# Patient Record
Sex: MALE | Race: WHITE | NOT HISPANIC OR LATINO | Employment: OTHER | URBAN - METROPOLITAN AREA
[De-identification: names, ages, dates, MRNs, and addresses within clinical notes are randomized per-mention and may not be internally consistent; named-entity substitution may affect disease eponyms.]

---

## 2017-01-06 ENCOUNTER — ALLIED HEALTH/NURSE VISIT (OUTPATIENT)
Dept: NURSING | Facility: CLINIC | Age: 27
End: 2017-01-06
Payer: COMMERCIAL

## 2017-01-06 DIAGNOSIS — E34.9 TESTOSTERONE DEFICIENCY: Primary | ICD-10-CM

## 2017-01-06 PROCEDURE — 96372 THER/PROPH/DIAG INJ SC/IM: CPT

## 2017-01-06 PROCEDURE — 99207 ZZC NO CHARGE NURSE ONLY: CPT

## 2017-01-26 ENCOUNTER — ALLIED HEALTH/NURSE VISIT (OUTPATIENT)
Dept: NURSING | Facility: CLINIC | Age: 27
End: 2017-01-26
Payer: COMMERCIAL

## 2017-01-26 DIAGNOSIS — E34.9 TESTOSTERONE DEFICIENCY: Primary | ICD-10-CM

## 2017-01-26 PROCEDURE — 99207 ZZC NO CHARGE NURSE ONLY: CPT

## 2017-01-26 PROCEDURE — 96372 THER/PROPH/DIAG INJ SC/IM: CPT

## 2017-02-10 ENCOUNTER — ALLIED HEALTH/NURSE VISIT (OUTPATIENT)
Dept: NURSING | Facility: CLINIC | Age: 27
End: 2017-02-10
Payer: COMMERCIAL

## 2017-02-10 DIAGNOSIS — E34.9 TESTOSTERONE DEFICIENCY: Primary | ICD-10-CM

## 2017-02-10 PROCEDURE — 96372 THER/PROPH/DIAG INJ SC/IM: CPT

## 2017-02-10 PROCEDURE — 99207 ZZC NO CHARGE NURSE ONLY: CPT

## 2017-02-28 ENCOUNTER — ALLIED HEALTH/NURSE VISIT (OUTPATIENT)
Dept: NURSING | Facility: CLINIC | Age: 27
End: 2017-02-28
Payer: COMMERCIAL

## 2017-02-28 DIAGNOSIS — E34.9 TESTOSTERONE DEFICIENCY: Primary | ICD-10-CM

## 2017-02-28 PROCEDURE — 96372 THER/PROPH/DIAG INJ SC/IM: CPT

## 2017-02-28 PROCEDURE — 99207 ZZC NO CHARGE NURSE ONLY: CPT

## 2017-03-21 ENCOUNTER — ALLIED HEALTH/NURSE VISIT (OUTPATIENT)
Dept: NURSING | Facility: CLINIC | Age: 27
End: 2017-03-21
Payer: COMMERCIAL

## 2017-03-21 DIAGNOSIS — E34.9 TESTOSTERONE DEFICIENCY: Primary | ICD-10-CM

## 2017-03-21 PROCEDURE — 96372 THER/PROPH/DIAG INJ SC/IM: CPT

## 2017-03-21 PROCEDURE — 99207 ZZC NO CHARGE NURSE ONLY: CPT

## 2017-03-21 NOTE — MR AVS SNAPSHOT
"              After Visit Summary   3/21/2017    Mamadou Reece    MRN: [id number]           Patient Information     Date Of Birth          1990        Visit Information        Provider Department      3/21/2017 8:30 AM  NURSE [facility]        Today's Diagnoses     Testosterone deficiency    -  1       Follow-ups after your visit        Who to contact     If you have questions or need follow up information about today's clinic visit or your schedule please contact [facility] directly at [phone].  Normal or non-critical lab and imaging results will be communicated to you by MyChart, letter or phone within 4 business days after the clinic has received the results. If you do not hear from us within 7 days, please contact the clinic through [employer] or phone. If you have a critical or abnormal lab result, we will notify you by phone as soon as possible.  Submit refill requests through [employer] or call your pharmacy and they will forward the refill request to us. Please allow 3 business days for your refill to be completed.          Additional Information About Your Visit        MyChart Information     [employer] lets you send messages to your doctor, view your test results, renew your prescriptions, schedule appointments and more. To sign up, go to [email or web address] . Click on \"Log in\" on the left side of the screen, which will take you to the Welcome page. Then click on \"Sign up Now\" on the right side of the page.     You will be asked to enter the access code listed below, as well as some personal information. Please follow the directions to create your username and password.     Your access code is: [email or web address]  Expires: 2017  9:55 AM     Your access code will  in 90 days. If you need help or a new code, please call your [facility] or [phone].        Care EveryWhere ID     This is your Care EveryWhere ID. This could be used by other organizations to " access your [place] medical records  [id number]         Blood Pressure from Last 3 Encounters:   08/03/16 102/70   09/23/15 104/72   02/25/14 110/76    Weight from Last 3 Encounters:   08/03/16 184 lb 9.6 oz (83.7 kg)   09/23/15 195 lb 14.4 oz (88.9 kg)   02/25/14 194 lb (88 kg)              We Performed the Following     C TESTOSTERONE CYPIONATE INJECTION, 1 MG     INJECTION INTRAMUSCULAR OR SUB-Q        Primary Care Provider Office Phone # Fax #    Mariluz Mae [phone]       [facility] [address] [address]  [facility] [address]        Thank you!     Thank you for choosing [facility]  for your care. Our goal is always to provide you with excellent care. Hearing back from our patients is one way we can continue to improve our services. Please take a few minutes to complete the written survey that you may receive in the mail after your visit with us. Thank you!             Your Updated Medication List - Protect others around you: Learn how to safely use, store and throw away your medicines at www.disposemymeds.org.          This list is accurate as of: 3/21/17  9:11 AM.  Always use your most recent med list.                   Brand Name Dispense Instructions for use    ADDERALL XR 30 MG per 24 hr capsule   Generic drug:  amphetamine-dextroamphetamine      Take 30 mg by mouth daily.       B-12 1000 MCG Tbcr     100 tablet    Take 1,000 mcg by mouth daily       CALCIUM + D 315-200 MG-UNIT Tabs per tablet   Generic drug:  calcium citrate-vitamin D      Take  by mouth.       desmopressin acetate spray 0.01 % Soln      Spray  in nostril. 3 sprays once daily       levothyroxine 137 MCG tablet    SYNTHROID/LEVOTHROID     Take 137 mcg by mouth daily.       NORDITROPIN NORDIFLEX PEN SC      Inject 2 mLs Subcutaneous       predniSONE 2.5 MG tablet    DELTASONE     Take 2.5 mg by mouth daily. 3 tabs in am, and 2 in pm-unsure of dose       testosterone cypionate 100 MG/ML Soln  inj      Inject 1 mL into the muscle every 14 days       vitamin D 1000 UNITS capsule      Take 1 capsule by mouth daily.

## 2017-04-21 ENCOUNTER — TELEPHONE (OUTPATIENT)
Dept: FAMILY MEDICINE | Facility: CLINIC | Age: 27
End: 2017-04-21

## 2017-04-21 DIAGNOSIS — E23.0 PANHYPOPITUITARISM (H): ICD-10-CM

## 2017-04-21 DIAGNOSIS — E34.9 TESTOSTERONE DEFICIENCY: Primary | ICD-10-CM

## 2017-04-21 RX ORDER — TESTOSTERONE CYPIONATE 1000 MG/10ML
100 INJECTION, SOLUTION INTRAMUSCULAR
OUTPATIENT
Start: 2017-04-21 | End: 2024-08-08

## 2017-04-21 NOTE — TELEPHONE ENCOUNTER
Pt arrived for scheduled testosterone injection  testosterone cypionate 100 MG/ML SOLN inj   9/20/2016  --   Sig: Inject 1 mL into the muscle every 14 days   Class: Historical   Route: Intramuscular     Med is historical, no current order for medication  Was last seen August 2016 by PCP, saw endocrine 9-20-16, notes indicate testosterone per PCP  Above note states 100 mg/ml, but our stock comes 200 mg/ml,  Previous injections state being given 200 mg    Contacted PCP office, but PCP out of office    NEED NEW ORDER FOR MEDICATION WITH CORRECT DOSAGE AND FREQUENCY  NO TESTOSTERONE LEVEL ON FILE    Explained to pts father that we were unable to obtain current rx order, states he will call on Monday to make sure we have current order and will then schedule appt for injection  DIiscused with nurse manager    Prisca Melendrez RN, BS  Clinical Nurse Triage.

## 2017-04-21 NOTE — TELEPHONE ENCOUNTER
This looks like it is routed to me as FYI, but there are instructions to someone to call Endocrinology office and family.     Is this for when Dr. Clark's office calls back?     We certainly need to let the family know what is going on.  I will be there on Monday; I can talk with them if needed.      Thanks!

## 2017-04-21 NOTE — TELEPHONE ENCOUNTER
Dr. Tristan office has not called back yet.    Spoke with supervisor concerning shot protocol. [facility] no longer does testosterone shots. Patient's may go to [facility] or [facility] for RN only appt.    If [facility] provider must send prescription to [facility] pharmacy with refills enough for 6 months. If  provider must put prescription in chart with refills for 6 months. Prescription must read to be given by RN in clinic.    Outside provider, like endocrinology, may send prescription to [facility] or fax to [facility]. It does not need to come from pcp.    Prescriptions  in 6 months, so either way this patient needs new prescription.    Please discuss option with Dr. Tristan office if they want to send prescription.    Please call family to decide where they would like the prescription sent to.    Routed to pcp as FYI.    Jeni Braswell RN

## 2017-04-21 NOTE — TELEPHONE ENCOUNTER
"Several questions....      Looks like there were phone messages from last October. I am not sure if they have all been answered.  I have not wanted to be dosing. I have transcribed an order and given to him here for family convenience.  Looks like we got an order last Fall, but I had asked for how long the order was good for and do not see that answer (see phone message on 10/20/16).  From that; the dose has been 200 mg q 2 weeks:    S\"sivan with [facility]-Dr. Clark's office left VM message.  Dosing instructions for Depo Testosterone/Testosterone Cypionate-100mg/ml  Inject 200 mg IM every two weeks.  If any questions, call [facility] at [phone].\"    -------  Also, I thought I overheard that we will not be doing testosterone injections any longer? Is that true?   We need clarification on this...     If we cannot clarify by Monday; OK to give the 200 mg IM. It looks like we have been doing this all along; though I don't see the clarification there from last October yet... For how long this is good for and when he should be getting his testosterone checked (or did he)?    "

## 2017-04-24 NOTE — TELEPHONE ENCOUNTER
Left msg at Dr. Clark's office for eduardo to call back.  Per Dr. Mae, he should be writing prescription and they can send it to [facility] and then pt can have RN only visit to get injection there, but she does not wish to do the prescribing of the med.

## 2017-04-24 NOTE — TELEPHONE ENCOUNTER
From my understanding, they can do it from outside providers, is that correct? (that's what Jeni's note says)    If so, the order should come from the specialist.   The father may want to contact the specialty office as well.

## 2017-04-24 NOTE — TELEPHONE ENCOUNTER
Father calling-wanting to schedule Testosterone injection but wants to make sure we have order first this time.  Advised per below looks like RM has call in for Dr. Clark.  He was in [facility] the other day for injection but no current order so had to be sent away.  He is now wanting to come to [facility] for injections as RM no longer giving but if we are giving we do need to have current order to provide this service.  Brittaney Davis RN

## 2017-04-25 RX ORDER — TESTOSTERONE CYPIONATE 200 MG/ML
200 INJECTION, SOLUTION INTRAMUSCULAR
Qty: 1 ML | Refills: 5 | Status: SHIPPED | OUTPATIENT
Start: 2017-04-25 | End: 2017-08-08

## 2017-04-25 NOTE — TELEPHONE ENCOUNTER
"Faxed order dated 4/24/17 from [facility]   Endocrinology Department ph [phone], fax [phone]     Fax appears to be a prescription for testosterone.  We faxed note back to endo:     \"Please clarify.  Is this a prescription for pharmacy dispense? Or order for nursing?  If nurse order, what is end date?  If Rx, our pharmacy fax [phone]. \"    (Once we hear back from endo, let's call and give pt option to use clinic's testosterone. It could save him some time waiting in pharmacy for Rx dispense)   Darrel Gray, MICKIE - [place]      "

## 2017-04-25 NOTE — TELEPHONE ENCOUNTER
Father called in again to schedule testosterone  Waiting to get specifics from endocrinology, father was planning to contact endo to tell them we just need order including an end date, that we carry stock of medication in [place]    Prisca Melendrez RN, BS  Clinical Nurse Triage.

## 2017-04-25 NOTE — TELEPHONE ENCOUNTER
Spoke to father Pat,  Scheduled for labs in 1 1/2 wks after testosterone shot is given at [facility]  Considering changing to local endocrinologist, suggested our [facility] providers, will consider    I will f/u with pt & father Wed once order placed  Prisca Melendrez RN, BS  Clinical Nurse Triage.

## 2017-04-25 NOTE — TELEPHONE ENCOUNTER
I did place the order (I believe correctly).    It does look like it is for 200 mg IM q 2 weeks.  He had prn refills; but I have placed with 5 refills.     He will need to be seen in  by early August.  I believe the Endocrinologist had previously said to be seen in a year, which would be September.    Would be great if he switches to  Endocrinology.  Let me know if I should put in a referral.    Thanks so much for working with this family!

## 2017-04-25 NOTE — TELEPHONE ENCOUNTER
Triage received fax for Testosterone via fax. Rx placed in PCP in basket.    Estefania Catalan RN, BSN, PHN

## 2017-04-25 NOTE — TELEPHONE ENCOUNTER
Fax that was received this morning from endocrinologist showing rx for testosterone was just faxed to Dr Mae for review  Prisca Melendrez RN, BS  Clinical Nurse Triage.

## 2017-04-25 NOTE — TELEPHONE ENCOUNTER
Will route to the person who sent to me as not sure how to do routing in [facility]; and to our triage pool to help in the event that person is not there...    I can do an order based on the Endocrine recommendations...     But now I have not seen the prescription....   In the past, it was 200 mg IM q 2 weeks.   Is this what it is?    Is there an end date?   Can you fax a copy of the prescription or help with the order?   I am not sure I know the way the order needs to be in there; can someone help and I can cosign?

## 2017-04-25 NOTE — TELEPHONE ENCOUNTER
Father got a hold of endocrine office, since they are not part of [facility] they can send prescription but not just an order for the medication    [facility] has testosterone in clinic so pt does not need to  rx at pharmacy to be given here    Can we get order for testosterone based on endocrine recommendations so pt can get back on track getting injections?    Father states they plan to get testosterone level checked once pt is back on medications since would be low now,  Can we get testosterone level order for future date?    Route to PCP  Prisca Melendrez RN, BS  Clinical Nurse Triage.

## 2017-04-25 NOTE — TELEPHONE ENCOUNTER
AV RN will send prescription fax for PCP to review.    Please sign a future testosterone lab order as pt. is not up to date on his levels.    Estefania Catalan, RN, BSN, PHN

## 2017-04-26 NOTE — TELEPHONE ENCOUNTER
LM on VM  Encouraged father, Erendira to schedule appt for son,  Provided call back number  Prisca Melendrez RN, BS  Clinical Nurse Triage.

## 2017-04-28 ENCOUNTER — ALLIED HEALTH/NURSE VISIT (OUTPATIENT)
Dept: NURSING | Facility: CLINIC | Age: 27
End: 2017-04-28
Payer: COMMERCIAL

## 2017-04-28 DIAGNOSIS — E34.9 TESTOSTERONE DEFICIENCY: Primary | ICD-10-CM

## 2017-04-28 PROCEDURE — 96372 THER/PROPH/DIAG INJ SC/IM: CPT

## 2017-04-28 PROCEDURE — 99207 ZZC NO CHARGE NURSE ONLY: CPT

## 2017-04-28 NOTE — NURSING NOTE
The following medication was given:     MEDICATION: Testosterone 200 mg  ROUTE: IM  SITE: RT VG  DOSE: 200 mg (1 CC)  LOT #: [id number]  :  [employer]  EXPIRATION DATE:  06/2019  NDC#: 4811-1061-63    Mariluz Salmon RN, BSN  Message handled by Nurse Triage.

## 2017-04-28 NOTE — MR AVS SNAPSHOT
"              After Visit Summary   2017    Mamadou Reece    MRN: [id number]           Patient Information     Date Of Birth          1990        Visit Information        Provider Department      2017 8:30 AM [facility] RN [facility]        Today's Diagnoses     Testosterone deficiency    -  1       Follow-ups after your visit        Who to contact     If you have questions or need follow up information about today's clinic visit or your schedule please contact [facility] directly at [phone].  Normal or non-critical lab and imaging results will be communicated to you by MyChart, letter or phone within 4 business days after the clinic has received the results. If you do not hear from us within 7 days, please contact the clinic through MyChart or phone. If you have a critical or abnormal lab result, we will notify you by phone as soon as possible.  Submit refill requests through [employer] or call your pharmacy and they will forward the refill request to us. Please allow 3 business days for your refill to be completed.          Additional Information About Your Visit        MyChart Information     [employer] lets you send messages to your doctor, view your test results, renew your prescriptions, schedule appointments and more. To sign up, go to [email or web address] . Click on \"Log in\" on the left side of the screen, which will take you to the Welcome page. Then click on \"Sign up Now\" on the right side of the page.     You will be asked to enter the access code listed below, as well as some personal information. Please follow the directions to create your username and password.     Your access code is: [email or web address]  Expires: 2017  9:55 AM     Your access code will  in 90 days. If you need help or a new code, please call your [facility] or [phone].        Care EveryWhere ID     This is your Care EveryWhere ID. This could be used by other organizations to " access your [place] medical records  [id number]         Blood Pressure from Last 3 Encounters:   08/03/16 102/70   09/23/15 104/72   02/25/14 110/76    Weight from Last 3 Encounters:   08/03/16 184 lb 9.6 oz (83.7 kg)   09/23/15 195 lb 14.4 oz (88.9 kg)   02/25/14 194 lb (88 kg)              We Performed the Following     C TESTOSTERONE CYPIONATE INJECTION, 1 MG     INJECTION INTRAMUSCULAR OR SUB-Q        Primary Care Provider Office Phone # Fax #    Mariluz Mae [phone]       [facility] [address] [address] [facility] [address]        Thank you!     Thank you for choosing [facility]  for your care. Our goal is always to provide you with excellent care. Hearing back from our patients is one way we can continue to improve our services. Please take a few minutes to complete the written survey that you may receive in the mail after your visit with us. Thank you!             Your Updated Medication List - Protect others around you: Learn how to safely use, store and throw away your medicines at www.disposemymeds.org.          This list is accurate as of: 4/28/17 10:02 AM.  Always use your most recent med list.                   Brand Name Dispense Instructions for use    ADDERALL XR 30 MG per 24 hr capsule   Generic drug:  amphetamine-dextroamphetamine      Take 30 mg by mouth daily.       B-12 1000 MCG Tbcr     100 tablet    Take 1,000 mcg by mouth daily       CALCIUM + D 315-200 MG-UNIT Tabs per tablet   Generic drug:  calcium citrate-vitamin D      Take  by mouth.       desmopressin acetate spray 0.01 % Soln      Spray  in nostril. 3 sprays once daily       levothyroxine 137 MCG tablet    SYNTHROID/LEVOTHROID     Take 137 mcg by mouth daily.       NORDITROPIN NORDIFLEX PEN SC      Inject 2 mLs Subcutaneous       predniSONE 2.5 MG tablet    DELTASONE     Take 2.5 mg by mouth daily. 3 tabs in am, and 2 in pm-unsure of dose       * testosterone cypionate 100 MG/ML  Soln inj      Inject 1 mL into the muscle every 14 days       * testosterone cypionate 200 MG/ML injection    DEPOTESTOTERONE    1 mL    Inject 1 mL (200 mg) into the muscle every 14 days       vitamin D 1000 UNITS capsule      Take 1 capsule by mouth daily.       * Notice:  This list has 2 medication(s) that are the same as other medications prescribed for you. Read the directions carefully, and ask your doctor or other care provider to review them with you.

## 2017-05-12 ENCOUNTER — ALLIED HEALTH/NURSE VISIT (OUTPATIENT)
Dept: NURSING | Facility: CLINIC | Age: 27
End: 2017-05-12
Payer: COMMERCIAL

## 2017-05-12 DIAGNOSIS — E34.9 TESTOSTERONE DEFICIENCY: Primary | ICD-10-CM

## 2017-05-12 PROCEDURE — 96372 THER/PROPH/DIAG INJ SC/IM: CPT

## 2017-05-12 PROCEDURE — 99207 ZZC NO CHARGE NURSE ONLY: CPT

## 2017-05-12 NOTE — MR AVS SNAPSHOT
"              After Visit Summary   2017    Mamadou Reece    MRN: [id number]           Patient Information     Date Of Birth          1990        Visit Information        Provider Department      2017 8:15 AM CR RN [facility]        Today's Diagnoses     Testosterone deficiency    -  1       Follow-ups after your visit        Follow-up notes from your care team     Return in about 2 weeks (around 2017) for testosterone .      Who to contact     If you have questions or need follow up information about today's clinic visit or your schedule please contact [facility] directly at [phone].  Normal or non-critical lab and imaging results will be communicated to you by MyChart, letter or phone within 4 business days after the clinic has received the results. If you do not hear from us within 7 days, please contact the clinic through [employer] or phone. If you have a critical or abnormal lab result, we will notify you by phone as soon as possible.  Submit refill requests through [employer] or call your pharmacy and they will forward the refill request to us. Please allow 3 business days for your refill to be completed.          Additional Information About Your Visit        MyChart Information     [employer] lets you send messages to your doctor, view your test results, renew your prescriptions, schedule appointments and more. To sign up, go to [email or web address] . Click on \"Log in\" on the left side of the screen, which will take you to the Welcome page. Then click on \"Sign up Now\" on the right side of the page.     You will be asked to enter the access code listed below, as well as some personal information. Please follow the directions to create your username and password.     Your access code is: [email or web address]  Expires: 2017  9:55 AM     Your access code will  in 90 days. If you need help or a new code, please call your [facility] or " [phone].        Care EveryWhere ID     This is your Care EveryWhere ID. This could be used by other organizations to access your [place] medical records  [id number]         Blood Pressure from Last 3 Encounters:   08/03/16 102/70   09/23/15 104/72   02/25/14 110/76    Weight from Last 3 Encounters:   08/03/16 184 lb 9.6 oz (83.7 kg)   09/23/15 195 lb 14.4 oz (88.9 kg)   02/25/14 194 lb (88 kg)              We Performed the Following     C TESTOSTERONE CYPIONATE INJECTION, 1 MG     INJECTION INTRAMUSCULAR OR SUB-Q        Primary Care Provider Office Phone # Fax #    Mariluz Mae [phone]       [facility] [address] [address]  [facility] [address]        Thank you!     Thank you for choosing [facility]  for your care. Our goal is always to provide you with excellent care. Hearing back from our patients is one way we can continue to improve our services. Please take a few minutes to complete the written survey that you may receive in the mail after your visit with us. Thank you!             Your Updated Medication List - Protect others around you: Learn how to safely use, store and throw away your medicines at www.disposemymeds.org.          This list is accurate as of: 5/12/17  8:40 AM.  Always use your most recent med list.                   Brand Name Dispense Instructions for use    ADDERALL XR 30 MG per 24 hr capsule   Generic drug:  amphetamine-dextroamphetamine      Take 30 mg by mouth daily.       B-12 1000 MCG Tbcr     100 tablet    Take 1,000 mcg by mouth daily       CALCIUM + D 315-200 MG-UNIT Tabs per tablet   Generic drug:  calcium citrate-vitamin D      Take  by mouth.       desmopressin acetate spray 0.01 % Soln      Spray  in nostril. 3 sprays once daily       levothyroxine 137 MCG tablet    SYNTHROID/LEVOTHROID     Take 137 mcg by mouth daily.       NORDITROPIN NORDIFLEX PEN SC      Inject 2 mLs Subcutaneous       predniSONE 2.5 MG tablet     DELTASONE     Take 2.5 mg by mouth daily. 3 tabs in am, and 2 in pm-unsure of dose       * testosterone cypionate 100 MG/ML Soln inj      Inject 1 mL into the muscle every 14 days       * testosterone cypionate 200 MG/ML injection    DEPOTESTOTERONE    1 mL    Inject 1 mL (200 mg) into the muscle every 14 days       vitamin D 1000 UNITS capsule      Take 1 capsule by mouth daily.       * Notice:  This list has 2 medication(s) that are the same as other medications prescribed for you. Read the directions carefully, and ask your doctor or other care provider to review them with you.

## 2017-06-01 ENCOUNTER — ALLIED HEALTH/NURSE VISIT (OUTPATIENT)
Dept: NURSING | Facility: CLINIC | Age: 27
End: 2017-06-01
Payer: COMMERCIAL

## 2017-06-01 DIAGNOSIS — E34.9 TESTOSTERONE DEFICIENCY: Primary | ICD-10-CM

## 2017-06-01 PROCEDURE — 99207 ZZC NO CHARGE NURSE ONLY: CPT

## 2017-06-01 PROCEDURE — 96372 THER/PROPH/DIAG INJ SC/IM: CPT

## 2017-06-01 NOTE — NURSING NOTE
The following medication was given:     MEDICATION: Testosterone 200 mg  ROUTE: IM  SITE: Vastus Lateralis - Left  DOSE: 200 mg  LOT #: [id number]  :  [employer]  EXPIRATION DATE:  06/2019  NDC#: 2659-6811-57    Prisca Melendrez RN, BS  Clinical Nurse Triage.

## 2017-06-01 NOTE — MR AVS SNAPSHOT
"              After Visit Summary   2017    Mamadou Reece    MRN: [id number]           Patient Information     Date Of Birth          1990        Visit Information        Provider Department      2017 8:30 AM CR RN [facility]        Today's Diagnoses     Testosterone deficiency    -  1       Follow-ups after your visit        Follow-up notes from your care team     Return in about 2 weeks (around 6/15/2017).      Who to contact     If you have questions or need follow up information about today's clinic visit or your schedule please contact [facility] directly at [phone].  Normal or non-critical lab and imaging results will be communicated to you by [employer], letter or phone within 4 business days after the clinic has received the results. If you do not hear from us within 7 days, please contact the clinic through [employer] or phone. If you have a critical or abnormal lab result, we will notify you by phone as soon as possible.  Submit refill requests through [employer] or call your pharmacy and they will forward the refill request to us. Please allow 3 business days for your refill to be completed.          Additional Information About Your Visit        MyChart Information     [employer] lets you send messages to your doctor, view your test results, renew your prescriptions, schedule appointments and more. To sign up, go to [email or web address] . Click on \"Log in\" on the left side of the screen, which will take you to the Welcome page. Then click on \"Sign up Now\" on the right side of the page.     You will be asked to enter the access code listed below, as well as some personal information. Please follow the directions to create your username and password.     Your access code is: [email or web address]  Expires: 2017  9:01 AM     Your access code will  in 90 days. If you need help or a new code, please call your [facility] or [phone].        Care " EveryWhere ID     This is your Care EveryWhere ID. This could be used by other organizations to access your [place] medical records  [id number]         Blood Pressure from Last 3 Encounters:   08/03/16 102/70   09/23/15 104/72   02/25/14 110/76    Weight from Last 3 Encounters:   08/03/16 184 lb 9.6 oz (83.7 kg)   09/23/15 195 lb 14.4 oz (88.9 kg)   02/25/14 194 lb (88 kg)              We Performed the Following     C TESTOSTERONE CYPIONATE INJECTION, 1 MG     INJECTION INTRAMUSCULAR OR SUB-Q        Primary Care Provider Office Phone # Fax #    Mariluz Mae [phone]       [facility] [address] [facility] [address]        Thank you!     Thank you for choosing [facility]  for your care. Our goal is always to provide you with excellent care. Hearing back from our patients is one way we can continue to improve our services. Please take a few minutes to complete the written survey that you may receive in the mail after your visit with us. Thank you!             Your Updated Medication List - Protect others around you: Learn how to safely use, store and throw away your medicines at www.disposemymeds.org.          This list is accurate as of: 6/1/17  9:01 AM.  Always use your most recent med list.                   Brand Name Dispense Instructions for use    ADDERALL XR 30 MG per 24 hr capsule   Generic drug:  amphetamine-dextroamphetamine      Take 30 mg by mouth daily.       B-12 1000 MCG Tbcr     100 tablet    Take 1,000 mcg by mouth daily       CALCIUM + D 315-200 MG-UNIT Tabs per tablet   Generic drug:  calcium citrate-vitamin D      Take  by mouth.       desmopressin acetate spray 0.01 % Soln      Spray  in nostril. 3 sprays once daily       levothyroxine 137 MCG tablet    SYNTHROID/LEVOTHROID     Take 137 mcg by mouth daily.       NORDITROPIN NORDIFLEX PEN SC      Inject 2 mLs Subcutaneous       predniSONE 2.5 MG tablet    DELTASONE     Take 2.5 mg by  mouth daily. 3 tabs in am, and 2 in pm-unsure of dose       * testosterone cypionate 100 MG/ML Soln inj      Inject 1 mL into the muscle every 14 days       * testosterone cypionate 200 MG/ML injection    DEPOTESTOTERONE    1 mL    Inject 1 mL (200 mg) into the muscle every 14 days       vitamin D 1000 UNITS capsule      Take 1 capsule by mouth daily.       * Notice:  This list has 2 medication(s) that are the same as other medications prescribed for you. Read the directions carefully, and ask your doctor or other care provider to review them with you.

## 2017-06-19 ENCOUNTER — TELEPHONE (OUTPATIENT)
Dept: FAMILY MEDICINE | Facility: CLINIC | Age: 27
End: 2017-06-19

## 2017-06-20 ENCOUNTER — ALLIED HEALTH/NURSE VISIT (OUTPATIENT)
Dept: NURSING | Facility: CLINIC | Age: 27
End: 2017-06-20
Payer: COMMERCIAL

## 2017-06-20 DIAGNOSIS — E34.9 TESTOSTERONE DEFICIENCY: ICD-10-CM

## 2017-06-20 PROCEDURE — 99207 ZZC NO CHARGE NURSE ONLY: CPT

## 2017-06-20 PROCEDURE — 96372 THER/PROPH/DIAG INJ SC/IM: CPT

## 2017-06-20 NOTE — PROGRESS NOTES
Per orders of Dr. Mariluz Mae, injection of Depo-Testosterone 200mg given. Patient instructed to remain in clinic for 20 minutes afterwards and to report any adverse reaction to me immediately.  Darrel Gray RN

## 2017-06-20 NOTE — MR AVS SNAPSHOT
"              After Visit Summary   2017    Mamadou Reece    MRN: [id number]           Patient Information     Date Of Birth          1990        Visit Information        Provider Department      2017 8:30 AM CR RN [facility]        Today's Diagnoses     Testosterone deficiency           Follow-ups after your visit        Follow-up notes from your care team     Return in about 14 years (around 2031).      Who to contact     If you have questions or need follow up information about today's clinic visit or your schedule please contact [facility] directly at [phone].  Normal or non-critical lab and imaging results will be communicated to you by [employer], letter or phone within 4 business days after the clinic has received the results. If you do not hear from us within 7 days, please contact the clinic through [employer] or phone. If you have a critical or abnormal lab result, we will notify you by phone as soon as possible.  Submit refill requests through [employer] or call your pharmacy and they will forward the refill request to us. Please allow 3 business days for your refill to be completed.          Additional Information About Your Visit        MyChart Information     [employer] lets you send messages to your doctor, view your test results, renew your prescriptions, schedule appointments and more. To sign up, go to [email or web address] . Click on \"Log in\" on the left side of the screen, which will take you to the Welcome page. Then click on \"Sign up Now\" on the right side of the page.     You will be asked to enter the access code listed below, as well as some personal information. Please follow the directions to create your username and password.     Your access code is: [email or web address]  Expires: 2017  9:01 AM     Your access code will  in 90 days. If you need help or a new code, please call your [facility] or [phone].        Care " EveryWhere ID     This is your Care EveryWhere ID. This could be used by other organizations to access your [place] medical records  [id number]         Blood Pressure from Last 3 Encounters:   08/03/16 102/70   09/23/15 104/72   02/25/14 110/76    Weight from Last 3 Encounters:   08/03/16 184 lb 9.6 oz (83.7 kg)   09/23/15 195 lb 14.4 oz (88.9 kg)   02/25/14 194 lb (88 kg)              We Performed the Following     C TESTOSTERONE CYPIONATE INJECTION, 1 MG     INJECTION INTRAMUSCULAR OR SUB-Q        Primary Care Provider Office Phone # Fax #    Mariluz Mae -[phone]       [facility] [address] [facility] [address]        Thank you!     Thank you for choosing [facility]  for your care. Our goal is always to provide you with excellent care. Hearing back from our patients is one way we can continue to improve our services. Please take a few minutes to complete the written survey that you may receive in the mail after your visit with us. Thank you!             Your Updated Medication List - Protect others around you: Learn how to safely use, store and throw away your medicines at www.disposemymeds.org.          This list is accurate as of: 6/20/17  9:02 AM.  Always use your most recent med list.                   Brand Name Dispense Instructions for use    ADDERALL XR 30 MG per 24 hr capsule   Generic drug:  amphetamine-dextroamphetamine      Take 30 mg by mouth daily.       B-12 1000 MCG Tbcr     100 tablet    Take 1,000 mcg by mouth daily       CALCIUM + D 315-200 MG-UNIT Tabs per tablet   Generic drug:  calcium citrate-vitamin D      Take  by mouth.       desmopressin acetate spray 0.01 % Soln      Spray  in nostril. 3 sprays once daily       levothyroxine 137 MCG tablet    SYNTHROID/LEVOTHROID     Take 137 mcg by mouth daily.       NORDITROPIN NORDIFLEX PEN SC      Inject 2 mLs Subcutaneous       predniSONE 2.5 MG tablet    DELTASONE     Take 2.5 mg by  mouth daily. 3 tabs in am, and 2 in pm-unsure of dose       testosterone cypionate 200 MG/ML injection    DEPOTESTOTERONE    1 mL    Inject 1 mL (200 mg) into the muscle every 14 days       vitamin D 1000 UNITS capsule      Take 1 capsule by mouth daily.

## 2017-07-05 ENCOUNTER — ALLIED HEALTH/NURSE VISIT (OUTPATIENT)
Dept: NURSING | Facility: CLINIC | Age: 27
End: 2017-07-05
Payer: COMMERCIAL

## 2017-07-05 DIAGNOSIS — E34.9 TESTOSTERONE DEFICIENCY: Primary | ICD-10-CM

## 2017-07-05 PROCEDURE — 99207 ZZC NO CHARGE NURSE ONLY: CPT

## 2017-07-05 PROCEDURE — 96372 THER/PROPH/DIAG INJ SC/IM: CPT

## 2017-07-05 NOTE — MR AVS SNAPSHOT
"              After Visit Summary   2017    Mamadou Reece    MRN: [id number]           Patient Information     Date Of Birth          1990        Visit Information        Provider Department      2017 8:45 AM CR RN [facility]        Today's Diagnoses     Testosterone deficiency    -  1       Follow-ups after your visit        Follow-up notes from your care team     Return in about 2 weeks (around 2017).      Who to contact     If you have questions or need follow up information about today's clinic visit or your schedule please contact [facility] directly at [phone].  Normal or non-critical lab and imaging results will be communicated to you by [employer], letter or phone within 4 business days after the clinic has received the results. If you do not hear from us within 7 days, please contact the clinic through [employer] or phone. If you have a critical or abnormal lab result, we will notify you by phone as soon as possible.  Submit refill requests through [employer] or call your pharmacy and they will forward the refill request to us. Please allow 3 business days for your refill to be completed.          Additional Information About Your Visit        MyChart Information     [employer] lets you send messages to your doctor, view your test results, renew your prescriptions, schedule appointments and more. To sign up, go to [email or web address] . Click on \"Log in\" on the left side of the screen, which will take you to the Welcome page. Then click on \"Sign up Now\" on the right side of the page.     You will be asked to enter the access code listed below, as well as some personal information. Please follow the directions to create your username and password.     Your access code is: [email or web address]  Expires: 2017  9:01 AM     Your access code will  in 90 days. If you need help or a new code, please call your [facility] or [phone].        Care " EveryWhere ID     This is your Care EveryWhere ID. This could be used by other organizations to access your [place] medical records  [id number]         Blood Pressure from Last 3 Encounters:   08/03/16 102/70   09/23/15 104/72   02/25/14 110/76    Weight from Last 3 Encounters:   08/03/16 184 lb 9.6 oz (83.7 kg)   09/23/15 195 lb 14.4 oz (88.9 kg)   02/25/14 194 lb (88 kg)              We Performed the Following     C TESTOSTERONE CYPIONATE INJECTION, 1 MG     INJECTION INTRAMUSCULAR OR SUB-Q        Primary Care Provider Office Phone # Fax #    Mariluz Mae [phone]       [facility] [address] [facility] [address]        Equal Access to Services     MARLO OZUNA : Hadii ryan ku hadasho Soomaali, waaxda luqadaha, qaybta kaalmada adeegyada, waxbárbara garcia hayharoon osorio . So [facility] [phone].    ATENCIÓN: Si habla español, tiene a henderson disposición servicios gratuitos de asistencia lingüística. Llame al [phone].    We comply with applicable federal civil rights laws and [place] laws. We do not discriminate on the basis of race, color, national origin, age, disability sex, sexual orientation or gender identity.            Thank you!     Thank you for choosing [facility]  for your care. Our goal is always to provide you with excellent care. Hearing back from our patients is one way we can continue to improve our services. Please take a few minutes to complete the written survey that you may receive in the mail after your visit with us. Thank you!             Your Updated Medication List - Protect others around you: Learn how to safely use, store and throw away your medicines at www.disposemymeds.org.          This list is accurate as of: 7/5/17  9:13 AM.  Always use your most recent med list.                   Brand Name Dispense Instructions for use Diagnosis    ADDERALL XR 30 MG per 24 hr capsule   Generic drug:   amphetamine-dextroamphetamine      Take 30 mg by mouth daily.        B-12 1000 MCG Tbcr     100 tablet    Take 1,000 mcg by mouth daily    Routine general medical examination at a health care facility       CALCIUM + D 315-200 MG-UNIT Tabs per tablet   Generic drug:  calcium citrate-vitamin D      Take  by mouth.        desmopressin acetate spray 0.01 % Soln      Spray  in nostril. 3 sprays once daily        levothyroxine 137 MCG tablet    SYNTHROID/LEVOTHROID     Take 137 mcg by mouth daily.        NORDITROPIN NORDIFLEX PEN SC      Inject 2 mLs Subcutaneous        predniSONE 2.5 MG tablet    DELTASONE     Take 2.5 mg by mouth daily. 3 tabs in am, and 2 in pm-unsure of dose        testosterone cypionate 200 MG/ML injection    DEPOTESTOTERONE    1 mL    Inject 1 mL (200 mg) into the muscle every 14 days    Testosterone deficiency, Panhypopituitarism (H)       vitamin D 1000 UNITS capsule      Take 1 capsule by mouth daily.

## 2017-07-05 NOTE — PROGRESS NOTES
Per orders of Dr. Mariluz Mae, injection of testosterone 200mg given.. Patient instructed to remain in clinic for 20 minutes afterwards and to report any adverse reaction to me immediately.  Darrel Gray RN

## 2017-07-21 ENCOUNTER — OFFICE VISIT (OUTPATIENT)
Dept: NURSING | Facility: CLINIC | Age: 27
End: 2017-07-21
Payer: COMMERCIAL

## 2017-07-21 DIAGNOSIS — E34.9 TESTOSTERONE DEFICIENCY: Primary | ICD-10-CM

## 2017-07-21 PROCEDURE — 96372 THER/PROPH/DIAG INJ SC/IM: CPT

## 2017-07-21 PROCEDURE — 99207 ZZC NO CHARGE NURSE ONLY: CPT

## 2017-07-21 NOTE — MR AVS SNAPSHOT
"              After Visit Summary   2017    Mamadou Reece    MRN: [id number]           Patient Information     Date Of Birth          1990        Visit Information        Provider Department      2017 9:00 AM CR RN [facility]        Today's Diagnoses     Testosterone deficiency    -  1       Follow-ups after your visit        Who to contact     If you have questions or need follow up information about today's clinic visit or your schedule please contact [facility] directly at [phone].  Normal or non-critical lab and imaging results will be communicated to you by MyChart, letter or phone within 4 business days after the clinic has received the results. If you do not hear from us within 7 days, please contact the clinic through MyChart or phone. If you have a critical or abnormal lab result, we will notify you by phone as soon as possible.  Submit refill requests through [employer] or call your pharmacy and they will forward the refill request to us. Please allow 3 business days for your refill to be completed.          Additional Information About Your Visit        MyChart Information     [employer] lets you send messages to your doctor, view your test results, renew your prescriptions, schedule appointments and more. To sign up, go to [email or web address] . Click on \"Log in\" on the left side of the screen, which will take you to the Welcome page. Then click on \"Sign up Now\" on the right side of the page.     You will be asked to enter the access code listed below, as well as some personal information. Please follow the directions to create your username and password.     Your access code is: [email or web address]  Expires: 2017  9:01 AM     Your access code will  in 90 days. If you need help or a new code, please call your [facility] or [phone].        Care EveryWhere ID     This is your Care EveryWhere ID. This could be used by other organizations to " access your [place] medical records  [id number]         Blood Pressure from Last 3 Encounters:   08/03/16 102/70   09/23/15 104/72   02/25/14 110/76    Weight from Last 3 Encounters:   08/03/16 184 lb 9.6 oz (83.7 kg)   09/23/15 195 lb 14.4 oz (88.9 kg)   02/25/14 194 lb (88 kg)              We Performed the Following     C TESTOSTERONE CYPIONATE INJECTION, 1 MG     INJECTION INTRAMUSCULAR OR SUB-Q        Primary Care Provider Office Phone # Fax #    Mariluz Mae [phone]       [facility] [address] [facility] [address]        Equal Access to Services     MARLO OZUNA : Hadii aad ku hadasho Sodainaali, waaxda luqadaha, qaybta kaalmada adeegyada, kylah best. So [facility] [phone].    ATENCIÓN: Si habla español, tiene a henderson disposición servicios gratuitos de asistencia lingüística. Llame al [phone].    We comply with applicable federal civil rights laws and [place] laws. We do not discriminate on the basis of race, color, national origin, age, disability sex, sexual orientation or gender identity.            Thank you!     Thank you for choosing [facility]  for your care. Our goal is always to provide you with excellent care. Hearing back from our patients is one way we can continue to improve our services. Please take a few minutes to complete the written survey that you may receive in the mail after your visit with us. Thank you!             Your Updated Medication List - Protect others around you: Learn how to safely use, store and throw away your medicines at www.disposemymeds.org.          This list is accurate as of: 7/21/17  9:20 AM.  Always use your most recent med list.                   Brand Name Dispense Instructions for use Diagnosis    ADDERALL XR 30 MG per 24 hr capsule   Generic drug:  amphetamine-dextroamphetamine      Take 30 mg by mouth daily.        B-12 1000 MCG Tbcr     100 tablet    Take 1,000 mcg  by mouth daily    Routine general medical examination at a health care facility       CALCIUM + D 315-200 MG-UNIT Tabs per tablet   Generic drug:  calcium citrate-vitamin D      Take  by mouth.        desmopressin acetate spray 0.01 % Soln      Spray  in nostril. 3 sprays once daily        levothyroxine 137 MCG tablet    SYNTHROID/LEVOTHROID     Take 137 mcg by mouth daily.        NORDITROPIN NORDIFLEX PEN SC      Inject 2 mLs Subcutaneous        predniSONE 2.5 MG tablet    DELTASONE     Take 2.5 mg by mouth daily. 3 tabs in am, and 2 in pm-unsure of dose        testosterone cypionate 200 MG/ML injection    DEPOTESTOTERONE    1 mL    Inject 1 mL (200 mg) into the muscle every 14 days    Testosterone deficiency, Panhypopituitarism (H)       vitamin D 1000 UNITS capsule      Take 1 capsule by mouth daily.

## 2017-08-08 ENCOUNTER — ALLIED HEALTH/NURSE VISIT (OUTPATIENT)
Dept: NURSING | Facility: CLINIC | Age: 27
End: 2017-08-08
Payer: COMMERCIAL

## 2017-08-08 ENCOUNTER — TELEPHONE (OUTPATIENT)
Dept: FAMILY MEDICINE | Facility: CLINIC | Age: 27
End: 2017-08-08

## 2017-08-08 DIAGNOSIS — E34.9 TESTOSTERONE DEFICIENCY: ICD-10-CM

## 2017-08-08 DIAGNOSIS — E34.9 TESTOSTERONE DEFICIENCY: Primary | ICD-10-CM

## 2017-08-08 DIAGNOSIS — E23.0 PANHYPOPITUITARISM (H): ICD-10-CM

## 2017-08-08 PROCEDURE — 99207 ZZC NO CHARGE NURSE ONLY: CPT

## 2017-08-08 PROCEDURE — 96372 THER/PROPH/DIAG INJ SC/IM: CPT

## 2017-08-08 RX ORDER — TESTOSTERONE CYPIONATE 200 MG/ML
200 INJECTION, SOLUTION INTRAMUSCULAR
Qty: 1 ML | Refills: 0 | OUTPATIENT
Start: 2017-08-08 | End: 2017-08-29

## 2017-08-08 NOTE — MR AVS SNAPSHOT
"              After Visit Summary   2017    Mamadou Reece    MRN: [id number]           Patient Information     Date Of Birth          1990        Visit Information        Provider Department      2017 8:30 AM CR RN [facility]        Today's Diagnoses     Testosterone deficiency    -  1       Follow-ups after your visit        Who to contact     If you have questions or need follow up information about today's clinic visit or your schedule please contact [facility] directly at [phone].  Normal or non-critical lab and imaging results will be communicated to you by MyChart, letter or phone within 4 business days after the clinic has received the results. If you do not hear from us within 7 days, please contact the clinic through [employer] or phone. If you have a critical or abnormal lab result, we will notify you by phone as soon as possible.  Submit refill requests through [employer] or call your pharmacy and they will forward the refill request to us. Please allow 3 business days for your refill to be completed.          Additional Information About Your Visit        MyChart Information     [employer] lets you send messages to your doctor, view your test results, renew your prescriptions, schedule appointments and more. To sign up, go to [email or web address] . Click on \"Log in\" on the left side of the screen, which will take you to the Welcome page. Then click on \"Sign up Now\" on the right side of the page.     You will be asked to enter the access code listed below, as well as some personal information. Please follow the directions to create your username and password.     Your access code is: [email or web address]  Expires: 2017  9:01 AM     Your access code will  in 90 days. If you need help or a new code, please call your [facility] or [phone].        Care EveryWhere ID     This is your Care EveryWhere ID. This could be used by other organizations to " access your [place] medical records  [id number]         Blood Pressure from Last 3 Encounters:   08/03/16 102/70   09/23/15 104/72   02/25/14 110/76    Weight from Last 3 Encounters:   08/03/16 184 lb 9.6 oz (83.7 kg)   09/23/15 195 lb 14.4 oz (88.9 kg)   02/25/14 194 lb (88 kg)              Today, you had the following     No orders found for display       Primary Care Provider Office Phone # Fax #    Mariluz Mae [phone]       [facility] [address] [address] [facility] [address]        Equal Access to Services     MARLO OZUNA : Hadii aad ku hadasho Soomaali, waaxda luqadaha, qaybta kaalmada adeegyada, kylah best. So [facility] [phone].    ATENCIÓN: Si habla español, tiene a hendesron disposición servicios gratuitos de asistencia lingüística. Llame al [phone].    We comply with applicable federal civil rights laws and [place] laws. We do not discriminate on the basis of race, color, national origin, age, disability sex, sexual orientation or gender identity.            Thank you!     Thank you for choosing [facility]  for your care. Our goal is always to provide you with excellent care. Hearing back from our patients is one way we can continue to improve our services. Please take a few minutes to complete the written survey that you may receive in the mail after your visit with us. Thank you!             Your Updated Medication List - Protect others around you: Learn how to safely use, store and throw away your medicines at www.disposemymeds.org.          This list is accurate as of: 8/8/17  8:50 AM.  Always use your most recent med list.                   Brand Name Dispense Instructions for use Diagnosis    ADDERALL XR 30 MG per 24 hr capsule   Generic drug:  amphetamine-dextroamphetamine      Take 30 mg by mouth daily.        B-12 1000 MCG Tbcr     100 tablet    Take 1,000 mcg by mouth daily    Routine general medical  examination at a health care facility       CALCIUM + D 315-200 MG-UNIT Tabs per tablet   Generic drug:  calcium citrate-vitamin D      Take  by mouth.        desmopressin acetate spray 0.01 % Soln      Spray  in nostril. 3 sprays once daily        levothyroxine 137 MCG tablet    SYNTHROID/LEVOTHROID     Take 137 mcg by mouth daily.        NORDITROPIN NORDIFLEX PEN SC      Inject 2 mLs Subcutaneous        predniSONE 2.5 MG tablet    DELTASONE     Take 2.5 mg by mouth daily. 3 tabs in am, and 2 in pm-unsure of dose        testosterone cypionate 200 MG/ML injection    DEPOTESTOTERONE    1 mL    Inject 1 mL (200 mg) into the muscle every 14 days    Testosterone deficiency, Panhypopituitarism (H)       vitamin D 1000 UNITS capsule      Take 1 capsule by mouth daily.

## 2017-08-29 ENCOUNTER — OFFICE VISIT (OUTPATIENT)
Dept: FAMILY MEDICINE | Facility: CLINIC | Age: 27
End: 2017-08-29
Payer: COMMERCIAL

## 2017-08-29 VITALS
SYSTOLIC BLOOD PRESSURE: 112 MMHG | HEIGHT: 73 IN | HEART RATE: 71 BPM | WEIGHT: 187.1 LBS | RESPIRATION RATE: 16 BRPM | DIASTOLIC BLOOD PRESSURE: 72 MMHG | OXYGEN SATURATION: 98 % | BODY MASS INDEX: 24.8 KG/M2 | TEMPERATURE: 97.9 F

## 2017-08-29 DIAGNOSIS — Z00.00 ENCOUNTER FOR ROUTINE ADULT HEALTH EXAMINATION WITHOUT ABNORMAL FINDINGS: Primary | ICD-10-CM

## 2017-08-29 DIAGNOSIS — E34.9 TESTOSTERONE DEFICIENCY: ICD-10-CM

## 2017-08-29 DIAGNOSIS — Z86.19 HISTORY OF CHICKEN POX: ICD-10-CM

## 2017-08-29 DIAGNOSIS — E03.9 HYPOTHYROIDISM, UNSPECIFIED TYPE: ICD-10-CM

## 2017-08-29 DIAGNOSIS — F98.8 ATTENTION DEFICIT DISORDER, UNSPECIFIED HYPERACTIVITY PRESENCE: ICD-10-CM

## 2017-08-29 DIAGNOSIS — E23.0 PANHYPOPITUITARISM (H): ICD-10-CM

## 2017-08-29 PROCEDURE — 99214 OFFICE O/P EST MOD 30 MIN: CPT | Mod: 25 | Performed by: FAMILY MEDICINE

## 2017-08-29 PROCEDURE — 99395 PREV VISIT EST AGE 18-39: CPT | Performed by: FAMILY MEDICINE

## 2017-08-29 RX ORDER — TESTOSTERONE CYPIONATE 200 MG/ML
200 INJECTION, SOLUTION INTRAMUSCULAR
Qty: 1 ML | Refills: 0
Start: 2017-08-29 | End: 2017-10-12

## 2017-08-29 NOTE — PROGRESS NOTES
SUBJECTIVE:   CC: Mamadou Reece is an 26 year old male who presents for preventative health visit.     Physical   Annual:     Getting at least 3 servings of Calcium per day::  Yes    Bi-annual eye exam::  Yes    Dental care twice a year::  Yes    Sleep apnea or symptoms of sleep apnea::  None    Diet::  Regular (no restrictions)    Frequency of exercise::  2-3 days/week    Duration of exercise::  15-30 minutes    Taking medications regularly::  Yes    Medication side effects::  None    Additional concerns today::  YES            Discuss taking over the adderal prescription.  Has an ongoing issue with testosterone.    Today's PHQ-2 Score: PHQ-2 ( 1999 Pfizer) 8/29/2017   Q1: Little interest or pleasure in doing things 0   Q2: Feeling down, depressed or hopeless 0   PHQ-2 Score 0   Q1: Little interest or pleasure in doing things Not at all   Q2: Feeling down, depressed or hopeless Not at all   PHQ-2 Score 0       Abuse: Current or Past(Physical, Sexual or Emotional)- No  Do you feel safe in your environment - Yes    Social History   Substance Use Topics     Smoking status: Never Smoker     Smokeless tobacco: Never Used     Alcohol use No     The patient does not drink >3 drinks per day nor >7 drinks per week.    Last PSA: No results found for: PSA    Reviewed orders with patient. Reviewed health maintenance and updated orders accordingly - Yes      Reviewed and updated as needed this visit by clinical staff         Reviewed and updated as needed this visit by Provider        Patient Active Problem List   Diagnosis     ADD (attention deficit disorder)     Craniopharyngioma (H)     CARDIOVASCULAR SCREENING; LDL GOAL LESS THAN 160     Mood change (H)     Legal blindness     Hypothyroidism     Panhypopituitarism (H)     History of craniopharyngioma     Testosterone deficiency       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, [facility]     Legal blindness     Craniopharyngioma      Mood disorder (H)     Craniopharyngioma     Radiation age 11    Craniopharyngioma       Past Surgical History:   Procedure Laterality Date     CRANIOTOMY, EXCISE TUMOR COMPLEX, COMBINED  age 6    craniopharyngioma       Current Outpatient Prescriptions   Medication Sig Dispense Refill     testosterone cypionate (DEPOTESTOTERONE) 200 MG/ML injection Inject 1 mL (200 mg) into the muscle every 14 days 1 mL 0     Cyanocobalamin (B-12) 1000 MCG TBCR Take 1,000 mcg by mouth daily 100 tablet 1     Somatropin (NORDITROPIN NORDIFLEX PEN SC) Inject 2 mLs Subcutaneous       Desmopressin Acetate Spray 0.01 % SOLN Spray  in nostril. 3 sprays once daily       levothyroxine (SYNTHROID, LEVOTHROID) 137 MCG tablet Take 137 mcg by mouth daily.       amphetamine-dextroamphetamine (ADDERALL XR) 30 MG per capsule Take 30 mg by mouth daily.       predniSONE (DELTASONE) 2.5 MG tablet Take 2.5 mg by mouth daily. 3 tabs in am, and 2 in pm-unsure of dose       Cholecalciferol (VITAMIN D) 1000 UNIT capsule Take 1 capsule by mouth daily.       Calcium Citrate-Vitamin D (CALCIUM + D) 315-200 MG-UNIT TABS Take  by mouth.         Family History   Problem Relation Age of Onset     CANCER Father      Melanoma     CEREBROVASCULAR DISEASE Maternal Grandmother 91     DIABETES Maternal Grandmother      CEREBROVASCULAR DISEASE Paternal Grandmother      CEREBROVASCULAR DISEASE Paternal Grandfather        Social History   Substance Use Topics     Smoking status: Never Smoker     Smokeless tobacco: Never Used     Alcohol use 0.0 oz/week     0 Standard drinks or equivalent per week      Comment: sip at holidays     Lives at home with parents.  Works ~ 3.5 hours per day (5 days per week) at [employer] in [place]; does take [employer].    Immunization History   Administered Date(s) Administered     DTAP (<7y) 1991, 04/03/1992, 07/19/1992, 04/26/1993, 09/05/1996     HIB 1991, 02/07/1992, 04/03/1992, 01/26/1993     HepA-Ped 2 dose 08/13/2009      HepB-Peds 04/06/1995     Influenza (IIV3) 11/24/2009, 11/02/2012, 10/01/2013     Influenza Vaccine IM 3yrs+ 4 Valent IIV4 09/23/2015     MMR 01/26/1993, 01/06/2000     Meningococcal (Menomune ) 12/14/2005     Poliovirus, inactivated (IPV) 1991, 02/07/1992, 04/26/1993, 09/05/1996     TD (ADULT, 7+) 01/29/2003     TDAP Vaccine (Adacel) 08/13/2009     Varicella 09/05/1996         ROS:  Here with mother.     C: NEGATIVE for fever, chills, change in weight  I: NEGATIVE for worrisome rashes, moles or lesions  Dryness. May have fungus on toenail.  E: NEGATIVE for vision changes or irritation; he is considered legally blind, but can see some things.   ENT: NEGATIVE for ear, mouth and throat problems  R: NEGATIVE for significant cough or SOB  CV: NEGATIVE for chest pain, palpitations or peripheral edema  GI: NEGATIVE for nausea, abdominal pain, heartburn, or change in bowel habits   male: negative for dysuria, hematuria, decreased urinary stream, erectile dysfunction, urethral discharge  M: NEGATIVE for significant arthralgias or myalgia x left ankle pain, improving.   N: NEGATIVE for weakness, dizziness or paresthesias  P: NEGATIVE for changes in mood or affect      [employer] in [employer].     [facility] [place].  Has been on consistent adderall dose for several years. He has suggested that PCP could take over. He does go in q 6 months.   Notes he is tolerating this.    Used to see Dr. Escobar for Endocrinology.  Now Dr. Clark since Dr. Escobar retired.  Has not seen much; would like to consider [facility].  Mother notes Dr. Tello felt strongly needs growth hormone for life and to work with Endocrinologist who feels the same.  There have been some issues with ordering his testosterone.    Is now past due for testosterone.    Had chicken pox vaccine and got the disease anyways. Was given an antiviral. Mother asking about implications for shingles.      OBJECTIVE:   BP  "112/72 (BP Location: Right arm, Cuff Size: Adult Regular)  Pulse 71  Temp 97.9  F (36.6  C) (Oral)  Resp 16  Ht 6' 0.5\" (1.842 m)  Wt 187 lb 1.6 oz (84.9 kg)  SpO2 98%  BMI 25.03 kg/m2    EXAM:  GENERAL: healthy, alert and no distress  EYES: RR is positive bilaterally; he was able to get onto the table, using the step without difficulty  HENT: ear canals and TM's normal, nose and mouth without ulcers or lesions  NECK: no adenopathy, no asymmetry, masses, or scars and thyroid normal to palpation  RESP: lungs clear to auscultation - no rales, rhonchi or wheezes  CV: regular rate and rhythm, no peripheral edema  ABDOMEN: soft, nontender, no hepatosplenomegaly, no masses and bowel sounds normal  MS: no gross musculoskeletal defects noted, no edema  SKIN: no suspicious lesions or rashes  NEURO: Normal strength and tone, mentation intact and speech normal  PSYCH: mentation appears normal, affect normal/bright    ASSESSMENT/PLAN:     Encounter for routine adult health examination without abnormal findings      Attention deficit disorder, unspecified hyperactivity presence  Discussed I can take over the stimulant prescription. They believe he does have one more month.  Will use our pharmacy. Discussed process.  At this time, had TREVA signed for recent records from Dr. Michelle Rondon ([facility])  Would like to establish with  Endocrinology.   They will do consult. Prior Endocrinologist felt strongly should be on growth hormone for life and this will be important to them (I do know him and he has been an expert on Growth Hormone).  I am not sure of philosophies around this; they will consult with our Endocrinologist.   - ENDOCRINOLOGY ADULT REFERRAL  - testosterone cypionate (DEPOTESTOTERONE) 200 MG/ML injection; Inject 1 mL (200 mg) into the muscle every 14 days    Testosterone deficiency  at this time, placed order for another couple months of testosterone shots.  Hoping they will be established with " "Endocrinology by that time.  He was due to otherwise see Dr. Clark in October.  - ENDOCRINOLOGY ADULT REFERRAL  - testosterone cypionate (DEPOTESTOTERONE) 200 MG/ML injection; Inject 1 mL (200 mg) into the muscle every 14 days    Hypothyroidism, unspecified type  As above.   - ENDOCRINOLOGY ADULT REFERRAL    History of chicken pox  Uncertain about what might happen regarding shingles; not sure if it is known in his situation.  Discussed availability of shingles vaccine, but he is young for this. Perhaps more will become known over the years; could consider consult with ID if needed.        COUNSELING:   Reviewed preventive health counseling, as reflected in patient instructions       Regular exercise       Healthy diet/nutrition         reports that he has never smoked. He has never used smokeless tobacco.      Estimated body mass index is 24.69 kg/(m^2) as calculated from the following:    Height as of 8/3/16: 6' 0.5\" (1.842 m).    Weight as of 8/3/16: 184 lb 9.6 oz (83.7 kg).       Follow up in 6 months; related to ADD medications.    Counseling Resources:  ATP IV Guidelines  Pooled Cohorts Equation Calculator  FRAX Risk Assessment  ICSI Preventive Guidelines  Dietary Guidelines for Americans, 2010  [employer] MyPlate  ASA Prophylaxis  Lung CA Screening    Mariluz Mae MD, MD  [facility] ROSEMOUNTAnswers for HPI/ROS submitted by the patient on 8/29/2017   PHQ-2 Score: 0    "

## 2017-08-29 NOTE — NURSING NOTE
"Chief Complaint   Patient presents with     Physical       Initial /72 (BP Location: Right arm, Cuff Size: Adult Regular)  Pulse 71  Temp 97.9  F (36.6  C) (Oral)  Resp 16  Ht 6' 0.5\" (1.842 m)  Wt 187 lb 1.6 oz (84.9 kg)  SpO2 98%  BMI 25.03 kg/m2 Estimated body mass index is 25.03 kg/(m^2) as calculated from the following:    Height as of this encounter: 6' 0.5\" (1.842 m).    Weight as of this encounter: 187 lb 1.6 oz (84.9 kg).  Medication Reconciliation: complete   Jazmine Kim, CMA    "

## 2017-08-29 NOTE — MR AVS SNAPSHOT
After Visit Summary   8/29/2017    Mamadou Reece    MRN: [id number]           Patient Information     Date Of Birth          1990        Visit Information        Provider Department      8/29/2017 4:10 PM Mariluz Mae MD [facility]        Today's Diagnoses     Hypothyroidism, unspecified type    -  1    Testosterone deficiency        Panhypopituitarism (H)          Care Instructions      Preventive Health Recommendations  Male Ages 18 - 25     Yearly exam:             See your health care provider every year in order to  o   Review health changes.   o   Discuss preventive care.    o   Review your medicines if your doctor has prescribed any.    You should be tested each year for STDs (sexually transmitted diseases).     Talk to your provider about cholesterol testing.      If you are at risk for diabetes, you should have a diabetes test (fasting glucose).    Shots: Get a flu shot each year. Get a tetanus shot every 10 years.     Nutrition:    Eat at least 5 servings of fruits and vegetables daily.     Eat whole-grain bread, whole-wheat pasta and brown rice instead of white grains and rice.     Talk to your provider about calcium and Vitamin D.     Lifestyle    Exercise for at least 150 minutes a week (30 minutes a day, 5 days a week). This will help you control your weight and prevent disease.     Limit alcohol to one drink per day.     No smoking.     Wear sunscreen to prevent skin cancer.     See your dentist every six months for an exam and cleaning.             Follow-ups after your visit        Additional Services     ENDOCRINOLOGY ADULT REFERRAL       Your provider has referred you to: [facility]: [facility] [phone]   [email or web address]/  [facility]: [facility] [phone]   [email or web address]  [facility]: [facility] [phone]    "[email or web address]      Please be aware that coverage of these services is subject to the terms and limitations of your health insurance plan.  Call member services at your health plan with any benefit or coverage questions.      Please bring the following to your appointment:    >>   Any x-rays, CTs or MRIs which have been performed.  Contact the facility where they were done to arrange for  prior to your scheduled appointment.    >>   List of current medications   >>   This referral request   >>   Any documents/labs given to you for this referral                  Who to contact     If you have questions or need follow up information about today's clinic visit or your schedule please contact [facility] directly at [phone].  Normal or non-critical lab and imaging results will be communicated to you by MyChart, letter or phone within 4 business days after the clinic has received the results. If you do not hear from us within 7 days, please contact the clinic through [employer] Network[employer] or phone. If you have a critical or abnormal lab result, we will notify you by phone as soon as possible.  Submit refill requests through [employer] or call your pharmacy and they will forward the refill request to us. Please allow 3 business days for your refill to be completed.          Additional Information About Your Visit        [employer] Information     [employer] lets you send messages to your doctor, view your test results, renew your prescriptions, schedule appointments and more. To sign up, go to [email or web address] . Click on \"Log in\" on the left side of the screen, which will take you to the Welcome page. Then click on \"Sign up Now\" on the right side of the page.     You will be asked to enter the access code listed below, as well as some personal information. Please follow the directions to create your username and password.     Your access code is: [email or web address]  Expires: 8/30/2017  9:01 " "AM     Your access code will  in 90 days. If you need help or a new code, please call your [facility] clinic or [phone].        Care EveryWhere ID     This is your Care EveryWhere ID. This could be used by other organizations to access your [facility] medical records  [id number]        Your Vitals Were     Pulse Temperature Respirations Height Pulse Oximetry BMI (Body Mass Index)    71 97.9  F (36.6  C) (Oral) 16 6' 0.5\" (1.842 m) 98% 25.03 kg/m2       Blood Pressure from Last 3 Encounters:   17 112/72   16 102/70   09/23/15 104/72    Weight from Last 3 Encounters:   17 187 lb 1.6 oz (84.9 kg)   16 184 lb 9.6 oz (83.7 kg)   09/23/15 195 lb 14.4 oz (88.9 kg)              We Performed the Following     ENDOCRINOLOGY ADULT REFERRAL        Primary Care Provider Office Phone # Fax #    Mariluz Mae [phone] [address] [facility] [address]        Equal Access to Services     [facility]: Hadii aad ku hadasho Soomaali, waaxda luqadaha, qaybta kaalmada adeegyada, kylah osorio . So [facility] [phone].    ATENCIÓN: Si habla español, tiene a henderson disposición servicios gratuitos de asistencia lingüística. Llame al [phone].    We comply with applicable federal civil rights laws and [place] laws. We do not discriminate on the basis of race, color, national origin, age, disability sex, sexual orientation or gender identity.            Thank you!     Thank you for choosing [facility]  for your care. Our goal is always to provide you with excellent care. Hearing back from our patients is one way we can continue to improve our services. Please take a few minutes to complete the written survey that you may receive in the mail after your visit with us. Thank you!             Your Updated Medication List - Protect others around you: Learn how to safely use, store and throw away your medicines at www.disposemymeds.org.    "       This list is accurate as of: 8/29/17  4:52 PM.  Always use your most recent med list.                   Brand Name Dispense Instructions for use Diagnosis    ADDERALL XR 30 MG per 24 hr capsule   Generic drug:  amphetamine-dextroamphetamine      Take 30 mg by mouth daily.        B-12 1000 MCG Tbcr     100 tablet    Take 1,000 mcg by mouth daily    Routine general medical examination at a health care facility       CALCIUM + D 315-200 MG-UNIT Tabs per tablet   Generic drug:  calcium citrate-vitamin D      Take  by mouth.        desmopressin acetate spray 0.01 % Soln      Spray  in nostril. 3 sprays once daily        levothyroxine 137 MCG tablet    SYNTHROID/LEVOTHROID     Take 137 mcg by mouth daily.        NORDITROPIN NORDIFLEX PEN SC      Inject 2 mLs Subcutaneous        predniSONE 2.5 MG tablet    DELTASONE     Take 2.5 mg by mouth daily. 3 tabs in am, and 2 in pm-unsure of dose        testosterone cypionate 200 MG/ML injection    DEPOTESTOTERONE    1 mL    Inject 1 mL (200 mg) into the muscle every 14 days    Testosterone deficiency, Panhypopituitarism (H)       vitamin D 1000 UNITS capsule      Take 1 capsule by mouth daily.

## 2017-08-31 ENCOUNTER — ALLIED HEALTH/NURSE VISIT (OUTPATIENT)
Dept: NURSING | Facility: CLINIC | Age: 27
End: 2017-08-31
Payer: COMMERCIAL

## 2017-08-31 DIAGNOSIS — E34.9 TESTOSTERONE DEFICIENCY: Primary | ICD-10-CM

## 2017-08-31 PROCEDURE — 96372 THER/PROPH/DIAG INJ SC/IM: CPT

## 2017-08-31 NOTE — PROGRESS NOTES
Per orders of Dr. Mae, injection of Testosterone 200 mg given by Prisca Melendrez. Patient instructed to remain in clinic for 15 minutes afterwards, and to report any adverse reaction to me immediately.    Prisca Melendrez RN, BS  Clinical Nurse Triage.

## 2017-08-31 NOTE — MR AVS SNAPSHOT
"              After Visit Summary   2017    Mamadou Reece    MRN: [id number]           Patient Information     Date Of Birth          1990        Visit Information        Provider Department      2017 8:30 AM CR RN [facility]        Today's Diagnoses     Testosterone deficiency    -  1       Follow-ups after your visit        Follow-up notes from your care team     Return in about 14 days (around 2017).      Who to contact     If you have questions or need follow up information about today's clinic visit or your schedule please contact [facility] directly at [phone].  Normal or non-critical lab and imaging results will be communicated to you by BitePalhart, letter or phone within 4 business days after the clinic has received the results. If you do not hear from us within 7 days, please contact the clinic through [employer] or phone. If you have a critical or abnormal lab result, we will notify you by phone as soon as possible.  Submit refill requests through [employer] or call your pharmacy and they will forward the refill request to us. Please allow 3 business days for your refill to be completed.          Additional Information About Your Visit        MyChart Information     [employer] lets you send messages to your doctor, view your test results, renew your prescriptions, schedule appointments and more. To sign up, go to [email or web address] . Click on \"Log in\" on the left side of the screen, which will take you to the Welcome page. Then click on \"Sign up Now\" on the right side of the page.     You will be asked to enter the access code listed below, as well as some personal information. Please follow the directions to create your username and password.     Your access code is: [email or web address]  Expires: 2017  8:57 AM     Your access code will  in 90 days. If you need help or a new code, please call your [facility] or [phone].        Care " EveryWhere ID     This is your Care EveryWhere ID. This could be used by other organizations to access your [facility] medical records  [id number]         Blood Pressure from Last 3 Encounters:   08/29/17 112/72   08/03/16 102/70   09/23/15 104/72    Weight from Last 3 Encounters:   08/29/17 187 lb 1.6 oz (84.9 kg)   08/03/16 184 lb 9.6 oz (83.7 kg)   09/23/15 195 lb 14.4 oz (88.9 kg)              We Performed the Following     ADMIN 1st VACCINE     TESTOSTERONE CYPIONATE INJECTION 1MG        Primary Care Provider Office Phone # Fax #    Mariluz Mae [phone] [address] [facility] [facility] [address]        Equal Access to Services     [facility] : Hadii aad ku hadasho Soomaali, waaxda luqadaha, qaybta kaalmada adeegyada, kylah orellanain hayharoon osorio . So [facility] [phone].    ATENCIÓN: Si habla español, tiene a henderson disposición servicios gratuitos de asistencia lingüística. Llame al [phone].    We comply with applicable federal civil rights laws and [place] laws. We do not discriminate on the basis of race, color, national origin, age, disability sex, sexual orientation or gender identity.            Thank you!     Thank you for choosing [facility]  for your care. Our goal is always to provide you with excellent care. Hearing back from our patients is one way we can continue to improve our services. Please take a few minutes to complete the written survey that you may receive in the mail after your visit with us. Thank you!             Your Updated Medication List - Protect others around you: Learn how to safely use, store and throw away your medicines at www.disposemymeds.org.          This list is accurate as of: 8/31/17  8:57 AM.  Always use your most recent med list.                   Brand Name Dispense Instructions for use Diagnosis    ADDERALL XR 30 MG per 24 hr capsule   Generic drug:  amphetamine-dextroamphetamine      Take 30 mg by mouth daily.         B-12 1000 MCG Tbcr     100 tablet    Take 1,000 mcg by mouth daily    Routine general medical examination at a health care facility       CALCIUM + D 315-200 MG-UNIT Tabs per tablet   Generic drug:  calcium citrate-vitamin D      Take  by mouth.        desmopressin acetate spray 0.01 % Soln      Spray  in nostril. 3 sprays once daily        levothyroxine 137 MCG tablet    SYNTHROID/LEVOTHROID     Take 137 mcg by mouth daily.        NORDITROPIN NORDIFLEX PEN SC      Inject 2 mLs Subcutaneous        predniSONE 2.5 MG tablet    DELTASONE     Take 2.5 mg by mouth daily. 3 tabs in am, and 2 in pm-unsure of dose        testosterone cypionate 200 MG/ML injection    DEPOTESTOTERONE    1 mL    Inject 1 mL (200 mg) into the muscle every 14 days    Testosterone deficiency, Panhypopituitarism (H)       vitamin D 1000 UNITS capsule      Take 1 capsule by mouth daily.

## 2017-09-14 ENCOUNTER — ALLIED HEALTH/NURSE VISIT (OUTPATIENT)
Dept: NURSING | Facility: CLINIC | Age: 27
End: 2017-09-14
Payer: COMMERCIAL

## 2017-09-14 DIAGNOSIS — E34.9 TESTOSTERONE DEFICIENCY: Primary | ICD-10-CM

## 2017-09-14 PROCEDURE — 96372 THER/PROPH/DIAG INJ SC/IM: CPT

## 2017-09-14 PROCEDURE — 99207 ZZC NO CHARGE NURSE ONLY: CPT

## 2017-09-14 NOTE — MR AVS SNAPSHOT
"              After Visit Summary   9/14/2017    Mamadou Reece    MRN: [id number]           Patient Information     Date Of Birth          1990        Visit Information        Provider Department      9/14/2017 8:30 AM CR RN [facility]        Today's Diagnoses     Testosterone deficiency    -  1       Follow-ups after your visit        Follow-up notes from your care team     Return in about 14 days (around 9/28/2017).      Your next 10 appointments already scheduled     Oct 05, 2017  8:30 AM CDT   [facility]    [address] [facility] [address]  [facility] [address] [phone]              Who to contact     If you have questions or need follow up information about today's clinic visit or your schedule please contact [facility] directly at [phone].  Normal or non-critical lab and imaging results will be communicated to you by MyChart, letter or phone within 4 business days after the clinic has received the results. If you do not hear from us within 7 days, please contact the clinic through MyChart or phone. If you have a critical or abnormal lab result, we will notify you by phone as soon as possible.  Submit refill requests through [employer] or call your pharmacy and they will forward the refill request to us. Please allow 3 business days for your refill to be completed.          Additional Information About Your Visit        MyChart Information     [employer] lets you send messages to your doctor, view your test results, renew your prescriptions, schedule appointments and more. To sign up, go to [email or web address] . Click on \"Log in\" on the left side of the screen, which will take you to the Welcome page. Then click on \"Sign up Now\" on the right side of the page.     You will be asked to enter the access code listed below, as well as some personal information. Please follow the " directions to create your username and password.     Your access code is: [email or web address]  Expires: 2017  8:57 AM     Your access code will  in 90 days. If you need help or a new code, please call your [place] clinic or [phone].        Care EveryWhere ID     This is your Care EveryWhere ID. This could be used by other organizations to access your [place] medical records  [id number]         Blood Pressure from Last 3 Encounters:   17 112/72   16 102/70   09/23/15 104/72    Weight from Last 3 Encounters:   17 187 lb 1.6 oz (84.9 kg)   16 184 lb 9.6 oz (83.7 kg)   09/23/15 195 lb 14.4 oz (88.9 kg)              Today, you had the following     No orders found for display       Primary Care Provider Office Phone # Fax #    Marilzu Mae [phone] [address] [address]  [facility] [address]        Equal Access to Services     [facility]: Hadii aad ku hadasho Soomaali, waaxda luqadaha, qaybta kaalmada adeegyahyun, kylah osorio . So [facility] [phone].    ATENCIÓN: Si habla español, tiene a henderson disposición servicios gratuitos de asistencia lingüística. Llame al [phone].    We comply with applicable federal civil rights laws and [place] laws. We do not discriminate on the basis of race, color, national origin, age, disability sex, sexual orientation or gender identity.            Thank you!     Thank you for choosing [facility]  for your care. Our goal is always to provide you with excellent care. Hearing back from our patients is one way we can continue to improve our services. Please take a few minutes to complete the written survey that you may receive in the mail after your visit with us. Thank you!             Your Updated Medication List - Protect others around you: Learn how to safely use, store and throw away your medicines at www.disposemymeds.org.          This list is accurate as of: 17  9:16 AM.   Always use your most recent med list.                   Brand Name Dispense Instructions for use Diagnosis    ADDERALL XR 30 MG per 24 hr capsule   Generic drug:  amphetamine-dextroamphetamine      Take 30 mg by mouth daily.        B-12 1000 MCG Tbcr     100 tablet    Take 1,000 mcg by mouth daily    Routine general medical examination at a health care facility       CALCIUM + D 315-200 MG-UNIT Tabs per tablet   Generic drug:  calcium citrate-vitamin D      Take  by mouth.        desmopressin acetate spray 0.01 % Soln      Spray  in nostril. 3 sprays once daily        levothyroxine 137 MCG tablet    SYNTHROID/LEVOTHROID     Take 137 mcg by mouth daily.        NORDITROPIN NORDIFLEX PEN SC      Inject 2 mLs Subcutaneous        predniSONE 2.5 MG tablet    DELTASONE     Take 2.5 mg by mouth daily. 3 tabs in am, and 2 in pm-unsure of dose        testosterone cypionate 200 MG/ML injection    DEPOTESTOTERONE    1 mL    Inject 1 mL (200 mg) into the muscle every 14 days    Testosterone deficiency, Panhypopituitarism (H)       vitamin D 1000 UNITS capsule      Take 1 capsule by mouth daily.

## 2017-09-14 NOTE — PROGRESS NOTES
Per orders of Dr. Mae, injection of Depo-Testosterone 200 mg given by Prisca Melendrez. Patient instructed to remain in clinic for 15 minutes afterwards, and to report any adverse reaction to me immediately.    Prisca Melendrez RN, BS  Clinical Nurse Triage.

## 2017-09-28 ENCOUNTER — ALLIED HEALTH/NURSE VISIT (OUTPATIENT)
Dept: NURSING | Facility: CLINIC | Age: 27
End: 2017-09-28
Payer: COMMERCIAL

## 2017-09-28 DIAGNOSIS — E34.9 TESTOSTERONE DEFICIENCY: Primary | ICD-10-CM

## 2017-09-28 PROCEDURE — 96372 THER/PROPH/DIAG INJ SC/IM: CPT

## 2017-09-28 PROCEDURE — 99207 ZZC NO CHARGE NURSE ONLY: CPT

## 2017-09-28 NOTE — MR AVS SNAPSHOT
"              After Visit Summary   9/28/2017    Mamadou Reece    MRN: [id number]           Patient Information     Date Of Birth          1990        Visit Information        Provider Department      9/28/2017 8:30 AM CR RN [facility]        Today's Diagnoses     Testosterone deficiency    -  1       Follow-ups after your visit        Follow-up notes from your care team     Return in about 14 days (around 10/12/2017).      Your next 10 appointments already scheduled     Oct 05, 2017  8:30 AM CDT   New Visit with Sumaya Alonso MD   [facility]    [address] [facility] [address]  [facility] [address] [phone]              Who to contact     If you have questions or need follow up information about today's clinic visit or your schedule please contact [facility] directly at [phone].  Normal or non-critical lab and imaging results will be communicated to you by MyChart, letter or phone within 4 business days after the clinic has received the results. If you do not hear from us within 7 days, please contact the clinic through MyChart or phone. If you have a critical or abnormal lab result, we will notify you by phone as soon as possible.  Submit refill requests through [employer] or call your pharmacy and they will forward the refill request to us. Please allow 3 business days for your refill to be completed.          Additional Information About Your Visit        MyChart Information     [employer] lets you send messages to your doctor, view your test results, renew your prescriptions, schedule appointments and more. To sign up, go to [email or web address] . Click on \"Log in\" on the left side of the screen, which will take you to the Welcome page. Then click on \"Sign up Now\" on the right side of the page.     You will be asked to enter the access code listed below, as well as some personal information. Please follow the " directions to create your username and password.     Your access code is: [email or web address]  Expires: 2017  8:57 AM     Your access code will  in 90 days. If you need help or a new code, please call your [place] clinic or [phone].        Care EveryWhere ID     This is your Care EveryWhere ID. This could be used by other organizations to access your [place] medical records  [id number]         Blood Pressure from Last 3 Encounters:   17 112/72   16 102/70   09/23/15 104/72    Weight from Last 3 Encounters:   17 187 lb 1.6 oz (84.9 kg)   16 184 lb 9.6 oz (83.7 kg)   09/23/15 195 lb 14.4 oz (88.9 kg)              We Performed the Following     ADMIN 1st VACCINE     TESTOSTERONE CYPIONATE INJECTION 1MG        Primary Care Provider Office Phone # Fax #    Mariluz Mae [phone] [address] [address] [facility] [address]        Equal Access to Services     [facility]: Hadii aad ku hadasho Soomaali, waaxda luqadaha, qaybta kaalmada adeegyada, waxay radha osorio . So [facility] [phone].    ATENCIÓN: Si habla español, tiene a henderson disposición servicios gratuitos de asistencia lingüística. Ravinderame al [phone].    We comply with applicable federal civil rights laws and [place] laws. We do not discriminate on the basis of race, color, national origin, age, disability sex, sexual orientation or gender identity.            Thank you!     Thank you for choosing [facility]  for your care. Our goal is always to provide you with excellent care. Hearing back from our patients is one way we can continue to improve our services. Please take a few minutes to complete the written survey that you may receive in the mail after your visit with us. Thank you!             Your Updated Medication List - Protect others around you: Learn how to safely use, store and throw away your medicines at www.disposemymeds.org.          This list is  accurate as of: 9/28/17  8:45 AM.  Always use your most recent med list.                   Brand Name Dispense Instructions for use Diagnosis    ADDERALL XR 30 MG per 24 hr capsule   Generic drug:  amphetamine-dextroamphetamine      Take 30 mg by mouth daily.        B-12 1000 MCG Tbcr     100 tablet    Take 1,000 mcg by mouth daily    Routine general medical examination at a health care facility       CALCIUM + D 315-200 MG-UNIT Tabs per tablet   Generic drug:  calcium citrate-vitamin D      Take  by mouth.        desmopressin acetate spray 0.01 % Soln      Spray  in nostril. 3 sprays once daily        levothyroxine 137 MCG tablet    SYNTHROID/LEVOTHROID     Take 137 mcg by mouth daily.        NORDITROPIN NORDIFLEX PEN SC      Inject 2 mLs Subcutaneous        predniSONE 2.5 MG tablet    DELTASONE     Take 2.5 mg by mouth daily. 3 tabs in am, and 2 in pm-unsure of dose        testosterone cypionate 200 MG/ML injection    DEPOTESTOTERONE    1 mL    Inject 1 mL (200 mg) into the muscle every 14 days    Testosterone deficiency, Panhypopituitarism (H)       vitamin D 1000 UNITS capsule      Take 1 capsule by mouth daily.

## 2017-10-05 ENCOUNTER — OFFICE VISIT (OUTPATIENT)
Dept: ENDOCRINOLOGY | Facility: CLINIC | Age: 27
End: 2017-10-05
Payer: COMMERCIAL

## 2017-10-05 VITALS
OXYGEN SATURATION: 100 % | WEIGHT: 191.6 LBS | HEIGHT: 73 IN | HEART RATE: 81 BPM | DIASTOLIC BLOOD PRESSURE: 62 MMHG | BODY MASS INDEX: 25.39 KG/M2 | TEMPERATURE: 98.7 F | SYSTOLIC BLOOD PRESSURE: 104 MMHG

## 2017-10-05 DIAGNOSIS — E34.9 TESTOSTERONE DEFICIENCY: Primary | ICD-10-CM

## 2017-10-05 DIAGNOSIS — E23.0 PANHYPOPITUITARISM (H): ICD-10-CM

## 2017-10-05 DIAGNOSIS — E03.9 HYPOTHYROIDISM, UNSPECIFIED TYPE: ICD-10-CM

## 2017-10-05 LAB
ANION GAP SERPL CALCULATED.3IONS-SCNC: 5 MMOL/L (ref 3–14)
BUN SERPL-MCNC: 11 MG/DL (ref 7–30)
CALCIUM SERPL-MCNC: 9.7 MG/DL (ref 8.5–10.1)
CHLORIDE SERPL-SCNC: 104 MMOL/L (ref 94–109)
CO2 SERPL-SCNC: 32 MMOL/L (ref 20–32)
CREAT SERPL-MCNC: 1.06 MG/DL (ref 0.66–1.25)
GFR SERPL CREATININE-BSD FRML MDRD: 84 ML/MIN/1.7M2
GLUCOSE SERPL-MCNC: 57 MG/DL (ref 70–99)
HGB BLD-MCNC: 14.9 G/DL (ref 13.3–17.7)
POTASSIUM SERPL-SCNC: 3.8 MMOL/L (ref 3.4–5.3)
PROLACTIN SERPL-MCNC: 1 UG/L (ref 2–18)
SODIUM SERPL-SCNC: 141 MMOL/L (ref 133–144)
T4 FREE SERPL-MCNC: 1.18 NG/DL (ref 0.76–1.46)

## 2017-10-05 PROCEDURE — 85018 HEMOGLOBIN: CPT | Performed by: INTERNAL MEDICINE

## 2017-10-05 PROCEDURE — 99244 OFF/OP CNSLTJ NEW/EST MOD 40: CPT | Performed by: INTERNAL MEDICINE

## 2017-10-05 PROCEDURE — 36415 COLL VENOUS BLD VENIPUNCTURE: CPT | Performed by: INTERNAL MEDICINE

## 2017-10-05 PROCEDURE — 80048 BASIC METABOLIC PNL TOTAL CA: CPT | Performed by: INTERNAL MEDICINE

## 2017-10-05 PROCEDURE — 84439 ASSAY OF FREE THYROXINE: CPT | Performed by: INTERNAL MEDICINE

## 2017-10-05 PROCEDURE — 84146 ASSAY OF PROLACTIN: CPT | Performed by: INTERNAL MEDICINE

## 2017-10-05 PROCEDURE — 84153 ASSAY OF PSA TOTAL: CPT | Performed by: INTERNAL MEDICINE

## 2017-10-05 PROCEDURE — 84270 ASSAY OF SEX HORMONE GLOBUL: CPT | Performed by: INTERNAL MEDICINE

## 2017-10-05 PROCEDURE — 84305 ASSAY OF SOMATOMEDIN: CPT | Performed by: INTERNAL MEDICINE

## 2017-10-05 PROCEDURE — 84403 ASSAY OF TOTAL TESTOSTERONE: CPT | Performed by: INTERNAL MEDICINE

## 2017-10-05 RX ORDER — PREDNISONE 1 MG/1
TABLET ORAL
Qty: 150 TABLET | Refills: 11 | Status: SHIPPED | OUTPATIENT
Start: 2017-10-05 | End: 2018-10-29

## 2017-10-05 NOTE — MR AVS SNAPSHOT
After Visit Summary   10/5/2017    Mamadou Reece    MRN: [id number]           Patient Information     Date Of Birth          1990        Visit Information        Provider Department      10/5/2017 8:30 AM Sumaya Alonso MD [facility]        Today's Diagnoses     Testosterone deficiency    -  1    Panhypopituitarism (H)        Hypothyroidism, unspecified type          Care Instructions    [facility] & [place] locations   Dr Alonso, Endocrinology Department      [facility]   [address]  [place] [address]  Appointment [phone]  Fax: [phone]  [place]: Monday and Tuesday         [facility]   [address] [facility]  [place] [address]  Appointment [phone]  Fax: [phone]  [place]: Wednesday and Thursday          Labs today  Follow up based on labs or in 6 months            Follow-ups after your visit        Who to contact     If you have questions or need follow up information about today's clinic visit or your schedule please contact [facility] directly at [phone].  Normal or non-critical lab and imaging results will be communicated to you by MyChart, letter or phone within 4 business days after the clinic has received the results. If you do not hear from us within 7 days, please contact the clinic through MyChart or phone. If you have a critical or abnormal lab result, we will notify you by phone as soon as possible.  Submit refill requests through [employer] or call your pharmacy and they will forward the refill request to us. Please allow 3 business days for your refill to be completed.          Additional Information About Your Visit        MyChart Information     [employer] lets you send messages to your doctor, view your test results, renew your prescriptions, schedule appointments and more. To sign up, go to  [email or web address] . Click on \"Log in\" on the left side of the screen, which will take you to the Welcome page. Then click on \"Sign up Now\" on the right side of the page.     You will be asked to enter the access code listed below, as well as some personal information. Please follow the directions to create your username and password.     Your access code is: [email or web address]  Expires: 2017  8:57 AM     Your access code will  in 90 days. If you need help or a new code, please call your [place] clinic or [phone].        Care EveryWhere ID     This is your Care EveryWhere ID. This could be used by other organizations to access your [place] medical records  [id number]        Your Vitals Were     Pulse Temperature Height Pulse Oximetry BMI (Body Mass Index)       81 98.7  F (37.1  C) (Oral) 1.842 m (6' 0.5\") 100% 25.63 kg/m2        Blood Pressure from Last 3 Encounters:   10/05/17 104/62   17 112/72   16 102/70    Weight from Last 3 Encounters:   10/05/17 86.9 kg (191 lb 9.6 oz)   17 84.9 kg (187 lb 1.6 oz)   16 83.7 kg (184 lb 9.6 oz)              We Performed the Following     Basic metabolic panel     Hemoglobin     Insulin growth factor 1     Prolactin     PSA tumor marker     T4 free     Testosterone Free and Total          Today's Medication Changes          These changes are accurate as of: 10/5/17  9:11 AM.  If you have any questions, ask your nurse or doctor.               These medicines have changed or have updated prescriptions.        Dose/Directions    predniSONE 1 MG tablet   Commonly known as:  DELTASONE   This may have changed:    - medication strength  - how much to take  - how to take this  - when to take this  - additional instructions   Used for:  Panhypopituitarism (H)   Changed by:  Sumaya Alonso MD        Take 3 mg in AM and 2 mg at night   Quantity:  150 tablet   Refills:  11            Where to get your medicines      These medications were " sent to [facility] [place] - [address]  [address] [address] [facility] [address]     Phone:  [phone]     predniSONE 1 MG tablet                Primary Care Provider Office Phone # Fax #    Mariluz Mae [phone] [address] [address] [facility] [address]        Equal Access to Services     [facility]: Hadii aad ku hadasho Soomaali, waaxda luqadaha, qaybta kaalmada adeegyada, waxay idiin hayaan adeeg kharash la'aan ah. So [facility] [phone].    ATENCIÓN: Si habla espricarda, tiene a henderson disposición servicios gratuitos de asistencia lingüística. Llame al [phone].    We comply with applicable federal civil rights laws and [place] laws. We do not discriminate on the basis of race, color, national origin, age, disability, sex, sexual orientation, or gender identity.            Thank you!     Thank you for choosing [facility]  for your care. Our goal is always to provide you with excellent care. Hearing back from our patients is one way we can continue to improve our services. Please take a few minutes to complete the written survey that you may receive in the mail after your visit with us. Thank you!             Your Updated Medication List - Protect others around you: Learn how to safely use, store and throw away your medicines at www.disposemymeds.org.          This list is accurate as of: 10/5/17  9:11 AM.  Always use your most recent med list.                   Brand Name Dispense Instructions for use Diagnosis    ADDERALL XR 30 MG per 24 hr capsule   Generic drug:  amphetamine-dextroamphetamine      Take 30 mg by mouth daily.        B-12 1000 MCG Tbcr     100 tablet    Take 2,000 mcg by mouth daily    Routine general medical examination at a health care facility       CALCIUM + D 315-200 MG-UNIT Tabs per tablet   Generic drug:  calcium citrate-vitamin D           desmopressin 0.01 % Soln spray    DDAVP     Spray  in nostril. 3 sprays  once daily        levothyroxine 137 MCG tablet    SYNTHROID/LEVOTHROID     Take 137 mcg by mouth daily.        NORDITROPIN NORDIFLEX PEN SC      Inject 0.8 mLs Subcutaneous        predniSONE 1 MG tablet    DELTASONE    150 tablet    Take 3 mg in AM and 2 mg at night    Panhypopituitarism (H)       testosterone cypionate 200 MG/ML injection    DEPOTESTOTERONE    1 mL    Inject 1 mL (200 mg) into the muscle every 14 days    Testosterone deficiency, Panhypopituitarism (H)       vitamin D 1000 UNITS capsule      Take 2 capsules by mouth daily

## 2017-10-05 NOTE — PROGRESS NOTES
Name: Mamadou Reece  Seen at the request of Mariluz Mae for panhypopituitarism. Here with mother.  Chief Complaint   Patient presents with     Referral     Dr Mae for Hypothyroidism, testoetersone deficiency and panhypopituitarism       HPI:  Mamadou Reece is a 27 year old male who presents for the evaluation of  Above.  Was seen in [facility] before. Records reviewed. Last visit with Endo was 9/2016  [facility]  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 He had stereotactic  radiation therapy at age 11.   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  Follow[facility] by [facility]; getting MRIs every 2 yrs now. Due for MRI and has upcoming appointment.  Has not had recurrence since his radiation therapy    He is currently on full hormonal treatment with medications listed below.    Feeling OK.  No major concerns today.    1. HYpogonadism:  On testosterone cypionate 200 mg IM q 2 weeks.  Goes to clinic.  Last dose was last Thrusday.  Due for labs  Energy level is OK  Shaving is OK  Muscle strength is OK    2. Diabetes Insipidus:  On desmopressiin 10 mcg- 3 sprays at night.  No major concerns. No excessive thirst or frequent urination.    3. Growth hormone deficiency:  On Norditropin 0.8 mg/day. 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  Reports compliance. But taking it at night.  Weight stable  No CP, palpitations, diarrhea or constipation.    5. Secondary adrenal insufficiency:  On prednsione 3 mg AM and 2 mg PM. On this dose X many years.  No nausea. No vomiting.  Wt stable.  Wt Readings from Last 2 Encounters:   10/05/17 86.9 kg (191 lb 9.6 oz)   08/29/17 84.9 kg (187 lb 1.6 oz)         PMH/PSH:  1. Diabetes insipidus   2. Panhypopituitarism   3. History of ADD.  4. Central  "hypothyroidism   5. Secondary adrenal insufficiency  6. Growth hormone deficiency   7. Hypogonadotropic hypogonadism     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, [facility] [place]     Legal blindness     Craniopharyngioma     Mood disorder (H)     Craniopharyngioma     Radiation age 11    Craniopharyngioma     Past Surgical History:   Procedure Laterality Date     CRANIOTOMY, EXCISE TUMOR COMPLEX, COMBINED  age 6    craniopharyngioma     Family Hx:  Family History   Problem Relation Age of Onset     CANCER Father      Melanoma     CEREBROVASCULAR DISEASE Maternal Grandmother 91     DIABETES Maternal Grandmother      CEREBROVASCULAR DISEASE Paternal Grandmother      CEREBROVASCULAR DISEASE Paternal Grandfather      Social Hx:  Social History     Social History     Marital status: Single     Spouse name: N/A     Number of children: N/A     Years of education: N/A     Occupational History     Not on file.     Social History Main Topics     Smoking status: Never Smoker     Smokeless tobacco: Never Used     Alcohol use 0.0 oz/week     0 Standard drinks or equivalent per week      Comment: sip at holidays     Drug use: No     Sexual activity: No     Other Topics Concern     Not on file     Social History Narrative          MEDICATIONS:  has a current medication list which includes the following prescription(s): testosterone cypionate, b-12, somatropin, desmopressin, levothyroxine, amphetamine-dextroamphetamine, prednisone, vitamin d, and calcium + d.    ROS     ROS: 10 point ROS neg other than the symptoms noted above in the HPI.    Physical Exam   VS: /62 (BP Location: Left arm, Patient Position: Chair, Cuff Size: Adult Large)  Pulse 81  Temp 98.7  F (37.1  C) (Oral)  Ht 1.842 m (6' 0.5\")  Wt 86.9 kg (191 lb 9.6 oz)  SpO2 100%  BMI 25.63 kg/m2  GENERAL: AXOX3, NAD, well dressed, answering questions appropriately, appears stated age.  HEENT: No exopthalmous, no proptosis, " EOMI, no lig lag, no retraction  NECK: Thyroid small in size, non tender, no nodules were palpated.  CV: RRR, no rubs, gallops, no murmurs  LUNGS: CTAB, no wheezes, rales, or ronchi  ABDOMEN: +BS  EXTREMITIES: no edema, +pulses, no rashes, no lesions  NEUROLOGY: CN grossly intact, + DTR upper and lower extremity, no tremors  MSK: grossly intact  SKIN: no rashes, no lesions  PSYCH: normal affect and mood    LABS:  Last Basic Metabolic Panel:  No results found for: NA   Lab Results   Component Value Date    POTASSIUM 3.8 06/16/2015     No results found for: CHLORIDE  No results found for: IVAN  No results found for: CO2  No results found for: BUN  Lab Results   Component Value Date    CR 0.88 06/16/2015     Lab Results   Component Value Date    GLC 96 06/16/2015       No results found for: TSH]      All pertinent notes, labs, and images personally reviewed by me.     A/P  Mr.Jared Reece is a 27 year old here for the evaluation of above:    1. Hypogonadism:  On testosterone cypionate 200 mg IM q 2 weeks.  Goes to clinic.  Last dose was last Thrusday.  Due for labs  -- dose change based on labs    2. Diabetes Insipidus:  On desmopressiin 10 mcg- 3 sprays at night.  No major concerns. No excessive thirst or frequent urination.  -- recheck BMP  -- continue current dose of DDAVP    3. Growth hormone deficiency:  On Norditropin 0.8 mg/day. Dose was decrease 9/2016  Labs today  Discussed GH replacement in adults.  They would like to continue it. Agree with it.  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  Reports compliance. But taking it at night.  Clinically looks euthyroid  -- labs today  -- dose change based on that    5. Secondary adrenal insufficiency:  On prednsione 3 mg AM and 2 mg PM. On this dose X many years.  No nausea. No vomiting.  -- continue current dose  Refills done.    More than 50% of face to face time spent with Mr. Reece on counseling / coordinating his  care.      All questions were answered.  The patient indicates understanding of the above issues and agrees with the plan set forth.       Follow-up:  6 months    Sumaya Alonos MD  Endocrinology   [facility]    CC: Mariluz Mae     Disclaimer: This note consists of symbols derived from keyboarding, dictation and/or voice recognition software. As a result, there may be errors in the script that have gone undetected. Please consider this when interpreting information found in this chart.    Addendum to above note and clinic visit:    Labs reviewed.    See result note/telephone encounter.

## 2017-10-05 NOTE — PATIENT INSTRUCTIONS
[facility]   Dr Alonso, Endocrinology Department      [facility]   [address]  [place] [address]  Appointment [phone]  Fax: [phone]  [place]: Monday and Tuesday         [place] [address]  [place] [address]  Appointment [phone]  Fax: [phone]  [place]: Wednesday and Thursday          Labs today  Follow up based on labs or in 6 months

## 2017-10-05 NOTE — NURSING NOTE
"Chief Complaint   Patient presents with     Referral     Dr Mae for Hypothyroidism, testoetersone deficiency and panhypopituitarism        Initial /62 (BP Location: Left arm, Patient Position: Chair, Cuff Size: Adult Large)  Pulse 81  Temp 98.7  F (37.1  C) (Oral)  Ht 1.842 m (6' 0.5\")  Wt 86.9 kg (191 lb 9.6 oz)  SpO2 100%  BMI 25.63 kg/m2 Estimated body mass index is 25.63 kg/(m^2) as calculated from the following:    Height as of this encounter: 1.842 m (6' 0.5\").    Weight as of this encounter: 86.9 kg (191 lb 9.6 oz).  Medication Reconciliation: complete     ENDOCRINOLOGY INTAKE FORM    Patient Name:  Mamadou Reece  :  1990    Is patient Diabetic?   No  Does patient have non-diabetic or other endocrine issues?  Yes: Dr Mae for Hypothyroidism, testoetersone deficiency and panhypopituitarism     Vitals: /62 (BP Location: Left arm, Patient Position: Chair, Cuff Size: Adult Large)  Pulse 81  Temp 98.7  F (37.1  C) (Oral)  Ht 1.842 m (6' 0.5\")  Wt 86.9 kg (191 lb 9.6 oz)  SpO2 100%  BMI 25.63 kg/m2  BMI= Body mass index is 25.63 kg/(m^2).    Flu vaccine:  No  Pneumonia vaccine:  No    Smoking and Alcohol use:  Social History   Substance Use Topics     Smoking status: Never Smoker     Smokeless tobacco: Never Used     Alcohol use 0.0 oz/week     0 Standard drinks or equivalent per week      Comment: sip at holidays         Staff Signature:  Maren Jolly CMA ([facility])        "

## 2017-10-06 LAB
IGF-I BLD-MCNC: 298 NG/ML (ref 94–271)
PSA SERPL-MCNC: 0.17 UG/L (ref 0–4)

## 2017-10-09 ENCOUNTER — TELEPHONE (OUTPATIENT)
Dept: ENDOCRINOLOGY | Facility: CLINIC | Age: 27
End: 2017-10-09

## 2017-10-09 DIAGNOSIS — E23.0 PANHYPOPITUITARISM (H): Primary | ICD-10-CM

## 2017-10-09 LAB
SHBG SERPL-SCNC: 18 NMOL/L (ref 11–80)
TESTOST FREE SERPL-MCNC: 18.84 NG/DL (ref 4.7–24.4)
TESTOST SERPL-MCNC: 647 NG/DL (ref 240–950)

## 2017-10-09 NOTE — TELEPHONE ENCOUNTER
Component      Latest Ref Rng & Units 10/5/2017   Sodium      133 - 144 mmol/L 141   Potassium      3.4 - 5.3 mmol/L 3.8   Chloride      94 - 109 mmol/L 104   Carbon Dioxide      20 - 32 mmol/L 32   Anion Gap      3 - 14 mmol/L 5   Glucose      70 - 99 mg/dL 57 (L)   Urea Nitrogen      7 - 30 mg/dL 11   Creatinine      0.66 - 1.25 mg/dL 1.06   GFR Estimate      >60 mL/min/1.7m2 84   GFR Estimate If Black      >60 mL/min/1.7m2 >90   Calcium      8.5 - 10.1 mg/dL 9.7   Testosterone Total      240 - 950 ng/dL 647   Sex Hormone Binding Globulin      11 - 80 nmol/L 18   Free Testosterone Calculated      4.7 - 24.4 ng/dL 18.84   PSA      0 - 4 ug/L 0.17   Prolactin      2 - 18 ug/L 1 (L)   T4 Free      0.76 - 1.46 ng/dL 1.18   Ins Growth Factor 1      94 - 271 ng/ml 298 (H)   Hemoglobin      13.3 - 17.7 g/dL 14.9     All labs are in acceptable range except insulin growth factor (growth hormone)  It is slightly high.    Current dose is Norditropin 30 mg/3 mL -- takes 1.4 mg/day?    Can you please check with pt/his mother and pharmacy?      Plan will be to decrease dose based on that.      Sumaya Alonso MD  Endocrinology   [facility]  October 9, 2017

## 2017-10-11 ENCOUNTER — TELEPHONE (OUTPATIENT)
Dept: FAMILY MEDICINE | Facility: CLINIC | Age: 27
End: 2017-10-11

## 2017-10-11 DIAGNOSIS — E03.9 HYPOTHYROIDISM, UNSPECIFIED TYPE: Primary | ICD-10-CM

## 2017-10-11 DIAGNOSIS — F98.8 ATTENTION DEFICIT DISORDER, UNSPECIFIED HYPERACTIVITY PRESENCE: Primary | ICD-10-CM

## 2017-10-11 RX ORDER — DEXTROAMPHETAMINE SACCHARATE, AMPHETAMINE ASPARTATE MONOHYDRATE, DEXTROAMPHETAMINE SULFATE AND AMPHETAMINE SULFATE 7.5; 7.5; 7.5; 7.5 MG/1; MG/1; MG/1; MG/1
30 CAPSULE, EXTENDED RELEASE ORAL DAILY
Qty: 30 CAPSULE | Refills: 0 | Status: SHIPPED | OUTPATIENT
Start: 2017-12-10 | End: 2018-02-13

## 2017-10-11 RX ORDER — DEXTROAMPHETAMINE SACCHARATE, AMPHETAMINE ASPARTATE MONOHYDRATE, DEXTROAMPHETAMINE SULFATE AND AMPHETAMINE SULFATE 7.5; 7.5; 7.5; 7.5 MG/1; MG/1; MG/1; MG/1
30 CAPSULE, EXTENDED RELEASE ORAL DAILY
Qty: 30 CAPSULE | Refills: 0 | Status: SHIPPED | OUTPATIENT
Start: 2017-10-11 | End: 2017-10-11

## 2017-10-11 RX ORDER — DEXTROAMPHETAMINE SACCHARATE, AMPHETAMINE ASPARTATE MONOHYDRATE, DEXTROAMPHETAMINE SULFATE AND AMPHETAMINE SULFATE 7.5; 7.5; 7.5; 7.5 MG/1; MG/1; MG/1; MG/1
30 CAPSULE, EXTENDED RELEASE ORAL DAILY
Qty: 30 CAPSULE | Refills: 0 | Status: SHIPPED | OUTPATIENT
Start: 2017-11-10 | End: 2017-10-11

## 2017-10-11 NOTE — TELEPHONE ENCOUNTER
Synthroid 137      Last Written Prescription Date: 09/12/17  Last Fill Quantity: 30,  # refills: 0   Last Office Visit with G, [facility] or [facility] prescribing provider: 10/05/2017                                         Next 5 appointments (look out 90 days)     Oct 12, 2017  8:30 AM CDT   [facility]    [address] [facility] [address] [id number] [phone]                    Kaylan Doran CPhT  [facility]  [address] [place] [address] [phone]

## 2017-10-11 NOTE — TELEPHONE ENCOUNTER
Mom calls.  She said they discussed Dr. Mae prescribing his adderall now.  They would like the prescription walked to [facility].      He last had it filled 9/13/17 per mom - verified on .    LOV 8/29/17    Mom can be reached at [phone].

## 2017-10-12 ENCOUNTER — ALLIED HEALTH/NURSE VISIT (OUTPATIENT)
Dept: NURSING | Facility: CLINIC | Age: 27
End: 2017-10-12
Payer: COMMERCIAL

## 2017-10-12 DIAGNOSIS — E34.9 TESTOSTERONE DEFICIENCY: Primary | ICD-10-CM

## 2017-10-12 DIAGNOSIS — E23.0 PANHYPOPITUITARISM (H): ICD-10-CM

## 2017-10-12 DIAGNOSIS — E34.9 TESTOSTERONE DEFICIENCY: ICD-10-CM

## 2017-10-12 PROCEDURE — 96372 THER/PROPH/DIAG INJ SC/IM: CPT

## 2017-10-12 PROCEDURE — 99207 ZZC NO CHARGE NURSE ONLY: CPT

## 2017-10-12 RX ORDER — LEVOTHYROXINE SODIUM 137 UG/1
137 TABLET ORAL DAILY
Qty: 30 TABLET | Refills: 11 | Status: SHIPPED | OUTPATIENT
Start: 2017-10-12 | End: 2018-10-08

## 2017-10-12 NOTE — TELEPHONE ENCOUNTER
Testosterone Injection      Last Written Prescription Date:  8/29/2017  Last Fill Quantity: 1,   # refills: 0  Last Office Visit with [facility], [facility] or [facility] prescribing provider: 10/5/2017  Future Office visit:       Routing refill request to provider for review/approval because:  Drug not on the [facility], [facility] or [facility] refill protocol or controlled substance    Please renew prescription to reflect 6 month f/u per LOV note.   -Follow-up:  6 months  Sumaya Alonso MD  Endocrinology       Estefania VARMA RN, BSN, PHN  [place] Flex RN

## 2017-10-12 NOTE — PROGRESS NOTES
Patient present for testosterone injection. Mother present.     Patient tolerated well.     Estefania VARMA RN, BSN, PHN  [place] [facility] RN

## 2017-10-12 NOTE — TELEPHONE ENCOUNTER
I did three prescriptions (for the next 3 months).  Please put them on my desk to sign.  Thanks!    Also, from last visit:  At this time, had TREVA signed for recent records from Dr. Martinez    Can you please follow up to see about getting the records?   Thanks!

## 2017-10-12 NOTE — MR AVS SNAPSHOT
"              After Visit Summary   10/12/2017    Mamadou Reece    MRN: [id number]           Patient Information     Date Of Birth          1990        Visit Information        Provider Department      10/12/2017 8:30 AM CR RN [facility]        Today's Diagnoses     Testosterone deficiency    -  1    Panhypopituitarism (H)           Follow-ups after your visit        Who to contact     If you have questions or need follow up information about today's clinic visit or your schedule please contact [facility] directly at [phone].  Normal or non-critical lab and imaging results will be communicated to you by Playnomicshart, letter or phone within 4 business days after the clinic has received the results. If you do not hear from us within 7 days, please contact the clinic through Positionlyt or phone. If you have a critical or abnormal lab result, we will notify you by phone as soon as possible.  Submit refill requests through Monitise or call your pharmacy and they will forward the refill request to us. Please allow 3 business days for your refill to be completed.          Additional Information About Your Visit        MyChart Information     Monitise lets you send messages to your doctor, view your test results, renew your prescriptions, schedule appointments and more. To sign up, go to [email or web address] . Click on \"Log in\" on the left side of the screen, which will take you to the Welcome page. Then click on \"Sign up Now\" on the right side of the page.     You will be asked to enter the access code listed below, as well as some personal information. Please follow the directions to create your username and password.     Your access code is: [email or web address]  Expires: 2017  8:57 AM     Your access code will  in 90 days. If you need help or a new code, please call your [facility] or [phone].        Care EveryWhere ID     This is your Care EveryWhere ID. This could " be used by other organizations to access your [place] medical records  [id number]         Blood Pressure from Last 3 Encounters:   10/05/17 104/62   08/29/17 112/72   08/03/16 102/70    Weight from Last 3 Encounters:   10/05/17 191 lb 9.6 oz (86.9 kg)   08/29/17 187 lb 1.6 oz (84.9 kg)   08/03/16 184 lb 9.6 oz (83.7 kg)              We Performed the Following     C TESTOSTERONE CYPIONATE INJECTION, 1 MG     INJECTION INTRAMUSCULAR OR SUB-Q        Primary Care Provider Office Phone # Fax #    Mariluz Mae [phone]       [address]  [facility] [address]        Equal Access to Services     MARLO OZUNA : Hadii ryan palomo hadasho Sodainaali, waaxda luqadaha, qaybta kaalmada adeegyada, kylah osorio . So [facility] [phone].    ATENCIÓN: Si habla español, tiene a henderson disposición servicios gratuitos de asistencia lingüística. LlGalion Hospital [phone].    We comply with applicable federal civil rights laws and [place] laws. We do not discriminate on the basis of race, color, national origin, age, disability, sex, sexual orientation, or gender identity.            Thank you!     Thank you for choosing [facility]  for your care. Our goal is always to provide you with excellent care. Hearing back from our patients is one way we can continue to improve our services. Please take a few minutes to complete the written survey that you may receive in the mail after your visit with us. Thank you!             Your Updated Medication List - Protect others around you: Learn how to safely use, store and throw away your medicines at www.disposemymeds.org.          This list is accurate as of: 10/12/17  8:50 AM.  Always use your most recent med list.                   Brand Name Dispense Instructions for use Diagnosis    amphetamine-dextroamphetamine 30 MG per 24 hr capsule   Start taking on:  12/10/2017    ADDERALL XR    30 capsule    Take 1 capsule (30 mg) by mouth daily     Attention deficit disorder, unspecified hyperactivity presence       B-12 1000 MCG Tbcr     100 tablet    Take 2,000 mcg by mouth daily    Routine general medical examination at a health care facility       CALCIUM + D 315-200 MG-UNIT Tabs per tablet   Generic drug:  calcium citrate-vitamin D           desmopressin 0.01 % Soln spray    DDAVP     Spray  in nostril. 3 sprays once daily        levothyroxine 137 MCG tablet    SYNTHROID/LEVOTHROID     Take 137 mcg by mouth daily.        NORDITROPIN NORDIFLEX PEN SC      Inject 0.8 mLs Subcutaneous        predniSONE 1 MG tablet    DELTASONE    150 tablet    Take 3 mg in AM and 2 mg at night    Panhypopituitarism (H)       testosterone cypionate 200 MG/ML injection    DEPOTESTOTERONE    1 mL    Inject 1 mL (200 mg) into the muscle every 14 days    Testosterone deficiency, Panhypopituitarism (H)       vitamin D 1000 UNITS capsule      Take 2 capsules by mouth daily

## 2017-10-16 RX ORDER — TESTOSTERONE CYPIONATE 200 MG/ML
200 INJECTION, SOLUTION INTRAMUSCULAR
Qty: 1 ML | Refills: 0
Start: 2017-10-16 | End: 2017-11-30

## 2017-10-18 NOTE — TELEPHONE ENCOUNTER
Pt's mom called back-Relayed below. Mom stated she is not sure. She is going to call her  and then she will call our office back with info

## 2017-10-24 ENCOUNTER — TELEPHONE (OUTPATIENT)
Dept: ENDOCRINOLOGY | Facility: CLINIC | Age: 27
End: 2017-10-24

## 2017-10-24 DIAGNOSIS — E23.0 PANHYPOPITUITARISM (H): ICD-10-CM

## 2017-10-24 NOTE — TELEPHONE ENCOUNTER
When I try to order it - it does not give me the option of mg.  Plan is to decrease the dose.  Current dose is 0.8 ml and paln to decrease to 0.7 ml.  Please let pharmacy know.  Thanks !    Sumaya Alonso

## 2017-10-24 NOTE — TELEPHONE ENCOUNTER
[facility] calls with question regarding new prescription for Norditropin.     The order they received was for 0.7mL which would equal 7mg, previous dose was 0.8mg. The Norditropin pen is dosed in mg. They are asking if dose should be 0.7mg and confirm the plan was to decrease pt's dose.

## 2017-10-26 NOTE — TELEPHONE ENCOUNTER
Pharmacist calls. He states the best dosing for 0.7 mg daily is to do the Norditropin Flexpro 10 mg/1.5ml pen, 2 pens a month.     Pended the pen that he recommended and gave him a VO.

## 2017-10-27 ENCOUNTER — NURSE TRIAGE (OUTPATIENT)
Dept: NURSING | Facility: CLINIC | Age: 27
End: 2017-10-27

## 2017-10-27 NOTE — TELEPHONE ENCOUNTER
Rx sent.    Please call patient with above information.    Sumaya Alonso MD  Endocrinology   [facility]  October 27, 2017

## 2017-10-27 NOTE — TELEPHONE ENCOUNTER
Dad returned a call saying the clinic left a message about a dose change to Mamadou's somatropin. I read the notes from the clinic along with the fact that they sent in a new prescription.  Dad confirmed it with his son.  Dominga Yost RN-[facility]

## 2017-11-02 ENCOUNTER — ALLIED HEALTH/NURSE VISIT (OUTPATIENT)
Dept: NURSING | Facility: CLINIC | Age: 27
End: 2017-11-02
Payer: COMMERCIAL

## 2017-11-02 DIAGNOSIS — E34.9 TESTOSTERONE DEFICIENCY: Primary | ICD-10-CM

## 2017-11-02 PROCEDURE — 96372 THER/PROPH/DIAG INJ SC/IM: CPT

## 2017-11-02 NOTE — MR AVS SNAPSHOT
"              After Visit Summary   2017    Mamadou Reece    MRN: [id number]           Patient Information     Date Of Birth          1990        Visit Information        Provider Department      2017 8:30 AM CR RN [facility]        Today's Diagnoses     Testosterone deficiency    -  1       Follow-ups after your visit        Who to contact     If you have questions or need follow up information about today's clinic visit or your schedule please contact [facility] directly at [phone].  Normal or non-critical lab and imaging results will be communicated to you by MyChart, letter or phone within 4 business days after the clinic has received the results. If you do not hear from us within 7 days, please contact the clinic through PowerSecure Internationalhart or phone. If you have a critical or abnormal lab result, we will notify you by phone as soon as possible.  Submit refill requests through [email or web address] or call your pharmacy and they will forward the refill request to us. Please allow 3 business days for your refill to be completed.          Additional Information About Your Visit        MyChart Information     [email or web address] lets you send messages to your doctor, view your test results, renew your prescriptions, schedule appointments and more. To sign up, go to [email or web address] . Click on \"Log in\" on the left side of the screen, which will take you to the Welcome page. Then click on \"Sign up Now\" on the right side of the page.     You will be asked to enter the access code listed below, as well as some personal information. Please follow the directions to create your username and password.     Your access code is: [email or web address]  Expires: 2017  8:57 AM     Your access code will  in 90 days. If you need help or a new code, please call your [facility] or [phone].        Care EveryWhere ID     This is your Care EveryWhere ID. This could be used by other organizations " to access your [place] medical records  [id number]         Blood Pressure from Last 3 Encounters:   10/05/17 104/62   08/29/17 112/72   08/03/16 102/70    Weight from Last 3 Encounters:   10/05/17 191 lb 9.6 oz (86.9 kg)   08/29/17 187 lb 1.6 oz (84.9 kg)   08/03/16 184 lb 9.6 oz (83.7 kg)              We Performed the Following     C TESTOSTERONE CYPIONATE INJECTION, 1 MG     INJECTION INTRAMUSCULAR OR SUB-Q        Primary Care Provider Office Phone # Fax #    Mariluz Mae [phone]       [address]  [facility] [address]        Equal Access to Services     [facility] HospitalSARAH : Hadii aad stacia hadasho Soele, waaxda luqadaha, qaybta kaalmada adeegyada, kylah osorio . So [facility] [phone].    ATENCIÓN: Si habla español, tiene a henderson disposición servicios gratuitos de asistencia lingüística. Llame al [phone].    We comply with applicable federal civil rights laws and [place] laws. We do not discriminate on the basis of race, color, national origin, age, disability, sex, sexual orientation, or gender identity.            Thank you!     Thank you for choosing [facility]  for your care. Our goal is always to provide you with excellent care. Hearing back from our patients is one way we can continue to improve our services. Please take a few minutes to complete the written survey that you may receive in the mail after your visit with us. Thank you!             Your Updated Medication List - Protect others around you: Learn how to safely use, store and throw away your medicines at www.disposemymeds.org.          This list is accurate as of: 11/2/17  8:45 AM.  Always use your most recent med list.                   Brand Name Dispense Instructions for use Diagnosis    amphetamine-dextroamphetamine 30 MG per 24 hr capsule   Start taking on:  12/10/2017    ADDERALL XR    30 capsule    Take 1 capsule (30 mg) by mouth daily    Attention deficit disorder,  unspecified hyperactivity presence       B-12 1000 MCG Tbcr     100 tablet    Take 2,000 mcg by mouth daily    Routine general medical examination at a health care facility       CALCIUM + D 315-200 MG-UNIT Tabs per tablet   Generic drug:  calcium citrate-vitamin D           desmopressin 0.01 % Soln spray    DDAVP     Spray  in nostril. 3 sprays once daily        levothyroxine 137 MCG tablet    SYNTHROID/LEVOTHROID    30 tablet    Take 1 tablet (137 mcg) by mouth daily    Hypothyroidism, unspecified type       predniSONE 1 MG tablet    DELTASONE    150 tablet    Take 3 mg in AM and 2 mg at night    Panhypopituitarism (H)       somatropin 10 MG/1.5ML Soln PEN injection    NORDITROPIN FLEXPRO    3 mL    Inject 0.7 mg Subcutaneous daily    Panhypopituitarism (H)       testosterone cypionate 200 MG/ML injection    DEPOTESTOTERONE    1 mL    Inject 1 mL (200 mg) into the muscle every 14 days    Testosterone deficiency, Panhypopituitarism (H)       vitamin D 1000 UNITS capsule      Take 2 capsules by mouth daily

## 2017-11-16 ENCOUNTER — ALLIED HEALTH/NURSE VISIT (OUTPATIENT)
Dept: NURSING | Facility: CLINIC | Age: 27
End: 2017-11-16
Payer: COMMERCIAL

## 2017-11-16 DIAGNOSIS — E34.9 TESTOSTERONE DEFICIENCY: Primary | ICD-10-CM

## 2017-11-16 PROCEDURE — 96372 THER/PROPH/DIAG INJ SC/IM: CPT

## 2017-11-16 PROCEDURE — 99207 ZZC NO CHARGE NURSE ONLY: CPT

## 2017-11-16 NOTE — MR AVS SNAPSHOT
"              After Visit Summary   2017    Mamadou Reece    MRN: [id number]           Patient Information     Date Of Birth          1990        Visit Information        Provider Department      2017 8:30 AM CR RN [facility]        Today's Diagnoses     Testosterone deficiency    -  1       Follow-ups after your visit        Follow-up notes from your care team     Return in about 14 days (around 2017).      Who to contact     If you have questions or need follow up information about today's clinic visit or your schedule please contact [facility] directly at [phone].  Normal or non-critical lab and imaging results will be communicated to you by [employer] Mobile Telematicshart, letter or phone within 4 business days after the clinic has received the results. If you do not hear from us within 7 days, please contact the clinic through [employer] Tele[employer]hart or phone. If you have a critical or abnormal lab result, we will notify you by phone as soon as possible.  Submit refill requests through Sjapper or call your pharmacy and they will forward the refill request to us. Please allow 3 business days for your refill to be completed.          Additional Information About Your Visit        MyChart Information     Sjapper lets you send messages to your doctor, view your test results, renew your prescriptions, schedule appointments and more. To sign up, go to [email or web address] . Click on \"Log in\" on the left side of the screen, which will take you to the Welcome page. Then click on \"Sign up Now\" on the right side of the page.     You will be asked to enter the access code listed below, as well as some personal information. Please follow the directions to create your username and password.     Your access code is: [email or web address]  Expires: 2017  7:57 AM     Your access code will  in 90 days. If you need help or a new code, please call your [facility] or [phone].        Care " EveryWhere ID     This is your Care EveryWhere ID. This could be used by other organizations to access your [place] medical records  [id number]         Blood Pressure from Last 3 Encounters:   10/05/17 104/62   08/29/17 112/72   08/03/16 102/70    Weight from Last 3 Encounters:   10/05/17 191 lb 9.6 oz (86.9 kg)   08/29/17 187 lb 1.6 oz (84.9 kg)   08/03/16 184 lb 9.6 oz (83.7 kg)              We Performed the Following     ADMIN 1st VACCINE     TESTOSTERONE CYPIONATE INJECTION 1MG        Primary Care Provider Office Phone # Fax #    Mariluz Mae [phone] [address] [facility] [address]        Equal Access to Services     MARLO OZUNA : Hadii aad ku hadasho Soomaali, waaxda luqadaha, qaybta kaalmada adeegyada, waxay idiin hayharoon osorio . So [facility] [phone].    ATENCIÓN: Si habla español, tiene a henderson disposición servicios gratuitos de asistencia lingüística. Llame al [phone].    We comply with applicable federal civil rights laws and [place] laws. We do not discriminate on the basis of race, color, national origin, age, disability, sex, sexual orientation, or gender identity.            Thank you!     Thank you for choosing [facility]  for your care. Our goal is always to provide you with excellent care. Hearing back from our patients is one way we can continue to improve our services. Please take a few minutes to complete the written survey that you may receive in the mail after your visit with us. Thank you!             Your Updated Medication List - Protect others around you: Learn how to safely use, store and throw away your medicines at www.disposemymeds.org.          This list is accurate as of: 11/16/17  9:10 AM.  Always use your most recent med list.                   Brand Name Dispense Instructions for use Diagnosis    amphetamine-dextroamphetamine 30 MG per 24 hr capsule   Start taking on:  12/10/2017    ADDERALL XR    30  capsule    Take 1 capsule (30 mg) by mouth daily    Attention deficit disorder, unspecified hyperactivity presence       B-12 1000 MCG Tbcr     100 tablet    Take 2,000 mcg by mouth daily    Routine general medical examination at a health care facility       CALCIUM + D 315-200 MG-UNIT Tabs per tablet   Generic drug:  calcium citrate-vitamin D           desmopressin 0.01 % Soln spray    DDAVP     Spray  in nostril. 3 sprays once daily        levothyroxine 137 MCG tablet    SYNTHROID/LEVOTHROID    30 tablet    Take 1 tablet (137 mcg) by mouth daily    Hypothyroidism, unspecified type       predniSONE 1 MG tablet    DELTASONE    150 tablet    Take 3 mg in AM and 2 mg at night    Panhypopituitarism (H)       somatropin 10 MG/1.5ML Soln PEN injection    NORDITROPIN FLEXPRO    3 mL    Inject 0.7 mg Subcutaneous daily    Panhypopituitarism (H)       testosterone cypionate 200 MG/ML injection    DEPOTESTOTERONE    1 mL    Inject 1 mL (200 mg) into the muscle every 14 days    Testosterone deficiency, Panhypopituitarism (H)       vitamin D 1000 UNITS capsule      Take 2 capsules by mouth daily

## 2017-11-16 NOTE — PROGRESS NOTES
Per orders of Dr. Alonso, injection of Testosterone 200 mg given by Prisca Melendrez RN. Patient instructed to remain in clinic for 15 minutes afterwards, and to report any adverse reaction to me immediately.    Prisca Melendrez RN, BS  Clinical Nurse Triage.

## 2017-11-20 DIAGNOSIS — F98.8 ATTENTION DEFICIT DISORDER, UNSPECIFIED HYPERACTIVITY PRESENCE: ICD-10-CM

## 2017-11-30 ENCOUNTER — ALLIED HEALTH/NURSE VISIT (OUTPATIENT)
Dept: NURSING | Facility: CLINIC | Age: 27
End: 2017-11-30
Payer: COMMERCIAL

## 2017-11-30 ENCOUNTER — TELEPHONE (OUTPATIENT)
Dept: ENDOCRINOLOGY | Facility: CLINIC | Age: 27
End: 2017-11-30

## 2017-11-30 DIAGNOSIS — E34.9 TESTOSTERONE DEFICIENCY: ICD-10-CM

## 2017-11-30 DIAGNOSIS — E03.9 HYPOTHYROIDISM, UNSPECIFIED TYPE: Primary | ICD-10-CM

## 2017-11-30 DIAGNOSIS — E23.0 PANHYPOPITUITARISM (H): ICD-10-CM

## 2017-11-30 PROCEDURE — 96372 THER/PROPH/DIAG INJ SC/IM: CPT

## 2017-11-30 PROCEDURE — 99207 ZZC NO CHARGE NURSE ONLY: CPT

## 2017-11-30 RX ORDER — TESTOSTERONE CYPIONATE 200 MG/ML
200 INJECTION, SOLUTION INTRAMUSCULAR
Qty: 1 ML | Refills: 5
Start: 2017-11-30 | End: 2018-02-22

## 2017-11-30 NOTE — TELEPHONE ENCOUNTER
Disp Refills Start End RADHA   testosterone cypionate (DEPOTESTOTERONE) 200 MG/ML injection 1 mL 0 10/16/2017 4/16/2018 No   Sig: Inject 1 mL (200 mg) into the muscle every 14 days   Class: No Print Out     Pt comes to [facility] for testosterone injections q 14 days  Order was placed, but does not have refills on it  Need updated orders for injections q 14 d  We have medication in stock, just need the order to dispense    Route to provider to review and advise    Ellis RN Nurse Triage

## 2017-11-30 NOTE — MR AVS SNAPSHOT
"              After Visit Summary   2017    Mamadou Reece    MRN: [id number]           Patient Information     Date Of Birth          1990        Visit Information        Provider Department      2017 8:30 AM CR RN [facility]        Today's Diagnoses     Hypothyroidism, unspecified type    -  1       Follow-ups after your visit        Follow-up notes from your care team     Return in about 14 days (around 2017).      Who to contact     If you have questions or need follow up information about today's clinic visit or your schedule please contact [facility] directly at [phone].  Normal or non-critical lab and imaging results will be communicated to you by MyChart, letter or phone within 4 business days after the clinic has received the results. If you do not hear from us within 7 days, please contact the clinic through Lesson Prephart or phone. If you have a critical or abnormal lab result, we will notify you by phone as soon as possible.  Submit refill requests through Awdio or call your pharmacy and they will forward the refill request to us. Please allow 3 business days for your refill to be completed.          Additional Information About Your Visit        MyChart Information     Awdio lets you send messages to your doctor, view your test results, renew your prescriptions, schedule appointments and more. To sign up, go to [email or web address] . Click on \"Log in\" on the left side of the screen, which will take you to the Welcome page. Then click on \"Sign up Now\" on the right side of the page.     You will be asked to enter the access code listed below, as well as some personal information. Please follow the directions to create your username and password.     Your access code is: [email or web address]  Expires: 2018  9:06 AM     Your access code will  in 90 days. If you need help or a new code, please call your [facility] or [phone].      "   Care EveryWhere ID     This is your Care EveryWhere ID. This could be used by other organizations to access your [place] medical records  [id number]         Blood Pressure from Last 3 Encounters:   10/05/17 104/62   08/29/17 112/72   08/03/16 102/70    Weight from Last 3 Encounters:   10/05/17 191 lb 9.6 oz (86.9 kg)   08/29/17 187 lb 1.6 oz (84.9 kg)   08/03/16 184 lb 9.6 oz (83.7 kg)              We Performed the Following     ADMIN 1st VACCINE     TESTOSTERONE CYPIONATE INJECTION 1MG        Primary Care Provider Office Phone # Fax #    Mariluz Mae [phone] [address] [place] [address]        Equal Access to Services     MARLO OZUNA : Hadii aad ku hadasho Soomaali, waaxda luqadaha, qaybta kaalmada adeegyada, waxay idiin hayharoon osorio . So [facility] [phone].    ATENCIÓN: Si habla español, tiene a henderson disposición servicios gratuitos de asistencia lingüística. Llame al [phone].    We comply with applicable federal civil rights laws and [place] laws. We do not discriminate on the basis of race, color, national origin, age, disability, sex, sexual orientation, or gender identity.            Thank you!     Thank you for choosing [facility]  for your care. Our goal is always to provide you with excellent care. Hearing back from our patients is one way we can continue to improve our services. Please take a few minutes to complete the written survey that you may receive in the mail after your visit with us. Thank you!             Your Updated Medication List - Protect others around you: Learn how to safely use, store and throw away your medicines at www.disposemymeds.org.          This list is accurate as of: 11/30/17  9:06 AM.  Always use your most recent med list.                   Brand Name Dispense Instructions for use Diagnosis    amphetamine-dextroamphetamine 30 MG per 24 hr capsule   Start taking on:  12/10/2017    ADDERALL XR    30  capsule    Take 1 capsule (30 mg) by mouth daily    Attention deficit disorder, unspecified hyperactivity presence       B-12 1000 MCG Tbcr     100 tablet    Take 2,000 mcg by mouth daily    Routine general medical examination at a health care facility       CALCIUM + D 315-200 MG-UNIT Tabs per tablet   Generic drug:  calcium citrate-vitamin D           desmopressin 0.01 % Soln spray    DDAVP     Spray  in nostril. 3 sprays once daily        levothyroxine 137 MCG tablet    SYNTHROID/LEVOTHROID    30 tablet    Take 1 tablet (137 mcg) by mouth daily    Hypothyroidism, unspecified type       predniSONE 1 MG tablet    DELTASONE    150 tablet    Take 3 mg in AM and 2 mg at night    Panhypopituitarism (H)       somatropin 10 MG/1.5ML Soln PEN injection    NORDITROPIN FLEXPRO    3 mL    Inject 0.7 mg Subcutaneous daily    Panhypopituitarism (H)       testosterone cypionate 200 MG/ML injection    DEPOTESTOTERONE    1 mL    Inject 1 mL (200 mg) into the muscle every 14 days    Testosterone deficiency, Panhypopituitarism (H)       vitamin D 1000 UNITS capsule      Take 2 capsules by mouth daily

## 2017-11-30 NOTE — PROGRESS NOTES
Per orders of Dr. Alvarado, injection of Testosterone 200 mg given by Prisca Melendrez RN. Patient instructed to remain in clinic for 15 minutes afterwards, and to report any adverse reaction to me immediately.    Prisca Melendrez RN, BS  Clinical Nurse Triage.

## 2017-12-14 ENCOUNTER — ALLIED HEALTH/NURSE VISIT (OUTPATIENT)
Dept: NURSING | Facility: CLINIC | Age: 27
End: 2017-12-14
Payer: COMMERCIAL

## 2017-12-14 DIAGNOSIS — E34.9 TESTOSTERONE DEFICIENCY: Primary | ICD-10-CM

## 2017-12-14 PROCEDURE — 99207 ZZC NO CHARGE NURSE ONLY: CPT

## 2017-12-14 PROCEDURE — 96372 THER/PROPH/DIAG INJ SC/IM: CPT

## 2017-12-14 NOTE — MR AVS SNAPSHOT
"              After Visit Summary   2017    Mamadou Reece    MRN: [id number]           Patient Information     Date Of Birth          1990        Visit Information        Provider Department      2017 8:30 AM CR RN [facility]        Today's Diagnoses     Testosterone deficiency    -  1       Follow-ups after your visit        Follow-up notes from your care team     Return in about 14 days (around 2017).      Who to contact     If you have questions or need follow up information about today's clinic visit or your schedule please contact [facility] directly at [phone].  Normal or non-critical lab and imaging results will be communicated to you by Eastidehart, letter or phone within 4 business days after the clinic has received the results. If you do not hear from us within 7 days, please contact the clinic through Eastidehart or phone. If you have a critical or abnormal lab result, we will notify you by phone as soon as possible.  Submit refill requests through [email or web address] or call your pharmacy and they will forward the refill request to us. Please allow 3 business days for your refill to be completed.          Additional Information About Your Visit        MyChart Information     [email or web address] lets you send messages to your doctor, view your test results, renew your prescriptions, schedule appointments and more. To sign up, go to [email or web address] . Click on \"Log in\" on the left side of the screen, which will take you to the Welcome page. Then click on \"Sign up Now\" on the right side of the page.     You will be asked to enter the access code listed below, as well as some personal information. Please follow the directions to create your username and password.     Your access code is: [email or web address]  Expires: 2018  9:06 AM     Your access code will  in 90 days. If you need help or a new code, please call your [facility] or [phone].        Care " EveryWhere ID     This is your Care EveryWhere ID. This could be used by other organizations to access your [place] medical records  [id number]         Blood Pressure from Last 3 Encounters:   10/05/17 104/62   08/29/17 112/72   08/03/16 102/70    Weight from Last 3 Encounters:   10/05/17 191 lb 9.6 oz (86.9 kg)   08/29/17 187 lb 1.6 oz (84.9 kg)   08/03/16 184 lb 9.6 oz (83.7 kg)              We Performed the Following     ADMIN 1st VACCINE     TESTOSTERONE CYPIONATE INJECTION 1MG        Primary Care Provider Office Phone # Fax #    Mariluz Mae -[phone] [address] [place] [address]        Equal Access to Services     MARLO OZUNA : Hadii aad ku hadasho Soomaali, waaxda luqadaha, qaybta kaalmada adeegyada, waxay idiin hayharoon osorio . So [facility] [phone].    ATENCIÓN: Si habla español, tiene a henderson disposición servicios gratuitos de asistencia lingüística. Llame al [phone].    We comply with applicable federal civil rights laws and [place] laws. We do not discriminate on the basis of race, color, national origin, age, disability, sex, sexual orientation, or gender identity.            Thank you!     Thank you for choosing [facility]  for your care. Our goal is always to provide you with excellent care. Hearing back from our patients is one way we can continue to improve our services. Please take a few minutes to complete the written survey that you may receive in the mail after your visit with us. Thank you!             Your Updated Medication List - Protect others around you: Learn how to safely use, store and throw away your medicines at www.disposemymeds.org.          This list is accurate as of: 12/14/17  8:56 AM.  Always use your most recent med list.                   Brand Name Dispense Instructions for use Diagnosis    amphetamine-dextroamphetamine 30 MG per 24 hr capsule    ADDERALL XR    30 capsule    Take 1 capsule (30 mg) by  mouth daily    Attention deficit disorder, unspecified hyperactivity presence       B-12 1000 MCG Tbcr     100 tablet    Take 2,000 mcg by mouth daily    Routine general medical examination at a health care facility       CALCIUM + D 315-200 MG-UNIT Tabs per tablet   Generic drug:  calcium citrate-vitamin D           desmopressin 0.01 % Soln spray    DDAVP     Spray  in nostril. 3 sprays once daily        levothyroxine 137 MCG tablet    SYNTHROID/LEVOTHROID    30 tablet    Take 1 tablet (137 mcg) by mouth daily    Hypothyroidism, unspecified type       predniSONE 1 MG tablet    DELTASONE    150 tablet    Take 3 mg in AM and 2 mg at night    Panhypopituitarism (H)       somatropin 10 MG/1.5ML Soln PEN injection    NORDITROPIN FLEXPRO    3 mL    Inject 0.7 mg Subcutaneous daily    Panhypopituitarism (H)       testosterone cypionate 200 MG/ML injection    DEPOTESTOTERONE    1 mL    Inject 1 mL (200 mg) into the muscle every 14 days    Testosterone deficiency, Panhypopituitarism (H)       vitamin D 1000 UNITS capsule      Take 2 capsules by mouth daily

## 2017-12-28 ENCOUNTER — ALLIED HEALTH/NURSE VISIT (OUTPATIENT)
Dept: NURSING | Facility: CLINIC | Age: 27
End: 2017-12-28
Payer: COMMERCIAL

## 2017-12-28 DIAGNOSIS — E34.9 TESTOSTERONE DEFICIENCY: Primary | ICD-10-CM

## 2017-12-28 PROCEDURE — 96372 THER/PROPH/DIAG INJ SC/IM: CPT

## 2017-12-28 PROCEDURE — 99207 ZZC NO CHARGE NURSE ONLY: CPT

## 2017-12-28 NOTE — NURSING NOTE
Per orders of Dr. Alvarado, injection of Testosterone 200 mg L VG given by Prisca Melendrez RN. Patient instructed to remain in clinic for 15 minutes afterwards, and to report any adverse reaction to me immediately.  Prisca Melendrez RN, BS  Clinical Nurse Triage.

## 2017-12-28 NOTE — MR AVS SNAPSHOT
"              After Visit Summary   2017    Mamadou Reece    MRN: [id number]           Patient Information     Date Of Birth          1990        Visit Information        Provider Department      2017 8:30 AM CR RN [facility]        Today's Diagnoses     Testosterone deficiency    -  1       Follow-ups after your visit        Follow-up notes from your care team     Return in about 14 days (around 2018).      Who to contact     If you have questions or need follow up information about today's clinic visit or your schedule please contact [facility] directly at [phone].  Normal or non-critical lab and imaging results will be communicated to you by ECO Filmshart, letter or phone within 4 business days after the clinic has received the results. If you do not hear from us within 7 days, please contact the clinic through ECO Filmshart or phone. If you have a critical or abnormal lab result, we will notify you by phone as soon as possible.  Submit refill requests through SureSpeak or call your pharmacy and they will forward the refill request to us. Please allow 3 business days for your refill to be completed.          Additional Information About Your Visit        MyChart Information     SureSpeak lets you send messages to your doctor, view your test results, renew your prescriptions, schedule appointments and more. To sign up, go to [email or web address] . Click on \"Log in\" on the left side of the screen, which will take you to the Welcome page. Then click on \"Sign up Now\" on the right side of the page.     You will be asked to enter the access code listed below, as well as some personal information. Please follow the directions to create your username and password.     Your access code is: [email or web address]  Expires: 2018  9:06 AM     Your access code will  in 90 days. If you need help or a new code, please call your [facility] or [phone].        Care " EveryWhere ID     This is your Care EveryWhere ID. This could be used by other organizations to access your [place] medical records  [id number]         Blood Pressure from Last 3 Encounters:   10/05/17 104/62   08/29/17 112/72   08/03/16 102/70    Weight from Last 3 Encounters:   10/05/17 191 lb 9.6 oz (86.9 kg)   08/29/17 187 lb 1.6 oz (84.9 kg)   08/03/16 184 lb 9.6 oz (83.7 kg)              We Performed the Following     ADMIN 1st VACCINE     TESTOSTERONE CYPIONATE INJECTION 1MG        Primary Care Provider Office Phone # Fax #    Mariluz Mae [phone] [address] [place] [address]        Equal Access to Services     MARLO OZUNA : Hadii aad ku hadasho Soomaali, waaxda luqadaha, qaybta kaalmada adeegyada, waxay idiin hayharoon osorio . So [facility] [phone].    ATENCIÓN: Si habla español, tiene a henderson disposición servicios gratuitos de asistencia lingüística. Llame al [phone].    We comply with applicable federal civil rights laws and [place] laws. We do not discriminate on the basis of race, color, national origin, age, disability, sex, sexual orientation, or gender identity.            Thank you!     Thank you for choosing [facility]  for your care. Our goal is always to provide you with excellent care. Hearing back from our patients is one way we can continue to improve our services. Please take a few minutes to complete the written survey that you may receive in the mail after your visit with us. Thank you!             Your Updated Medication List - Protect others around you: Learn how to safely use, store and throw away your medicines at www.disposemymeds.org.          This list is accurate as of: 12/28/17  8:48 AM.  Always use your most recent med list.                   Brand Name Dispense Instructions for use Diagnosis    amphetamine-dextroamphetamine 30 MG per 24 hr capsule    ADDERALL XR    30 capsule    Take 1 capsule (30 mg) by  mouth daily    Attention deficit disorder, unspecified hyperactivity presence       B-12 1000 MCG Tbcr     100 tablet    Take 2,000 mcg by mouth daily    Routine general medical examination at a health care facility       CALCIUM + D 315-200 MG-UNIT Tabs per tablet   Generic drug:  calcium citrate-vitamin D           desmopressin 0.01 % Soln spray    DDAVP     Spray  in nostril. 3 sprays once daily        levothyroxine 137 MCG tablet    SYNTHROID/LEVOTHROID    30 tablet    Take 1 tablet (137 mcg) by mouth daily    Hypothyroidism, unspecified type       predniSONE 1 MG tablet    DELTASONE    150 tablet    Take 3 mg in AM and 2 mg at night    Panhypopituitarism (H)       somatropin 10 MG/1.5ML Soln PEN injection    NORDITROPIN FLEXPRO    3 mL    Inject 0.7 mg Subcutaneous daily    Panhypopituitarism (H)       testosterone cypionate 200 MG/ML injection    DEPOTESTOTERONE    1 mL    Inject 1 mL (200 mg) into the muscle every 14 days    Testosterone deficiency, Panhypopituitarism (H)       vitamin D 1000 UNITS capsule      Take 2 capsules by mouth daily

## 2018-01-11 ENCOUNTER — ALLIED HEALTH/NURSE VISIT (OUTPATIENT)
Dept: NURSING | Facility: CLINIC | Age: 28
End: 2018-01-11
Payer: COMMERCIAL

## 2018-01-11 DIAGNOSIS — E03.9 HYPOTHYROIDISM, UNSPECIFIED TYPE: Primary | ICD-10-CM

## 2018-01-11 PROCEDURE — 90471 IMMUNIZATION ADMIN: CPT

## 2018-01-11 PROCEDURE — 99207 ZZC NO CHARGE NURSE ONLY: CPT

## 2018-01-11 NOTE — MR AVS SNAPSHOT
"              After Visit Summary   2018    Mamadou Reece    MRN: [id number]           Patient Information     Date Of Birth          1990        Visit Information        Provider Department      2018 8:30 AM CR RN [facility]        Today's Diagnoses     Hypothyroidism, unspecified type    -  1       Follow-ups after your visit        Follow-up notes from your care team     Return in about 14 days (around 2018).      Who to contact     If you have questions or need follow up information about today's clinic visit or your schedule please contact [facility] directly at [phone].  Normal or non-critical lab and imaging results will be communicated to you by MyChart, letter or phone within 4 business days after the clinic has received the results. If you do not hear from us within 7 days, please contact the clinic through Playlorehart or phone. If you have a critical or abnormal lab result, we will notify you by phone as soon as possible.  Submit refill requests through RedFlag Software or call your pharmacy and they will forward the refill request to us. Please allow 3 business days for your refill to be completed.          Additional Information About Your Visit        MyChart Information     RedFlag Software lets you send messages to your doctor, view your test results, renew your prescriptions, schedule appointments and more. To sign up, go to [email or web address] . Click on \"Log in\" on the left side of the screen, which will take you to the Welcome page. Then click on \"Sign up Now\" on the right side of the page.     You will be asked to enter the access code listed below, as well as some personal information. Please follow the directions to create your username and password.     Your access code is: [email or web address]  Expires: 2018  9:06 AM     Your access code will  in 90 days. If you need help or a new code, please call your [facility] or [phone].      "   Care EveryWhere ID     This is your Care EveryWhere ID. This could be used by other organizations to access your [place] medical records  [id number]         Blood Pressure from Last 3 Encounters:   10/05/17 104/62   08/29/17 112/72   08/03/16 102/70    Weight from Last 3 Encounters:   10/05/17 191 lb 9.6 oz (86.9 kg)   08/29/17 187 lb 1.6 oz (84.9 kg)   08/03/16 184 lb 9.6 oz (83.7 kg)              We Performed the Following     ADMIN 1st VACCINE     TESTOSTERONE CYPIONATE INJECTION 1MG        Primary Care Provider Office Phone # Fax #    Mariluz Mae [phone] [address] [place] [address]        Equal Access to Services     MARLO OZUNA : Hadii aad ku hadasho Soomaali, waaxda luqadaha, qaybta kaalmada adeegyada, waxay idiin haymedhatn isabell osorio . So [facility] [phone].    ATENCIÓN: Si habla español, tiene a henderson disposición servicios gratuitos de asistencia lingüística. Llame al [phone].    We comply with applicable federal civil rights laws and [place] laws. We do not discriminate on the basis of race, color, national origin, age, disability, sex, sexual orientation, or gender identity.            Thank you!     Thank you for choosing [facility]  for your care. Our goal is always to provide you with excellent care. Hearing back from our patients is one way we can continue to improve our services. Please take a few minutes to complete the written survey that you may receive in the mail after your visit with us. Thank you!             Your Updated Medication List - Protect others around you: Learn how to safely use, store and throw away your medicines at www.disposemymeds.org.          This list is accurate as of: 1/11/18  8:51 AM.  Always use your most recent med list.                   Brand Name Dispense Instructions for use Diagnosis    amphetamine-dextroamphetamine 30 MG per 24 hr capsule    ADDERALL XR    30 capsule    Take 1 capsule (30  mg) by mouth daily    Attention deficit disorder, unspecified hyperactivity presence       B-12 1000 MCG Tbcr     100 tablet    Take 2,000 mcg by mouth daily    Routine general medical examination at a health care facility       CALCIUM + D 315-200 MG-UNIT Tabs per tablet   Generic drug:  calcium citrate-vitamin D           desmopressin 0.01 % Soln spray    DDAVP     Spray  in nostril. 3 sprays once daily        levothyroxine 137 MCG tablet    SYNTHROID/LEVOTHROID    30 tablet    Take 1 tablet (137 mcg) by mouth daily    Hypothyroidism, unspecified type       predniSONE 1 MG tablet    DELTASONE    150 tablet    Take 3 mg in AM and 2 mg at night    Panhypopituitarism (H)       somatropin 10 MG/1.5ML Soln PEN injection    NORDITROPIN FLEXPRO    3 mL    Inject 0.7 mg Subcutaneous daily    Panhypopituitarism (H)       testosterone cypionate 200 MG/ML injection    DEPOTESTOTERONE    1 mL    Inject 1 mL (200 mg) into the muscle every 14 days    Testosterone deficiency, Panhypopituitarism (H)       vitamin D 1000 UNITS capsule      Take 2 capsules by mouth daily

## 2018-01-11 NOTE — NURSING NOTE
Due to injection administration, patient instructed to remain in clinic for 15 minutes  afterwards, and to report any adverse reaction to me immediately.  Prisca Melendrez RN, BS  Clinical Nurse Triage.

## 2018-01-24 ENCOUNTER — TRANSFERRED RECORDS (OUTPATIENT)
Dept: HEALTH INFORMATION MANAGEMENT | Facility: CLINIC | Age: 28
End: 2018-01-24

## 2018-01-25 ENCOUNTER — ALLIED HEALTH/NURSE VISIT (OUTPATIENT)
Dept: NURSING | Facility: CLINIC | Age: 28
End: 2018-01-25
Payer: COMMERCIAL

## 2018-01-25 ENCOUNTER — TRANSFERRED RECORDS (OUTPATIENT)
Dept: HEALTH INFORMATION MANAGEMENT | Facility: CLINIC | Age: 28
End: 2018-01-25

## 2018-01-25 DIAGNOSIS — E34.9 TESTOSTERONE DEFICIENCY: Primary | ICD-10-CM

## 2018-01-25 PROCEDURE — 99207 ZZC NO CHARGE NURSE ONLY: CPT

## 2018-01-25 PROCEDURE — 96372 THER/PROPH/DIAG INJ SC/IM: CPT

## 2018-01-25 NOTE — MR AVS SNAPSHOT
"              After Visit Summary   2018    Mamadou Reece    MRN: [id number]           Patient Information     Date Of Birth          1990        Visit Information        Provider Department      2018 8:30 AM CR RN [facility] [place] [facility]        Today's Diagnoses     Testosterone deficiency    -  1       Follow-ups after your visit        Follow-up notes from your care team     Return in about 14 days (around 2018).      Who to contact     If you have questions or need follow up information about today's clinic visit or your schedule please contact [facility] directly at [phone].  Normal or non-critical lab and imaging results will be communicated to you by Manifesthart, letter or phone within 4 business days after the clinic has received the results. If you do not hear from us within 7 days, please contact the clinic through Manifesthart or phone. If you have a critical or abnormal lab result, we will notify you by phone as soon as possible.  Submit refill requests through Saber Hacer or call your pharmacy and they will forward the refill request to us. Please allow 3 business days for your refill to be completed.          Additional Information About Your Visit        MyChart Information     Saber Hacer lets you send messages to your doctor, view your test results, renew your prescriptions, schedule appointments and more. To sign up, go to [email or web address] . Click on \"Log in\" on the left side of the screen, which will take you to the Welcome page. Then click on \"Sign up Now\" on the right side of the page.     You will be asked to enter the access code listed below, as well as some personal information. Please follow the directions to create your username and password.     Your access code is: [email or web address]  Expires: 2018  9:06 AM     Your access code will  in 90 days. If you need help or a new code, please call your [facility] or [phone].        Care " EveryWhere ID     This is your Care EveryWhere ID. This could be used by other organizations to access your [place] medical records  [id number]         Blood Pressure from Last 3 Encounters:   10/05/17 104/62   08/29/17 112/72   08/03/16 102/70    Weight from Last 3 Encounters:   10/05/17 191 lb 9.6 oz (86.9 kg)   08/29/17 187 lb 1.6 oz (84.9 kg)   08/03/16 184 lb 9.6 oz (83.7 kg)              We Performed the Following     ADMIN 1st VACCINE     TESTOSTERONE CYPIONATE INJECTION 1MG        Primary Care Provider Office Phone # Fax #    Mariluz Mae [phone] [address] [place] [address]        Equal Access to Services     MARLO OZUNA : Hadii aad ku hadasho Soomaali, waaxda luqadaha, qaybta kaalmada adeegyada, waxay idiin hayharoon osorio . So [facility] [phone].    ATENCIÓN: Si habla español, tiene a henderson disposición servicios gratuitos de asistencia lingüística. Llame al [phone].    We comply with applicable federal civil rights laws and [place] laws. We do not discriminate on the basis of race, color, national origin, age, disability, sex, sexual orientation, or gender identity.            Thank you!     Thank you for choosing [facility]  for your care. Our goal is always to provide you with excellent care. Hearing back from our patients is one way we can continue to improve our services. Please take a few minutes to complete the written survey that you may receive in the mail after your visit with us. Thank you!             Your Updated Medication List - Protect others around you: Learn how to safely use, store and throw away your medicines at www.disposemymeds.org.          This list is accurate as of 1/25/18  8:57 AM.  Always use your most recent med list.                   Brand Name Dispense Instructions for use Diagnosis    amphetamine-dextroamphetamine 30 MG per 24 hr capsule    ADDERALL XR    30 capsule    Take 1 capsule (30 mg) by  mouth daily    Attention deficit disorder, unspecified hyperactivity presence       B-12 1000 MCG Tbcr     100 tablet    Take 2,000 mcg by mouth daily    Routine general medical examination at a health care facility       CALCIUM + D 315-200 MG-UNIT Tabs per tablet   Generic drug:  calcium citrate-vitamin D           desmopressin 0.01 % Soln spray    DDAVP     Spray  in nostril. 3 sprays once daily        levothyroxine 137 MCG tablet    SYNTHROID/LEVOTHROID    30 tablet    Take 1 tablet (137 mcg) by mouth daily    Hypothyroidism, unspecified type       predniSONE 1 MG tablet    DELTASONE    150 tablet    Take 3 mg in AM and 2 mg at night    Panhypopituitarism (H)       somatropin 10 MG/1.5ML Soln PEN injection    NORDITROPIN FLEXPRO    3 mL    Inject 0.7 mg Subcutaneous daily    Panhypopituitarism (H)       testosterone cypionate 200 MG/ML injection    DEPOTESTOTERONE    1 mL    Inject 1 mL (200 mg) into the muscle every 14 days    Testosterone deficiency, Panhypopituitarism (H)       vitamin D 1000 UNITS capsule      Take 2 capsules by mouth daily

## 2018-02-08 ENCOUNTER — ALLIED HEALTH/NURSE VISIT (OUTPATIENT)
Dept: NURSING | Facility: CLINIC | Age: 28
End: 2018-02-08
Payer: COMMERCIAL

## 2018-02-08 DIAGNOSIS — E34.9 TESTOSTERONE DEFICIENCY: Primary | ICD-10-CM

## 2018-02-08 PROCEDURE — 96372 THER/PROPH/DIAG INJ SC/IM: CPT

## 2018-02-08 PROCEDURE — 99207 ZZC NO CHARGE NURSE ONLY: CPT

## 2018-02-12 ENCOUNTER — TELEPHONE (OUTPATIENT)
Dept: FAMILY MEDICINE | Facility: CLINIC | Age: 28
End: 2018-02-12

## 2018-02-12 DIAGNOSIS — E23.2 DIABETES INSIPIDUS (H): Primary | ICD-10-CM

## 2018-02-13 DIAGNOSIS — F98.8 ATTENTION DEFICIT DISORDER, UNSPECIFIED HYPERACTIVITY PRESENCE: ICD-10-CM

## 2018-02-13 NOTE — TELEPHONE ENCOUNTER
Prior Authorization Retail Medication Request  Medication/Dose: desmopressin nasal spray  Diagnosis and ICD code:   New/Renewal/Insurance Change PA:   Previously Tried and Failed Therapies:     Insurance ID (if provided): [id number]  Insurance Phone (if provided): [phone]    Any additional info from fax request:     If you received a fax notification from an outside Pharmacy:  Pharmacy Name:[facility]  Pharmacy #:[phone]  Pharmacy Fax:[phone]    Kaylan Doran CPhT  [facility]  [address] [place] [address]  [phone]

## 2018-02-14 ENCOUNTER — TELEPHONE (OUTPATIENT)
Dept: ENDOCRINOLOGY | Facility: CLINIC | Age: 28
End: 2018-02-14

## 2018-02-14 RX ORDER — DESMOPRESSIN ACETATE 0.1 MG/ML
SOLUTION NASAL
Qty: 10 ML | Refills: 3 | Status: SHIPPED | OUTPATIENT
Start: 2018-02-14 | End: 2018-12-13

## 2018-02-14 RX ORDER — DEXTROAMPHETAMINE SACCHARATE, AMPHETAMINE ASPARTATE MONOHYDRATE, DEXTROAMPHETAMINE SULFATE AND AMPHETAMINE SULFATE 7.5; 7.5; 7.5; 7.5 MG/1; MG/1; MG/1; MG/1
30 CAPSULE, EXTENDED RELEASE ORAL DAILY
Qty: 30 CAPSULE | Refills: 0 | Status: SHIPPED | OUTPATIENT
Start: 2018-02-14 | End: 2018-03-26

## 2018-02-14 RX ORDER — DESMOPRESSIN ACETATE 0.1 MG/ML
SOLUTION NASAL
Status: CANCELLED | OUTPATIENT
Start: 2018-02-14

## 2018-02-14 NOTE — TELEPHONE ENCOUNTER
"Pt's dad calls (consent to communicate in epic), checking on status of PA request. States pt has a enough med to last a few days. Discuss per epic med list Desmopressin Nasal [place] rx is pt reported. Dad states they recently transferred from Dr. Eduardo Mcdonough to Dr. Alonso.     Called pharm, verified dosing of Desmopressin 0.01% solution 3 sprays into nostril each evening. Dx code: 23.2, but no words for specific dx.    Per 10/05/17 OV note: \"2. Diabetes Insipidus: On desmopressiin 10 mcg- 3 sprays at night.\"     To start PA process using Dr. Alonso's name an rx needs to be sent from her. Pended rx.    Please sign pended rx so PA process can be initiated.               "

## 2018-02-14 NOTE — TELEPHONE ENCOUNTER
Rx sent.    Please initiate prior authorization if needed.  There is another telephone encounter requesting the prior authorization.  See telephone encounter from yesterday  Please expedite the process as patient is running out of medication.  Let me know if you have any questions.

## 2018-02-14 NOTE — TELEPHONE ENCOUNTER
PA Initiation    Medication: desmopressin nasal spray  Insurance Company: [employer] - Phone [phone] Fax [phone]  Pharmacy Filling the Rx[facility] [place] [place] [facility] [address]  Filling Pharmacy Phone: [phone]  Filling Pharmacy Fax:    Start Date: 2/14/2018

## 2018-02-14 NOTE — TELEPHONE ENCOUNTER
Prior Authorization Retail Medication Request  URGENT  Medication/Dose: desmopressin  Diagnosis and ICD code: E23.2  New/Renewal/Insurance Change PA: New/Renewal- this is a new patient to Dr Alonso   Previously Tried and Failed Therapies: patient has very limited vision     Insurance ID (if provided): [id number]   Insurance Phone (if provided): [phone]     Any additional info from fax request:     If you received a fax notification from an outside Pharmacy:[facility]   Pharmacy Name:  Pharmacy #:[phone]  Pharmacy Fax:[phone]

## 2018-02-14 NOTE — TELEPHONE ENCOUNTER
adderall  LRF 12/10/17, dispense 30 with no refills  LOV 8/29/17    Routing refill request to provider for review/approval because:  Drug not on the [facility] refill protocol     RX monitoring program ([place]) reviewed:  - called [facility] pharmacy and spoke to Jessica for clarification also.  The third prescription from 10/11/17 was filled on 1/9/18.      [place] profile:  [email or web address]

## 2018-02-14 NOTE — TELEPHONE ENCOUNTER
Please put in my inbox to sign.  Also let them know he is due for an appointment prior to additional fills.  Thanks!

## 2018-02-15 NOTE — TELEPHONE ENCOUNTER
Prior Authorization Approval    Authorization Effective Date: 2/14/2018  Authorization Expiration Date: 2/14/2019  Medication: desmopressin nasal spray-approved   Approved Dose/Quantity:    Reference #:     Insurance Company: [employer] - Phone [phone] Fax [phone]  Expected CoPay: 0.00     CoPay Card Available:      Foundation Assistance Needed:    Which Pharmacy is filling the prescription (Not needed for infusion/clinic administered): [facility] [place] - [address] [address]  Pharmacy Notified: Yes  Patient Notified: Yes

## 2018-02-22 ENCOUNTER — TELEPHONE (OUTPATIENT)
Dept: ENDOCRINOLOGY | Facility: CLINIC | Age: 28
End: 2018-02-22

## 2018-02-22 ENCOUNTER — ALLIED HEALTH/NURSE VISIT (OUTPATIENT)
Dept: NURSING | Facility: CLINIC | Age: 28
End: 2018-02-22
Payer: COMMERCIAL

## 2018-02-22 DIAGNOSIS — E23.0 PANHYPOPITUITARISM (H): ICD-10-CM

## 2018-02-22 DIAGNOSIS — E34.9 TESTOSTERONE DEFICIENCY: Primary | ICD-10-CM

## 2018-02-22 DIAGNOSIS — E34.9 TESTOSTERONE DEFICIENCY: ICD-10-CM

## 2018-02-22 PROCEDURE — 96372 THER/PROPH/DIAG INJ SC/IM: CPT

## 2018-02-22 PROCEDURE — 99207 ZZC NO CHARGE NURSE ONLY: CPT

## 2018-02-22 RX ORDER — TESTOSTERONE CYPIONATE 200 MG/ML
200 INJECTION, SOLUTION INTRAMUSCULAR
Qty: 1 ML | Refills: 5
Start: 2018-02-22 | End: 2018-05-03

## 2018-02-22 NOTE — TELEPHONE ENCOUNTER
Pt in for testosterone injection today,  Noted we need new orders to continue injections  Injections are q 14 days, so 6 doses last 3 months  Last visit was 10.5.17, plan f/u in 6 months       Disp Refills Start End RADHA   testosterone cypionate (DEPOTESTOTERONE) 200 MG/ML injection 1 mL 5 11/30/2017  No   Sig: Inject 1 mL (200 mg) into the muscle every 14 days     Component Value Flag Ref Range Units Status Collected Lab   Testosterone Total 647  240 - 950 ng/dL Final 10/05/2017  9:18 AM 51     Route to provider to review and advise    Prisca Melendrez RN Nurse Triage

## 2018-03-08 ENCOUNTER — ALLIED HEALTH/NURSE VISIT (OUTPATIENT)
Dept: NURSING | Facility: CLINIC | Age: 28
End: 2018-03-08
Payer: COMMERCIAL

## 2018-03-08 DIAGNOSIS — E34.9 TESTOSTERONE DEFICIENCY: Primary | ICD-10-CM

## 2018-03-08 PROCEDURE — 99207 ZZC NO CHARGE NURSE ONLY: CPT

## 2018-03-08 PROCEDURE — 96372 THER/PROPH/DIAG INJ SC/IM: CPT

## 2018-03-22 ENCOUNTER — ALLIED HEALTH/NURSE VISIT (OUTPATIENT)
Dept: NURSING | Facility: CLINIC | Age: 28
End: 2018-03-22
Payer: COMMERCIAL

## 2018-03-22 DIAGNOSIS — E34.9 TESTOSTERONE DEFICIENCY: Primary | ICD-10-CM

## 2018-03-22 PROCEDURE — 99207 ZZC NO CHARGE NURSE ONLY: CPT

## 2018-03-22 PROCEDURE — 90471 IMMUNIZATION ADMIN: CPT

## 2018-03-26 DIAGNOSIS — F98.8 ATTENTION DEFICIT DISORDER, UNSPECIFIED HYPERACTIVITY PRESENCE: ICD-10-CM

## 2018-03-28 NOTE — TELEPHONE ENCOUNTER
adderall  LRF 2/14/18  LOV 8/29/17    Routing refill request to provider for review/approval because:  Drug not on the [facility] refill protocol and pt due for an appt    RX monitoring program ([place]MP) reviewed:  reviewed- no concerns    [place]MP profile:  [email or web address]    Will forward to the station, please try and help the pt schedule an appt - f/u ADHD.  Thanks!

## 2018-03-29 RX ORDER — DEXTROAMPHETAMINE SACCHARATE, AMPHETAMINE ASPARTATE MONOHYDRATE, DEXTROAMPHETAMINE SULFATE AND AMPHETAMINE SULFATE 7.5; 7.5; 7.5; 7.5 MG/1; MG/1; MG/1; MG/1
30 CAPSULE, EXTENDED RELEASE ORAL DAILY
Qty: 30 CAPSULE | Refills: 0 | Status: SHIPPED | OUTPATIENT
Start: 2018-03-29 | End: 2018-05-11

## 2018-03-29 NOTE — TELEPHONE ENCOUNTER
Notified patient's dad that Rx was at front and he said he will mick back to make apt.    Bailee Gibson, CMA

## 2018-04-05 ENCOUNTER — ALLIED HEALTH/NURSE VISIT (OUTPATIENT)
Dept: NURSING | Facility: CLINIC | Age: 28
End: 2018-04-05
Payer: COMMERCIAL

## 2018-04-05 DIAGNOSIS — E34.9 TESTOSTERONE DEFICIENCY: Primary | ICD-10-CM

## 2018-04-05 PROCEDURE — 96372 THER/PROPH/DIAG INJ SC/IM: CPT

## 2018-04-05 NOTE — MR AVS SNAPSHOT
"              After Visit Summary   4/5/2018    Mamadou Reece    MRN: [id number]           Patient Information     Date Of Birth          1990        Visit Information        Provider Department      4/5/2018 8:30 AM CR RN [facility]        Today's Diagnoses     Testosterone deficiency    -  1       Follow-ups after your visit        Your next 10 appointments already scheduled     Apr 19, 2018  4:10 PM CDT   SHORT with Mariluz Mae MD   [facility]    [address] [address] [phone]              Who to contact     If you have questions or need follow up information about today's clinic visit or your schedule please contact [facility] directly at [phone].  Normal or non-critical lab and imaging results will be communicated to you by MyChart, letter or phone within 4 business days after the clinic has received the results. If you do not hear from us within 7 days, please contact the clinic through MyChart or phone. If you have a critical or abnormal lab result, we will notify you by phone as soon as possible.  Submit refill requests through Pouring Pounds or call your pharmacy and they will forward the refill request to us. Please allow 3 business days for your refill to be completed.          Additional Information About Your Visit        MyChart Information     Pouring Pounds lets you send messages to your doctor, view your test results, renew your prescriptions, schedule appointments and more. To sign up, go to [email or web address] . Click on \"Log in\" on the left side of the screen, which will take you to the Welcome page. Then click on \"Sign up Now\" on the right side of the page.     You will be asked to enter the access code listed below, as well as some personal information. Please follow the directions to create your username and password.     Your access code is: [email or web address]  Expires: 6/6/2018  9:51 " AM     Your access code will  in 90 days. If you need help or a new code, please call your [place] clinic or [phone].        Care EveryWhere ID     This is your Care EveryWhere ID. This could be used by other organizations to access your [place] medical records  [id number]         Blood Pressure from Last 3 Encounters:   10/05/17 104/62   17 112/72   16 102/70    Weight from Last 3 Encounters:   10/05/17 191 lb 9.6 oz (86.9 kg)   17 187 lb 1.6 oz (84.9 kg)   16 184 lb 9.6 oz (83.7 kg)              We Performed the Following     C TESTOSTERONE CYPIONATE INJECTION, 1 MG     INJECTION INTRAMUSCULAR OR SUB-Q        Primary Care Provider Office Phone # Fax #    Mariluz Mae [phone] [address] [facility] [address]        Equal Access to Services     ECHO OZUNA : Hadii aad ku hadasho Soomaali, waaxda luqadaha, qaybta kaalmada adeegyada, waxay idiin hayaan isabell khbertha osorio . So [facility] [phone].    ATENCIÓN: Si ronal dejesus, tiene a henderson disposición servicios gratuitos de asistencia lingüística. Llame al [phone].    We comply with applicable federal civil rights laws and [place] laws. We do not discriminate on the basis of race, color, national origin, age, disability, sex, sexual orientation, or gender identity.            Thank you!     Thank you for choosing [facility]  for your care. Our goal is always to provide you with excellent care. Hearing back from our patients is one way we can continue to improve our services. Please take a few minutes to complete the written survey that you may receive in the mail after your visit with us. Thank you!             Your Updated Medication List - Protect others around you: Learn how to safely use, store and throw away your medicines at www.disposemymeds.org.          This list is accurate as of 18  8:42 AM.  Always use your most recent med list.                   Brand  Name Dispense Instructions for use Diagnosis    amphetamine-dextroamphetamine 30 MG per 24 hr capsule    ADDERALL XR    30 capsule    Take 1 capsule (30 mg) by mouth daily    Attention deficit disorder, unspecified hyperactivity presence       B-12 1000 MCG Tbcr     100 tablet    Take 2,000 mcg by mouth daily    Routine general medical examination at a health care facility       CALCIUM + D 315-200 MG-UNIT Tabs per tablet   Generic drug:  calcium citrate-vitamin D           desmopressin 0.01 % Soln spray    DDAVP    10 mL    3 sprays once daily    Diabetes insipidus (H)       levothyroxine 137 MCG tablet    SYNTHROID/LEVOTHROID    30 tablet    Take 1 tablet (137 mcg) by mouth daily    Hypothyroidism, unspecified type       predniSONE 1 MG tablet    DELTASONE    150 tablet    Take 3 mg in AM and 2 mg at night    Panhypopituitarism (H)       somatropin 10 MG/1.5ML Soln PEN injection    NORDITROPIN FLEXPRO    3 mL    Inject 0.7 mg Subcutaneous daily    Panhypopituitarism (H)       testosterone cypionate 200 MG/ML injection    DEPOTESTOTERONE    1 mL    Inject 1 mL (200 mg) into the muscle every 14 days    Testosterone deficiency, Panhypopituitarism (H)       vitamin D 1000 UNITS capsule      Take 2 capsules by mouth daily

## 2018-04-19 ENCOUNTER — TRANSFERRED RECORDS (OUTPATIENT)
Dept: HEALTH INFORMATION MANAGEMENT | Facility: CLINIC | Age: 28
End: 2018-04-19

## 2018-04-19 ENCOUNTER — ALLIED HEALTH/NURSE VISIT (OUTPATIENT)
Dept: NURSING | Facility: CLINIC | Age: 28
End: 2018-04-19
Payer: COMMERCIAL

## 2018-04-19 ENCOUNTER — OFFICE VISIT (OUTPATIENT)
Dept: FAMILY MEDICINE | Facility: CLINIC | Age: 28
End: 2018-04-19
Payer: COMMERCIAL

## 2018-04-19 VITALS
TEMPERATURE: 97.9 F | WEIGHT: 186.5 LBS | BODY MASS INDEX: 24.72 KG/M2 | HEIGHT: 73 IN | HEART RATE: 86 BPM | SYSTOLIC BLOOD PRESSURE: 108 MMHG | RESPIRATION RATE: 16 BRPM | OXYGEN SATURATION: 97 % | DIASTOLIC BLOOD PRESSURE: 74 MMHG

## 2018-04-19 DIAGNOSIS — F98.8 ATTENTION DEFICIT DISORDER, UNSPECIFIED HYPERACTIVITY PRESENCE: Primary | ICD-10-CM

## 2018-04-19 DIAGNOSIS — E23.0 PANHYPOPITUITARISM (H): ICD-10-CM

## 2018-04-19 DIAGNOSIS — E34.9 TESTOSTERONE DEFICIENCY: Primary | ICD-10-CM

## 2018-04-19 PROCEDURE — 96372 THER/PROPH/DIAG INJ SC/IM: CPT

## 2018-04-19 PROCEDURE — 99213 OFFICE O/P EST LOW 20 MIN: CPT | Performed by: FAMILY MEDICINE

## 2018-04-19 RX ORDER — DESMOPRESSIN ACETATE 0.1 MG/ML
SOLUTION NASAL
COMMUNITY
Start: 2018-02-12 | End: 2018-06-20

## 2018-04-19 ASSESSMENT — ENCOUNTER SYMPTOMS
NAUSEA: 0
CHILLS: 0
NERVOUS/ANXIOUS: 0
HEADACHES: 0
DIARRHEA: 0
FREQUENCY: 0
EYE PAIN: 0
PALPITATIONS: 0
COUGH: 0
MYALGIAS: 0
JOINT SWELLING: 0
HEMATOCHEZIA: 0
CONSTIPATION: 0
DYSURIA: 0
ABDOMINAL PAIN: 0
PARESTHESIAS: 0
SHORTNESS OF BREATH: 0
HEARTBURN: 0
FEVER: 0
ARTHRALGIAS: 0
WEAKNESS: 0
HEMATURIA: 0
DIZZINESS: 0
SORE THROAT: 0

## 2018-04-19 NOTE — MR AVS SNAPSHOT
After Visit Summary   4/19/2018    Mamadou Reece    MRN: [id number]           Patient Information     Date Of Birth          1990        Visit Information        Provider Department      4/19/2018 4:10 PM Mariluz Mae MD [facility]        Today's Diagnoses     Panhypopituitarism (H)    -  1      Care Instructions    It looks like you are due to see Dr. Alonso.           Follow-ups after your visit        Additional Services     ENDOCRINOLOGY ADULT REFERRAL       Your provider has referred you to: [facility]: [facility] [place] [phone]   [email or web address]  [facility]: [facility] [place] [phone]   [email or web address]      Please be aware that coverage of these services is subject to the terms and limitations of your health insurance plan.  Call member services at your health plan with any benefit or coverage questions.      Please bring the following to your appointment:    >>   Any x-rays, CTs or MRIs which have been performed.  Contact the facility where they were done to arrange for  prior to your scheduled appointment.    >>   List of current medications   >>   This referral request   >>   Any documents/labs given to you for this referral                  Follow-up notes from your care team     Return in about 6 months (around 10/19/2018).      Your next 10 appointments already scheduled     Oct 19, 2018  4:10 PM CDT   PHYSICAL with Mariluz Mae MD   [facility]    [address] [address] [phone]              Who to contact     If you have questions or need follow up information about today's clinic visit or your schedule please contact [facility] directly at [phone].  Normal or non-critical lab and imaging results will be communicated to you by MyChart, letter or phone within 4 business days after  "the clinic has received the results. If you do not hear from us within 7 days, please contact the clinic through real trends or phone. If you have a critical or abnormal lab result, we will notify you by phone as soon as possible.  Submit refill requests through real trends or call your pharmacy and they will forward the refill request to us. Please allow 3 business days for your refill to be completed.          Additional Information About Your Visit        Direct Flow [employer] Information     real trends lets you send messages to your doctor, view your test results, renew your prescriptions, schedule appointments and more. To sign up, go to [email or web address] . Click on \"Log in\" on the left side of the screen, which will take you to the Welcome page. Then click on \"Sign up Now\" on the right side of the page.     You will be asked to enter the access code listed below, as well as some personal information. Please follow the directions to create your username and password.     Your access code is: [email or web address]  Expires: 2018  9:51 AM     Your access code will  in 90 days. If you need help or a new code, please call your [place] clinic or [phone].        Care EveryWhere ID     This is your Care EveryWhere ID. This could be used by other organizations to access your [place] medical records  [id number]        Your Vitals Were     Pulse Temperature Respirations Height Pulse Oximetry BMI (Body Mass Index)    86 97.9  F (36.6  C) (Oral) 16 6' 0.5\" (1.842 m) 97% 24.95 kg/m2       Blood Pressure from Last 3 Encounters:   18 108/74   10/05/17 104/62   17 112/72    Weight from Last 3 Encounters:   18 186 lb 8 oz (84.6 kg)   10/05/17 191 lb 9.6 oz (86.9 kg)   17 187 lb 1.6 oz (84.9 kg)              We Performed the Following     ENDOCRINOLOGY ADULT REFERRAL        Primary Care Provider Office Phone # Fax #    Mariluz Mae [phone]       [address] [place] [address]      "   Equal Access to Services     [facility]: Hadii ryan palomo cathyherber Ronel, waedwinada luqadaha, qaybta kamartyhyun benson, kylah best. So [facility] [phone].    ATENCIÓN: Si habla español, tiene a henderson disposición servicios gratuitos de asistencia lingüística. Ravinderame al [phone].    We comply with applicable federal civil rights laws and [place] laws. We do not discriminate on the basis of race, color, national origin, age, disability, sex, sexual orientation, or gender identity.            Thank you!     Thank you for choosing [facility] [place]  for your care. Our goal is always to provide you with excellent care. Hearing back from our patients is one way we can continue to improve our services. Please take a few minutes to complete the written survey that you may receive in the mail after your visit with us. Thank you!             Your Updated Medication List - Protect others around you: Learn how to safely use, store and throw away your medicines at www.disposemymeds.org.          This list is accurate as of 4/19/18  5:23 PM.  Always use your most recent med list.                   Brand Name Dispense Instructions for use Diagnosis    amphetamine-dextroamphetamine 30 MG per 24 hr capsule    ADDERALL XR    30 capsule    Take 1 capsule (30 mg) by mouth daily    Attention deficit disorder, unspecified hyperactivity presence       B-12 1000 MCG Tbcr     100 tablet    Take 2,000 mcg by mouth daily    Routine general medical examination at a health care facility       CALCIUM + D 315-200 MG-UNIT Tabs per tablet   Generic drug:  calcium citrate-vitamin D           desmopressin 0.01 % Soln spray    DDAVP          desmopressin 0.01 % Soln spray    DDAVP    10 mL    3 sprays once daily    Diabetes insipidus (H)       levothyroxine 137 MCG tablet    SYNTHROID/LEVOTHROID    30 tablet    Take 1 tablet (137 mcg) by mouth daily    Hypothyroidism, unspecified type       predniSONE 1 MG  tablet    DELTASONE    150 tablet    Take 3 mg in AM and 2 mg at night    Panhypopituitarism (H)       somatropin 10 MG/1.5ML Soln PEN injection    NORDITROPIN FLEXPRO    3 mL    Inject 0.7 mg Subcutaneous daily    Panhypopituitarism (H)       testosterone cypionate 200 MG/ML injection    DEPOTESTOTERONE    1 mL    Inject 1 mL (200 mg) into the muscle every 14 days    Testosterone deficiency, Panhypopituitarism (H)       vitamin D 1000 units capsule      Take 2 capsules by mouth daily

## 2018-04-19 NOTE — MR AVS SNAPSHOT
"              After Visit Summary   4/19/2018    Mamadou Reece    MRN: [id number]           Patient Information     Date Of Birth          1990        Visit Information        Provider Department      4/19/2018 8:30 AM CR RN [facility]        Today's Diagnoses     Testosterone deficiency    -  1       Follow-ups after your visit        Your next 10 appointments already scheduled     Apr 19, 2018  4:10 PM CDT   SHORT with Mariluz Mae MD   [facility]    [address] [facility] [address] [address] [phone]              Who to contact     If you have questions or need follow up information about today's clinic visit or your schedule please contact [facility] directly at [phone].  Normal or non-critical lab and imaging results will be communicated to you by MyChart, letter or phone within 4 business days after the clinic has received the results. If you do not hear from us within 7 days, please contact the clinic through MyChart or phone. If you have a critical or abnormal lab result, we will notify you by phone as soon as possible.  Submit refill requests through Validas or call your pharmacy and they will forward the refill request to us. Please allow 3 business days for your refill to be completed.          Additional Information About Your Visit        MyChart Information     Validas lets you send messages to your doctor, view your test results, renew your prescriptions, schedule appointments and more. To sign up, go to [email or web address] . Click on \"Log in\" on the left side of the screen, which will take you to the Welcome page. Then click on \"Sign up Now\" on the right side of the page.     You will be asked to enter the access code listed below, as well as some personal information. Please follow the directions to create your username and password.     Your access code is: [email or web address]  Expires: 6/6/2018  " 9:51 AM     Your access code will  in 90 days. If you need help or a new code, please call your [place] clinic or [phone].        Care EveryWhere ID     This is your Care EveryWhere ID. This could be used by other organizations to access your [place] medical records  [id number]         Blood Pressure from Last 3 Encounters:   10/05/17 104/62   17 112/72   16 102/70    Weight from Last 3 Encounters:   10/05/17 191 lb 9.6 oz (86.9 kg)   17 187 lb 1.6 oz (84.9 kg)   16 184 lb 9.6 oz (83.7 kg)              We Performed the Following     C TESTOSTERONE CYPIONATE INJECTION, 1 MG     INJECTION INTRAMUSCULAR OR SUB-Q        Primary Care Provider Office Phone # Fax #    Mariluz Mae [phone]       [address] [facility] [address]        Equal Access to Services     [facility] : Hadii aad ku hadasho Soomaali, waaxda luqadaha, qaybta kaalmada adeegyada, waxay idiin haymedhatn isabell osorio . So [facility] [phone].    ATENCIÓN: Si miriamla oleg, tiene a henderson disposición servicios gratuitos de asistencia lingüística. Llame al [phone].    We comply with applicable federal civil rights laws and [place] laws. We do not discriminate on the basis of race, color, national origin, age, disability, sex, sexual orientation, or gender identity.            Thank you!     Thank you for choosing [facility]  for your care. Our goal is always to provide you with excellent care. Hearing back from our patients is one way we can continue to improve our services. Please take a few minutes to complete the written survey that you may receive in the mail after your visit with us. Thank you!             Your Updated Medication List - Protect others around you: Learn how to safely use, store and throw away your medicines at www.disposemymeds.org.          This list is accurate as of 18  8:53 AM.  Always use your most recent med list.                    Brand Name Dispense Instructions for use Diagnosis    amphetamine-dextroamphetamine 30 MG per 24 hr capsule    ADDERALL XR    30 capsule    Take 1 capsule (30 mg) by mouth daily    Attention deficit disorder, unspecified hyperactivity presence       B-12 1000 MCG Tbcr     100 tablet    Take 2,000 mcg by mouth daily    Routine general medical examination at a health care facility       CALCIUM + D 315-200 MG-UNIT Tabs per tablet   Generic drug:  calcium citrate-vitamin D           desmopressin 0.01 % Soln spray    DDAVP    10 mL    3 sprays once daily    Diabetes insipidus (H)       levothyroxine 137 MCG tablet    SYNTHROID/LEVOTHROID    30 tablet    Take 1 tablet (137 mcg) by mouth daily    Hypothyroidism, unspecified type       predniSONE 1 MG tablet    DELTASONE    150 tablet    Take 3 mg in AM and 2 mg at night    Panhypopituitarism (H)       somatropin 10 MG/1.5ML Soln PEN injection    NORDITROPIN FLEXPRO    3 mL    Inject 0.7 mg Subcutaneous daily    Panhypopituitarism (H)       testosterone cypionate 200 MG/ML injection    DEPOTESTOTERONE    1 mL    Inject 1 mL (200 mg) into the muscle every 14 days    Testosterone deficiency, Panhypopituitarism (H)       vitamin D 1000 units capsule      Take 2 capsules by mouth daily

## 2018-04-19 NOTE — PROGRESS NOTES
SUBJECTIVE:   Mamadou Reece is a 27 year old male who presents to clinic today for the following health issues:      Medication Followup of Adderall    Taking Medication as prescribed: yes    Side Effects:  None    Medication Helping Symptoms:  yes           Problem list and histories reviewed & adjusted, as indicated.  Additional history:     See under ROS    Patient Active Problem List   Diagnosis     ADD (attention deficit disorder)     Craniopharyngioma (H)     CARDIOVASCULAR SCREENING; LDL GOAL LESS THAN 160     Mood change (H)     Legal blindness     Hypothyroidism     Panhypopituitarism (H)     History of craniopharyngioma     Testosterone deficiency       Current Outpatient Prescriptions   Medication Sig Dispense Refill     amphetamine-dextroamphetamine (ADDERALL XR) 30 MG per 24 hr capsule Take 1 capsule (30 mg) by mouth daily 30 capsule 0     Calcium Citrate-Vitamin D (CALCIUM + D) 315-200 MG-UNIT TABS        Cholecalciferol (VITAMIN D) 1000 UNIT capsule Take 2 capsules by mouth daily        Cyanocobalamin (B-12) 1000 MCG TBCR Take 2,000 mcg by mouth daily  100 tablet 1     desmopressin (DDAVP) 0.01 % SOLN spray 3 sprays once daily 10 mL 3     desmopressin (DDAVP) 0.01 % SOLN spray        levothyroxine (SYNTHROID/LEVOTHROID) 137 MCG tablet Take 1 tablet (137 mcg) by mouth daily 30 tablet 11     predniSONE (DELTASONE) 1 MG tablet Take 3 mg in AM and 2 mg at night 150 tablet 11     somatropin (NORDITROPIN FLEXPRO) 10 MG/1.5ML SOLN PEN injection Inject 0.7 mg Subcutaneous daily 3 mL 0     testosterone cypionate (DEPOTESTOTERONE) 200 MG/ML injection Inject 1 mL (200 mg) into the muscle every 14 days 1 mL 5         Reviewed and updated as needed this visit by clinical staff       Reviewed and updated as needed this visit by Provider         ROS:  Here with mother.    CONSTITUTIONAL:NEGATIVE for fever, chills, change in weight  RESP:NEGATIVE for significant cough or SOB  CV: NEGATIVE for chest pain,  "palpitations or peripheral edema  PSYCHIATRIC: NEGATIVE for changes in mood or affect    Notes things are going well with Adderall. Tolerating the medication without side effects.     OBJECTIVE:     /74 (BP Location: Right arm, Patient Position: Chair, Cuff Size: Adult Regular)  Pulse 86  Temp 97.9  F (36.6  C) (Oral)  Resp 16  Ht 6' 0.5\" (1.842 m)  Wt 186 lb 8 oz (84.6 kg)  SpO2 97%  BMI 24.95 kg/m2  Body mass index is 24.95 kg/(m^2).  GENERAL APPEARANCE: alert and no distress  He does appear to have limited vision.  RESP: lungs clear to auscultation - no rales, rhonchi or wheezes  CV: regular rates and rhythm  MS: no edema.   PSYCH: mentation appears normal and affect normal/bright    Reviewed Endocrinology note.     ASSESSMENT/PLAN:     Attention deficit disorder, unspecified hyperactivity presence  Doing well. Will continue with the Adderall.     Panhypopituitarism (H)  Due to see Endocrinology.  - ENDOCRINOLOGY ADULT REFERRAL        Patient Instructions   It looks like you are due to see Dr. Alonso.       Mariluz Mae MD, MD  [facility]  "

## 2018-04-19 NOTE — PROGRESS NOTES
"SUBJECTIVE:   CC: Mamadou Reece is an 27 year old male who presents for preventative health visit.     Physical   Annual:     Getting at least 3 servings of Calcium per day::  Yes    Bi-annual eye exam::  Yes    Dental care twice a year::  Yes    Sleep apnea or symptoms of sleep apnea::  None    Diet::  Regular (no restrictions)    Frequency of exercise::  1 day/week    Duration of exercise::  15-30 minutes    Taking medications regularly::  Yes    Medication side effects::  None    Additional concerns today::  No                {additional problems to add (Optional):117692}    Today's PHQ-2 Score:   PHQ-2 ( 1999 Pfizer) 8/29/2017   Q1: Little interest or pleasure in doing things 0   Q2: Feeling down, depressed or hopeless 0   PHQ-2 Score 0   Q1: Little interest or pleasure in doing things Not at all   Q2: Feeling down, depressed or hopeless Not at all   PHQ-2 Score 0       Abuse: Current or Past(Physical, Sexual or Emotional)- No  Do you feel safe in your environment - Yes    Social History   Substance Use Topics     Smoking status: Never Smoker     Smokeless tobacco: Never Used     Alcohol use 0.0 oz/week     0 Standard drinks or equivalent per week      Comment: sip at holidays     No flowsheet data found.    Last PSA:   PSA   Date Value Ref Range Status   10/05/2017 0.17 0 - 4 ug/L Final     Comment:     Assay Method:  Chemiluminescence using Siemens Vista analyzer       Reviewed orders with patient. Reviewed health maintenance and updated orders accordingly - {Yes/No:897577::\"Yes\"}  {Chronicprobdata (Optional):070805}    Reviewed and updated as needed this visit by clinical staff  Tobacco  Allergies  Meds  Med Hx  Surg Hx  Fam Hx  Soc Hx        Reviewed and updated as needed this visit by Provider        {HISTORY OPTIONS (Optional):043708}    Review of Systems   Constitutional: Negative for chills and fever.   HENT: Negative for congestion, ear pain, hearing loss and sore throat.    Eyes: Negative for pain " "and visual disturbance.   Respiratory: Negative for cough and shortness of breath.    Cardiovascular: Negative for chest pain, palpitations and peripheral edema.   Gastrointestinal: Negative for abdominal pain, constipation, diarrhea, heartburn, hematochezia and nausea.   Genitourinary: Negative for discharge, dysuria, frequency, genital sores, hematuria, impotence and urgency.   Musculoskeletal: Negative for arthralgias, joint swelling and myalgias.   Skin: Negative for rash.   Neurological: Negative for dizziness, weakness, headaches and paresthesias.   Psychiatric/Behavioral: Negative for mood changes. The patient is not nervous/anxious.      {MALE ROS-adult preventive care package:510545::\"C: NEGATIVE for fever, chills, change in weight\",\"I: NEGATIVE for worrisome rashes, moles or lesions\",\"E: NEGATIVE for vision changes or irritation\",\"ENT: NEGATIVE for ear, mouth and throat problems\",\"R: NEGATIVE for significant cough or SOB\",\"CV: NEGATIVE for chest pain, palpitations or peripheral edema\",\"GI: NEGATIVE for nausea, abdominal pain, heartburn, or change in bowel habits\",\" male: negative for dysuria, hematuria, decreased urinary stream, erectile dysfunction, urethral discharge\",\"M: NEGATIVE for significant arthralgias or myalgia\",\"N: NEGATIVE for weakness, dizziness or paresthesias\",\"P: NEGATIVE for changes in mood or affect\"}    OBJECTIVE:   /74 (BP Location: Right arm, Patient Position: Chair, Cuff Size: Adult Regular)  Pulse 86  Temp 97.9  F (36.6  C) (Oral)  Resp 16  Ht 6' 0.5\" (1.842 m)  Wt 186 lb 8 oz (84.6 kg)  SpO2 97%  BMI 24.95 kg/m2    Physical Exam  {Exam Choices:535700}    ASSESSMENT/PLAN:   {Diag Picklist:049935}    COUNSELING:   {MALE COUNSELING MESSAGES:917476::\"Reviewed preventive health counseling, as reflected in patient instructions\"}    {BP Counseling- Complete if BP >= 120/80  (Optional):458992}     reports that he has never smoked. He has never used smokeless " "tobacco.  {Tobacco Cessation -- Complete if patient is a smoker (Optional):247305}  Estimated body mass index is 24.95 kg/(m^2) as calculated from the following:    Height as of this encounter: 6' 0.5\" (1.842 m).    Weight as of this encounter: 186 lb 8 oz (84.6 kg).   {Weight Management Plan (ACO) Complete if BMI is abnormal-  Ages 18-64  BMI >24.9.  Age 65+ with BMI <23 or >30 (Optional):580965}    Counseling Resources:  ATP IV Guidelines  Pooled Cohorts Equation Calculator  FRAX Risk Assessment  ICSI Preventive Guidelines  Dietary Guidelines for Americans, 2010  DataArt's MyPlate  ASA Prophylaxis  Lung CA Screening    Mrailuz Mae MD, MD  [facility] [place]  Answers for HPI/ROS submitted by the patient on 4/19/2018   PHQ-2 Score: 0    "

## 2018-05-02 DIAGNOSIS — E23.0 PANHYPOPITUITARISM (H): ICD-10-CM

## 2018-05-02 NOTE — TELEPHONE ENCOUNTER
Based on the dosing on epic med list the amount would be 0.105ml per day or 3.15 ml per 30 days or 9.45 ml per 90 days. Pended rx for 10 ml with 4 refills. Please verify this is the quantity you'd like, thanks.

## 2018-05-02 NOTE — TELEPHONE ENCOUNTER
Called [facility], states they were unable to locate pt in their database. Indicates [facility] accounts are generated in response to pt's eligibility t/ insurance. Recommends contacting pt and verifying [employer] benefit.     Called pt's mom (consent to communicate in epic), and reached pt's dad (consent to communicate in epic). Relay above information. He's unsure if pt is eligible t/ insurance for [employer]. Notes pt's insurance is changing to Medicare 06/01 and then will be using [facility] for rxs.     He will have pt's mom call clinic back to discuss [employer] status. If mom continues to want an rx to [facility] she will need to call insurance to facilitate an account being sent up for pt.

## 2018-05-02 NOTE — TELEPHONE ENCOUNTER
Can you please Ceck with [employer] if this si correct amount to dispense?  Pend for 3 months and 4 refills

## 2018-05-02 NOTE — TELEPHONE ENCOUNTER
Received call from [email or web address] asking for refill of Norditropin Flexpro, they have not filled for this pt before but patient's mother called and requested the refill and gave Dr. Alonso's name as prescriber.  ROBERT Gandara R.N.

## 2018-05-03 ENCOUNTER — ALLIED HEALTH/NURSE VISIT (OUTPATIENT)
Dept: NURSING | Facility: CLINIC | Age: 28
End: 2018-05-03
Payer: COMMERCIAL

## 2018-05-03 ENCOUNTER — TELEPHONE (OUTPATIENT)
Dept: ENDOCRINOLOGY | Facility: CLINIC | Age: 28
End: 2018-05-03

## 2018-05-03 DIAGNOSIS — E34.9 TESTOSTERONE DEFICIENCY: Primary | ICD-10-CM

## 2018-05-03 DIAGNOSIS — E23.0 PANHYPOPITUITARISM (H): ICD-10-CM

## 2018-05-03 DIAGNOSIS — E34.9 TESTOSTERONE DEFICIENCY: ICD-10-CM

## 2018-05-03 PROCEDURE — 96372 THER/PROPH/DIAG INJ SC/IM: CPT

## 2018-05-03 NOTE — MR AVS SNAPSHOT
"              After Visit Summary   5/3/2018    Mamadou Reece    MRN: [id number]           Patient Information     Date Of Birth          1990        Visit Information        Provider Department      5/3/2018 8:30 AM CR RN [facility]        Today's Diagnoses     Testosterone deficiency    -  1       Follow-ups after your visit        Your next 10 appointments already scheduled     Jun 20, 2018  4:00 PM CDT   Return Visit with Sumaya Alonso MD   [facility] ([facility])    [address]  [facility] [id number] [phone]            Oct 19, 2018  4:10 PM CDT   PHYSICAL with Mariluz Mae MD   [facility]    [address] [address] [facility] [address] [phone]              Who to contact     If you have questions or need follow up information about today's clinic visit or your schedule please contact [facility] directly at [phone].  Normal or non-critical lab and imaging results will be communicated to you by [employer], letter or phone within 4 business days after the clinic has received the results. If you do not hear from us within 7 days, please contact the clinic through [employer] or phone. If you have a critical or abnormal lab result, we will notify you by phone as soon as possible.  Submit refill requests through FamilyID or call your pharmacy and they will forward the refill request to us. Please allow 3 business days for your refill to be completed.          Additional Information About Your Visit        [employer] Information     FamilyID lets you send messages to your doctor, view your test results, renew your prescriptions, schedule appointments and more. To sign up, go to [email or web address] . Click on \"Log in\" on the left side of the screen, which will take you to the Welcome page. Then click on \"Sign up Now\" on the right side of the page.     You will " be asked to enter the access code listed below, as well as some personal information. Please follow the directions to create your username and password.     Your access code is: [email or web address]  Expires: 2018  9:51 AM     Your access code will  in 90 days. If you need help or a new code, please call your [place] clinic or [phone].        Care EveryWhere ID     This is your Care EveryWhere ID. This could be used by other organizations to access your [place] medical records  [id number]         Blood Pressure from Last 3 Encounters:   18 108/74   10/05/17 104/62   17 112/72    Weight from Last 3 Encounters:   18 186 lb 8 oz (84.6 kg)   10/05/17 191 lb 9.6 oz (86.9 kg)   17 187 lb 1.6 oz (84.9 kg)              We Performed the Following     C TESTOSTERONE CYPIONATE INJECTION, 1 MG     INJECTION INTRAMUSCULAR OR SUB-Q        Primary Care Provider Office Phone # Fax #    Mariluz Mae [phone] [address] [facility] [address]        Equal Access to Services     [facility]: Hadii aad ku hadasho Soomaali, waaxda luqadaha, qaybta kaalmada adeegyada, waxay reynain haymedhatn isabell osorio . So [facility] [phone].    ATENCIÓN: Si habla español, tiene a henderson disposición servicios gratuitos de asistencia lingüística. Ll[facility] [phone].    We comply with applicable federal civil rights laws and [place] laws. We do not discriminate on the basis of race, color, national origin, age, disability, sex, sexual orientation, or gender identity.            Thank you!     Thank you for choosing [facility]  for your care. Our goal is always to provide you with excellent care. Hearing back from our patients is one way we can continue to improve our services. Please take a few minutes to complete the written survey that you may receive in the mail after your visit with us. Thank you!             Your Updated Medication List - Protect others  around you: Learn how to safely use, store and throw away your medicines at www.disposemymeds.org.          This list is accurate as of 5/3/18  8:58 AM.  Always use your most recent med list.                   Brand Name Dispense Instructions for use Diagnosis    amphetamine-dextroamphetamine 30 MG per 24 hr capsule    ADDERALL XR    30 capsule    Take 1 capsule (30 mg) by mouth daily    Attention deficit disorder, unspecified hyperactivity presence       B-12 1000 MCG Tbcr     100 tablet    Take 2,000 mcg by mouth daily    Routine general medical examination at a health care facility       CALCIUM + D 315-200 MG-UNIT Tabs per tablet   Generic drug:  calcium citrate-vitamin D           desmopressin 0.01 % Soln spray    DDAVP          desmopressin 0.01 % Soln spray    DDAVP    10 mL    3 sprays once daily    Diabetes insipidus (H)       levothyroxine 137 MCG tablet    SYNTHROID/LEVOTHROID    30 tablet    Take 1 tablet (137 mcg) by mouth daily    Hypothyroidism, unspecified type       predniSONE 1 MG tablet    DELTASONE    150 tablet    Take 3 mg in AM and 2 mg at night    Panhypopituitarism (H)       somatropin 10 MG/1.5ML Soln PEN injection    NORDITROPIN FLEXPRO    3 mL    Inject 0.7 mg Subcutaneous daily    Panhypopituitarism (H)       testosterone cypionate 200 MG/ML injection    DEPOTESTOTERONE    1 mL    Inject 1 mL (200 mg) into the muscle every 14 days    Testosterone deficiency, Panhypopituitarism (H)       vitamin D 1000 units capsule      Take 2 capsules by mouth daily

## 2018-05-03 NOTE — TELEPHONE ENCOUNTER
See below.  Will need order for 3 additional shots to get him by til 6/20/18 appointment.  Order T'd up.  Will not need next shot til 5/17/18 and then one on 5/31/18 and 6/14/18.  OK to wait for Dr. Alonso to return since not coming back til 5/17/18.  Brittaney Davis RN      5/3/2018  [facility]    Testosterone deficiency   Dx    Imm/Inj   Reason for visit    Nursing Note   Brittaney Davis RN (Registered Nurse)      Depo Testosterone injection given.  See med note.  Advised time for visit and new order.  Pat, father states his wife is calling today to schedule.  She did call but appt not til 6/20/18 4 pm.  Will send telephone encounter to extend order til visit.  Brittaney Davis RN

## 2018-05-03 NOTE — NURSING NOTE
Depo Testosterone injection given.  See med note.  Advised time for visit and new order.  Pat, father states his wife is calling today to schedule.  She did call but appt not til 6/20/18 4 pm.  Will send telephone encounter to extend order til visit.  Brtitaney Davis RN

## 2018-05-04 NOTE — TELEPHONE ENCOUNTER
"Prescription faxed.  Left general message on machine that \"requested prescription for Mamadou was faxed to [facility]\".  Advised to call back with any questions.  Danette Higuera M.A.  "

## 2018-05-09 ENCOUNTER — TELEPHONE (OUTPATIENT)
Dept: ENDOCRINOLOGY | Facility: CLINIC | Age: 28
End: 2018-05-09

## 2018-05-09 NOTE — TELEPHONE ENCOUNTER
Fax Received from [employer].  [employer] requires a PA.  Form put in Dr. Alonso's in basket.  Norditropin is preferred product for patient's health plan.  Please send alternative RX or complete PA form.

## 2018-05-11 DIAGNOSIS — F98.8 ATTENTION DEFICIT DISORDER, UNSPECIFIED HYPERACTIVITY PRESENCE: ICD-10-CM

## 2018-05-14 ENCOUNTER — MEDICAL CORRESPONDENCE (OUTPATIENT)
Dept: HEALTH INFORMATION MANAGEMENT | Facility: CLINIC | Age: 28
End: 2018-05-14

## 2018-05-14 ENCOUNTER — TELEPHONE (OUTPATIENT)
Dept: ENDOCRINOLOGY | Facility: CLINIC | Age: 28
End: 2018-05-14

## 2018-05-14 DIAGNOSIS — E23.0 PANHYPOPITUITARISM (H): ICD-10-CM

## 2018-05-14 RX ORDER — TESTOSTERONE CYPIONATE 200 MG/ML
200 INJECTION, SOLUTION INTRAMUSCULAR
Qty: 1 ML | Refills: 5 | OUTPATIENT
Start: 2018-05-14 | End: 2018-08-16

## 2018-05-14 NOTE — TELEPHONE ENCOUNTER
Ritika from [facility] pharm calling.  Need to send new rx for NORDITROPIN FLEXPRO with Dr. Alonso's name instead of Jasmin Miller's name (Ritika did not state why).  Just advised rx needs to come from provider who sees pt.    Also then need to call with Dr. Alonso's ABIODUN number.    Please advise, thanks.

## 2018-05-14 NOTE — TELEPHONE ENCOUNTER
Approved by Jasmin Gillespie earlier.  Thank you.    Endo staff-- See another telephone encounter--phone call for this.  Can you check status on this please?  He is it approved or does it needs to be refaxed  Let me know once you get more information.

## 2018-05-14 NOTE — TELEPHONE ENCOUNTER
I was out of the office for last 1 weeks or Jasmin signed a prescription.  His that reason okay on to the need me to send a new prescription?  Please check with pharmacy and let me know.  I will be happy to send a new prescription.

## 2018-05-15 ENCOUNTER — TELEPHONE (OUTPATIENT)
Dept: FAMILY MEDICINE | Facility: CLINIC | Age: 28
End: 2018-05-15

## 2018-05-15 PROBLEM — C44.310 BCC (BASAL CELL CARCINOMA), FACE: Status: ACTIVE | Noted: 2018-05-15

## 2018-05-15 RX ORDER — DEXTROAMPHETAMINE SACCHARATE, AMPHETAMINE ASPARTATE MONOHYDRATE, DEXTROAMPHETAMINE SULFATE AND AMPHETAMINE SULFATE 7.5; 7.5; 7.5; 7.5 MG/1; MG/1; MG/1; MG/1
30 CAPSULE, EXTENDED RELEASE ORAL DAILY
Qty: 30 CAPSULE | Refills: 0 | Status: SHIPPED | OUTPATIENT
Start: 2018-05-15 | End: 2018-05-15

## 2018-05-15 RX ORDER — DEXTROAMPHETAMINE SACCHARATE, AMPHETAMINE ASPARTATE MONOHYDRATE, DEXTROAMPHETAMINE SULFATE AND AMPHETAMINE SULFATE 7.5; 7.5; 7.5; 7.5 MG/1; MG/1; MG/1; MG/1
30 CAPSULE, EXTENDED RELEASE ORAL DAILY
Qty: 30 CAPSULE | Refills: 0 | Status: SHIPPED | OUTPATIENT
Start: 2018-06-14 | End: 2018-05-15

## 2018-05-15 RX ORDER — DEXTROAMPHETAMINE SACCHARATE, AMPHETAMINE ASPARTATE MONOHYDRATE, DEXTROAMPHETAMINE SULFATE AND AMPHETAMINE SULFATE 7.5; 7.5; 7.5; 7.5 MG/1; MG/1; MG/1; MG/1
30 CAPSULE, EXTENDED RELEASE ORAL DAILY
Qty: 30 CAPSULE | Refills: 0 | Status: SHIPPED | OUTPATIENT
Start: 2018-07-14 | End: 2018-09-06

## 2018-05-15 NOTE — TELEPHONE ENCOUNTER
Dad calls.  He is looking for adderall refill.  He would like 3 months if possible.    Please call when walked to [facility] pharmacy.      adderall  LRF 3/29/18, disp 30  LOV 4/19/18    [place]:  reviewed- [place] concerns    [place]:  [email or web address]      Routing refill request to provider for review/approval because:  Drug not on the [facility] refill protocol

## 2018-05-15 NOTE — TELEPHONE ENCOUNTER
Unsure if patient hs been getting injections at clinic or at home. Medication has been ordered. Have routed this back to original sender as I don't know any nurses in [facility]

## 2018-05-15 NOTE — TELEPHONE ENCOUNTER
Can take Norditropin.  Please check with pt what he is taking currently. Per med list its Norditropin.

## 2018-05-15 NOTE — TELEPHONE ENCOUNTER
Per other encounter rx would need Dr Alonso's name on it. Called pharmacy to confirm.   There is a form for this patient and because leah stated on the form the she hasn't seen him that is why we need a new rx with Dr Alonso's name on it.

## 2018-05-15 NOTE — TELEPHONE ENCOUNTER
Per Maren's note in 5/9 tele enc:    Maren Jolly        11:15 AM   Unsigned Note      Per other encounter rx would need Dr Alonso's name on it. Called pharmacy to confirm.   There is a form for this patient and because leah stated on the form the she hasn't seen him that is why we need a new rx with Dr Alonso's name on it.

## 2018-05-16 ENCOUNTER — TELEPHONE (OUTPATIENT)
Dept: ENDOCRINOLOGY | Facility: CLINIC | Age: 28
End: 2018-05-16

## 2018-05-16 NOTE — TELEPHONE ENCOUNTER
Forms/paperwork reviewed, completed and signed.  Please fax or send the papers as requested, document in chart and close the encounter.    Thank you.    Sumaya Alonso

## 2018-05-16 NOTE — TELEPHONE ENCOUNTER
PA Initiation    Medication: somatropin (NORDITROPIN FLEXPRO) 10 MG/1.5ML SOLN PEN injection-pa initiated   Insurance Company: [employer] - Phone [phone] Fax [phone]  Pharmacy Filling the Rx: [facility] - [address] [place] [address]  Filling Pharmacy Phone: [phone]  Filling Pharmacy Fax:    Start Date: 5/16/2018

## 2018-05-17 ENCOUNTER — ALLIED HEALTH/NURSE VISIT (OUTPATIENT)
Dept: NURSING | Facility: CLINIC | Age: 28
End: 2018-05-17
Payer: COMMERCIAL

## 2018-05-17 DIAGNOSIS — E34.9 TESTOSTERONE DEFICIENCY: Primary | ICD-10-CM

## 2018-05-17 PROCEDURE — 99207 ZZC NO CHARGE NURSE ONLY: CPT

## 2018-05-17 PROCEDURE — 96372 THER/PROPH/DIAG INJ SC/IM: CPT

## 2018-05-17 NOTE — PROGRESS NOTES
Prior to injection verified patient identity using patient's name and date of birth.  Due to injection administration, patient instructed to remain in clinic for 15 minutes  afterwards, and to report any adverse reaction to me immediately.    Prisca Melendrez RN, BS  Clinical Nurse Triage.

## 2018-05-17 NOTE — MR AVS SNAPSHOT
"              After Visit Summary   5/17/2018    Mamadou Reece    MRN: [id number]           Patient Information     Date Of Birth          1990        Visit Information        Provider Department      5/17/2018 8:30 AM CR RN [facility]        Today's Diagnoses     Testosterone deficiency    -  1       Follow-ups after your visit        Follow-up notes from your care team     Return in about 14 days (around 5/31/2018).      Your next 10 appointments already scheduled     Jun 20, 2018  4:00 PM CDT   [facility]    [address]  [facility] [address] [phone]            Oct 19, 2018  4:10 PM CDT   PHYSICAL with Mariluz Mae MD   [facility]    [address] [facility] [address] [phone]              Who to contact     If you have questions or need follow up information about today's clinic visit or your schedule please contact [facility] directly at [phone].  Normal or non-critical lab and imaging results will be communicated to you by MyChart, letter or phone within 4 business days after the clinic has received the results. If you do not hear from us within 7 days, please contact the clinic through [employer] or phone. If you have a critical or abnormal lab result, we will notify you by phone as soon as possible.  Submit refill requests through [employer] or call your pharmacy and they will forward the refill request to us. Please allow 3 business days for your refill to be completed.          Additional Information About Your Visit        MyChart Information     [employer] lets you send messages to your doctor, view your test results, renew your prescriptions, schedule appointments and more. To sign up, go to [email or web address] . Click on \"Log in\" on the left side of the screen, which will take you " "to the Welcome page. Then click on \"Sign up Now\" on the right side of the page.     You will be asked to enter the access code listed below, as well as some personal information. Please follow the directions to create your username and password.     Your access code is: [email or web address]  Expires: 2018  9:51 AM     Your access code will  in 90 days. If you need help or a new code, please call your [facility] clinic or [phone].        Care EveryWhere ID     This is your Care EveryWhere ID. This could be used by other organizations to access your [place] medical records  [id number]         Blood Pressure from Last 3 Encounters:   18 108/74   10/05/17 104/62   17 112/72    Weight from Last 3 Encounters:   18 186 lb 8 oz (84.6 kg)   10/05/17 191 lb 9.6 oz (86.9 kg)   17 187 lb 1.6 oz (84.9 kg)              We Performed the Following     ADMIN 1st VACCINE     TESTOSTERONE CYPIONATE INJECTION 1MG        Primary Care Provider Office Phone # Fax #    Mariluz Mae [phone] [address] [address] [facility] [address]        Equal Access to Services     ECHO OZUNA AH: Hadii aad ku hadasho Soomaali, waaxda luqadaha, qaybta kaalmada adeegyada, waxay idiin hayharoon best. So [facility] [phone].    ATENCIÓN: Si habla español, tiene a henderson disposición servicios gratuitos de asistencia lingüística. Llame al [phone].    We comply with applicable federal civil rights laws and [place] laws. We do not discriminate on the basis of race, color, national origin, age, disability, sex, sexual orientation, or gender identity.            Thank you!     Thank you for choosing [facility]  for your care. Our goal is always to provide you with excellent care. Hearing back from our patients is one way we can continue to improve our services. Please take a few minutes to complete the written survey that you may receive in the mail after your visit with us. " Thank you!             Your Updated Medication List - Protect others around you: Learn how to safely use, store and throw away your medicines at www.disposemymeds.org.          This list is accurate as of 5/17/18  8:57 AM.  Always use your most recent med list.                   Brand Name Dispense Instructions for use Diagnosis    amphetamine-dextroamphetamine 30 MG per 24 hr capsule   Start taking on:  7/14/2018    ADDERALL XR    30 capsule    Take 1 capsule (30 mg) by mouth daily    Attention deficit disorder, unspecified hyperactivity presence       B-12 1000 MCG Tbcr     100 tablet    Take 2,000 mcg by mouth daily    Routine general medical examination at a health care facility       CALCIUM + D 315-200 MG-UNIT Tabs per tablet   Generic drug:  calcium citrate-vitamin D           desmopressin 0.01 % Soln spray    DDAVP          desmopressin 0.01 % Soln spray    DDAVP    10 mL    3 sprays once daily    Diabetes insipidus (H)       levothyroxine 137 MCG tablet    SYNTHROID/LEVOTHROID    30 tablet    Take 1 tablet (137 mcg) by mouth daily    Hypothyroidism, unspecified type       predniSONE 1 MG tablet    DELTASONE    150 tablet    Take 3 mg in AM and 2 mg at night    Panhypopituitarism (H)       somatropin 10 MG/1.5ML Soln PEN injection    NORDITROPIN FLEXPRO    10 mL    Inject 0.7 mg Subcutaneous daily    Panhypopituitarism (H)       testosterone cypionate 200 MG/ML injection    DEPOTESTOTERONE    1 mL    Inject 1 mL (200 mg) into the muscle every 14 days    Testosterone deficiency, Panhypopituitarism (H)       vitamin D 1000 units capsule      Take 2 capsules by mouth daily

## 2018-05-23 ENCOUNTER — TELEPHONE (OUTPATIENT)
Dept: FAMILY MEDICINE | Facility: CLINIC | Age: 28
End: 2018-05-23

## 2018-05-23 ENCOUNTER — NURSE TRIAGE (OUTPATIENT)
Dept: NURSING | Facility: CLINIC | Age: 28
End: 2018-05-23

## 2018-05-23 NOTE — TELEPHONE ENCOUNTER
Prior Authorization Approval    Authorization Effective Date: 5/23/2018  Authorization Expiration Date: 5/23/2019  Medication: somatropin (NORDITROPIN FLEXPRO) 10 MG/1.5ML SOLN PEN injection-pa approved  Approved Dose/Quantity: ud  Reference #:     Insurance Company: [employer] - Phone [phone] Fax [phone]    Which Pharmacy is filling the prescription (Not needed for infusion/clinic administered): [facility] - [place], [place] - [address] [place] [address]  Pharmacy Notified: No  Patient Notified: No

## 2018-05-23 NOTE — TELEPHONE ENCOUNTER
Clinic Action Needed:  Yes, resubmission/prior authorization  FNA Triage Call  Presenting Problem:  [facility] is calling and states that Mamadou needs a prior authorization for Somatropin.  Mita is calling and can be reached at [phone] ext. [phone].    Someone in the clinic needs to resubmit the request to [employer].      Routed to:  RN Pool  Please be sure to close this encounter once this patient's issue/question has been addressed.    Mariluz Martienz RN/[facility]

## 2018-05-23 NOTE — TELEPHONE ENCOUNTER
[facility] is calling and states that Mamadou needs a prior authorization for Somatropin.  Mita is calling and can be reached at [phone] ext. [phone].    Someone in the clinic needs to resubmit the request to [employer].

## 2018-05-24 ENCOUNTER — TELEPHONE (OUTPATIENT)
Dept: FAMILY MEDICINE | Facility: CLINIC | Age: 28
End: 2018-05-24

## 2018-05-24 ENCOUNTER — TELEPHONE (OUTPATIENT)
Dept: ENDOCRINOLOGY | Facility: CLINIC | Age: 28
End: 2018-05-24

## 2018-05-24 NOTE — TELEPHONE ENCOUNTER
Patient's father Pat calling to report that pt is now going on Medicare and a new PA is required for Norditropin.    Medicare [employer] ph# [phone] (fax# [phone])  Patient's ID# is [id number], ROBERT Gandara R.N.

## 2018-05-24 NOTE — TELEPHONE ENCOUNTER
Please place prior auth request for adderall XR 30 mg cap. Insurance not covering extended release. Patient has been taking medication and doing well.    [facility]    Ph. [phone]    Fax [phone]    ID- [id number]    Jeni Braswell RN

## 2018-05-24 NOTE — TELEPHONE ENCOUNTER
Central Prior Authorization Team   Phone: [phone]    PA Initiation    Medication:   Insurance Company: [employer] - Phone [phone] Fax [phone]  Pharmacy Filling the Rx: [facility]  [facility]LLA [facility] - [address]  Filling Pharmacy Phone: [phone]  Filling Pharmacy Fax:    Start Date: 5/24/2018

## 2018-05-29 NOTE — TELEPHONE ENCOUNTER
Central Prior Authorization Team   Phone: [phone]    A PA for this medication is not needed.  The medication was just refill too soon at the pharmacy.

## 2018-05-30 ENCOUNTER — TELEPHONE (OUTPATIENT)
Dept: ENDOCRINOLOGY | Facility: CLINIC | Age: 28
End: 2018-05-30

## 2018-05-30 NOTE — TELEPHONE ENCOUNTER
Called pt's mom, relay MD message below. Verbalized understanding. States she will continue to give pt double dosing for a total of 2 full days worth of doses. This will mean he gets double doses for 2 more doses after the surgery scheduled for tomorrow afternoon.     Routed to MD as update.

## 2018-05-30 NOTE — TELEPHONE ENCOUNTER
Prior Authorization Retail Medication Request    Medication/Dose: Norditropin  ICD code (if different than what is on RX):  e23.0  Previously Tried and Failed:  Unknown patient is still new to Dr Alonso  Rationale:  Patient has very limited vison    Insurance Name:  medicare  Insurance ID:  -a      Pharmacy Information (if different than what is on RX)  Name:  [facility]  Phone:  [phone]

## 2018-05-30 NOTE — TELEPHONE ENCOUNTER
Yes. Double the dose of prednisone X 2-3 days for surgery.  Please call patient with above information.    Sumaya Alonso MD  Endocrinology   [facility]  May 30, 2018

## 2018-05-30 NOTE — TELEPHONE ENCOUNTER
Pt's mom calls, states in the past pt's Pediatric Endocrinologist had told her that he needs increased dose of prednisone for surgery. Indicates she forgot about this until now and pt has a basal skin surgery scheduled for tomorrow at 1:30 PM. States he takes prednisone baseline and could just take more of that if MD recommended higher dose r/t surgery.    Please advise, thanks.

## 2018-05-31 ENCOUNTER — ALLIED HEALTH/NURSE VISIT (OUTPATIENT)
Dept: NURSING | Facility: CLINIC | Age: 28
End: 2018-05-31
Payer: COMMERCIAL

## 2018-05-31 ENCOUNTER — TRANSFERRED RECORDS (OUTPATIENT)
Dept: HEALTH INFORMATION MANAGEMENT | Facility: CLINIC | Age: 28
End: 2018-05-31

## 2018-05-31 DIAGNOSIS — E34.9 TESTOSTERONE DEFICIENCY: Primary | ICD-10-CM

## 2018-05-31 PROCEDURE — 96372 THER/PROPH/DIAG INJ SC/IM: CPT

## 2018-05-31 NOTE — TELEPHONE ENCOUNTER
PA Initiation    Medication: Norditropin  Insurance Company: [employer] - Phone [phone] Fax [phone]  Pharmacy Filling the Rx: [facility], [address]  Filling Pharmacy Phone: [phone]  Filling Pharmacy Fax:    Start Date: 5/31/2018    Central Prior Authorization Team   Phone: [phone]    Manually faxed prior authorization form to [facility] at [phone].

## 2018-05-31 NOTE — MR AVS SNAPSHOT
After Visit Summary   5/31/2018    Mamadou Reece    MRN: [id number]           Patient Information     Date Of Birth          1990        Visit Information        Provider Department      5/31/2018 8:30 AM CR RN [facility]        Today's Diagnoses     Testosterone deficiency    -  1       Follow-ups after your visit        Your next 10 appointments already scheduled     Jun 20, 2018  4:00 PM CDT   Return Visit with Sumaya Alonso MD   [facility]    [address]  [facility] [address] [phone]            Oct 19, 2018  4:10 PM CDT   PHYSICAL with Mariluz Mae MD   [facility]    [address] [facility] [address] [phone]              Who to contact     If you have questions or need follow up information about today's clinic visit or your schedule please contact [facility] directly at [phone].  Normal or non-critical lab and imaging results will be communicated to you by MyChart, letter or phone within 4 business days after the clinic has received the results. If you do not hear from us within 7 days, please contact the clinic through MyChart or phone. If you have a critical or abnormal lab result, we will notify you by phone as soon as possible.  Submit refill requests through [employer] or call your pharmacy and they will forward the refill request to us. Please allow 3 business days for your refill to be completed.          Additional Information About Your Visit        Care EveryWhere ID     This is your Care EveryWhere ID. This could be used by other organizations to access your [place] medical records  [id number]         Blood Pressure from Last 3 Encounters:   04/19/18 108/74   10/05/17 104/62   08/29/17 112/72    Weight from Last 3 Encounters:   04/19/18 186 lb 8 oz (84.6 kg)   10/05/17 191 lb 9.6 oz (86.9 kg)    08/29/17 187 lb 1.6 oz (84.9 kg)              We Performed the Following     C TESTOSTERONE CYPIONATE INJECTION, 1 MG     INJECTION INTRAMUSCULAR OR SUB-Q        Primary Care Provider Office Phone # Fax #    Mariluz Mae [phone] [address] [facility] [facility] [address]        Equal Access to Services     [facility]: Hadii aad ku hadasho Soomaali, waaxda luqadaha, qaybta kaalmada adeegyada, waxay idiin hayaan adeeg khsivansh laTaranmedhatn . So [facility] [phone].    ATENCIÓN: Si habla español, tiene a henderson disposición servicios gratuitos de asistencia lingüística. Llame al [phone].    We comply with applicable federal civil rights laws and [place] laws. We do not discriminate on the basis of race, color, national origin, age, disability, sex, sexual orientation, or gender identity.            Thank you!     Thank you for choosing [facility]  for your care. Our goal is always to provide you with excellent care. Hearing back from our patients is one way we can continue to improve our services. Please take a few minutes to complete the written survey that you may receive in the mail after your visit with us. Thank you!             Your Updated Medication List - Protect others around you: Learn how to safely use, store and throw away your medicines at www.disposemymeds.org.          This list is accurate as of 5/31/18  8:52 AM.  Always use your most recent med list.                   Brand Name Dispense Instructions for use Diagnosis    amphetamine-dextroamphetamine 30 MG per 24 hr capsule   Start taking on:  7/14/2018    ADDERALL XR    30 capsule    Take 1 capsule (30 mg) by mouth daily    Attention deficit disorder, unspecified hyperactivity presence       B-12 1000 MCG Tbcr     100 tablet    Take 2,000 mcg by mouth daily    Routine general medical examination at a health care facility       CALCIUM + D 315-200 MG-UNIT Tabs per tablet   Generic drug:  calcium  citrate-vitamin D           desmopressin 0.01 % Soln spray    DDAVP          desmopressin 0.01 % Soln spray    DDAVP    10 mL    3 sprays once daily    Diabetes insipidus (H)       levothyroxine 137 MCG tablet    SYNTHROID/LEVOTHROID    30 tablet    Take 1 tablet (137 mcg) by mouth daily    Hypothyroidism, unspecified type       predniSONE 1 MG tablet    DELTASONE    150 tablet    Take 3 mg in AM and 2 mg at night    Panhypopituitarism (H)       somatropin 10 MG/1.5ML Soln PEN injection    NORDITROPIN FLEXPRO    10 mL    Inject 0.7 mg Subcutaneous daily    Panhypopituitarism (H)       testosterone cypionate 200 MG/ML injection    DEPOTESTOTERONE    1 mL    Inject 1 mL (200 mg) into the muscle every 14 days    Testosterone deficiency, Panhypopituitarism (H)       vitamin D 1000 units capsule      Take 2 capsules by mouth daily

## 2018-06-01 ENCOUNTER — TELEPHONE (OUTPATIENT)
Dept: FAMILY MEDICINE | Facility: CLINIC | Age: 28
End: 2018-06-01

## 2018-06-01 NOTE — TELEPHONE ENCOUNTER
Patient's father Pat calls. They contacted [facility] yesterday afternoon and were told there were additional questions that needed to be answered and the request had been denied. Informed Pat that the PA department did receive additional questions and faxed them back yesterday at 12:48pm. The PA department will be notified with approval or denial.

## 2018-06-01 NOTE — TELEPHONE ENCOUNTER
Patient's father called wondering about status of PA. Notified him on 5/29/18 it was noted that the PA was not needed. Dad feels things are not being handled correctly as patient is now a part of Medicare as of June 1st (primary), [employer] still handles the medicare prescription drug coverage, and there is also secondary coverage by [employer].     Advised he call our Central Prior Authorization Team. Provided number. Father stated understanding and will be following up with them.    Ghazal KRUSE Triage RN

## 2018-06-04 NOTE — TELEPHONE ENCOUNTER
PRIOR AUTHORIZATION DENIED    Medication: Norditropin    Denial Date: 5/31/2018    Denial Rational: Denied as patient needs to meet any of the following: A) failed 2 pretreatment growth hormone stimulation tests (peak level below 5ng/mL), B) structural abnormality of the hypothalamus or pituitary AND 3 or more pituitary hormone deficiencies, C) Childhood-onset growth hormone deficiency with congenital (genetic or structural) abnormality of the hypothalamus/pituitary, D) Failed 1 pretreatment growth hormone stimulation test (peak level below 5ng/mL) AND low pretreatment insulin-like growth factor-1 (IGF-1) level:            Appeal Information:

## 2018-06-06 NOTE — TELEPHONE ENCOUNTER
Called insurance for reevaluation, has been approved for one year. [facility] will fax form to office shortly

## 2018-06-13 ENCOUNTER — TELEPHONE (OUTPATIENT)
Dept: FAMILY MEDICINE | Facility: CLINIC | Age: 28
End: 2018-06-13

## 2018-06-13 NOTE — TELEPHONE ENCOUNTER
Prior Authorization Not Needed per Insurance    Medication: amphetamine-dextroamphetamine (ADDERALL XR) 30 MG   Insurance Company: Marleny Schneider - Phone [phone] Fax [phone]  Expected CoPay:      [facility] [place] [address] [facility]  Pharmacy Notified: Yes  Patient Notified: Yes  Called and spoke with insurance and as long as patient gets generic this will be covered.  Called and spoke with father and instructed him to call back when he fills the rx if there are any issues.

## 2018-06-14 ENCOUNTER — ALLIED HEALTH/NURSE VISIT (OUTPATIENT)
Dept: NURSING | Facility: CLINIC | Age: 28
End: 2018-06-14
Payer: MEDICARE

## 2018-06-14 DIAGNOSIS — E34.9 TESTOSTERONE DEFICIENCY: Primary | ICD-10-CM

## 2018-06-14 PROCEDURE — 96372 THER/PROPH/DIAG INJ SC/IM: CPT

## 2018-06-14 NOTE — MR AVS SNAPSHOT
After Visit Summary   6/14/2018    Mamadou Reece    MRN: [id number]           Patient Information     Date Of Birth          1990        Visit Information        Provider Department      6/14/2018 8:30 AM CR RN [facility]        Today's Diagnoses     Testosterone deficiency    -  1       Follow-ups after your visit        Follow-up notes from your care team     Return in about 14 days (around 6/28/2018).      Your next 10 appointments already scheduled     Jun 20, 2018  4:00 PM CDT   Return Visit with Sumaya Alonso MD   [facility]    [address]  [facility] [address] [phone]            Oct 19, 2018  4:10 PM CDT   PHYSICAL with Mariluz Mae MD   [facility]    [address] [facility] [address] [phone]              Who to contact     If you have questions or need follow up information about today's clinic visit or your schedule please contact [facility] directly at [phone].  Normal or non-critical lab and imaging results will be communicated to you by MyChart, letter or phone within 4 business days after the clinic has received the results. If you do not hear from us within 7 days, please contact the clinic through MyChart or phone. If you have a critical or abnormal lab result, we will notify you by phone as soon as possible.  Submit refill requests through [employer] or call your pharmacy and they will forward the refill request to us. Please allow 3 business days for your refill to be completed.          Additional Information About Your Visit        Care EveryWhere ID     This is your Care EveryWhere ID. This could be used by other organizations to access your [place] medical records  [id number]         Blood Pressure from Last 3 Encounters:   04/19/18 108/74   10/05/17 104/62   08/29/17 112/72    Weight from  Last 3 Encounters:   04/19/18 186 lb 8 oz (84.6 kg)   10/05/17 191 lb 9.6 oz (86.9 kg)   08/29/17 187 lb 1.6 oz (84.9 kg)              Today, you had the following     No orders found for display       Primary Care Provider Office Phone # Fax #    Mariluz Mae [phone] [address] [address] [facility] [place] [address]        Equal Access to Services     ECHO OZUNA : Hadii aad ku hadasho Soomaali, waaxda luqadaha, qaybta kaalmada adeegyada, waxay idiin hayaan adeeg kharash la'aan . So [facility] [phone].    ATENCIÓN: Si habla español, tiene a henderson disposición servicios gratuitos de asistencia lingüística. [facility] [phone].    We comply with applicable federal civil rights laws and [place] laws. We do not discriminate on the basis of race, color, national origin, age, disability, sex, sexual orientation, or gender identity.            Thank you!     Thank you for choosing [facility]  for your care. Our goal is always to provide you with excellent care. Hearing back from our patients is one way we can continue to improve our services. Please take a few minutes to complete the written survey that you may receive in the mail after your visit with us. Thank you!             Your Updated Medication List - Protect others around you: Learn how to safely use, store and throw away your medicines at www.disposemymeds.org.          This list is accurate as of 6/14/18  9:06 AM.  Always use your most recent med list.                   Brand Name Dispense Instructions for use Diagnosis    amphetamine-dextroamphetamine 30 MG per 24 hr capsule   Start taking on:  7/14/2018    ADDERALL XR    30 capsule    Take 1 capsule (30 mg) by mouth daily    Attention deficit disorder, unspecified hyperactivity presence       B-12 1000 MCG Tbcr     100 tablet    Take 2,000 mcg by mouth daily    Routine general medical examination at a health care facility       CALCIUM + D 315-200 MG-UNIT Tabs per tablet    Generic drug:  calcium citrate-vitamin D           desmopressin 0.01 % Soln spray    DDAVP          desmopressin 0.01 % Soln spray    DDAVP    10 mL    3 sprays once daily    Diabetes insipidus (H)       levothyroxine 137 MCG tablet    SYNTHROID/LEVOTHROID    30 tablet    Take 1 tablet (137 mcg) by mouth daily    Hypothyroidism, unspecified type       predniSONE 1 MG tablet    DELTASONE    150 tablet    Take 3 mg in AM and 2 mg at night    Panhypopituitarism (H)       somatropin 10 MG/1.5ML Soln PEN injection    NORDITROPIN FLEXPRO    10 mL    Inject 0.7 mg Subcutaneous daily    Panhypopituitarism (H)       testosterone cypionate 200 MG/ML injection    DEPOTESTOTERONE    1 mL    Inject 1 mL (200 mg) into the muscle every 14 days    Testosterone deficiency, Panhypopituitarism (H)       vitamin D 1000 units capsule      Take 2 capsules by mouth daily

## 2018-06-20 ENCOUNTER — OFFICE VISIT (OUTPATIENT)
Dept: ENDOCRINOLOGY | Facility: CLINIC | Age: 28
End: 2018-06-20
Payer: MEDICARE

## 2018-06-20 VITALS
SYSTOLIC BLOOD PRESSURE: 108 MMHG | HEIGHT: 73 IN | BODY MASS INDEX: 25.1 KG/M2 | WEIGHT: 189.4 LBS | HEART RATE: 110 BPM | DIASTOLIC BLOOD PRESSURE: 80 MMHG | RESPIRATION RATE: 16 BRPM | TEMPERATURE: 98 F | OXYGEN SATURATION: 96 %

## 2018-06-20 DIAGNOSIS — E34.9 TESTOSTERONE DEFICIENCY: Primary | ICD-10-CM

## 2018-06-20 DIAGNOSIS — E23.0 PANHYPOPITUITARISM (H): ICD-10-CM

## 2018-06-20 DIAGNOSIS — E03.9 HYPOTHYROIDISM, UNSPECIFIED TYPE: ICD-10-CM

## 2018-06-20 DIAGNOSIS — D44.4 CRANIOPHARYNGIOMA (H): ICD-10-CM

## 2018-06-20 DIAGNOSIS — F98.8 ATTENTION DEFICIT DISORDER, UNSPECIFIED HYPERACTIVITY PRESENCE: ICD-10-CM

## 2018-06-20 PROCEDURE — 84295 ASSAY OF SERUM SODIUM: CPT | Performed by: INTERNAL MEDICINE

## 2018-06-20 PROCEDURE — 99214 OFFICE O/P EST MOD 30 MIN: CPT | Performed by: INTERNAL MEDICINE

## 2018-06-20 PROCEDURE — 36415 COLL VENOUS BLD VENIPUNCTURE: CPT | Performed by: INTERNAL MEDICINE

## 2018-06-20 PROCEDURE — 84305 ASSAY OF SOMATOMEDIN: CPT | Performed by: INTERNAL MEDICINE

## 2018-06-20 PROCEDURE — 84439 ASSAY OF FREE THYROXINE: CPT | Performed by: INTERNAL MEDICINE

## 2018-06-20 ASSESSMENT — PAIN SCALES - GENERAL: PAINLEVEL: NO PAIN (0)

## 2018-06-20 NOTE — LETTER
[facility]  [address], [address]  [place] [address]  [phone]        June 21, 2018    Mamadou Reece  [address] [facility] [address]            Dear Mr. Mamadou Reece:    Labs/ Imaging studies done with endocrinology showed:    Normal Sodium and thyroid function.  Continue current dose of levothyroxine.    Follow up as discussed in last clinic visit.    Please call endocrinology clinic (nurse line: [phone]) if questions.          Sincerely,        Sumaya Alonso

## 2018-06-20 NOTE — PATIENT INSTRUCTIONS
[facility]   Dr Alonso, Endocrinology Department      [facility]   [address] [address]  Appointment [phone]  Fax: [phone]   Monday and Tuesday         [place] [address] [facility] [place] [address]  Appointment [phone]  Fax: [phone]  Wednesday and Thursday           Labs today  Labs in 1 year ( fasting morning midcycle sample)  Please make a lab appointment for blood work and follow up clinic appointment in 2 week after that to discuss results.

## 2018-06-20 NOTE — LETTER
June 26, 2018    [place]amadou Reece  [address] [facility] [place] [address]    Dear Mamadou     Labs/ Imaging studies done with endocrinology showed IGF (growth hormone) in acceptable range.   Continue current dose of hormone replacement.     Follow up as discussed in last clinic visit.     Please call endocrinology clinic (nurse line: [phone]) if questions.     Thank you.     Sumaya Alonso    Results for orders placed or performed in visit on 06/20/18   T4 free   Result Value Ref Range    T4 Free 1.38 0.76 - 1.46 ng/dL   Insulin Growth Factor 1 by Immunoassay   Result Value Ref Range    Ins Growth Factor 1 202 94 - 271 ng/ml   Sodium   Result Value Ref Range    Sodium 143 133 - 144 mmol/L

## 2018-06-20 NOTE — PROGRESS NOTES
Name: Mamadou Reece  Seen at the request of Mariluz Mae for panhypopituitarism. Here with mother.  Chief Complaint   Patient presents with     RECHECK     Parents in room.     Recheck Medication      HPI:  Mamadou Reece is a 27 year old male who presents for the evaluation of  Above.  Was seen in [facility] before. Records reviewed. Last visit with Endo was 9/2016  [facility]  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 He had stereotactic  radiation therapy at age 11.   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  Followed by Pediatrics oncology; getting MRIs every 2 yrs now. Due for MRI and has upcoming appointment.  Has not had recurrence since his radiation therapy    He is currently on full hormonal treatment with medications listed below.    Feeling OK.  No major concerns today.    1. HYpogonadism:  On testosterone cypionate 200 mg IM q 2 weeks.  Goes to clinic.  Last dose was last Thrusday.  Due for labs  Energy level is OK  Shaving is OK  Muscle strength is OK    2. Diabetes Insipidus:  On desmopressiin 10 mcg- 3 sprays at night.  No major concerns. No excessive thirst or frequent urination.    3. Growth hormone deficiency:  On Norditropin 0.8 mg/day. 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  Reports compliance. But taking it at night.  Weight stable  No CP, palpitations, diarrhea or constipation.    5. Secondary adrenal insufficiency:  On prednsione 3 mg AM and 2 mg PM. On this dose X many years.  No nausea. No vomiting.no dizziness.  Wt stable. BP stable.  Wt Readings from Last 2 Encounters:   06/20/18 85.9 kg (189 lb 6.4 oz)   04/19/18 84.6 kg (186 lb 8 oz)         PMH/PSH:  1. Diabetes insipidus   2. Panhypopituitarism   3. History of ADD.  4. Central hypothyroidism  "  5. Secondary adrenal insufficiency  6. Growth hormone deficiency   7. Hypogonadotropic hypogonadism     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, [facility] [place]     Legal blindness     Craniopharyngioma     Mood disorder (H)     Craniopharyngioma     Radiation age 11    Craniopharyngioma     Past Surgical History:   Procedure Laterality Date     CRANIOTOMY, EXCISE TUMOR COMPLEX, COMBINED  age 6    craniopharyngioma     MOHS MICROGRAPHIC PROCEDURE Left 05/31/2018    left cheek nodular BCC     Family Hx:  Family History   Problem Relation Age of Onset     Cancer Father      Melanoma     Cerebrovascular Disease Maternal Grandmother 91     Diabetes Maternal Grandmother      Cerebrovascular Disease Paternal Grandmother      Cerebrovascular Disease Paternal Grandfather      Social Hx:  Social History     Social History     Marital status: Single     Spouse name: N/A     Number of children: N/A     Years of education: N/A     Occupational History     Not on file.     Social History Main Topics     Smoking status: Never Smoker     Smokeless tobacco: Never Used     Alcohol use 0.0 oz/week     0 Standard drinks or equivalent per week      Comment: sip at holidays     Drug use: No     Sexual activity: No     Other Topics Concern     Not on file     Social History Narrative          MEDICATIONS:  has a current medication list which includes the following prescription(s): amphetamine-dextroamphetamine, calcium + d, vitamin d, b-12, desmopressin, levothyroxine, prednisone, somatropin, and testosterone cypionate.    ROS     ROS: 10 point ROS neg other than the symptoms noted above in the HPI.    Physical Exam   VS: /80 (BP Location: Left arm, Patient Position: Chair, Cuff Size: Adult Large)  Pulse 110  Temp 98  F (36.7  C) (Oral)  Resp 16  Ht 1.842 m (6' 0.5\")  Wt 85.9 kg (189 lb 6.4 oz)  SpO2 96%  BMI 25.33 kg/m2  GENERAL: AXOX3, NAD, well dressed, answering questions " appropriately, appears stated age.  HEENT: No exopthalmous, no proptosis, EOMI, no lig lag, no retraction  NECK: Thyroid normal in size, non tender, no nodules were palpated.  CV: RRR  LUNGS: CTAB  ABDOMEN: +BS  NEUROLOGY: CN grossly intact, no tremors  PSYCH: normal affect and mood      LABS:  ENDO THYROID LABS-[facility] Latest Ref Rng & Units 10/5/2017   T4 FREE 0.76 - 1.46 ng/dL 1.18     ENDO PITUITARY LABS-P Latest Ref Rng & Units 10/5/2017   PROLACTIN 2 - 18 ug/L 1 (L)   TESTOSTERONE TOTAL 240 - 950 ng/dL 647   PSA 0 - 4 ug/L 0.17    - 144 mmol/L 141   POTASSIUM 3.4 - 5.3 mmol/L 3.8   INS GROWTH FACTOR 1 94 - 271 ng/ml 298 (H)     All pertinent notes, labs, and images personally reviewed by me.     A/P  Mr.Jared Reece is a 27 year old here for the evaluation of above:    1. Hypogonadism:  On testosterone cypionate 200 mg IM q 2 weeks.  Goes to clinic for njections.  Plan to continue same dose.  Labs in 1 year.    2. Diabetes Insipidus:  On desmopressiin 10 mcg- 3 sprays at night.  No major concerns. No excessive thirst or frequent urination.  -- recheck Na  -- continue current dose of DDAVP    3. Growth hormone deficiency:  On Norditropin 0.7 mg/day. Dose was decrease 9/2016  Labs today  Discussed GH replacement in adults.  They would like to continue it. Agree with it.  -- dose change based on labs    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  Reports compliance. But taking it at night.  Clinically looks euthyroid.   -- labs today  -- dose change based on that    5. Secondary adrenal insufficiency:  On prednsione 3 mg AM and 2 mg PM. On this dose X many years.  No nausea. No vomiting.  -- continue current dose  Discussed sick days.    More than 50% of face to face time spent with Mr. Reece on counseling / coordinating his care.      All questions were answered.  The patient indicates understanding of the above issues and agrees with the plan set forth.       Follow-up:  6  daryl Alonso MD  Endocrinology   [facility]    CC: Mariluz Mae     Disclaimer: This note consists of symbols derived from keyboarding, dictation and/or voice recognition software. As a result, there may be errors in the script that have gone undetected. Please consider this when interpreting information found in this chart.    Addendum to above note and clinic visit:    Labs reviewed.    See result note/telephone encounter.

## 2018-06-20 NOTE — LETTER
6/20/2018         RE: Mamadou Reece  [address]  [facility] [address]        Dear Colleague,    Thank you for referring your patient, Mamadou Reece, to the [facility]. Please see a copy of my visit note below.    Name: Mamadou Reece  Seen at the request of Mariluz Mae for panhypopituitarism. Here with mother.  Chief Complaint   Patient presents with     RECHECK     Parents in room.     Recheck Medication      HPI:  Mamadou Reece is a 27 year old male who presents for the evaluation of  Above.  Was seen in [facility] before. Records reviewed. Last visit with Endo was 9/2016  Before that [facility]  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 He had stereotactic  radiation therapy at age 11.   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  Followed by Pediatrics oncology; getting MRIs every 2 yrs now. Due for MRI and has upcoming appointment.  Has not had recurrence since his radiation therapy    He is currently on full hormonal treatment with medications listed below.    Feeling OK.  No major concerns today.    1. HYpogonadism:  On testosterone cypionate 200 mg IM q 2 weeks.  Goes to clinic.  Last dose was last Thrusday.  Due for labs  Energy level is OK  Shaving is OK  Muscle strength is OK    2. Diabetes Insipidus:  On desmopressiin 10 mcg- 3 sprays at night.  No major concerns. No excessive thirst or frequent urination.    3. Growth hormone deficiency:  On Norditropin 0.8 mg/day. 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  Reports compliance. But taking it at night.  Weight stable  No CP, palpitations, diarrhea or constipation.    5. Secondary adrenal insufficiency:  On prednsione 3 mg AM and 2 mg PM. On this dose X many years.  No nausea. No vomiting.no  dizziness.  Wt stable. BP stable.  Wt Readings from Last 2 Encounters:   06/20/18 85.9 kg (189 lb 6.4 oz)   04/19/18 84.6 kg (186 lb 8 oz)         PMH/PSH:  1. Diabetes insipidus   2. Panhypopituitarism   3. History of ADD.  4. Central hypothyroidism   5. Secondary adrenal insufficiency  6. Growth hormone deficiency   7. Hypogonadotropic hypogonadism     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, [facility] [place]     Legal blindness     Craniopharyngioma     Mood disorder (H)     Craniopharyngioma     Radiation age 11    Craniopharyngioma     Past Surgical History:   Procedure Laterality Date     CRANIOTOMY, EXCISE TUMOR COMPLEX, COMBINED  age 6    craniopharyngioma     MOHS MICROGRAPHIC PROCEDURE Left 05/31/2018    left cheek nodular BCC     Family Hx:  Family History   Problem Relation Age of Onset     Cancer Father      Melanoma     Cerebrovascular Disease Maternal Grandmother 91     Diabetes Maternal Grandmother      Cerebrovascular Disease Paternal Grandmother      Cerebrovascular Disease Paternal Grandfather      Social Hx:  Social History     Social History     Marital status: Single     Spouse name: N/A     Number of children: N/A     Years of education: N/A     Occupational History     Not on file.     Social History Main Topics     Smoking status: Never Smoker     Smokeless tobacco: Never Used     Alcohol use 0.0 oz/week     0 Standard drinks or equivalent per week      Comment: sip at holidays     Drug use: No     Sexual activity: No     Other Topics Concern     Not on file     Social History Narrative          MEDICATIONS:  has a current medication list which includes the following prescription(s): amphetamine-dextroamphetamine, calcium + d, vitamin d, b-12, desmopressin, levothyroxine, prednisone, somatropin, and testosterone cypionate.    ROS     ROS: 10 point ROS neg other than the symptoms noted above in the HPI.    Physical Exam   VS: /80 (BP Location: Left  "arm, Patient Position: Chair, Cuff Size: Adult Large)  Pulse 110  Temp 98  F (36.7  C) (Oral)  Resp 16  Ht 1.842 m (6' 0.5\")  Wt 85.9 kg (189 lb 6.4 oz)  SpO2 96%  BMI 25.33 kg/m2  GENERAL: AXOX3, NAD, well dressed, answering questions appropriately, appears stated age.  HEENT: No exopthalmous, no proptosis, EOMI, no lig lag, no retraction  NECK: Thyroid normal in size, non tender, no nodules were palpated.  CV: RRR  LUNGS: CTAB  ABDOMEN: +BS  NEUROLOGY: CN grossly intact, no tremors  PSYCH: normal affect and mood      LABS:  END[facility]ROID LABS-[facility] Latest Ref Rng & Units 10/5/2017   T4 FREE 0.76 - 1.46 ng/dL 1.18     ENDO PITUITARY LABS-[facility] Latest Ref Rng & Units 10/5/2017   PROLACTIN 2 - 18 ug/L 1 (L)   TESTOSTERONE TOTAL 240 - 950 ng/dL 647   PSA 0 - 4 ug/L 0.17    - 144 mmol/L 141   POTASSIUM 3.4 - 5.3 mmol/L 3.8   INS GROWTH FACTOR 1 94 - 271 ng/ml 298 (H)     All pertinent notes, labs, and images personally reviewed by me.     A/P  Mr.Jared Reece is a 27 year old here for the evaluation of above:    1. Hypogonadism:  On testosterone cypionate 200 mg IM q 2 weeks.  Goes to clinic for njections.  Plan to continue same dose.  Labs in 1 year.    2. Diabetes Insipidus:  On desmopressiin 10 mcg- 3 sprays at night.  No major concerns. No excessive thirst or frequent urination.  -- recheck Na  -- continue current dose of DDAVP    3. Growth hormone deficiency:  On Norditropin 0.7 mg/day. Dose was decrease 9/2016  Labs today  Discussed GH replacement in adults.  They would like to continue it. Agree with it.  -- dose change based on labs    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  Reports compliance. But taking it at night.  Clinically looks euthyroid.   -- labs today  -- dose change based on that    5. Secondary adrenal insufficiency:  On prednsione 3 mg AM and 2 mg PM. On this dose X many years.  No nausea. No vomiting.  -- continue current dose  Discussed sick days.    More than 50% of " face to face time spent with Mr. Reece on counseling / coordinating his care.      All questions were answered.  The patient indicates understanding of the above issues and agrees with the plan set forth.       Follow-up:  6 months    Sumaya Alonso MD  Endocrinology   [facility]    CC: Mariluz Mae     Disclaimer: This note consists of symbols derived from keyboarding, dictation and/or voice recognition software. As a result, there may be errors in the script that have gone undetected. Please consider this when interpreting information found in this chart.    Addendum to above note and clinic visit:    Labs reviewed.    See result note/telephone encounter.            Again, thank you for allowing me to participate in the care of your patient.        Sincerely,        Sumaya Alonso MD

## 2018-06-20 NOTE — MR AVS SNAPSHOT
After Visit Summary   2018    Mamadou Reece    MRN: [id number]           Patient Information     Date Of Birth          1990        Visit Information        Provider Department      2018 4:00 PM Sumaya Alonso MD [facility]        Today's Diagnoses     Testosterone deficiency    -  1    Panhypopituitarism (H)        Hypothyroidism, unspecified type        Craniopharyngioma (H)          Care Instructions    [facility] [place] [facility]   Dr Alonso, Endocrinology Department      [facility]   [address] [address]  Appointment [phone]  Fax: [phone]   Monday and Tuesday         [address] [place] [address]  Appointment [phone]  Fax: [phone]  Wednesday and Thursday           Labs today  Labs in 1 year ( fasting morning midcycle sample)  Please make a lab appointment for blood work and follow up clinic appointment in 2 week after that to discuss results.              Follow-ups after your visit        Your next 10 appointments already scheduled     Oct 19, 2018  4:10 PM CDT   PHYSICAL with Mariluz Mae MD   [facility]    [address] [address] [phone]              Who to contact     If you have questions or need follow up information about today's clinic visit or your schedule please contact [facility] directly at [phone].  Normal or non-critical lab and imaging results will be communicated to you by MyChart, letter or phone within 4 business days after the clinic has received the results. If you do not hear from us within 7 days, please contact the clinic through [employer] or phone. If you have a critical or abnormal lab result, we will notify you by phone as soon as possible.  Submit refill requests through Mama  "or call your pharmacy and they will forward the refill request to us. Please allow 3 business days for your refill to be completed.          Additional Information About Your Visit        Care EveryWhere ID     This is your Care EveryWhere ID. This could be used by other organizations to access your [facility] medical records  [id number]        Your Vitals Were     Pulse Temperature Respirations Height Pulse Oximetry BMI (Body Mass Index)    110 98  F (36.7  C) (Oral) 16 1.842 m (6' 0.5\") 96% 25.33 kg/m2       Blood Pressure from Last 3 Encounters:   06/20/18 108/80   04/19/18 108/74   10/05/17 104/62    Weight from Last 3 Encounters:   06/20/18 85.9 kg (189 lb 6.4 oz)   04/19/18 84.6 kg (186 lb 8 oz)   10/05/17 86.9 kg (191 lb 9.6 oz)              We Performed the Following     Insulin Growth Factor 1 by Immunoassay     Sodium     T4 free        Primary Care Provider Office Phone # Fax #    Mariluz Mae [phone]       [address] [facility] [address]        Equal Access to Services     [facility]: Hadii aad ku hadasho Soomaali, waaxda luqadaha, qaybta kaalmada adeegyada, kylah osorio . So [facility] [phone].    ATENCIÓN: Si habla español, tiene a henderson disposición servicios gratuitos de asistencia lingüística. [facility] [phone].    We comply with applicable federal civil rights laws and [place] laws. We do not discriminate on the basis of race, color, national origin, age, disability, sex, sexual orientation, or gender identity.            Thank you!     Thank you for choosing [facility]  for your care. Our goal is always to provide you with excellent care. Hearing back from our patients is one way we can continue to improve our services. Please take a few minutes to complete the written survey that you may receive in the mail after your visit with us. Thank you!             Your Updated Medication List - Protect others around you: Learn " how to safely use, store and throw away your medicines at www.disposemymeds.org.          This list is accurate as of 6/20/18  4:22 PM.  Always use your most recent med list.                   Brand Name Dispense Instructions for use Diagnosis    amphetamine-dextroamphetamine 30 MG per 24 hr capsule   Start taking on:  7/14/2018    ADDERALL XR    30 capsule    Take 1 capsule (30 mg) by mouth daily    Attention deficit disorder, unspecified hyperactivity presence       B-12 1000 MCG Tbcr     100 tablet    Take 2,000 mcg by mouth daily    Routine general medical examination at a health care facility       CALCIUM + D 315-200 MG-UNIT Tabs per tablet   Generic drug:  calcium citrate-vitamin D           desmopressin 0.01 % Soln spray    DDAVP    10 mL    3 sprays once daily    Diabetes insipidus (H)       levothyroxine 137 MCG tablet    SYNTHROID/LEVOTHROID    30 tablet    Take 1 tablet (137 mcg) by mouth daily    Hypothyroidism, unspecified type       predniSONE 1 MG tablet    DELTASONE    150 tablet    Take 3 mg in AM and 2 mg at night    Panhypopituitarism (H)       somatropin 10 MG/1.5ML Soln PEN injection    NORDITROPIN FLEXPRO    10 mL    Inject 0.7 mg Subcutaneous daily    Panhypopituitarism (H)       testosterone cypionate 200 MG/ML injection    DEPOTESTOTERONE    1 mL    Inject 1 mL (200 mg) into the muscle every 14 days    Testosterone deficiency, Panhypopituitarism (H)       vitamin D 1000 units capsule      Take 2 capsules by mouth daily

## 2018-06-21 LAB
SODIUM SERPL-SCNC: 143 MMOL/L (ref 133–144)
T4 FREE SERPL-MCNC: 1.38 NG/DL (ref 0.76–1.46)

## 2018-06-22 LAB — IGF-I BLD-MCNC: 202 NG/ML (ref 94–271)

## 2018-06-25 ENCOUNTER — TELEPHONE (OUTPATIENT)
Dept: FAMILY MEDICINE | Facility: CLINIC | Age: 28
End: 2018-06-25

## 2018-06-25 NOTE — TELEPHONE ENCOUNTER
"Routing message to Dr. Mae and Nurse Station [facility] A Cedillo's Mom, Dana, left a phone message at 12:24 p.m. Dr. Mae recently filled out forms so Mamadou can participate in [employer], but Mom states Dr. Mae inadvertently checked a box saying \"not cognitively impaired\". Dana states there is long documentation about Mamadou's impairments and she's wondering what needs to be done to fix this so he can continue to participate in the [employer].    Called Mom to notify Dr. Mae out of office x 3 days. 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     Mom can drop off forms today for Dr. Mae's attention on Thursday (hopefully). Please call Dana at [phone] when forms are ready to be picked up.  "

## 2018-06-27 NOTE — TELEPHONE ENCOUNTER
Patient's mother dropped off forms and placed in Dr Mae's box up at the .  Pleace call Dana, Mom, when done.  [phone]

## 2018-06-28 ENCOUNTER — ALLIED HEALTH/NURSE VISIT (OUTPATIENT)
Dept: NURSING | Facility: CLINIC | Age: 28
End: 2018-06-28
Payer: MEDICARE

## 2018-06-28 DIAGNOSIS — E34.9 TESTOSTERONE DEFICIENCY: Primary | ICD-10-CM

## 2018-06-28 PROCEDURE — 90471 IMMUNIZATION ADMIN: CPT

## 2018-06-28 NOTE — MR AVS SNAPSHOT
After Visit Summary   6/28/2018    Mamadou Reece    MRN: [id number]           Patient Information     Date Of Birth          1990        Visit Information        Provider Department      6/28/2018 8:30 AM CR RN [facility]        Today's Diagnoses     Testosterone deficiency    -  1       Follow-ups after your visit        Follow-up notes from your care team     Return in about 14 days (around 7/12/2018).      Your next 10 appointments already scheduled     Oct 19, 2018  4:10 PM CDT   PHYSICAL with Mariluz Mae MD   [facility] ([facility])    [address] [address] [phone]              Who to contact     If you have questions or need follow up information about today's clinic visit or your schedule please contact [facility] directly at [phone].  Normal or non-critical lab and imaging results will be communicated to you by MyChart, letter or phone within 4 business days after the clinic has received the results. If you do not hear from us within 7 days, please contact the clinic through MyChart or phone. If you have a critical or abnormal lab result, we will notify you by phone as soon as possible.  Submit refill requests through [employer] or call your pharmacy and they will forward the refill request to us. Please allow 3 business days for your refill to be completed.          Additional Information About Your Visit        Care EveryWhere ID     This is your Care EveryWhere ID. This could be used by other organizations to access your [place] medical records  [id number]         Blood Pressure from Last 3 Encounters:   06/20/18 108/80   04/19/18 108/74   10/05/17 104/62    Weight from Last 3 Encounters:   06/20/18 189 lb 6.4 oz (85.9 kg)   04/19/18 186 lb 8 oz (84.6 kg)   10/05/17 191 lb 9.6 oz (86.9 kg)              We Performed the Following     ADMIN 1st VACCINE     TESTOSTERONE CYPIONATE  INJECTION 1MG        Primary Care Provider Office Phone # Fax #    Mariluz Mae [phone]       [address] [address] [facility] [address]        Equal Access to Services     MARLO CASTROSARAH : Hadii aad ku hadgeeo Soomaali, waaxda luqadaha, qaybta kaalmada adeegyada, kylah fuentesdagoberto nasir. So [facility] [phone].    ATENCIÓN: Si habla español, tiene a henderson disposición servicios gratuitos de asistencia lingüística. Llame al [phone].    We comply with applicable federal civil rights laws and [place] laws. We do not discriminate on the basis of race, color, national origin, age, disability, sex, sexual orientation, or gender identity.            Thank you!     Thank you for choosing [facility]  for your care. Our goal is always to provide you with excellent care. Hearing back from our patients is one way we can continue to improve our services. Please take a few minutes to complete the written survey that you may receive in the mail after your visit with us. Thank you!             Your Updated Medication List - Protect others around you: Learn how to safely use, store and throw away your medicines at www.disposemymeds.org.          This list is accurate as of 6/28/18  8:53 AM.  Always use your most recent med list.                   Brand Name Dispense Instructions for use Diagnosis    amphetamine-dextroamphetamine 30 MG per 24 hr capsule   Start taking on:  7/14/2018    ADDERALL XR    30 capsule    Take 1 capsule (30 mg) by mouth daily    Attention deficit disorder, unspecified hyperactivity presence       B-12 1000 MCG Tbcr     100 tablet    Take 2,000 mcg by mouth daily    Routine general medical examination at a health care facility       CALCIUM + D 315-200 MG-UNIT Tabs per tablet   Generic drug:  calcium citrate-vitamin D           desmopressin 0.01 % Soln spray    DDAVP    10 mL    3 sprays once daily    Diabetes insipidus (H)       levothyroxine 137 MCG  tablet    SYNTHROID/LEVOTHROID    30 tablet    Take 1 tablet (137 mcg) by mouth daily    Hypothyroidism, unspecified type       predniSONE 1 MG tablet    DELTASONE    150 tablet    Take 3 mg in AM and 2 mg at night    Panhypopituitarism (H)       somatropin 10 MG/1.5ML Soln PEN injection    NORDITROPIN FLEXPRO    10 mL    Inject 0.7 mg Subcutaneous daily    Panhypopituitarism (H)       testosterone cypionate 200 MG/ML injection    DEPOTESTOTERONE    1 mL    Inject 1 mL (200 mg) into the muscle every 14 days    Testosterone deficiency, Panhypopituitarism (H)       vitamin D 1000 units capsule      Take 2 capsules by mouth daily

## 2018-06-28 NOTE — TELEPHONE ENCOUNTER
Pt has been notified form is ready to be picked up at the .  Placed form up at the .  Jazmine Kim CMA

## 2018-06-29 DIAGNOSIS — Z86.03 HISTORY OF CRANIOPHARYNGIOMA: Primary | ICD-10-CM

## 2018-06-29 DIAGNOSIS — E23.0 PANHYPOPITUITARISM (H): ICD-10-CM

## 2018-07-03 RX ORDER — DESMOPRESSIN ACETATE 0.1 MG/ML
SOLUTION NASAL
Qty: 10 ML | Refills: 3 | Status: SHIPPED | OUTPATIENT
Start: 2018-07-03 | End: 2018-09-06

## 2018-07-03 NOTE — TELEPHONE ENCOUNTER
Rx sent.    Please call patient with above information.    Sumaya Alonso MD  Endocrinology   [facility]  July 3, 2018

## 2018-07-03 NOTE — TELEPHONE ENCOUNTER
Reason for Call:  Other returning call and prescription    Detailed comments: wanted to know status of Rx.     Phone Number Patient can be reached at: Cell number on file:    Telephone Information:   Mobile [phone]       Best Time: any    Can we leave a detailed message on this number? YES    Call taken on 7/3/2018 at 10:48 AM by Meghan Miller

## 2018-07-11 ENCOUNTER — TRANSFERRED RECORDS (OUTPATIENT)
Dept: HEALTH INFORMATION MANAGEMENT | Facility: CLINIC | Age: 28
End: 2018-07-11

## 2018-07-12 ENCOUNTER — ALLIED HEALTH/NURSE VISIT (OUTPATIENT)
Dept: NURSING | Facility: CLINIC | Age: 28
End: 2018-07-12
Payer: MEDICARE

## 2018-07-12 DIAGNOSIS — E34.9 TESTOSTERONE DEFICIENCY: Primary | ICD-10-CM

## 2018-07-12 PROCEDURE — 96372 THER/PROPH/DIAG INJ SC/IM: CPT

## 2018-07-12 NOTE — MR AVS SNAPSHOT
After Visit Summary   7/12/2018    Mamadou Reece    MRN: [id number]           Patient Information     Date Of Birth          1990        Visit Information        Provider Department      7/12/2018 8:30 AM CR RN [facility]        Today's Diagnoses     Testosterone deficiency    -  1       Follow-ups after your visit        Your next 10 appointments already scheduled     Oct 19, 2018  4:10 PM CDT   PHYSICAL with Mariluz Mae MD   [facility]    [address] [address] [phone]              Who to contact     If you have questions or need follow up information about today's clinic visit or your schedule please contact [facility] directly at [phone].  Normal or non-critical lab and imaging results will be communicated to you by MyChart, letter or phone within 4 business days after the clinic has received the results. If you do not hear from us within 7 days, please contact the clinic through MyChart or phone. If you have a critical or abnormal lab result, we will notify you by phone as soon as possible.  Submit refill requests through [employer] or call your pharmacy and they will forward the refill request to us. Please allow 3 business days for your refill to be completed.          Additional Information About Your Visit        Care EveryWhere ID     This is your Care EveryWhere ID. This could be used by other organizations to access your [place] medical records  [id number]         Blood Pressure from Last 3 Encounters:   06/20/18 108/80   04/19/18 108/74   10/05/17 104/62    Weight from Last 3 Encounters:   06/20/18 189 lb 6.4 oz (85.9 kg)   04/19/18 186 lb 8 oz (84.6 kg)   10/05/17 191 lb 9.6 oz (86.9 kg)              We Performed the Following     C TESTOSTERONE CYPIONATE INJECTION, 1 MG     INJECTION INTRAMUSCULAR OR SUB-Q        Primary Care Provider Office Phone # Fax #    Mariluz BOWENS  MD Tu [phone]       [address]  [facility] [address]        Equal Access to Services     ANUJECHO SULMA : Helena ryan palomo chucky Rodney, shamar lukeraheem, anup fauziamartyda marcelino, kylah best. So [facility] [phone].    ATENCIÓN: Si habla español, tiene a henderson disposición servicios gratuitos de asistencia lingüística. Llame al [phone].    We comply with applicable federal civil rights laws and [place] laws. We do not discriminate on the basis of race, color, national origin, age, disability, sex, sexual orientation, or gender identity.            Thank you!     Thank you for choosing [facility]  for your care. Our goal is always to provide you with excellent care. Hearing back from our patients is one way we can continue to improve our services. Please take a few minutes to complete the written survey that you may receive in the mail after your visit with us. Thank you!             Your Updated Medication List - Protect others around you: Learn how to safely use, store and throw away your medicines at www.disposemymeds.org.          This list is accurate as of 7/12/18  8:47 AM.  Always use your most recent med list.                   Brand Name Dispense Instructions for use Diagnosis    amphetamine-dextroamphetamine 30 MG per 24 hr capsule   Start taking on:  7/14/2018    ADDERALL XR    30 capsule    Take 1 capsule (30 mg) by mouth daily    Attention deficit disorder, unspecified hyperactivity presence       B-12 1000 MCG Tbcr     100 tablet    Take 2,000 mcg by mouth daily    Routine general medical examination at a health care facility       CALCIUM + D 315-200 MG-UNIT Tabs per tablet   Generic drug:  calcium citrate-vitamin D           desmopressin 0.01 % Soln spray    DDAVP    10 mL    3 sprays once daily    Diabetes insipidus (H)       desmopressin 0.01 % Soln spray    DDAVP    10 mL    INHALE 3 SPRAYS INTO NOSTRIL DAILY    History  of craniopharyngioma, Panhypopituitarism (H)       levothyroxine 137 MCG tablet    SYNTHROID/LEVOTHROID    30 tablet    Take 1 tablet (137 mcg) by mouth daily    Hypothyroidism, unspecified type       predniSONE 1 MG tablet    DELTASONE    150 tablet    Take 3 mg in AM and 2 mg at night    Panhypopituitarism (H)       somatropin 10 MG/1.5ML Soln PEN injection    NORDITROPIN FLEXPRO    10 mL    Inject 0.7 mg Subcutaneous daily    Panhypopituitarism (H)       testosterone cypionate 200 MG/ML injection    DEPOTESTOTERONE    1 mL    Inject 1 mL (200 mg) into the muscle every 14 days    Testosterone deficiency, Panhypopituitarism (H)       vitamin D 1000 units capsule      Take 2 capsules by mouth daily

## 2018-08-02 ENCOUNTER — ALLIED HEALTH/NURSE VISIT (OUTPATIENT)
Dept: NURSING | Facility: CLINIC | Age: 28
End: 2018-08-02
Payer: MEDICARE

## 2018-08-02 DIAGNOSIS — E34.9 TESTOSTERONE DEFICIENCY: Primary | ICD-10-CM

## 2018-08-02 PROCEDURE — 99207 ZZC NO CHARGE NURSE ONLY: CPT

## 2018-08-02 PROCEDURE — 96372 THER/PROPH/DIAG INJ SC/IM: CPT

## 2018-08-02 NOTE — MR AVS SNAPSHOT
After Visit Summary   8/2/2018    Mamadou Reece    MRN: [id number]           Patient Information     Date Of Birth          1990        Visit Information        Provider Department      8/2/2018 9:00 AM CR RN [facility]        Today's Diagnoses     Testosterone deficiency    -  1       Follow-ups after your visit        Your next 10 appointments already scheduled     Aug 02, 2018  9:00 AM CDT   Nurse Only with CR RN   [facility] ([facility])    [address] [id number] [phone]            Oct 19, 2018  4:10 PM CDT   PHYSICAL with Mariluz Mae MD   [facility] ([facility])    [address] [address] [address] [phone]              Who to contact     If you have questions or need follow up information about today's clinic visit or your schedule please contact [facility] directly at [phone].  Normal or non-critical lab and imaging results will be communicated to you by MyChart, letter or phone within 4 business days after the clinic has received the results. If you do not hear from us within 7 days, please contact the clinic through MyChart or phone. If you have a critical or abnormal lab result, we will notify you by phone as soon as possible.  Submit refill requests through [employer] or call your pharmacy and they will forward the refill request to us. Please allow 3 business days for your refill to be completed.          Additional Information About Your Visit        Care EveryWhere ID     This is your Care EveryWhere ID. This could be used by other organizations to access your [place] medical records  [id number]         Blood Pressure from Last 3 Encounters:   06/20/18 108/80   04/19/18 108/74   10/05/17 104/62    Weight from Last 3 Encounters:   06/20/18 189 lb 6.4 oz (85.9 kg)   04/19/18 186 lb 8 oz (84.6 kg)   10/05/17 191 lb 9.6  oz (86.9 kg)              We Performed the Following     C TESTOSTERONE CYPIONATE INJECTION, 1 MG     INJECTION INTRAMUSCULAR OR SUB-Q        Primary Care Provider Office Phone # Fax #    Mariluz Mae [phone] [address] [place] [address]        Equal Access to Services     [facility] SULMA : Hadii aad ku hadasho Soomaali, waaxda luqadaha, qaybta kaalmada adeegyada, waxay idiin hayaan adeeg khsivansh latwin . So [facility] [phone].    ATENCIÓN: Si habla español, tiene a henderson disposición servicios gratuitos de asistencia lingüística. Llame al [phone].    We comply with applicable federal civil rights laws and [place] laws. We do not discriminate on the basis of race, color, national origin, age, disability, sex, sexual orientation, or gender identity.            Thank you!     Thank you for choosing [facility]  for your care. Our goal is always to provide you with excellent care. Hearing back from our patients is one way we can continue to improve our services. Please take a few minutes to complete the written survey that you may receive in the mail after your visit with us. Thank you!             Your Updated Medication List - Protect others around you: Learn how to safely use, store and throw away your medicines at www.disposemymeds.org.          This list is accurate as of 8/2/18  8:34 AM.  Always use your most recent med list.                   Brand Name Dispense Instructions for use Diagnosis    amphetamine-dextroamphetamine 30 MG per 24 hr capsule    ADDERALL XR    30 capsule    Take 1 capsule (30 mg) by mouth daily    Attention deficit disorder, unspecified hyperactivity presence       B-12 1000 MCG Tbcr     100 tablet    Take 2,000 mcg by mouth daily    Routine general medical examination at a health care facility       CALCIUM + D 315-200 MG-UNIT Tabs per tablet   Generic drug:  calcium citrate-vitamin D           desmopressin 0.01 % Soln spray    DDAVP     10 mL    3 sprays once daily    Diabetes insipidus (H)       desmopressin 0.01 % Soln spray    DDAVP    10 mL    INHALE 3 SPRAYS INTO NOSTRIL DAILY    History of craniopharyngioma, Panhypopituitarism (H)       levothyroxine 137 MCG tablet    SYNTHROID/LEVOTHROID    30 tablet    Take 1 tablet (137 mcg) by mouth daily    Hypothyroidism, unspecified type       predniSONE 1 MG tablet    DELTASONE    150 tablet    Take 3 mg in AM and 2 mg at night    Panhypopituitarism (H)       somatropin 10 MG/1.5ML Soln PEN injection    NORDITROPIN FLEXPRO    10 mL    Inject 0.7 mg Subcutaneous daily    Panhypopituitarism (H)       testosterone cypionate 200 MG/ML injection    DEPOTESTOTERONE    1 mL    Inject 1 mL (200 mg) into the muscle every 14 days    Testosterone deficiency, Panhypopituitarism (H)       vitamin D 1000 units capsule      Take 2 capsules by mouth daily

## 2018-08-13 ENCOUNTER — TRANSFERRED RECORDS (OUTPATIENT)
Dept: HEALTH INFORMATION MANAGEMENT | Facility: CLINIC | Age: 28
End: 2018-08-13

## 2018-08-13 NOTE — TELEPHONE ENCOUNTER
Results for NIR PARKER (MRN [id number]) as of 10/23/2017 10:27   Ref. Range 10/5/2017 09:18   Ins Growth Factor 1 Latest Ref Range: 94 - 271 ng/ml 298 (H)     Plan to decrease dose to 0.7 ml/day  Labs in 3 months.  Rx will be faxed.    Please call patient with above information.    Sumaya Alonso MD  Endocrinology   [facility]  October 23, 2017       58 years old male presented with  mass on neck .

## 2018-08-16 ENCOUNTER — TELEPHONE (OUTPATIENT)
Dept: ENDOCRINOLOGY | Facility: CLINIC | Age: 28
End: 2018-08-16

## 2018-08-16 ENCOUNTER — ALLIED HEALTH/NURSE VISIT (OUTPATIENT)
Dept: NURSING | Facility: CLINIC | Age: 28
End: 2018-08-16
Payer: MEDICARE

## 2018-08-16 DIAGNOSIS — E34.9 TESTOSTERONE DEFICIENCY: ICD-10-CM

## 2018-08-16 DIAGNOSIS — E34.9 TESTOSTERONE DEFICIENCY: Primary | ICD-10-CM

## 2018-08-16 DIAGNOSIS — E23.0 PANHYPOPITUITARISM (H): ICD-10-CM

## 2018-08-16 PROCEDURE — 99207 ZZC NO CHARGE NURSE ONLY: CPT

## 2018-08-16 PROCEDURE — 96372 THER/PROPH/DIAG INJ SC/IM: CPT

## 2018-08-16 NOTE — NURSING NOTE
Patient here for Testosterone injection.  Last injection on order.  Telephone message sent to Dr. Alonso to update order.  Brittaney Davis RN

## 2018-08-16 NOTE — TELEPHONE ENCOUNTER
Needs new order for Testosterone injections.  Next visit due 12/20/18.  Next injection will be 8/30/18.  OK to wait for Dr. Alonso.  T'd up.  Please advise.  Brittaney Davis RN    6/20/18  A/P  Mr.Jared Reece is a 27 year old here for the evaluation of above:     1. Hypogonadism:  On testosterone cypionate 200 mg IM q 2 weeks.  Goes to clinic for njections.  Plan to continue same dose.  Labs in 1 year.     2. Diabetes Insipidus:  On desmopressiin 10 mcg- 3 sprays at night.  No major concerns. No excessive thirst or frequent urination.  -- recheck Na  -- continue current dose of DDAVP     3. Growth hormone deficiency:  On Norditropin 0.7 mg/day. Dose was decrease 9/2016  Labs today  Discussed GH replacement in adults.  They would like to continue it. Agree with it.  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  Reports compliance. But taking it at night.  Clinically looks euthyroid.   -- labs today  -- dose change based on that     5. Secondary adrenal insufficiency:  On prednsione 3 mg AM and 2 mg PM. On this dose X many years.  No nausea. No vomiting.  -- continue current dose  Discussed sick days.     More than 50% of face to face time spent with Mr. Reece on counseling / coordinating his care.       All questions were answered.  The patient indicates understanding of the above issues and agrees with the plan set forth.         Follow-up:  6 months     Sumaya Alonso MD  Endocrinology   [facility]

## 2018-08-20 ENCOUNTER — TELEPHONE (OUTPATIENT)
Dept: INTERNAL MEDICINE | Facility: CLINIC | Age: 28
End: 2018-08-20

## 2018-08-20 ENCOUNTER — DOCUMENTATION ONLY (OUTPATIENT)
Dept: OTHER | Facility: CLINIC | Age: 28
End: 2018-08-20

## 2018-08-20 NOTE — TELEPHONE ENCOUNTER
Mom calls (Consent to Communicate on file) stating that patient is having urinary issues since last Wednesday, patient reports feeling like he has to urinate but doesn't empty his bladder fully and still feeling like he has to go but can't. Patient denies fever and burning with urination. Encouraged patient be seen by his primary provider to check for urinary infection.  Parent verbalized understanding, agrees to plan of care, and has no further questions at this time. Transferred to scheduling.

## 2018-08-21 ENCOUNTER — OFFICE VISIT (OUTPATIENT)
Dept: FAMILY MEDICINE | Facility: CLINIC | Age: 28
End: 2018-08-21
Payer: MEDICARE

## 2018-08-21 VITALS
DIASTOLIC BLOOD PRESSURE: 89 MMHG | BODY MASS INDEX: 24.93 KG/M2 | HEART RATE: 89 BPM | TEMPERATURE: 97.7 F | WEIGHT: 186.4 LBS | RESPIRATION RATE: 12 BRPM | SYSTOLIC BLOOD PRESSURE: 131 MMHG

## 2018-08-21 DIAGNOSIS — R35.0 INCREASED URINARY FREQUENCY: Primary | ICD-10-CM

## 2018-08-21 LAB
ALBUMIN UR-MCNC: NEGATIVE MG/DL
APPEARANCE UR: CLEAR
BILIRUB UR QL STRIP: NEGATIVE
COLOR UR AUTO: YELLOW
GLUCOSE UR STRIP-MCNC: NEGATIVE MG/DL
HGB UR QL STRIP: NEGATIVE
KETONES UR STRIP-MCNC: NEGATIVE MG/DL
LEUKOCYTE ESTERASE UR QL STRIP: NEGATIVE
NITRATE UR QL: NEGATIVE
PH UR STRIP: 6 PH (ref 5–7)
SOURCE: NORMAL
SP GR UR STRIP: 1.02 (ref 1–1.03)
UROBILINOGEN UR STRIP-ACNC: 0.2 EU/DL (ref 0.2–1)

## 2018-08-21 PROCEDURE — 99213 OFFICE O/P EST LOW 20 MIN: CPT | Performed by: PHYSICIAN ASSISTANT

## 2018-08-21 PROCEDURE — 81003 URINALYSIS AUTO W/O SCOPE: CPT | Performed by: PHYSICIAN ASSISTANT

## 2018-08-21 RX ORDER — TESTOSTERONE CYPIONATE 200 MG/ML
200 INJECTION, SOLUTION INTRAMUSCULAR
Qty: 1 ML | Refills: 5 | OUTPATIENT
Start: 2018-08-21 | End: 2018-11-08

## 2018-08-21 NOTE — PATIENT INSTRUCTIONS
Try pushing fluids.     If not improving in 1 week, you can call and we can try a medication called Flomax which helps with urinary retention and decreased flow.     If still not improving after that, recommend follow-up with primary care provider.

## 2018-08-21 NOTE — MR AVS SNAPSHOT
After Visit Summary   8/21/2018    Mamadou Reece    MRN: [id number]           Patient Information     Date Of Birth          1990        Visit Information        Provider Department      8/21/2018 8:20 AM Sourav Duran PA-C [facility]        Today's Diagnoses     Increased urinary frequency    -  1      Care Instructions    Try pushing fluids.     If not improving in 1 week, you can call and we can try a medication called Flomax which helps with urinary retention and decreased flow.     If still not improving after that, recommend follow-up with primary care provider.                  Follow-ups after your visit        Your next 10 appointments already scheduled     Oct 19, 2018  4:10 PM CDT   PHYSICAL with Mariluz Mae MD   [facility]    [address] [address] [phone]              Who to contact     If you have questions or need follow up information about today's clinic visit or your schedule please contact [facility] directly at [phone].  Normal or non-critical lab and imaging results will be communicated to you by MyChart, letter or phone within 4 business days after the clinic has received the results. If you do not hear from us within 7 days, please contact the clinic through MyChart or phone. If you have a critical or abnormal lab result, we will notify you by phone as soon as possible.  Submit refill requests through [employer] or call your pharmacy and they will forward the refill request to us. Please allow 3 business days for your refill to be completed.          Additional Information About Your Visit        Care EveryWhere ID     This is your Care EveryWhere ID. This could be used by other organizations to access your [place] medical records  [id number]        Your Vitals Were     Pulse Temperature Respirations BMI (Body Mass Index)          89 97.7  F (36.5  C)  (Oral) 12 24.93 kg/m2         Blood Pressure from Last 3 Encounters:   08/21/18 131/89   06/20/18 108/80   04/19/18 108/74    Weight from Last 3 Encounters:   08/21/18 186 lb 6.4 oz (84.6 kg)   06/20/18 189 lb 6.4 oz (85.9 kg)   04/19/18 186 lb 8 oz (84.6 kg)              We Performed the Following     UA reflex to Microscopic and Culture        Primary Care Provider Office Phone # Fax #    Mariluz Mae [phone] [address]  [address] [facility] [address]        Equal Access to Services     [facility] : Hadii aad stacia hadasho Sodainaali, waaxda luqadaha, qaybta kaalmada adeegyada, kylah osorio . So [facility] [phone].    ATENCIÓN: Si habla español, tiene a henderson disposición servicios gratuitos de asistencia lingüística. Llame al [phone].    We comply with applicable federal civil rights laws and [place] laws. We do not discriminate on the basis of race, color, national origin, age, disability, sex, sexual orientation, or gender identity.            Thank you!     Thank you for choosing [facility]  for your care. Our goal is always to provide you with excellent care. Hearing back from our patients is one way we can continue to improve our services. Please take a few minutes to complete the written survey that you may receive in the mail after your visit with us. Thank you!             Your Updated Medication List - Protect others around you: Learn how to safely use, store and throw away your medicines at www.disposemymeds.org.          This list is accurate as of 8/21/18  8:56 AM.  Always use your most recent med list.                   Brand Name Dispense Instructions for use Diagnosis    amphetamine-dextroamphetamine 30 MG per 24 hr capsule    ADDERALL XR    30 capsule    Take 1 capsule (30 mg) by mouth daily    Attention deficit disorder, unspecified hyperactivity presence       B-12 1000 MCG Tbcr     100 tablet    Take 2,000 mcg by mouth daily     Routine general medical examination at a health care facility       CALCIUM + D 315-200 MG-UNIT Tabs per tablet   Generic drug:  calcium citrate-vitamin D           desmopressin 0.01 % Soln spray    DDAVP    10 mL    3 sprays once daily    Diabetes insipidus (H)       desmopressin 0.01 % Soln spray    DDAVP    10 mL    INHALE 3 SPRAYS INTO NOSTRIL DAILY    History of craniopharyngioma, Panhypopituitarism (H)       levothyroxine 137 MCG tablet    SYNTHROID/LEVOTHROID    30 tablet    Take 1 tablet (137 mcg) by mouth daily    Hypothyroidism, unspecified type       predniSONE 1 MG tablet    DELTASONE    150 tablet    Take 3 mg in AM and 2 mg at night    Panhypopituitarism (H)       somatropin 10 MG/1.5ML Soln PEN injection    NORDITROPIN FLEXPRO    10 mL    Inject 0.7 mg Subcutaneous daily    Panhypopituitarism (H)       testosterone cypionate 200 MG/ML injection    DEPOTESTOTERONE    1 mL    Inject 1 mL (200 mg) into the muscle every 14 days    Testosterone deficiency, Panhypopituitarism (H)       vitamin D 1000 units capsule      Take 2 capsules by mouth daily

## 2018-08-21 NOTE — PROGRESS NOTES
SUBJECTIVE:   Mamadou Reece is a 27 year old male here with his father who presents to clinic today for the following health issues:      Genitourinary - Male  Onset: 1 month ago    Description:   Dysuria (painful urination): no   Hematuria (blood in urine): no   Frequency: YES  Are you urinating at night : YES  Hesitancy (delay in urine): YES  Retention (unable to empty): YES  Decrease in urinary flow: YES- maybe a little  Incontinence: no     Progression of Symptoms:  worsening and losing sleep over it as it's worse at night.      Accompanying Signs & Symptoms:  Fever: no   Back/Flank pain: no   Urethral discharge: no   Testicle lumps/masses/pain: no   Nausea and/or vomiting: no   Abdominal pain: no     History:   History of frequent UTI's: no   History of kidney stones: no   History of hernias: no   Personal or Family history of Prostate problems: no  Sexually active: no     Precipitating factors:   Was on vacation when symptoms started.  May not have been drinking enough water    Alleviating factors:  no    Father also wonders if it could be psychological. Explained that this is possible as the more someone worries urinary frequency, the more you feel the need too urinate.      Problem list and histories reviewed & adjusted, as indicated.  Additional history: as documented    Patient Active Problem List   Diagnosis     ADD (attention deficit disorder)     Craniopharyngioma (H)     CARDIOVASCULAR SCREENING; LDL GOAL LESS THAN 160     Mood change (H)     Legal blindness     Hypothyroidism     Panhypopituitarism (H)     History of craniopharyngioma     Testosterone deficiency     BCC (basal cell carcinoma), face     Past Surgical History:   Procedure Laterality Date     CRANIOTOMY, EXCISE TUMOR COMPLEX, COMBINED  age 6    craniopharyngioma     MOHS MICROGRAPHIC PROCEDURE Left 05/31/2018    left cheek nodular BCC       Social History   Substance Use Topics     Smoking status: Never Smoker     Smokeless tobacco:  Never Used     Alcohol use 0.0 oz/week     0 Standard drinks or equivalent per week      Comment: sip at holidays     Family History   Problem Relation Age of Onset     Cancer Father      Melanoma     Cerebrovascular Disease Maternal Grandmother 91     Diabetes Maternal Grandmother      Cerebrovascular Disease Paternal Grandmother      Cerebrovascular Disease Paternal Grandfather          Current Outpatient Prescriptions   Medication Sig Dispense Refill     amphetamine-dextroamphetamine (ADDERALL XR) 30 MG per 24 hr capsule Take 1 capsule (30 mg) by mouth daily 30 capsule 0     Calcium Citrate-Vitamin D (CALCIUM + D) 315-200 MG-UNIT TABS        Cholecalciferol (VITAMIN D) 1000 UNIT capsule Take 2 capsules by mouth daily        Cyanocobalamin (B-12) 1000 MCG TBCR Take 2,000 mcg by mouth daily  100 tablet 1     desmopressin (DDAVP) 0.01 % SOLN spray INHALE 3 SPRAYS INTO NOSTRIL DAILY 10 mL 3     desmopressin (DDAVP) 0.01 % SOLN spray 3 sprays once daily 10 mL 3     levothyroxine (SYNTHROID/LEVOTHROID) 137 MCG tablet Take 1 tablet (137 mcg) by mouth daily 30 tablet 11     predniSONE (DELTASONE) 1 MG tablet Take 3 mg in AM and 2 mg at night 150 tablet 11     somatropin (NORDITROPIN FLEXPRO) 10 MG/1.5ML SOLN PEN injection Inject 0.7 mg Subcutaneous daily 10 mL 1     testosterone cypionate (DEPOTESTOTERONE) 200 MG/ML injection Inject 1 mL (200 mg) into the muscle every 14 days 1 mL 5     No Known Allergies    Reviewed and updated as needed this visit by clinical staff  Tobacco  Allergies  Meds  Med Hx  Surg Hx  Fam Hx  Soc Hx      Reviewed and updated as needed this visit by Provider         ROS:  Constitutional, HEENT, cardiovascular, pulmonary, gi and gu systems are negative, except as otherwise noted.    OBJECTIVE:     /89 (BP Location: Left arm, Patient Position: Chair, Cuff Size: Adult Regular)  Pulse 89  Temp 97.7  F (36.5  C) (Oral)  Resp 12  Wt 186 lb 6.4 oz (84.6 kg)  BMI 24.93 kg/m2  Body mass  index is 24.93 kg/(m^2).  GENERAL: healthy, alert and no distress  EYES: Eyes grossly normal to inspection, PERRL and conjunctivae and sclerae normal  RESP: lungs clear to auscultation - no rales, rhonchi or wheezes  CV: regular rate and rhythm, normal S1 S2, no S3 or S4, no murmur, click or rub, no peripheral edema and peripheral pulses strong  ABDOMEN: soft, nontender, no hepatosplenomegaly, no masses and bowel sounds normal  MS: no gross musculoskeletal defects noted, no edema  SKIN: no suspicious lesions or rashes  NEURO: Normal strength and tone, mentation intact and speech normal  BACK: no CVA tenderness, no paralumbar tenderness  PSYCH: mentation appears normal, affect normal/bright    Diagnostic Test Results:  Results for orders placed or performed in visit on 08/21/18 (from the past 24 hour(s))   UA reflex to Microscopic and Culture   Result Value Ref Range    Color Urine Yellow     Appearance Urine Clear     Glucose Urine Negative NEG^Negative mg/dL    Bilirubin Urine Negative NEG^Negative    Ketones Urine Negative NEG^Negative mg/dL    Specific Gravity Urine 1.025 1.003 - 1.035    Blood Urine Negative NEG^Negative    pH Urine 6.0 5.0 - 7.0 pH    Protein Albumin Urine Negative NEG^Negative mg/dL    Urobilinogen Urine 0.2 0.2 - 1.0 EU/dL    Nitrite Urine Negative NEG^Negative    Leukocyte Esterase Urine Negative NEG^Negative    Source Midstream Urine        ASSESSMENT/PLAN:       (R35.0) Increased urinary frequency  (primary encounter diagnosis)    Comment: Possibly due to not drinking enough fluids. There may also be a psychological component to this. Push fluids first. If not improving, can consider trying Flomax for a short time.     Plan: UA reflex to Microscopic and Culture              Patient Instructions   Try pushing fluids.     If not improving in 1 week, you can call and we can try a medication called Flomax which helps with urinary retention and decreased flow.     If still not improving after  that, recommend follow-up with primary care provider.              Sourav Duran PA-C  [facility]

## 2018-08-30 ENCOUNTER — ALLIED HEALTH/NURSE VISIT (OUTPATIENT)
Dept: NURSING | Facility: CLINIC | Age: 28
End: 2018-08-30
Payer: MEDICARE

## 2018-08-30 DIAGNOSIS — E34.9 TESTOSTERONE DEFICIENCY: Primary | ICD-10-CM

## 2018-08-30 PROCEDURE — 90471 IMMUNIZATION ADMIN: CPT

## 2018-08-30 PROCEDURE — 99207 ZZC NO CHARGE NURSE ONLY: CPT

## 2018-08-30 NOTE — MR AVS SNAPSHOT
After Visit Summary   8/30/2018    Mamadou Reece    MRN: [id number]           Patient Information     Date Of Birth          1990        Visit Information        Provider Department      8/30/2018 8:30 AM CR RN [facility]        Today's Diagnoses     Testosterone deficiency    -  1       Follow-ups after your visit        Follow-up notes from your care team     Return in about 14 days (around 9/13/2018) for testosterone.      Your next 10 appointments already scheduled     Oct 19, 2018  4:10 PM CDT   [facility]    [address] [facility] [address] [phone]              Who to contact     If you have questions or need follow up information about today's clinic visit or your schedule please contact [facility] directly at [phone].  Normal or non-critical lab and imaging results will be communicated to you by MyChart, letter or phone within 4 business days after the clinic has received the results. If you do not hear from us within 7 days, please contact the clinic through MyChart or phone. If you have a critical or abnormal lab result, we will notify you by phone as soon as possible.  Submit refill requests through [employer] or call your pharmacy and they will forward the refill request to us. Please allow 3 business days for your refill to be completed.          Additional Information About Your Visit        Care EveryWhere ID     This is your Care EveryWhere ID. This could be used by other organizations to access your [facility] medical records  [id number]         Blood Pressure from Last 3 Encounters:   08/21/18 131/89   06/20/18 108/80   04/19/18 108/74    Weight from Last 3 Encounters:   08/21/18 186 lb 6.4 oz (84.6 kg)   06/20/18 189 lb 6.4 oz (85.9 kg)   04/19/18 186 lb 8 oz (84.6 kg)              We Performed the Following     ADMIN 1st VACCINE      TESTOSTERONE CYPIONATE INJECTION 1MG        Primary Care Provider Office Phone # Fax #    Mariluz Mae [phone]       [address]  [facility] [address]        Equal Access to Services     MARLO OZUNA : Hadii ryan ku cathyo Soomaali, waaxda luqadaha, qaybta kaalmada adeegyada, kylah mcnairharoon best. So [facility] [phone].    ATENCIÓN: Si habla español, tiene a henderson disposición servicios gratuitos de asistencia lingüística. Llame al [phone].    We comply with applicable federal civil rights laws and [place] laws. We do not discriminate on the basis of race, color, national origin, age, disability, sex, sexual orientation, or gender identity.            Thank you!     Thank you for choosing [facility]  for your care. Our goal is always to provide you with excellent care. Hearing back from our patients is one way we can continue to improve our services. Please take a few minutes to complete the written survey that you may receive in the mail after your visit with us. Thank you!             Your Updated Medication List - Protect others around you: Learn how to safely use, store and throw away your medicines at www.disposemymeds.org.          This list is accurate as of 8/30/18  9:01 AM.  Always use your most recent med list.                   Brand Name Dispense Instructions for use Diagnosis    amphetamine-dextroamphetamine 30 MG per 24 hr capsule    ADDERALL XR    30 capsule    Take 1 capsule (30 mg) by mouth daily    Attention deficit disorder, unspecified hyperactivity presence       B-12 1000 MCG Tbcr     100 tablet    Take 2,000 mcg by mouth daily    Routine general medical examination at a health care facility       CALCIUM + D 315-200 MG-UNIT Tabs per tablet   Generic drug:  calcium citrate-vitamin D           desmopressin 0.01 % Soln spray    DDAVP    10 mL    3 sprays once daily    Diabetes insipidus (H)       desmopressin 0.01 % Soln spray     DDAVP    10 mL    INHALE 3 SPRAYS INTO NOSTRIL DAILY    History of craniopharyngioma, Panhypopituitarism (H)       levothyroxine 137 MCG tablet    SYNTHROID/LEVOTHROID    30 tablet    Take 1 tablet (137 mcg) by mouth daily    Hypothyroidism, unspecified type       predniSONE 1 MG tablet    DELTASONE    150 tablet    Take 3 mg in AM and 2 mg at night    Panhypopituitarism (H)       somatropin 10 MG/1.5ML Soln PEN injection    NORDITROPIN FLEXPRO    10 mL    Inject 0.7 mg Subcutaneous daily    Panhypopituitarism (H)       testosterone cypionate 200 MG/ML injection    DEPOTESTOTERONE    1 mL    Inject 1 mL (200 mg) into the muscle every 14 days    Testosterone deficiency, Panhypopituitarism (H)       vitamin D 1000 units capsule      Take 2 capsules by mouth daily

## 2018-09-04 ENCOUNTER — TELEPHONE (OUTPATIENT)
Dept: FAMILY MEDICINE | Facility: CLINIC | Age: 28
End: 2018-09-04

## 2018-09-04 NOTE — TELEPHONE ENCOUNTER
Received a call from pts mom.      He could not see Dr. Mae so went to AV - he has a sensation he needs to urinate and then he can't go.  It is not getting better, it is interrupting his sleep.    In reviewing his chart, looks like she offered to start him on medication if problems continue.    She would like him to see Dr. Mae.  Appt scheduled.

## 2018-09-05 NOTE — PROGRESS NOTES
"  SUBJECTIVE:   Mamadou Reece is a 27 year old male who presents to clinic today for the following health issues:      Genitourinary symptoms      Duration: x 3 weeks     Description:  Has the sensation that pt has to urinate but not able to go.- mostly at night and sometimes during the day. Does not notice it when busy doing things.    Intensity:  \" not able to describe it\"    Accompanying signs and symptoms (fever/discharge/nausea/vomiting/back or abdominal pain):  \"felt that penis was vibrating -Prickling sensation- one episode    History (frequent UTI's/kidney stones/prostate problems): None  Sexually active: no     Precipitating or alleviating factors: Had been seen in [place] - did have urine culture     Therapies tried and outcome: increase fluid intake   Outcome: little effective      Would like to have right ear checked.  Will have pain while chewing or moving mouth.      Problem list and histories reviewed & adjusted, as indicated.  Additional history:     See under ROS     Patient Active Problem List   Diagnosis     ADD (attention deficit disorder)     Craniopharyngioma (H)     CARDIOVASCULAR SCREENING; LDL GOAL LESS THAN 160     Mood change (H)     Legal blindness     Hypothyroidism     Panhypopituitarism (H)     History of craniopharyngioma     Testosterone deficiency     BCC (basal cell carcinoma), face       Current Outpatient Prescriptions   Medication Sig Dispense Refill     [START ON 11/5/2018] amphetamine-dextroamphetamine (ADDERALL XR) 30 MG per 24 hr capsule Take 1 capsule (30 mg) by mouth daily 30 capsule 0     Calcium Citrate-Vitamin D (CALCIUM + D) 315-200 MG-UNIT TABS Take by mouth 2 times daily        Cholecalciferol (VITAMIN D) 1000 UNIT capsule Take 1 capsule by mouth daily        Cyanocobalamin (B-12) 1000 MCG TBCR Take 2,000 mcg by mouth daily  100 tablet 1     desmopressin (DDAVP) 0.01 % SOLN spray 3 sprays once daily 10 mL 3     levothyroxine (SYNTHROID/LEVOTHROID) 137 MCG tablet " "Take 1 tablet (137 mcg) by mouth daily 30 tablet 11     predniSONE (DELTASONE) 1 MG tablet Take 3 mg in AM and 2 mg at night 150 tablet 11     somatropin (NORDITROPIN FLEXPRO) 10 MG/1.5ML SOLN PEN injection Inject 0.7 mg Subcutaneous daily 10 mL 1     testosterone cypionate (DEPOTESTOTERONE) 200 MG/ML injection Inject 1 mL (200 mg) into the muscle every 14 days 1 mL 5         Reviewed and updated as needed this visit by clinical staff       Reviewed and updated as needed this visit by Provider         ROS:  Here with mother.   CONSTITUTIONAL:NEGATIVE for fever, chills, change in weight  RESP:NEGATIVE for significant cough or SOB  CV: NEGATIVE for chest pain, palpitations or peripheral edema  : see below  ENDOCRINE: see below  PSYCHIATRIC: NEGATIVE for changes in mood or affect    Urine frequency.  Getting up at night also.    Taking melatonin and tylenol pm now to try to get better sleep.    No dysuria.   Believes emptying bladder OK.   Occasional pepsi, no other soda pop or caffeine.   Denies sexual activity.    Talked to Triage nurse at Dr. Dias's office; did not think this had anything to do with his DDAVP/  Previously thought was dehydrated. So trying to drink a lot.   Not overly thirsty.    Right ear pain with chewing. This has been over the last couple weeks. May be getting better. Does not recall eating anything chewy. No congestion.   No plugging or decreased hearing.    Notes he is doing well with his current dose adderall. Almost due to come in for that.     OBJECTIVE:     /82 (BP Location: Right arm, Cuff Size: Adult Regular)  Pulse 76  Temp 98.3  F (36.8  C) (Oral)  Resp 16  Ht 6' 5\" (1.956 m)  Wt 189 lb 14.4 oz (86.1 kg)  SpO2 97%  BMI 22.52 kg/m2  Body mass index is 22.52 kg/(m^2).  GENERAL APPEARANCE: alert and no distress  HENT: Right ear canal and TM normal. TMJ not tender at this time, but notes this is the area of pain. No clicking with ROM.  CV: regular rates and rhythm  BACK: " No CVAT.  ABDOMEN: soft, nontender, without hepatosplenomegaly or masses and bowel sounds normal  MS: no edema   PSYCH: mentation appears normal and affect normal/bright    Diagnostic Test Results:  Reviewed U/A from the other day.     ASSESSMENT/PLAN:     Urinary frequency  Uncertain etiology.   Will check a bmp to look at sugar and renal function.   Referring to Urology.   - Basic metabolic panel  - UROLOGY ADULT REFERRAL    Attention deficit disorder, unspecified hyperactivity presence  Stable with this. Have brought over 3 refills to his pharmacy.  Can do this again in 3 months; to be seen in 6 months.   - amphetamine-dextroamphetamine (ADDERALL XR) 30 MG per 24 hr capsule; Take 1 capsule (30 mg) by mouth daily    Right ear pain  Uncertain etiology.   I wonder about some TMJ symptoms. It is improving.   Recommend not to eat anything real chewy or to open mouth real wide for now (ie, cut up an apple)      Craniopharyngioma (H)  Ongoing. Legally blind. (unable to see the color of his urine)    Panhypopituitarism (H)  Does see Endocrinology. Not sure if this may play a role with his frequency. Starting with Urology.   Mother has discussed with Endocrinology nurse in reference to his DDAVP.      Mariluz Mae MD, MD  [facility]

## 2018-09-06 ENCOUNTER — OFFICE VISIT (OUTPATIENT)
Dept: FAMILY MEDICINE | Facility: CLINIC | Age: 28
End: 2018-09-06
Payer: MEDICARE

## 2018-09-06 VITALS
DIASTOLIC BLOOD PRESSURE: 82 MMHG | OXYGEN SATURATION: 97 % | TEMPERATURE: 98.3 F | BODY MASS INDEX: 22.42 KG/M2 | SYSTOLIC BLOOD PRESSURE: 112 MMHG | HEART RATE: 76 BPM | WEIGHT: 189.9 LBS | RESPIRATION RATE: 16 BRPM | HEIGHT: 77 IN

## 2018-09-06 DIAGNOSIS — R35.0 URINARY FREQUENCY: Primary | ICD-10-CM

## 2018-09-06 DIAGNOSIS — F98.8 ATTENTION DEFICIT DISORDER, UNSPECIFIED HYPERACTIVITY PRESENCE: ICD-10-CM

## 2018-09-06 DIAGNOSIS — E23.0 PANHYPOPITUITARISM (H): ICD-10-CM

## 2018-09-06 DIAGNOSIS — D44.4 CRANIOPHARYNGIOMA (H): ICD-10-CM

## 2018-09-06 DIAGNOSIS — H92.01 RIGHT EAR PAIN: ICD-10-CM

## 2018-09-06 PROCEDURE — 99214 OFFICE O/P EST MOD 30 MIN: CPT | Performed by: FAMILY MEDICINE

## 2018-09-06 PROCEDURE — 36415 COLL VENOUS BLD VENIPUNCTURE: CPT | Performed by: FAMILY MEDICINE

## 2018-09-06 PROCEDURE — 80048 BASIC METABOLIC PNL TOTAL CA: CPT | Performed by: FAMILY MEDICINE

## 2018-09-06 RX ORDER — DEXTROAMPHETAMINE SACCHARATE, AMPHETAMINE ASPARTATE MONOHYDRATE, DEXTROAMPHETAMINE SULFATE AND AMPHETAMINE SULFATE 7.5; 7.5; 7.5; 7.5 MG/1; MG/1; MG/1; MG/1
30 CAPSULE, EXTENDED RELEASE ORAL DAILY
Qty: 30 CAPSULE | Refills: 0 | Status: SHIPPED | OUTPATIENT
Start: 2018-10-06 | End: 2018-09-06

## 2018-09-06 RX ORDER — DEXTROAMPHETAMINE SACCHARATE, AMPHETAMINE ASPARTATE MONOHYDRATE, DEXTROAMPHETAMINE SULFATE AND AMPHETAMINE SULFATE 7.5; 7.5; 7.5; 7.5 MG/1; MG/1; MG/1; MG/1
30 CAPSULE, EXTENDED RELEASE ORAL DAILY
Qty: 30 CAPSULE | Refills: 0 | Status: SHIPPED | OUTPATIENT
Start: 2018-11-05 | End: 2019-02-05

## 2018-09-06 RX ORDER — DEXTROAMPHETAMINE SACCHARATE, AMPHETAMINE ASPARTATE MONOHYDRATE, DEXTROAMPHETAMINE SULFATE AND AMPHETAMINE SULFATE 7.5; 7.5; 7.5; 7.5 MG/1; MG/1; MG/1; MG/1
30 CAPSULE, EXTENDED RELEASE ORAL DAILY
Qty: 30 CAPSULE | Refills: 0 | Status: SHIPPED | OUTPATIENT
Start: 2018-09-06 | End: 2018-09-06

## 2018-09-06 NOTE — LETTER
September 10, 2018      Mamadou Reece  [id number]  [facility] [address]        Dear Mr. Mamadou Reece,    Enclosed is a copy of your recent results.    This is also all normal.  GFR stands for glomerular filtration rate (this is in regards to the kidney). They are now showing an estimate of this, based on the actual creatinine, age, gender, and race. This is commonly lower as one gets older. Your level of hydration can have an impact also.  If this is low, we should be aggressive with managing high blood pressure or high cholesterol.    Have a good Rosa!    Sincerely,     Mariluz Mae MD

## 2018-09-06 NOTE — MR AVS SNAPSHOT
After Visit Summary   9/6/2018    Mamadou Reece    MRN: [id number]           Patient Information     Date Of Birth          1990        Visit Information        Provider Department      9/6/2018 9:50 AM Mariluz Mae MD [facility]        Today's Diagnoses     Urinary frequency    -  1    Attention deficit disorder, unspecified hyperactivity presence           Follow-ups after your visit        Additional Services     UROLOGY ADULT REFERRAL       Your provider has referred you to: [facility]: [facility] [phone]   [email or web address]    Please be aware that coverage of these services is subject to the terms and limitations of your health insurance plan.  Call member services at your health plan with any benefit or coverage questions.      Please bring the following with you to your appointment:    (1) Any X-Rays, CTs or MRIs which have been performed.  Contact the facility where they were done to arrange for  prior to your scheduled appointment.    (2) List of current medications  (3) This referral request   (4) Any documents/labs given to you for this referral                  Follow-up notes from your care team     Return in about 6 months (around 3/6/2019) for Medication recheck.      Your next 10 appointments already scheduled     Oct 19, 2018  4:10 PM CDT   PHYSICAL with Mariluz Mae MD   [facility]    [address] [facility] [address] [phone]              Who to contact     If you have questions or need follow up information about today's clinic visit or your schedule please contact [facility] directly at [phone].  Normal or non-critical lab and imaging results will be communicated to you by MyChart, letter or phone within 4 business days after the clinic has received the results. If you do not hear from us within 7 days, please  "contact the clinic through [email or web address] or phone. If you have a critical or abnormal lab result, we will notify you by phone as soon as possible.  Submit refill requests through [employer] or call your pharmacy and they will forward the refill request to us. Please allow 3 business days for your refill to be completed.          Additional Information About Your Visit        Care EveryWhere ID     This is your Care EveryWhere ID. This could be used by other organizations to access your [facility] medical records  [id number]        Your Vitals Were     Pulse Temperature Respirations Height Pulse Oximetry BMI (Body Mass Index)    76 98.3  F (36.8  C) (Oral) 16 6' 5\" (1.956 m) 97% 22.52 kg/m2       Blood Pressure from Last 3 Encounters:   09/06/18 112/82   08/21/18 131/89   06/20/18 108/80    Weight from Last 3 Encounters:   09/06/18 189 lb 14.4 oz (86.1 kg)   08/21/18 186 lb 6.4 oz (84.6 kg)   06/20/18 189 lb 6.4 oz (85.9 kg)              We Performed the Following     Basic metabolic panel     UROLOGY ADULT REFERRAL          Today's Medication Changes          These changes are accurate as of 9/6/18 10:34 AM.  If you have any questions, ask your nurse or doctor.               Start taking these medicines.        Dose/Directions    amphetamine-dextroamphetamine 30 MG per 24 hr capsule   Commonly known as:  ADDERALL XR   Used for:  Attention deficit disorder, unspecified hyperactivity presence   Started by:  Mariluz Mae MD        Dose:  30 mg   Start taking on:  11/5/2018   Take 1 capsule (30 mg) by mouth daily   Quantity:  30 capsule   Refills:  0            Where to get your medicines      Some of these will need a paper prescription and others can be bought over the counter.  Ask your nurse if you have questions.     Bring a paper prescription for each of these medications     amphetamine-dextroamphetamine 30 MG per 24 hr capsule                Primary Care Provider Office Phone # Fax #    Mariluz Mae [phone] " [phone]       [address]  [facility] [address]        Equal Access to Services     ANUJECHO SULMA : Hadii aad ku hadgeeo Sodainaali, waaxda luqadaha, qaybta kaalmada ademitulda, kylah reynain hayaadagoberto mesajack tompkins lairenedagoberto nasir. So [facility] [phone].    ATENCIÓN: Si habla español, tiene a henderson disposición servicios gratuitos de asistencia lingüística. Llame al [phone].    We comply with applicable federal civil rights laws and [place] laws. We do not discriminate on the basis of race, color, national origin, age, disability, sex, sexual orientation, or gender identity.            Thank you!     Thank you for choosing [facility]  for your care. Our goal is always to provide you with excellent care. Hearing back from our patients is one way we can continue to improve our services. Please take a few minutes to complete the written survey that you may receive in the mail after your visit with us. Thank you!             Your Updated Medication List - Protect others around you: Learn how to safely use, store and throw away your medicines at www.disposemymeds.org.          This list is accurate as of 9/6/18 10:34 AM.  Always use your most recent med list.                   Brand Name Dispense Instructions for use Diagnosis    amphetamine-dextroamphetamine 30 MG per 24 hr capsule   Start taking on:  11/5/2018    ADDERALL XR    30 capsule    Take 1 capsule (30 mg) by mouth daily    Attention deficit disorder, unspecified hyperactivity presence       B-12 1000 MCG Tbcr     100 tablet    Take 2,000 mcg by mouth daily    Routine general medical examination at a health care facility       CALCIUM + D 315-200 MG-UNIT Tabs per tablet   Generic drug:  calcium citrate-vitamin D      Take by mouth 2 times daily        desmopressin 0.01 % Soln spray    DDAVP    10 mL    3 sprays once daily    Diabetes insipidus (H)       levothyroxine 137 MCG tablet    SYNTHROID/LEVOTHROID    30 tablet    Take 1 tablet (137 mcg) by  mouth daily    Hypothyroidism, unspecified type       predniSONE 1 MG tablet    DELTASONE    150 tablet    Take 3 mg in AM and 2 mg at night    Panhypopituitarism (H)       somatropin 10 MG/1.5ML Soln PEN injection    NORDITROPIN FLEXPRO    10 mL    Inject 0.7 mg Subcutaneous daily    Panhypopituitarism (H)       testosterone cypionate 200 MG/ML injection    DEPOTESTOTERONE    1 mL    Inject 1 mL (200 mg) into the muscle every 14 days    Testosterone deficiency, Panhypopituitarism (H)       vitamin D 1000 units capsule      Take 1 capsule by mouth daily

## 2018-09-07 LAB
ANION GAP SERPL CALCULATED.3IONS-SCNC: 6 MMOL/L (ref 3–14)
BUN SERPL-MCNC: 10 MG/DL (ref 7–30)
CALCIUM SERPL-MCNC: 9 MG/DL (ref 8.5–10.1)
CHLORIDE SERPL-SCNC: 104 MMOL/L (ref 94–109)
CO2 SERPL-SCNC: 30 MMOL/L (ref 20–32)
CREAT SERPL-MCNC: 0.87 MG/DL (ref 0.66–1.25)
GFR SERPL CREATININE-BSD FRML MDRD: >90 ML/MIN/1.7M2
GLUCOSE SERPL-MCNC: 88 MG/DL (ref 70–99)
POTASSIUM SERPL-SCNC: 4.4 MMOL/L (ref 3.4–5.3)
SODIUM SERPL-SCNC: 140 MMOL/L (ref 133–144)

## 2018-09-13 ENCOUNTER — ALLIED HEALTH/NURSE VISIT (OUTPATIENT)
Dept: NURSING | Facility: CLINIC | Age: 28
End: 2018-09-13
Payer: MEDICARE

## 2018-09-13 DIAGNOSIS — E34.9 TESTOSTERONE DEFICIENCY: Primary | ICD-10-CM

## 2018-09-13 PROCEDURE — 96372 THER/PROPH/DIAG INJ SC/IM: CPT

## 2018-09-13 NOTE — MR AVS SNAPSHOT
After Visit Summary   9/13/2018    Mamadou Reece    MRN: [id number]           Patient Information     Date Of Birth          1990        Visit Information        Provider Department      9/13/2018 8:30 AM CR RN [facility]        Today's Diagnoses     Testosterone deficiency    -  1       Follow-ups after your visit        Your next 10 appointments already scheduled     Oct 09, 2018  8:30 AM CDT   New Patient Visit with Jemal Manning MD   [facility] [place] ([facility] [place])    [address] [facility]  [address]  [facility] [id number] [phone]            Oct 19, 2018  4:10 PM CDT   PHYSICAL with Mariluz Mae MD   [facility] ([facility]    [address] [facility] [address] [phone]              Who to contact     If you have questions or need follow up information about today's clinic visit or your schedule please contact [facility] directly at [phone].  Normal or non-critical lab and imaging results will be communicated to you by MyChart, letter or phone within 4 business days after the clinic has received the results. If you do not hear from us within 7 days, please contact the clinic through MyChart or phone. If you have a critical or abnormal lab result, we will notify you by phone as soon as possible.  Submit refill requests through [employer] or call your pharmacy and they will forward the refill request to us. Please allow 3 business days for your refill to be completed.          Additional Information About Your Visit        Care EveryWhere ID     This is your Care EveryWhere ID. This could be used by other organizations to access your [place] medical records  [id number]         Blood Pressure from Last 3 Encounters:   09/06/18 112/82   08/21/18 131/89   06/20/18 108/80    Weight from Last 3 Encounters:   09/06/18 189 lb 14.4  oz (86.1 kg)   08/21/18 186 lb 6.4 oz (84.6 kg)   06/20/18 189 lb 6.4 oz (85.9 kg)              We Performed the Following     C TESTOSTERONE CYPIONATE INJECTION, 1 MG     INJECTION INTRAMUSCULAR OR SUB-Q        Primary Care Provider Office Phone # Fax #    Mariluz Mae [phone]       [address]  [facility] [address]        Equal Access to Services     [facility] : Hadii aad ku hadasho Soomaali, waaxda luqadaha, qaybta kaalmada adeegyada, waxay idiin hayaan adeeg khsivanmeena osorio . So [facility] [phone].    ATENCIÓN: Si ronal dejesus, tiene a henderson disposición servicios gratuitos de asistencia lingüística. [facility] [phone].    We comply with applicable federal civil rights laws and [place] laws. We do not discriminate on the basis of race, color, national origin, age, disability, sex, sexual orientation, or gender identity.            Thank you!     Thank you for choosing [facility]  for your care. Our goal is always to provide you with excellent care. Hearing back from our patients is one way we can continue to improve our services. Please take a few minutes to complete the written survey that you may receive in the mail after your visit with us. Thank you!             Your Updated Medication List - Protect others around you: Learn how to safely use, store and throw away your medicines at www.disposemymeds.org.          This list is accurate as of 9/13/18  8:46 AM.  Always use your most recent med list.                   Brand Name Dispense Instructions for use Diagnosis    amphetamine-dextroamphetamine 30 MG per 24 hr capsule   Start taking on:  11/5/2018    ADDERALL XR    30 capsule    Take 1 capsule (30 mg) by mouth daily    Attention deficit disorder, unspecified hyperactivity presence       B-12 1000 MCG Tbcr     100 tablet    Take 2,000 mcg by mouth daily    Routine general medical examination at a health care facility       CALCIUM + D 315-200 MG-UNIT Tabs  per tablet   Generic drug:  calcium citrate-vitamin D      Take by mouth 2 times daily        desmopressin 0.01 % Soln spray    DDAVP    10 mL    3 sprays once daily    Diabetes insipidus (H)       levothyroxine 137 MCG tablet    SYNTHROID/LEVOTHROID    30 tablet    Take 1 tablet (137 mcg) by mouth daily    Hypothyroidism, unspecified type       predniSONE 1 MG tablet    DELTASONE    150 tablet    Take 3 mg in AM and 2 mg at night    Panhypopituitarism (H)       somatropin 10 MG/1.5ML Soln PEN injection    NORDITROPIN FLEXPRO    10 mL    Inject 0.7 mg Subcutaneous daily    Panhypopituitarism (H)       testosterone cypionate 200 MG/ML injection    DEPOTESTOTERONE    1 mL    Inject 1 mL (200 mg) into the muscle every 14 days    Testosterone deficiency, Panhypopituitarism (H)       vitamin D 1000 units capsule      Take 1 capsule by mouth daily

## 2018-09-27 ENCOUNTER — ALLIED HEALTH/NURSE VISIT (OUTPATIENT)
Dept: NURSING | Facility: CLINIC | Age: 28
End: 2018-09-27
Payer: MEDICARE

## 2018-09-27 DIAGNOSIS — E34.9 TESTOSTERONE DEFICIENCY: Primary | ICD-10-CM

## 2018-09-27 DIAGNOSIS — E23.0 PANHYPOPITUITARISM (H): ICD-10-CM

## 2018-09-27 PROCEDURE — 99207 ZZC NO CHARGE NURSE ONLY: CPT

## 2018-09-27 PROCEDURE — 96372 THER/PROPH/DIAG INJ SC/IM: CPT

## 2018-09-27 NOTE — MR AVS SNAPSHOT
After Visit Summary   9/27/2018    Mamadou Reece    MRN: [id number]           Patient Information     Date Of Birth          1990        Visit Information        Provider Department      9/27/2018 8:45 AM CR RN [facility]        Today's Diagnoses     Testosterone deficiency    -  1    Panhypopituitarism (H)           Follow-ups after your visit        Follow-up notes from your care team     Return in about 14 days (around 10/11/2018) for injection.      Your next 10 appointments already scheduled     Oct 09, 2018  8:30 AM CDT   New Patient Visit with Jemal Manning MD   [facility] [place] ([facility] [place])    [address]  [address]  [facility] [address] [phone]            Oct 19, 2018  4:10 PM CDT   [facility]YSICAL with [facility]uz Mae MD   [facility]    [address] [facility] [address] [phone]              Who to contact     If you have questions or need follow up information about today's clinic visit or your schedule please contact [facility] directly at [phone].  Normal or non-critical lab and imaging results will be communicated to you by MyChart, letter or phone within 4 business days after the clinic has received the results. If you do not hear from us within 7 days, please contact the clinic through MyChart or phone. If you have a critical or abnormal lab result, we will notify you by phone as soon as possible.  Submit refill requests through [employer] or call your pharmacy and they will forward the refill request to us. Please allow 3 business days for your refill to be completed.          Additional Information About Your Visit        Care EveryWhere ID     This is your Care EveryWhere ID. This could be used by other organizations to access your [place] medical records  [id number]         Blood Pressure  from Last 3 Encounters:   09/06/18 112/82   08/21/18 131/89   06/20/18 108/80    Weight from Last 3 Encounters:   09/06/18 189 lb 14.4 oz (86.1 kg)   08/21/18 186 lb 6.4 oz (84.6 kg)   06/20/18 189 lb 6.4 oz (85.9 kg)              We Performed the Following     ADMIN 1st VACCINE     TESTOSTERONE CYPIONATE INJECTION 1MG     Testosterone Free and Total        Primary Care Provider Office Phone # Fax #    Mariluz Mae [phone] [address] [address] [facility] [address]        Equal Access to Services     [facility]: Hadii aad stacia Rodney, waaxda armani, qaybta kaalmada marcelino, kylah osorio . So [facility] [phone].    ATENCIÓN: Si habla español, tiene a henderson disposición servicios gratuitos de asistencia lingüística. [facility] [phone].    We comply with applicable federal civil rights laws and [place] laws. We do not discriminate on the basis of race, color, national origin, age, disability, sex, sexual orientation, or gender identity.            Thank you!     Thank you for choosing [facility]  for your care. Our goal is always to provide you with excellent care. Hearing back from our patients is one way we can continue to improve our services. Please take a few minutes to complete the written survey that you may receive in the mail after your visit with us. Thank you!             Your Updated Medication List - Protect others around you: Learn how to safely use, store and throw away your medicines at www.disposemymeds.org.          This list is accurate as of 9/27/18  9:10 AM.  Always use your most recent med list.                   Brand Name Dispense Instructions for use Diagnosis    amphetamine-dextroamphetamine 30 MG per 24 hr capsule   Start taking on:  11/5/2018    ADDERALL XR    30 capsule    Take 1 capsule (30 mg) by mouth daily    Attention deficit disorder, unspecified hyperactivity presence       B-12 1000 MCG Tbcr      100 tablet    Take 2,000 mcg by mouth daily    Routine general medical examination at a health care facility       CALCIUM + D 315-200 MG-UNIT Tabs per tablet   Generic drug:  calcium citrate-vitamin D      Take by mouth 2 times daily        desmopressin 0.01 % Soln spray    DDAVP    10 mL    3 sprays once daily    Diabetes insipidus (H)       levothyroxine 137 MCG tablet    SYNTHROID/LEVOTHROID    30 tablet    Take 1 tablet (137 mcg) by mouth daily    Hypothyroidism, unspecified type       predniSONE 1 MG tablet    DELTASONE    150 tablet    Take 3 mg in AM and 2 mg at night    Panhypopituitarism (H)       somatropin 10 MG/1.5ML Soln PEN injection    NORDITROPIN FLEXPRO    10 mL    Inject 0.7 mg Subcutaneous daily    Panhypopituitarism (H)       testosterone cypionate 200 MG/ML injection    DEPOTESTOTERONE    1 mL    Inject 1 mL (200 mg) into the muscle every 14 days    Testosterone deficiency, Panhypopituitarism (H)       vitamin D 1000 units capsule      Take 1 capsule by mouth daily

## 2018-10-04 DIAGNOSIS — E23.0 PANHYPOPITUITARISM (H): ICD-10-CM

## 2018-10-04 PROCEDURE — 84403 ASSAY OF TOTAL TESTOSTERONE: CPT | Performed by: INTERNAL MEDICINE

## 2018-10-04 PROCEDURE — 36415 COLL VENOUS BLD VENIPUNCTURE: CPT | Performed by: INTERNAL MEDICINE

## 2018-10-04 PROCEDURE — 84270 ASSAY OF SEX HORMONE GLOBUL: CPT | Performed by: INTERNAL MEDICINE

## 2018-10-08 LAB
SHBG SERPL-SCNC: 19 NMOL/L (ref 11–80)
TESTOST FREE SERPL-MCNC: 25.63 NG/DL (ref 4.7–24.4)
TESTOST SERPL-MCNC: 859 NG/DL (ref 240–950)

## 2018-10-09 ENCOUNTER — OFFICE VISIT (OUTPATIENT)
Dept: UROLOGY | Facility: CLINIC | Age: 28
End: 2018-10-09
Payer: MEDICARE

## 2018-10-09 VITALS — OXYGEN SATURATION: 98 % | HEART RATE: 78 BPM | BODY MASS INDEX: 25.05 KG/M2 | WEIGHT: 189 LBS | HEIGHT: 73 IN

## 2018-10-09 DIAGNOSIS — R35.0 URINARY FREQUENCY: Primary | ICD-10-CM

## 2018-10-09 LAB
ALBUMIN UR-MCNC: NEGATIVE MG/DL
APPEARANCE UR: CLEAR
BILIRUB UR QL STRIP: NEGATIVE
COLOR UR AUTO: YELLOW
GLUCOSE UR STRIP-MCNC: NEGATIVE MG/DL
HGB UR QL STRIP: NEGATIVE
KETONES UR STRIP-MCNC: NEGATIVE MG/DL
LEUKOCYTE ESTERASE UR QL STRIP: NEGATIVE
NITRATE UR QL: NEGATIVE
PH UR STRIP: 7 PH (ref 5–7)
RESIDUAL VOLUME (RV) (EXTERNAL): 10
SOURCE: NORMAL
SP GR UR STRIP: 1.01 (ref 1–1.03)
UROBILINOGEN UR STRIP-ACNC: 0.2 EU/DL (ref 0.2–1)

## 2018-10-09 PROCEDURE — 81003 URINALYSIS AUTO W/O SCOPE: CPT | Mod: QW | Performed by: UROLOGY

## 2018-10-09 PROCEDURE — 99203 OFFICE O/P NEW LOW 30 MIN: CPT | Mod: 25 | Performed by: UROLOGY

## 2018-10-09 PROCEDURE — 51798 US URINE CAPACITY MEASURE: CPT | Performed by: UROLOGY

## 2018-10-09 ASSESSMENT — PAIN SCALES - GENERAL: PAINLEVEL: NO PAIN (0)

## 2018-10-09 NOTE — PROGRESS NOTES
Chief Complaint:   Urinary spasms at bedtime         Consult or Referral:     Mr. Mamadou Reece is a 28 year old male seen at the request of Dr. Mae.         History of Present Illness:    Mamadou Reece is a very pleasant 28 year old male who presents with a history of craniopharyngioma and now presents with bladder spasms and urinary frequency at bedtime. States this started rather acutely several weeks ago. No significant other changes at that time, but does state he changed the time of his DDAVP dosing around that time.    He describes his symptoms as painful bladder spasms that make him feel he needs to void. This happens only at bedtime and is quite bothersome. Is disrupting his sleep. Is able to take sleep aids to help overcome this. Feels he has no daytime symptom and is able to empty. No other significant urologic history. No hematuria or dysuria.         Past Medical History:     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     Legal blindness     Craniopharyngioma     Mood disorder (H)     Craniopharyngioma     Radiation age 11    Craniopharyngioma          Past Surgical History:     Past Surgical History:   Procedure Laterality Date     CRANIOTOMY, EXCISE TUMOR COMPLEX, COMBINED  age 6    craniopharyngioma     MOHS MICROGRAPHIC PROCEDURE Left 05/31/2018    left cheek nodular BCC            Medications     Current Outpatient Prescriptions   Medication     [START ON 11/5/2018] amphetamine-dextroamphetamine (ADDERALL XR) 30 MG per 24 hr capsule     Calcium Citrate-Vitamin D (CALCIUM + D) 315-200 MG-UNIT TABS     Cholecalciferol (VITAMIN D) 1000 UNIT capsule     Cyanocobalamin (B-12) 1000 MCG TBCR     desmopressin (DDAVP) 0.01 % SOLN spray     levothyroxine (SYNTHROID/LEVOTHROID) 137 MCG tablet     predniSONE (DELTASONE) 1 MG tablet     somatropin (NORDITROPIN FLEXPRO) 10 MG/1.5ML SOLN PEN injection     testosterone cypionate (DEPOTESTOTERONE) 200  "MG/ML injection     No current facility-administered medications for this visit.             Family History:     Family History   Problem Relation Age of Onset     Cancer Father      Melanoma     Cerebrovascular Disease Maternal Grandmother 91     Diabetes Maternal Grandmother      Cerebrovascular Disease Paternal Grandmother      Cerebrovascular Disease Paternal Grandfather             Social History:     Social History     Social History     Marital status: Single     Spouse name: N/A     Number of children: N/A     Years of education: N/A     Occupational History     Not on file.     Social History Main Topics     Smoking status: Never Smoker     Smokeless tobacco: Never Used     Alcohol use 0.0 oz/week     0 Standard drinks or equivalent per week      Comment: sip at holidays     Drug use: No     Sexual activity: No     Other Topics Concern     Not on file     Social History Narrative            Allergies:   Review of patient's allergies indicates no known allergies.         Review of Systems:  From intake questionnaire     Skin: negative  Eyes: negative  Ears/Nose/Throat: negative  Respiratory: No shortness of breath, dyspnea on exertion, cough, or hemoptysis  Cardiovascular: No chest pain or palpitations  Gastrointestinal: negative; no nausea/vomiting, constipation or diarrhea  Genitourinary: as per HPI  Musculoskeletal: negative  Neurologic: negative  Psychiatric: negative  Hematologic/Lymphatic/Immunologic: negative  Endocrine: negative         Physical Exam:     Patient is a 28 year old  male   Vitals: Pulse 78, height 1.842 m (6' 0.5\"), weight 85.7 kg (189 lb), SpO2 98 %.  Constitutional: Body mass index is 25.28 kg/(m^2).  Alert, no acute distress, oriented, conversant  Eyes: no scleral icterus; extraocular muscles intact, moist conjunctivae  Neck: trachea midline, no thyromegaly  Ears/nose/mouth: throat/mouth:normal, good dentition  Respiratory: no respiratory distress, or pursed lip " breathing  Cardiovascular: pulses strong and intact; no obvious jugular venous distension present  Gastrointestinal: soft, nontender, no organomegaly or masses,   Lymphatics: No inguinal adenopathy  Musculoskeltal: extremities normal, no peripheral edema  Skin: no suspicious lesions or rashes  Neuro: Alert, oriented, speech and mentation normal  Psych: affect and mood normal, alert and oriented to person, place and time  Gait: Normal  : Small sebaceous cyst on dorsal aspect of penis. Atrophic testes. No masses or lesions.    PVR = 10ml      Labs and Pathology:    I reviewed all applicable laboratory and pathology data and went over findings with patient  Significant for   Lab Results   Component Value Date    CR 0.87 09/06/2018    CR 1.06 10/05/2017    CR 0.88 06/16/2015     PSA   Date Value Ref Range Status   10/05/2017 0.17 0 - 4 ug/L Final     Comment:     Assay Method:  Chemiluminescence using Siemens Vista analyzer       Outside and Past Medical records:    I spent 10 minutes reviewing outside and past medical records.         Assessment and Plan:     Assessment: 28 year old male with new onset of bladder spasms, only at night, particularly at bedtime. No other symptoms. Unremarkable physical exam today, low PVR, and normal UA. At this point, there is no clear etiology for these symptoms. May be related to timing of his DDAVP dosing as this did change around this time. We discussed that while anticholinergic medications may help, they also come with side effects. We also discussed that this may be a physical manifestation of anxiety. In short, he and his family were reassured given no evidence of infection or poor bladder emptying. They would prefer to continue to observe for now. Will let me know if symptoms worsen or fail to improve, and would consider cystoscopy and/or medications at that time.    Plan:  Follow-up prn    Orders  Orders Placed This Encounter   Procedures     UA without Microscopic      Bladder scan     Jemal Manning MD  Urology  [facility]  Clinic Phone [phone]

## 2018-10-09 NOTE — LETTER
10/9/2018       RE: Mamadou Reece  [address]  [place] [address]     Dear Colleague,    Thank you for referring your patient, Mamadou Reece, to the [facility] [place] at [facility]. Please see a copy of my visit note below.          Chief Complaint:   Urinary spasms at bedtime         Consult or Referral:     Mr. Mamadou Reece is a 28 year old male seen at the request of Dr. Mae.         History of Present Illness:    Mamadou Reece is a very pleasant 28 year old male who presents with a history of craniopharyngioma and now presents with bladder spasms and urinary frequency at bedtime. States this started rather acutely several weeks ago. No significant other changes at that time, but does state he changed the time of his DDAVP dosing around that time.    He describes his symptoms as painful bladder spasms that make him feel he needs to void. This happens only at bedtime and is quite bothersome. Is disrupting his sleep. Is able to take sleep aids to help overcome this. Feels he has no daytime symptom and is able to empty. No other significant urologic history. No hematuria or dysuria.         Past Medical History:     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     Legal blindness     Craniopharyngioma     Mood disorder (H)     Craniopharyngioma     Radiation age 11    Craniopharyngioma          Past Surgical History:     Past Surgical History:   Procedure Laterality Date     CRANIOTOMY, EXCISE TUMOR COMPLEX, COMBINED  age 6    craniopharyngioma     MOHS MICROGRAPHIC PROCEDURE Left 05/31/2018    left cheek nodular BCC            Medications     Current Outpatient Prescriptions   Medication     [START ON 11/5/2018] amphetamine-dextroamphetamine (ADDERALL XR) 30 MG per 24 hr capsule     Calcium Citrate-Vitamin D (CALCIUM + D) 315-200 MG-UNIT TABS     Cholecalciferol (VITAMIN D)  "1000 UNIT capsule     Cyanocobalamin (B-12) 1000 MCG TBCR     desmopressin (DDAVP) 0.01 % SOLN spray     levothyroxine (SYNTHROID/LEVOTHROID) 137 MCG tablet     predniSONE (DELTASONE) 1 MG tablet     somatropin (NORDITROPIN FLEXPRO) 10 MG/1.5ML SOLN PEN injection     testosterone cypionate (DEPOTESTOTERONE) 200 MG/ML injection     No current facility-administered medications for this visit.             Family History:     Family History   Problem Relation Age of Onset     Cancer Father      Melanoma     Cerebrovascular Disease Maternal Grandmother 91     Diabetes Maternal Grandmother      Cerebrovascular Disease Paternal Grandmother      Cerebrovascular Disease Paternal Grandfather             Social History:     Social History     Social History     Marital status: Single     Spouse name: N/A     Number of children: N/A     Years of education: N/A     Occupational History     Not on file.     Social History Main Topics     Smoking status: Never Smoker     Smokeless tobacco: Never Used     Alcohol use 0.0 oz/week     0 Standard drinks or equivalent per week      Comment: sip at holidays     Drug use: No     Sexual activity: No     Other Topics Concern     Not on file     Social History Narrative            Allergies:   Review of patient's allergies indicates no known allergies.         Physical Exam:     Patient is a 28 year old  male   Vitals: Pulse 78, height 1.842 m (6' 0.5\"), weight 85.7 kg (189 lb), SpO2 98 %.  Constitutional: Body mass index is 25.28 kg/(m^2).  Alert, no acute distress, oriented, conversant  Eyes: no scleral icterus; extraocular muscles intact, moist conjunctivae  Neck: trachea midline, no thyromegaly  Ears/nose/mouth: throat/mouth:normal, good dentition  Respiratory: no respiratory distress, or pursed lip breathing  Cardiovascular: pulses strong and intact; no obvious jugular venous distension present  Gastrointestinal: soft, nontender, no organomegaly or masses,   Lymphatics: No inguinal " adenopathy  Musculoskeltal: extremities normal, no peripheral edema  Skin: no suspicious lesions or rashes  Neuro: Alert, oriented, speech and mentation normal  Psych: affect and mood normal, alert and oriented to person, place and time  Gait: Normal  : Small sebaceous cyst on dorsal aspect of penis. Atrophic testes. No masses or lesions.    PVR = 10ml      Labs and Pathology:    I reviewed all applicable laboratory and pathology data and went over findings with patient  Significant for   Lab Results   Component Value Date    CR 0.87 09/06/2018    CR 1.06 10/05/2017    CR 0.88 06/16/2015     PSA   Date Value Ref Range Status   10/05/2017 0.17 0 - 4 ug/L Final     Comment:     Assay Method:  Chemiluminescence using Siemens Vista analyzer       Outside and Past Medical records:    I spent 10 minutes reviewing outside and past medical records.         Assessment and Plan:     Assessment: 28 year old male with new onset of bladder spasms, only at night, particularly at bedtime. No other symptoms. Unremarkable physical exam today, low PVR, and normal UA. At this point, there is no clear etiology for these symptoms. May be related to timing of his DDAVP dosing as this did change around this time. We discussed that while anticholinergic medications may help, they also come with side effects. We also discussed that this may be a physical manifestation of anxiety. In short, he and his family were reassured given no evidence of infection or poor bladder emptying. They would prefer to continue to observe for now. Will let me know if symptoms worsen or fail to improve, and would consider cystoscopy and/or medications at that time.    Plan:  Follow-up prn    Orders  Orders Placed This Encounter   Procedures     UA without Microscopic     Bladder scan     Jemal Manning MD  Urology  [facility]  Clinic Phone [phone]

## 2018-10-09 NOTE — NURSING NOTE
Pt states when he lays down at nt he feels like he has to go and he doesn't go.  Pt denies dysuria.  pvr by scanner=10mL  Pt states he had a sharp pain in his penis just now for the first time.  Pt denies gross hem.  AARON Munguia, CMA

## 2018-10-09 NOTE — MR AVS SNAPSHOT
"              After Visit Summary   10/9/2018    Mamadou Reece    MRN: [id number]           Patient Information     Date Of Birth          1990        Visit Information        Provider Department      10/9/2018 8:30 AM Jemal Manning MD [facility] [place]        Today's Diagnoses     Urinary frequency    -  1       Follow-ups after your visit        Follow-up notes from your care team     Return if symptoms worsen or fail to improve.      Your next 10 appointments already scheduled     Oct 19, 2018  4:10 PM CDT   PHYSICAL with Mariluz Mae MD   [facility]    [address] [address] [phone]              Who to contact     If you have questions or need follow up information about today's clinic visit or your schedule please contact [facility] directly at [phone].  Normal or non-critical lab and imaging results will be communicated to you by MyChart, letter or phone within 4 business days after the clinic has received the results. If you do not hear from us within 7 days, please contact the clinic through MyChart or phone. If you have a critical or abnormal lab result, we will notify you by phone as soon as possible.  Submit refill requests through [employer] or call your pharmacy and they will forward the refill request to us. Please allow 3 business days for your refill to be completed.          Additional Information About Your Visit        Care EveryWhere ID     This is your Care EveryWhere ID. This could be used by other organizations to access your [place] medical records  [id number]        Your Vitals Were     Pulse Height Pulse Oximetry BMI (Body Mass Index)          78 1.842 m (6' 0.5\") 98% 25.28 kg/m2         Blood Pressure from Last 3 Encounters:   09/06/18 112/82   08/21/18 131/89   06/20/18 108/80    Weight from Last 3 " Encounters:   10/09/18 85.7 kg (189 lb)   09/06/18 86.1 kg (189 lb 14.4 oz)   08/21/18 84.6 kg (186 lb 6.4 oz)              We Performed the Following     Bladder scan     UA without Microscopic        Primary Care Provider Office Phone # Fax #    Mariluz Mae [phone]       [address]  [facility] [address]        Equal Access to Services     ECHO OZUNA AH: Hadii aad ku hadasho Soomaali, waaxda luqadaha, qaybta kaalmada adeegyada, waxay idiin hayaan adeeg kharash lairenen ah. So [facility] [phone].    ATENCIÓN: Si ronal dejesus, tiene a henderson disposición servicios gratuitos de asistencia lingüística. Llame al [phone].    We comply with applicable federal civil rights laws and [place] laws. We do not discriminate on the basis of race, color, national origin, age, disability, sex, sexual orientation, or gender identity.            Thank you!     Thank you for choosing [facility] [place]  for your care. Our goal is always to provide you with excellent care. Hearing back from our patients is one way we can continue to improve our services. Please take a few minutes to complete the written survey that you may receive in the mail after your visit with us. Thank you!             Your Updated Medication List - Protect others around you: Learn how to safely use, store and throw away your medicines at www.disposemymeds.org.          This list is accurate as of 10/9/18 11:59 PM.  Always use your most recent med list.                   Brand Name Dispense Instructions for use Diagnosis    amphetamine-dextroamphetamine 30 MG per 24 hr capsule   Start taking on:  11/5/2018    ADDERALL XR    30 capsule    Take 1 capsule (30 mg) by mouth daily    Attention deficit disorder, unspecified hyperactivity presence       B-12 1000 MCG Tbcr     100 tablet    Take 2,000 mcg by mouth daily    Routine general medical examination at a health care facility       CALCIUM + D  315-200 MG-UNIT Tabs per tablet   Generic drug:  calcium citrate-vitamin D      Take by mouth 2 times daily        desmopressin 0.01 % Soln spray    DDAVP    10 mL    3 sprays once daily    Diabetes insipidus (H)       levothyroxine 137 MCG tablet    SYNTHROID/LEVOTHROID    30 tablet    TAKE ONE TABLET BY MOUTH EVERY MORNING BEFORE BREAKFAST    Hypothyroidism, unspecified type       predniSONE 1 MG tablet    DELTASONE    150 tablet    Take 3 mg in AM and 2 mg at night    Panhypopituitarism (H)       somatropin 10 MG/1.5ML Soln PEN injection    NORDITROPIN FLEXPRO    10 mL    Inject 0.7 mg Subcutaneous daily    Panhypopituitarism (H)       testosterone cypionate 200 MG/ML injection    DEPOTESTOSTERONE    1 mL    Inject 1 mL (200 mg) into the muscle every 14 days    Testosterone deficiency, Panhypopituitarism (H)       vitamin D 1000 units capsule      Take 1 capsule by mouth daily

## 2018-10-11 ENCOUNTER — ALLIED HEALTH/NURSE VISIT (OUTPATIENT)
Dept: NURSING | Facility: CLINIC | Age: 28
End: 2018-10-11
Payer: MEDICARE

## 2018-10-11 DIAGNOSIS — E34.9 TESTOSTERONE DEFICIENCY: Primary | ICD-10-CM

## 2018-10-11 PROCEDURE — 96372 THER/PROPH/DIAG INJ SC/IM: CPT

## 2018-10-12 PROBLEM — R35.0 URINARY FREQUENCY: Status: ACTIVE | Noted: 2018-10-12

## 2018-10-19 ENCOUNTER — DOCUMENTATION ONLY (OUTPATIENT)
Dept: OTHER | Facility: CLINIC | Age: 28
End: 2018-10-19

## 2018-10-25 ENCOUNTER — ALLIED HEALTH/NURSE VISIT (OUTPATIENT)
Dept: NURSING | Facility: CLINIC | Age: 28
End: 2018-10-25
Payer: MEDICARE

## 2018-10-25 DIAGNOSIS — E34.9 TESTOSTERONE DEFICIENCY: Primary | ICD-10-CM

## 2018-10-25 PROCEDURE — 96372 THER/PROPH/DIAG INJ SC/IM: CPT

## 2018-10-25 PROCEDURE — 99207 ZZC NO CHARGE NURSE ONLY: CPT

## 2018-10-25 NOTE — MR AVS SNAPSHOT
After Visit Summary   10/25/2018    Mamadou Reece    MRN: [id number]           Patient Information     Date Of Birth          1990        Visit Information        Provider Department      10/25/2018 8:30 AM CR RN [facility]        Today's Diagnoses     Testosterone deficiency    -  1       Follow-ups after your visit        Who to contact     If you have questions or need follow up information about today's clinic visit or your schedule please contact [facility] directly at [phone].  Normal or non-critical lab and imaging results will be communicated to you by MyChart, letter or phone within 4 business days after the clinic has received the results. If you do not hear from us within 7 days, please contact the clinic through MyChart or phone. If you have a critical or abnormal lab result, we will notify you by phone as soon as possible.  Submit refill requests through [employer] or call your pharmacy and they will forward the refill request to us. Please allow 3 business days for your refill to be completed.          Additional Information About Your Visit        Care EveryWhere ID     This is your Care EveryWhere ID. This could be used by other organizations to access your [place] medical records  [id number]         Blood Pressure from Last 3 Encounters:   09/06/18 112/82   08/21/18 131/89   06/20/18 108/80    Weight from Last 3 Encounters:   10/09/18 189 lb (85.7 kg)   09/06/18 189 lb 14.4 oz (86.1 kg)   08/21/18 186 lb 6.4 oz (84.6 kg)              We Performed the Following     C TESTOSTERONE CYPIONATE INJECTION, 1 MG     INJECTION INTRAMUSCULAR OR SUB-Q        Primary Care Provider Office Phone # Fax #    Mariluz Mae [phone] [address] [address] [address]        Equal Access to Services     MARLO OZUNA AH: Helena Rodney, shamar lomeli, kylah hearn  latwin best. So [facility] [phone].    ATENCIÓN: Si miriamla oleg, tiene a henderson disposición servicios gratuitos de asistencia lingüística. Citlali avila [phone].    We comply with applicable federal civil rights laws and [place] laws. We do not discriminate on the basis of race, color, national origin, age, disability, sex, sexual orientation, or gender identity.            Thank you!     Thank you for choosing [facility]  for your care. Our goal is always to provide you with excellent care. Hearing back from our patients is one way we can continue to improve our services. Please take a few minutes to complete the written survey that you may receive in the mail after your visit with us. Thank you!             Your Updated Medication List - Protect others around you: Learn how to safely use, store and throw away your medicines at www.disposemymeds.org.          This list is accurate as of 10/25/18 11:59 PM.  Always use your most recent med list.                   Brand Name Dispense Instructions for use Diagnosis    amphetamine-dextroamphetamine 30 MG per 24 hr capsule   Start taking on:  11/5/2018    ADDERALL XR    30 capsule    Take 1 capsule (30 mg) by mouth daily    Attention deficit disorder, unspecified hyperactivity presence       B-12 1000 MCG Tbcr     100 tablet    Take 2,000 mcg by mouth daily    Routine general medical examination at a health care facility       CALCIUM + D 315-200 MG-UNIT Tabs per tablet   Generic drug:  calcium citrate-vitamin D      Take by mouth 2 times daily        desmopressin 0.01 % Soln spray    DDAVP    10 mL    3 sprays once daily    Diabetes insipidus (H)       levothyroxine 137 MCG tablet    SYNTHROID/LEVOTHROID    30 tablet    TAKE ONE TABLET BY MOUTH EVERY MORNING BEFORE BREAKFAST    Hypothyroidism, unspecified type       predniSONE 1 MG tablet    DELTASONE    150 tablet    Take 3 mg in AM and 2 mg at night    Panhypopituitarism (H)       somatropin 10 MG/1.5ML  Soln PEN injection    NORDITROPIN FLEXPRO    10 mL    Inject 0.7 mg Subcutaneous daily    Panhypopituitarism (H)       testosterone cypionate 200 MG/ML injection    DEPOTESTOSTERONE    1 mL    Inject 1 mL (200 mg) into the muscle every 14 days    Testosterone deficiency, Panhypopituitarism (H)       vitamin D 1000 units capsule      Take 1 capsule by mouth daily

## 2018-10-29 DIAGNOSIS — E23.0 PANHYPOPITUITARISM (H): ICD-10-CM

## 2018-10-29 RX ORDER — PREDNISONE 1 MG/1
TABLET ORAL
Qty: 150 TABLET | Refills: 3 | Status: SHIPPED | OUTPATIENT
Start: 2018-10-29 | End: 2019-02-27

## 2018-10-29 NOTE — TELEPHONE ENCOUNTER
LAST OFFICE/VIRTUAL VISIT:  06/20/18    FUTURE OFFICE/VIRTUAL VISIT:  None - NEEDS F/U AT THE END OF December.    Annalee Nieto, CMA

## 2018-11-05 ENCOUNTER — TELEPHONE (OUTPATIENT)
Dept: ENDOCRINOLOGY | Facility: CLINIC | Age: 28
End: 2018-11-05

## 2018-11-05 DIAGNOSIS — E23.0 PANHYPOPITUITARISM (H): ICD-10-CM

## 2018-11-05 NOTE — TELEPHONE ENCOUNTER
Patients father calling to ask for a PA for norditropin, states the [employer] will not be covering this anymore. This has happened before and he does not think there is an alternative to this medication. Please call sade Erendira [phone] with questions.

## 2018-11-08 ENCOUNTER — TELEPHONE (OUTPATIENT)
Dept: ENDOCRINOLOGY | Facility: CLINIC | Age: 28
End: 2018-11-08

## 2018-11-08 ENCOUNTER — ALLIED HEALTH/NURSE VISIT (OUTPATIENT)
Dept: NURSING | Facility: CLINIC | Age: 28
End: 2018-11-08
Payer: MEDICARE

## 2018-11-08 DIAGNOSIS — E23.0 PANHYPOPITUITARISM (H): ICD-10-CM

## 2018-11-08 DIAGNOSIS — E34.9 TESTOSTERONE DEFICIENCY: Primary | ICD-10-CM

## 2018-11-08 DIAGNOSIS — E34.9 TESTOSTERONE DEFICIENCY: ICD-10-CM

## 2018-11-08 PROCEDURE — 99207 ZZC NO CHARGE NURSE ONLY: CPT

## 2018-11-08 PROCEDURE — 96372 THER/PROPH/DIAG INJ SC/IM: CPT

## 2018-11-08 RX ORDER — TESTOSTERONE CYPIONATE 200 MG/ML
200 INJECTION, SOLUTION INTRAMUSCULAR
Qty: 1 ML | Refills: 5 | OUTPATIENT
Start: 2018-11-08 | End: 2019-04-18

## 2018-11-08 NOTE — MR AVS SNAPSHOT
After Visit Summary   11/8/2018    Mamadou Reece    MRN: [id number]           Patient Information     Date Of Birth          1990        Visit Information        Provider Department      11/8/2018 8:30 AM CR RN [facility]        Today's Diagnoses     Testosterone deficiency    -  1       Follow-ups after your visit        Who to contact     If you have questions or need follow up information about today's clinic visit or your schedule please contact [facility] directly at [phone].  Normal or non-critical lab and imaging results will be communicated to you by MyChart, letter or phone within 4 business days after the clinic has received the results. If you do not hear from us within 7 days, please contact the clinic through MyChart or phone. If you have a critical or abnormal lab result, we will notify you by phone as soon as possible.  Submit refill requests through [employer] or call your pharmacy and they will forward the refill request to us. Please allow 3 business days for your refill to be completed.          Additional Information About Your Visit        Care EveryWhere ID     This is your Care EveryWhere ID. This could be used by other organizations to access your [place] medical records  [id number]         Blood Pressure from Last 3 Encounters:   09/06/18 112/82   08/21/18 131/89   06/20/18 108/80    Weight from Last 3 Encounters:   10/09/18 189 lb (85.7 kg)   09/06/18 189 lb 14.4 oz (86.1 kg)   08/21/18 186 lb 6.4 oz (84.6 kg)              We Performed the Following     C TESTOSTERONE CYPIONATE INJECTION, 1 MG     INJECTION INTRAMUSCULAR OR SUB-Q        Primary Care Provider Office Phone # Fax #    Mariluz Mae [phone] [address] [address] [address]        Equal Access to Services     MARLO OZUNA AH: Helena Rodney, shamar lomeli, kylah hearn  ah. So [facility] [phone].    ATENCIÓN: Si ronal dejesus, tiene a henderson disposición servicios gratuitos de asistencia lingüística. Citlali avila [phone].    We comply with applicable federal civil rights laws and [place] laws. We do not discriminate on the basis of race, color, national origin, age, disability, sex, sexual orientation, or gender identity.            Thank you!     Thank you for choosing [facility]  for your care. Our goal is always to provide you with excellent care. Hearing back from our patients is one way we can continue to improve our services. Please take a few minutes to complete the written survey that you may receive in the mail after your visit with us. Thank you!             Your Updated Medication List - Protect others around you: Learn how to safely use, store and throw away your medicines at www.disposemymeds.org.          This list is accurate as of 11/8/18  8:45 AM.  Always use your most recent med list.                   Brand Name Dispense Instructions for use Diagnosis    amphetamine-dextroamphetamine 30 MG per 24 hr capsule    ADDERALL XR    30 capsule    Take 1 capsule (30 mg) by mouth daily    Attention deficit disorder, unspecified hyperactivity presence       B-12 1000 MCG Tbcr     100 tablet    Take 2,000 mcg by mouth daily    Routine general medical examination at a health care facility       CALCIUM + D 315-200 MG-UNIT Tabs per tablet   Generic drug:  calcium citrate-vitamin D      Take by mouth 2 times daily        desmopressin 0.01 % Soln spray    DDAVP    10 mL    3 sprays once daily    Diabetes insipidus (H)       levothyroxine 137 MCG tablet    SYNTHROID/LEVOTHROID    30 tablet    TAKE ONE TABLET BY MOUTH EVERY MORNING BEFORE BREAKFAST    Hypothyroidism, unspecified type       predniSONE 1 MG tablet    DELTASONE    150 tablet    TAKE THREE TABLETS (3 MG) BY MOUTH IN THE MORNING AND TWO TABLETS (2 MG) AT NIGHT    Panhypopituitarism (H)       somatropin  10 MG/1.5ML Soln PEN injection    NORDITROPIN FLEXPRO    10 mL    Inject 0.7 mg Subcutaneous daily    Panhypopituitarism (H)       testosterone cypionate 200 MG/ML injection    DEPOTESTOSTERONE    1 mL    Inject 1 mL (200 mg) into the muscle every 14 days    Testosterone deficiency, Panhypopituitarism (H)       vitamin D 1000 units capsule      Take 1 capsule by mouth daily

## 2018-11-08 NOTE — TELEPHONE ENCOUNTER
Labs are stable.  I did to see him once a year in clinic as long as labs and doses stable  I did receive a message from RN-see another telephone encounter.  I am not sure about the clinic protocol for testosterone injections?  I have signed a new prescription and is updated in med list  Please check with RN at [facility] what is the clinic protocol and if needed I can see him sooner.

## 2018-11-08 NOTE — TELEPHONE ENCOUNTER
Patient's dad calls (Consent to Communicate on file) stating patient gets his testosterone injections at [facility] and that they informed him they have to get updated testosterone orders every 3 months from Dr. Alonso, but thought that Dr. Alonso stated at the 6/20/18 office visit that patient didn't need to follow up for 1 year.     Per office visit dated 6/20/18, states to follow up in 6 months. Dad requesting to clarify this as will need to schedule a follow up in December if needed. Call dad back at [phone] - ok to leave detailed message.    Provider please review and advise. Thanks.

## 2018-11-08 NOTE — TELEPHONE ENCOUNTER
Testosterone order expires after today's injection.  Will advise to schedule 6 month visit when they are here today for nurse visit.  Please update Testosterone order.  T'd up for injection.  Brittaney Davis RN    Component      Latest Ref Rng & Units 10/4/2018   Testosterone Total      240 - 950 ng/dL 859   Sex Hormone Binding Globulin      11 - 80 nmol/L 19   Free Testosterone Calculated      4.7 - 24.4 ng/dL 25.63 (H)     Notes Recorded by Sumaya Alonso MD on 10/8/2018 at 12:35 PM  Mamadou    Labs/ Imaging studies done with endocrinology showed testosterone in acceptable range.    Follow up as discussed in last clinic visit.    Please call endocrinology clinic (nurse line: [phone]) if questions.    Please send a letter with above lab/test results and above comments.    Thank you.    Sumaya Alonso    6/20/18  A/P  Mr.Jared Reece is a 27 year old here for the evaluation of above:     1. Hypogonadism:  On testosterone cypionate 200 mg IM q 2 weeks.  Goes to clinic for njections.  Plan to continue same dose.  Labs in 1 year.     2. Diabetes Insipidus:  On desmopressiin 10 mcg- 3 sprays at night.  No major concerns. No excessive thirst or frequent urination.  -- recheck Na  -- continue current dose of DDAVP     3. Growth hormone deficiency:  On Norditropin 0.7 mg/day. Dose was decrease 9/2016  Labs today  Discussed GH replacement in adults.  They would like to continue it. Agree with it.  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  Reports compliance. But taking it at night.  Clinically looks euthyroid.   -- labs today  -- dose change based on that     5. Secondary adrenal insufficiency:  On prednsione 3 mg AM and 2 mg PM. On this dose X many years.  No nausea. No vomiting.  -- continue current dose  Discussed sick days.     More than 50% of face to face time spent with Mr. Reece on counseling / coordinating his care.       All questions were answered.   The patient indicates understanding of the above issues and agrees with the plan set forth.         Follow-up:  6 months     Sumaya Alonso MD  Endocrinology   [facility]/[place]

## 2018-11-12 NOTE — TELEPHONE ENCOUNTER
1 year for endo visit unless he needs to be seen for testosterone Rx.  Hope this helps.  Let me know if you have any questions.    Sumaya Alonso

## 2018-11-12 NOTE — TELEPHONE ENCOUNTER
Called [employer]s per insurance medication will still be covered with them however 2018 pa will be  I did inform pat of this and he understood

## 2018-11-20 DIAGNOSIS — E23.0 PANHYPOPITUITARISM (H): ICD-10-CM

## 2018-11-20 NOTE — TELEPHONE ENCOUNTER
Requested Prescriptions   Pending Prescriptions Disp Refills     somatropin (NORDITROPIN FLEXPRO) 10 MG/1.5ML SOLN PEN injection  Last Written Prescription Date:  5/15/18  Last Fill Quantity: 10 mL,  # refills: 1   Last office visit: 6/20/2018 with prescribing provider:  1   Future Office Visit: none     10 mL 1     Sig: Inject 0.7 mg Subcutaneous daily    There is no refill protocol information for this order

## 2018-11-29 ENCOUNTER — ALLIED HEALTH/NURSE VISIT (OUTPATIENT)
Dept: NURSING | Facility: CLINIC | Age: 28
End: 2018-11-29
Payer: MEDICARE

## 2018-11-29 DIAGNOSIS — E34.9 TESTOSTERONE DEFICIENCY: Primary | ICD-10-CM

## 2018-11-29 PROCEDURE — 96372 THER/PROPH/DIAG INJ SC/IM: CPT

## 2018-11-29 PROCEDURE — 99207 ZZC NO CHARGE NURSE ONLY: CPT

## 2018-11-29 NOTE — MR AVS SNAPSHOT
After Visit Summary   11/29/2018    Mamadou Reece    MRN: [id number]           Patient Information     Date Of Birth          1990        Visit Information        Provider Department      11/29/2018 8:30 AM CR RN [facility]        Today's Diagnoses     Testosterone deficiency    -  1       Follow-ups after your visit        Who to contact     If you have questions or need follow up information about today's clinic visit or your schedule please contact [facility] directly at [phone].  Normal or non-critical lab and imaging results will be communicated to you by MyChart, letter or phone within 4 business days after the clinic has received the results. If you do not hear from us within 7 days, please contact the clinic through MyChart or phone. If you have a critical or abnormal lab result, we will notify you by phone as soon as possible.  Submit refill requests through [employer] or call your pharmacy and they will forward the refill request to us. Please allow 3 business days for your refill to be completed.          Additional Information About Your Visit        Care EveryWhere ID     This is your Care EveryWhere ID. This could be used by other organizations to access your [place] medical records  [id number]         Blood Pressure from Last 3 Encounters:   09/06/18 112/82   08/21/18 131/89   06/20/18 108/80    Weight from Last 3 Encounters:   10/09/18 189 lb (85.7 kg)   09/06/18 189 lb 14.4 oz (86.1 kg)   08/21/18 186 lb 6.4 oz (84.6 kg)              We Performed the Following     C TESTOSTERONE CYPIONATE INJECTION, 1 MG     INJECTION INTRAMUSCULAR OR SUB-Q        Primary Care Provider Office Phone # Fax #    Mariluz Mae [phone] [address] [address] [address]        Equal Access to Services     MARLO OZUNA AH: Helena Rodney, shamar lomeli, kylah hearn  latwin best. So [facility] [phone].    ATENCIÓN: Si habla oleg, tiene a henderson disposición servicios gratuitos de asistencia lingüística. Citlali avila [phone].    We comply with applicable federal civil rights laws and [place] laws. We do not discriminate on the basis of race, color, national origin, age, disability, sex, sexual orientation, or gender identity.            Thank you!     Thank you for choosing [facility]  for your care. Our goal is always to provide you with excellent care. Hearing back from our patients is one way we can continue to improve our services. Please take a few minutes to complete the written survey that you may receive in the mail after your visit with us. Thank you!             Your Updated Medication List - Protect others around you: Learn how to safely use, store and throw away your medicines at www.disposemymeds.org.          This list is accurate as of 11/29/18  8:51 AM.  Always use your most recent med list.                   Brand Name Dispense Instructions for use Diagnosis    amphetamine-dextroamphetamine 30 MG 24 hr capsule    ADDERALL XR    30 capsule    Take 1 capsule (30 mg) by mouth daily    Attention deficit disorder, unspecified hyperactivity presence       B-12 1000 MCG Tbcr     100 tablet    Take 2,000 mcg by mouth daily    Routine general medical examination at a health care facility       CALCIUM + D 315-200 MG-UNIT Tabs per tablet   Generic drug:  calcium citrate-vitamin D      Take by mouth 2 times daily        desmopressin 0.01 % spray    DDAVP    10 mL    3 sprays once daily    Diabetes insipidus (H)       levothyroxine 137 MCG tablet    SYNTHROID/LEVOTHROID    30 tablet    TAKE ONE TABLET BY MOUTH EVERY MORNING BEFORE BREAKFAST    Hypothyroidism, unspecified type       predniSONE 1 MG tablet    DELTASONE    150 tablet    TAKE THREE TABLETS (3 MG) BY MOUTH IN THE MORNING AND TWO TABLETS (2 MG) AT NIGHT    Panhypopituitarism (H)       somatropin 10  MG/1.5ML Soln PEN injection    NORDITROPIN FLEXPRO    10 mL    Inject 0.7 mg Subcutaneous daily    Panhypopituitarism (H)       testosterone cypionate 200 MG/ML injection    DEPOTESTOSTERONE    1 mL    Inject 1 mL (200 mg) into the muscle every 14 days    Testosterone deficiency, Panhypopituitarism (H)       vitamin D 1000 units capsule      Take 1 capsule by mouth daily

## 2018-12-10 ENCOUNTER — TELEPHONE (OUTPATIENT)
Dept: ENDOCRINOLOGY | Facility: CLINIC | Age: 28
End: 2018-12-10

## 2018-12-10 NOTE — TELEPHONE ENCOUNTER
Reason for Call:  Other call back    Detailed comments: Father Blayne is calling with questions about medication Dad said it is urgent to call back today. Issues with urination.  Desmopressin DDAVP specifically overnight urination ...    Phone Number Patient can be reached at: Cell number on file:    Telephone Information:   Mobile [phone]       Best Time: any    Can we leave a detailed message on this number? YES    Call taken on 12/10/2018 at 10:39 AM by Meghan Miller

## 2018-12-10 NOTE — TELEPHONE ENCOUNTER
PA Initiation    Medication: somatropin (NORDITROPIN FLEXPRO) 10 MG/1.5ML SOLN PEN injection- pa initiated   Insurance Company: [employer] - Phone [phone] Fax [phone]  Pharmacy Filling the Rx: [facility] - [address] [place] [address]  Filling Pharmacy Phone: [phone]  Filling Pharmacy Fax:    Start Date: 12/10/2018

## 2018-12-10 NOTE — TELEPHONE ENCOUNTER
Can you please get more information from pt/ his parent?  Let me know - then I can call them back. Or do phone visit.

## 2018-12-10 NOTE — TELEPHONE ENCOUNTER
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  Last few nights, frequency (5 times), under stress with new job, new apartment.  The frequency is very odd for him and father is concerned.  Annalee Nieto, CMA

## 2018-12-10 NOTE — TELEPHONE ENCOUNTER
Called Mamadou.  No response.  Left VM to call back- gave clinic phone numbers.  Please help schedule phone visit.

## 2018-12-12 ENCOUNTER — TELEPHONE (OUTPATIENT)
Dept: ENDOCRINOLOGY | Facility: CLINIC | Age: 28
End: 2018-12-12

## 2018-12-12 NOTE — TELEPHONE ENCOUNTER
Patients father, Erendira Reece left voicemail to call him back at [phone].  Erendira is requesting a telephone visit with Dr. Alonso as soon as possible.  Is there some place the patient can be worked in for this?  Please contact to advise.

## 2018-12-13 ENCOUNTER — TELEPHONE (OUTPATIENT)
Dept: ENDOCRINOLOGY | Facility: CLINIC | Age: 28
End: 2018-12-13

## 2018-12-13 ENCOUNTER — VIRTUAL VISIT (OUTPATIENT)
Dept: ENDOCRINOLOGY | Facility: CLINIC | Age: 28
End: 2018-12-13
Payer: MEDICARE

## 2018-12-13 DIAGNOSIS — E23.0 PANHYPOPITUITARISM (H): ICD-10-CM

## 2018-12-13 DIAGNOSIS — E23.2 DIABETES INSIPIDUS (H): ICD-10-CM

## 2018-12-13 DIAGNOSIS — E23.0 PANHYPOPITUITARISM (H): Primary | ICD-10-CM

## 2018-12-13 DIAGNOSIS — E34.9 TESTOSTERONE DEFICIENCY: Primary | ICD-10-CM

## 2018-12-13 PROCEDURE — 99443 ZZC PHYSICIAN TELEPHONE EVALUATION 21-30 MIN: CPT | Performed by: INTERNAL MEDICINE

## 2018-12-13 RX ORDER — TESTOSTERONE CYPIONATE 200 MG/ML
200 INJECTION, SOLUTION INTRAMUSCULAR
Qty: 1 ML | Refills: 5 | OUTPATIENT
Start: 2018-12-13 | End: 2019-05-21

## 2018-12-13 RX ORDER — DESMOPRESSIN ACETATE 0.1 MG/ML
SOLUTION NASAL
Qty: 10 ML | Refills: 3 | Status: SHIPPED | OUTPATIENT
Start: 2018-12-13 | End: 2019-03-30

## 2018-12-13 NOTE — TELEPHONE ENCOUNTER
Prior Authorization Approval    Authorization Effective Date: 1/1/2019  Authorization Expiration Date: 1/1/2020  Medication: Genotropin   Approved Dose/Quantity: UD   Reference #:     Insurance Company: [employer] Part D - Phone [phone] Fax [phone]  Expected CoPay:       CoPay Card Available:      Foundation Assistance Needed:    Which Pharmacy is filling the prescription (Not needed for infusion/clinic administered):    Pharmacy Notified:  no  Patient Notified:  no

## 2018-12-13 NOTE — PROGRESS NOTES
"The patient has been notified of following:      \"This telephone visit will be conducted via a call between you and your physician/provider. We have found that certain health care needs can be provided without the need for a physical exam.  This service lets us provide the care you need with a short phone conversation.  If a prescription is necessary we can send it directly to your pharmacy.  If lab work is needed we can place an order for that and you can then stop by our lab to have the test done at a later time.     We will bill your insurance company for this service.  Please check with your medical insurance if this type of visit is covered. You may be responsible for the cost of this type of visit if insurance coverage is denied.  The typical cost is $30 (10min), $59 (11-20min) and $85 (21-30min).  Most often these visits are shorter than 10 minutes.     If during the course of the call the physician/provider feels a telephone visit is not appropriate, you will not be charged for this service.\"      Past medical history, social history, family history, allergy and medications were reviewed and updated as appropriate.    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 He had stereotactic  radiation therapy at age 11.   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  Followed by Pediatrics oncology; getting MRIs every 2 yrs now. Due for MRI and has upcoming appointment.  Has not had recurrence since his radiation therapy     He is currently on full hormonal treatment   Current Outpatient Medications   Medication     amphetamine-dextroamphetamine (ADDERALL XR) 30 MG per 24 hr capsule     " Calcium Citrate-Vitamin D (CALCIUM + D) 315-200 MG-UNIT TABS     Cholecalciferol (VITAMIN D) 1000 UNIT capsule     Cyanocobalamin (B-12) 1000 MCG TBCR     desmopressin (DDAVP) 0.01 % SOLN spray     levothyroxine (SYNTHROID/LEVOTHROID) 137 MCG tablet     predniSONE (DELTASONE) 1 MG tablet     somatropin (NORDITROPIN FLEXPRO) 10 MG/1.5ML SOLN PEN injection     testosterone cypionate (DEPOTESTOSTERONE) 200 MG/ML injection     testosterone cypionate (DEPOTESTOSTERONE) 200 MG/ML injection     No current facility-administered medications for this visit.       spoke to parents- mother and father.  They are primary care taker.  Mamadou is not able to express himself much.    Problems with sleeping X 2 months  Takes ddavp- 3 sprays- at 7:30 PM  Goes to bed 11:00- 12:00 midnight.  Feels like he has to urinate at night before going to bed.  After urinating he still has the sensation that he has to go urinate again.  Sometimes he is able to urinate  and sometimes it is dry.  Was seen by primary care provider Dr. Mae as well as was seen by urologist.  Per parents report evaluation done by urologist and test done at their office were unremarkable.  Difficulty falling asleep at night.  Takes tylenol asleep as well as melatonin.  Not more thirsty.  Normal fluid intake.  Once he goes to sleep he is not waking up for urination and middle of night.        Plan:  Discussed diagnosis, pathophysiology, management and treatment options of condition with pt.  Currently he is taking DDAVP 3 sprays at 7:30 PM.  I recommend to take 1 additional spray of DDAVP at bedtime.  See the response  Sodium labs in few weeks  Drink to thirst.  I also updated about change in growth hormone- see other telephone encounters in terms of new prescription in next year.    The patient parents indicates understanding of these issues and agrees with the plan.  All questions were answered.    Sumaya Alonso MD  Endocrinology   [place]  Adan/Yuliya  December 13, 2018

## 2018-12-13 NOTE — TELEPHONE ENCOUNTER
Dr. Delvin Cedillo needs updated order due to computer upgrade.  Appt today at 8:30 am.  T'd up.  Please sign.  Brittaney Davis RN

## 2018-12-13 NOTE — TELEPHONE ENCOUNTER
Tonny from [facility] pharmacy calling.  States patient wants Somat[facility] to [facility], not [facility].    Tonny will void the prescription that was sent to [facility].    Please advise, thanks.

## 2018-12-13 NOTE — TELEPHONE ENCOUNTER
Please see message belwo.    Please pend the orders with correct quantity, select pharmacy and then send for signature. Also associate with correct diagnosis.    Thank you.    Sumaya Alonso

## 2018-12-13 NOTE — TELEPHONE ENCOUNTER
----- Message from Melissa Warner sent at 12/13/2018  9:25 AM CST -----  Regarding: change in medication   Hello,   PA was approved for genotropin. Per insurance will have to fill norditropin until the end of the year then will have to switch to genotropin due to starting 01/01/2019 genotropin will be the formulary product please send over new rx to pharmacy patient is not aware of change.  Thank you   Melissa

## 2018-12-13 NOTE — TELEPHONE ENCOUNTER
Phone visit [facility] for today at noon with father Erendira, patient will be at work.  Annalee Nieto, HITESH

## 2018-12-13 NOTE — TELEPHONE ENCOUNTER
PRIOR AUTHORIZATION DENIED    Medication: somatropin (NORDITROPIN FLEXPRO) 10 MG/1.5ML SOLN PEN injection- pa denied     Denial Date:      Denial Rational: Norditropin denied due to non formulary will only be good until 12/31/2018 then pt will have to use formulary medication which is genotropin that is approved for next month see genotropin approval

## 2018-12-17 ENCOUNTER — TELEPHONE (OUTPATIENT)
Dept: ENDOCRINOLOGY | Facility: CLINIC | Age: 28
End: 2018-12-17

## 2018-12-17 NOTE — TELEPHONE ENCOUNTER
See message below.  Please pend the orders with correct quantity, select pharmacy and then send for signature. Also associate with correct diagnosis.    Thank you.    Sumaya Alonso

## 2018-12-20 ENCOUNTER — ALLIED HEALTH/NURSE VISIT (OUTPATIENT)
Dept: NURSING | Facility: CLINIC | Age: 28
End: 2018-12-20
Payer: MEDICARE

## 2018-12-20 DIAGNOSIS — E34.9 TESTOSTERONE DEFICIENCY: Primary | ICD-10-CM

## 2018-12-20 RX ORDER — TESTOSTERONE CYPIONATE 200 MG/ML
200 INJECTION, SOLUTION INTRAMUSCULAR
Status: ACTIVE | OUTPATIENT
Start: 2018-12-20 | End: 2019-05-23

## 2018-12-20 NOTE — NURSING NOTE
The following medication was given:     MEDICATION: Depo Testosterone  200 mg  ROUTE: IM  SITE: Ventrogluteal - Left  DOSE: 200 mg  LOT #: [id number]  :  [employer]  EXPIRATION DATE:  12/2020  NDC#: 2440-0265-37    Prior to injection, verified patient identity using patient's name and date of birth.  Due to injection administration, patient instructed to remain in clinic for 15 minutes  afterwards, and to report any adverse reaction to me immediately.  Prisca Melendrez RN, BS  Clinical Nurse Triage.

## 2019-01-03 ENCOUNTER — ALLIED HEALTH/NURSE VISIT (OUTPATIENT)
Dept: NURSING | Facility: CLINIC | Age: 29
End: 2019-01-03
Payer: MEDICARE

## 2019-01-03 DIAGNOSIS — E23.0 PANHYPOPITUITARISM (H): ICD-10-CM

## 2019-01-03 DIAGNOSIS — E34.9 TESTOSTERONE DEFICIENCY: Primary | ICD-10-CM

## 2019-01-03 LAB
SODIUM SERPL-SCNC: 138 MMOL/L (ref 133–144)
T4 FREE SERPL-MCNC: 1.17 NG/DL (ref 0.76–1.46)

## 2019-01-03 PROCEDURE — 84305 ASSAY OF SOMATOMEDIN: CPT | Performed by: INTERNAL MEDICINE

## 2019-01-03 PROCEDURE — 96372 THER/PROPH/DIAG INJ SC/IM: CPT

## 2019-01-03 PROCEDURE — 84439 ASSAY OF FREE THYROXINE: CPT | Performed by: INTERNAL MEDICINE

## 2019-01-03 PROCEDURE — 36415 COLL VENOUS BLD VENIPUNCTURE: CPT | Performed by: INTERNAL MEDICINE

## 2019-01-03 PROCEDURE — 84295 ASSAY OF SERUM SODIUM: CPT | Performed by: INTERNAL MEDICINE

## 2019-01-03 RX ADMIN — TESTOSTERONE CYPIONATE 200 MG: 200 INJECTION, SOLUTION INTRAMUSCULAR at 08:42

## 2019-01-03 NOTE — NURSING NOTE
Prior to injection, verified patient identity using patient's name and date of birth.  Due to injection administration, patient instructed to remain in clinic for 15 minutes  afterwards, and to report any adverse reaction to me immediately.    Testosterone    Drug Amount Wasted:  None.  Vial/Syringe: Single dose vial  Expiration Date:  12/20    Brittaney Davis RN

## 2019-01-04 LAB — IGF-I BLD-MCNC: 320 NG/ML (ref 90–262)

## 2019-01-16 NOTE — MR AVS SNAPSHOT
After Visit Summary   8/16/2018    Mamadou Reece    MRN: [id number]           Patient Information     Date Of Birth          1990        Visit Information        Provider Department      8/16/2018 8:30 AM CR RN [facility]        Today's Diagnoses     Testosterone deficiency    -  1       Follow-ups after your visit        Your next 10 appointments already scheduled     Oct 19, 2018  4:10 PM CDT   PHYSICAL with [facility]iluz Mae MD   [facility]    [address] [address] [phone]              Who to contact     If you have questions or need follow up information about today's clinic visit or your schedule please contact [facility] directly at [phone].  Normal or non-critical lab and imaging results will be communicated to you by MyChart, letter or phone within 4 business days after the clinic has received the results. If you do not hear from us within 7 days, please contact the clinic through MyChart or phone. If you have a critical or abnormal lab result, we will notify you by phone as soon as possible.  Submit refill requests through [employer] or call your pharmacy and they will forward the refill request to us. Please allow 3 business days for your refill to be completed.          Additional Information About Your Visit        Care EveryWhere ID     This is your Care EveryWhere ID. This could be used by other organizations to access your [place] medical records  [id number]         Blood Pressure from Last 3 Encounters:   06/20/18 108/80   04/19/18 108/74   10/05/17 104/62    Weight from Last 3 Encounters:   06/20/18 189 lb 6.4 oz (85.9 kg)   04/19/18 186 lb 8 oz (84.6 kg)   10/05/17 191 lb 9.6 oz (86.9 kg)              We Performed the Following     C TESTOSTERONE CYPIONATE INJECTION, 1 MG     INJECTION INTRAMUSCULAR OR SUB-Q        Primary Care Provider Office Phone # Fax #    Mariluz BOWENS  MD Tu [phone]       [address]  [facility] [address]        Equal Access to Services     MARLO OZUNA : Helena ryan palomo chucky Rodney, asiada monyqraheem, anup kamartyda marcelino, kylah best. So [facility] [phone].    ATENCIÓN: Si habla español, tiene a henderson disposición servicios gratuitos de asistencia lingüística. Llame al [phone].    We comply with applicable federal civil rights laws and [place] laws. We do not discriminate on the basis of race, color, national origin, age, disability, sex, sexual orientation, or gender identity.            Thank you!     Thank you for choosing [facility]  for your care. Our goal is always to provide you with excellent care. Hearing back from our patients is one way we can continue to improve our services. Please take a few minutes to complete the written survey that you may receive in the mail after your visit with us. Thank you!             Your Updated Medication List - Protect others around you: Learn how to safely use, store and throw away your medicines at www.disposemymeds.org.          This list is accurate as of 8/16/18  8:54 AM.  Always use your most recent med list.                   Brand Name Dispense Instructions for use Diagnosis    amphetamine-dextroamphetamine 30 MG per 24 hr capsule    ADDERALL XR    30 capsule    Take 1 capsule (30 mg) by mouth daily    Attention deficit disorder, unspecified hyperactivity presence       B-12 1000 MCG Tbcr     100 tablet    Take 2,000 mcg by mouth daily    Routine general medical examination at a health care facility       CALCIUM + D 315-200 MG-UNIT Tabs per tablet   Generic drug:  calcium citrate-vitamin D           desmopressin 0.01 % Soln spray    DDAVP    10 mL    3 sprays once daily    Diabetes insipidus (H)       desmopressin 0.01 % Soln spray    DDAVP    10 mL    INHALE 3 SPRAYS INTO NOSTRIL DAILY    History of craniopharyngioma,  Panhypopituitarism (H)       levothyroxine 137 MCG tablet    SYNTHROID/LEVOTHROID    30 tablet    Take 1 tablet (137 mcg) by mouth daily    Hypothyroidism, unspecified type       predniSONE 1 MG tablet    DELTASONE    150 tablet    Take 3 mg in AM and 2 mg at night    Panhypopituitarism (H)       somatropin 10 MG/1.5ML Soln PEN injection    NORDITROPIN FLEXPRO    10 mL    Inject 0.7 mg Subcutaneous daily    Panhypopituitarism (H)       testosterone cypionate 200 MG/ML injection    DEPOTESTOTERONE    1 mL    Inject 1 mL (200 mg) into the muscle every 14 days    Testosterone deficiency, Panhypopituitarism (H)       vitamin D 1000 units capsule      Take 2 capsules by mouth daily           0 = independent

## 2019-01-17 ENCOUNTER — ALLIED HEALTH/NURSE VISIT (OUTPATIENT)
Dept: NURSING | Facility: CLINIC | Age: 29
End: 2019-01-17
Payer: MEDICARE

## 2019-01-17 DIAGNOSIS — E34.9 TESTOSTERONE DEFICIENCY: Primary | ICD-10-CM

## 2019-01-17 PROCEDURE — 96372 THER/PROPH/DIAG INJ SC/IM: CPT

## 2019-01-17 PROCEDURE — 99207 ZZC NO CHARGE NURSE ONLY: CPT

## 2019-01-17 RX ADMIN — TESTOSTERONE CYPIONATE 200 MG: 200 INJECTION, SOLUTION INTRAMUSCULAR at 08:46

## 2019-01-17 NOTE — PROGRESS NOTES
Prior to injection, verified patient identity using patient's name and date of birth.  Due to injection administration, patient instructed to remain in clinic for 15 minutes  afterwards, and to report any adverse reaction to me immediately.      Testosterone      Check vial for refills remaining and initiate refill request if no refills remain.     Administered testosterone medication in clinic.     Drug Amount Wasted:  None.  Vial/Syringe: Single dose vial    NDC- 4103-8017-01  Lot- [id number]  Expiration Date: 12/20    Brittaney Davis RN

## 2019-02-05 DIAGNOSIS — F98.8 ATTENTION DEFICIT DISORDER, UNSPECIFIED HYPERACTIVITY PRESENCE: ICD-10-CM

## 2019-02-05 RX ORDER — DEXTROAMPHETAMINE SACCHARATE, AMPHETAMINE ASPARTATE MONOHYDRATE, DEXTROAMPHETAMINE SULFATE AND AMPHETAMINE SULFATE 7.5; 7.5; 7.5; 7.5 MG/1; MG/1; MG/1; MG/1
30 CAPSULE, EXTENDED RELEASE ORAL DAILY
Qty: 30 CAPSULE | Refills: 0 | Status: SHIPPED | OUTPATIENT
Start: 2019-02-05 | End: 2019-03-08

## 2019-02-05 NOTE — TELEPHONE ENCOUNTER
Requested Prescriptions   Pending Prescriptions Disp Refills     amphetamine-dextroamphetamine (ADDERALL XR) 30 MG 24 hr capsule 30 capsule 0     Sig: Take 1 capsule (30 mg) by mouth daily    There is no refill protocol information for this order        Last Written Prescription Date:  11/5/18  Last Fill Quantity: 30,  # refills: 0   Last office visit: 9/6/2018 with prescribing provider:  Mariluz Mae MD    Future Office Visit:

## 2019-02-05 NOTE — TELEPHONE ENCOUNTER
In my outbox.     Please call and see if you can help him schedule an appointment; due in early March.    Thanks!

## 2019-02-07 ENCOUNTER — ALLIED HEALTH/NURSE VISIT (OUTPATIENT)
Dept: NURSING | Facility: CLINIC | Age: 29
End: 2019-02-07
Payer: MEDICARE

## 2019-02-07 DIAGNOSIS — E34.9 TESTOSTERONE DEFICIENCY: Primary | ICD-10-CM

## 2019-02-07 PROCEDURE — 96372 THER/PROPH/DIAG INJ SC/IM: CPT

## 2019-02-07 PROCEDURE — 99207 ZZC NO CHARGE NURSE ONLY: CPT

## 2019-02-07 RX ADMIN — TESTOSTERONE CYPIONATE 200 MG: 200 INJECTION, SOLUTION INTRAMUSCULAR at 08:55

## 2019-02-07 NOTE — PROGRESS NOTES
Prior to injection, verified patient identity using patient's name and date of birth.  Due to injection administration, patient instructed to remain in clinic for 15 minutes  afterwards, and to report any adverse reaction to me immediately.    Testosterone 200 mg    Drug Amount Wasted:  None.  Vial/Syringe: Single dose vial  NDC: 1501-0783-38  Lot: [id number]  Expiration Date:  12/20  Given (L) ventrogluteal    Brittaney Davis RN

## 2019-02-12 NOTE — TELEPHONE ENCOUNTER
Patient father is aware that patient needs appt they are trying to figure out when they can get the patient in for an appt

## 2019-02-21 ENCOUNTER — ALLIED HEALTH/NURSE VISIT (OUTPATIENT)
Dept: NURSING | Facility: CLINIC | Age: 29
End: 2019-02-21
Payer: MEDICARE

## 2019-02-21 DIAGNOSIS — E34.9 TESTOSTERONE DEFICIENCY: Primary | ICD-10-CM

## 2019-02-21 PROCEDURE — 99207 ZZC NO CHARGE NURSE ONLY: CPT

## 2019-02-21 PROCEDURE — 96372 THER/PROPH/DIAG INJ SC/IM: CPT

## 2019-02-21 RX ADMIN — TESTOSTERONE CYPIONATE 200 MG: 200 INJECTION, SOLUTION INTRAMUSCULAR at 08:46

## 2019-02-21 NOTE — PROGRESS NOTES
Prior to injection, verified patient identity using patient's name and date of birth.  Due to injection administration, patient instructed to remain in clinic for 15 minutes  afterwards, and to report any adverse reaction to me immediately.    Testosterone 200 mg    Drug Amount Wasted:  None.  Vial/Syringe: Single dose vial  Expiration Date:  12/20  NDC: 9238-0035-89  Lot: [id number]  Expiration Date:  12/20  Given (R) ventrogluteal     Brittaney Davis RN

## 2019-02-27 DIAGNOSIS — E23.0 PANHYPOPITUITARISM (H): ICD-10-CM

## 2019-02-28 RX ORDER — PREDNISONE 1 MG/1
TABLET ORAL
Qty: 200 TABLET | Refills: 11 | Status: SHIPPED | OUTPATIENT
Start: 2019-02-28 | End: 2020-02-24

## 2019-02-28 NOTE — TELEPHONE ENCOUNTER
Please confirm dose with pt.  Please pend the orders with correct quantity, select pharmacy and then send for signature. Also associate with correct diagnosis.    Thank you.    Sumaya Alonso

## 2019-03-07 ENCOUNTER — ALLIED HEALTH/NURSE VISIT (OUTPATIENT)
Dept: NURSING | Facility: CLINIC | Age: 29
End: 2019-03-07
Payer: MEDICARE

## 2019-03-07 DIAGNOSIS — E34.9 TESTOSTERONE DEFICIENCY: Primary | ICD-10-CM

## 2019-03-07 DIAGNOSIS — F98.8 ATTENTION DEFICIT DISORDER, UNSPECIFIED HYPERACTIVITY PRESENCE: ICD-10-CM

## 2019-03-07 PROCEDURE — 96372 THER/PROPH/DIAG INJ SC/IM: CPT

## 2019-03-07 RX ADMIN — TESTOSTERONE CYPIONATE 200 MG: 200 INJECTION, SOLUTION INTRAMUSCULAR at 08:44

## 2019-03-07 NOTE — PROGRESS NOTES
Prior to injection, verified patient identity using patient's name and date of birth.  Due to injection administration, patient instructed to remain in clinic for 15 minutes  afterwards, and to report any adverse reaction to me immediately.     Testosterone 200 mg     Drug Amount Wasted:  None.  Vial/Syringe: Single dose vial  Expiration Date:  12/20  NDC: 6464-5481-70  Lot: [id number]  Expiration Date:  12/20  Given (L) ventrogluteal     Brittaney Davis RN

## 2019-03-08 RX ORDER — DEXTROAMPHETAMINE SACCHARATE, AMPHETAMINE ASPARTATE MONOHYDRATE, DEXTROAMPHETAMINE SULFATE AND AMPHETAMINE SULFATE 7.5; 7.5; 7.5; 7.5 MG/1; MG/1; MG/1; MG/1
30 CAPSULE, EXTENDED RELEASE ORAL DAILY
Qty: 30 CAPSULE | Refills: 0 | Status: SHIPPED | OUTPATIENT
Start: 2019-03-08 | End: 2019-03-12

## 2019-03-08 NOTE — TELEPHONE ENCOUNTER
checked.    Routing refill request to provider for review/approval because:  Drug not on the [facility] refill protocol.  See refill encounter dated 2/5/19.    Araceli Bourne RN

## 2019-03-12 ENCOUNTER — OFFICE VISIT (OUTPATIENT)
Dept: FAMILY MEDICINE | Facility: CLINIC | Age: 29
End: 2019-03-12
Payer: MEDICARE

## 2019-03-12 VITALS
OXYGEN SATURATION: 99 % | RESPIRATION RATE: 16 BRPM | HEART RATE: 77 BPM | SYSTOLIC BLOOD PRESSURE: 110 MMHG | BODY MASS INDEX: 22.61 KG/M2 | DIASTOLIC BLOOD PRESSURE: 80 MMHG | TEMPERATURE: 98 F | WEIGHT: 191.5 LBS | HEIGHT: 77 IN

## 2019-03-12 DIAGNOSIS — F98.8 ATTENTION DEFICIT DISORDER, UNSPECIFIED HYPERACTIVITY PRESENCE: Primary | ICD-10-CM

## 2019-03-12 DIAGNOSIS — E23.0 PANHYPOPITUITARISM (H): ICD-10-CM

## 2019-03-12 DIAGNOSIS — G47.00 INSOMNIA, UNSPECIFIED TYPE: ICD-10-CM

## 2019-03-12 PROCEDURE — 99213 OFFICE O/P EST LOW 20 MIN: CPT | Performed by: FAMILY MEDICINE

## 2019-03-12 RX ORDER — DEXTROAMPHETAMINE SACCHARATE, AMPHETAMINE ASPARTATE MONOHYDRATE, DEXTROAMPHETAMINE SULFATE AND AMPHETAMINE SULFATE 7.5; 7.5; 7.5; 7.5 MG/1; MG/1; MG/1; MG/1
30 CAPSULE, EXTENDED RELEASE ORAL DAILY
Qty: 30 CAPSULE | Refills: 0 | Status: SHIPPED | OUTPATIENT
Start: 2019-04-07 | End: 2019-03-12

## 2019-03-12 RX ORDER — DEXTROAMPHETAMINE SACCHARATE, AMPHETAMINE ASPARTATE MONOHYDRATE, DEXTROAMPHETAMINE SULFATE AND AMPHETAMINE SULFATE 7.5; 7.5; 7.5; 7.5 MG/1; MG/1; MG/1; MG/1
30 CAPSULE, EXTENDED RELEASE ORAL DAILY
Qty: 30 CAPSULE | Refills: 0 | Status: SHIPPED | OUTPATIENT
Start: 2019-06-06 | End: 2019-07-11

## 2019-03-12 RX ORDER — DEXTROAMPHETAMINE SACCHARATE, AMPHETAMINE ASPARTATE MONOHYDRATE, DEXTROAMPHETAMINE SULFATE AND AMPHETAMINE SULFATE 7.5; 7.5; 7.5; 7.5 MG/1; MG/1; MG/1; MG/1
30 CAPSULE, EXTENDED RELEASE ORAL DAILY
Qty: 30 CAPSULE | Refills: 0 | Status: SHIPPED | OUTPATIENT
Start: 2019-05-07 | End: 2019-03-12

## 2019-03-12 ASSESSMENT — MIFFLIN-ST. JEOR: SCORE: 1961.02

## 2019-03-12 NOTE — LETTER
[facility]  03/12/19    Patient: Mamadou Reece  YOB: 1990  Medical Record Number: [id number]  CSN: [id number]                                                                              Non-opioid Controlled Substance Agreement    I understand that my care provider has prescribed a controlled substance to help manage my condition(s). I am taking this medicine to help me function or work. I know this is strong medicine, and that it can cause serious side effects. Controlled substances can be sedating, addicting and may cause a dependency on the drug. They can affect my ability to drive or think, and cause depression. They need to be taken exactly as prescribed. Combining controlled substances with certain medicines or chemicals (such as cocaine, sedatives and tranquilizers, sleeping pills, meth) can be dangerous or even fatal. Also, if I stop controlled substances suddenly, I may have severe withdrawal symptoms.  If not helpful, I may be asked to stop them.    The risks, benefits, and side effects of these medicine(s) were explained to me. I agree that:    1. I will take part in other treatments as advised by my care team. This may be psychiatry or counseling, physical therapy, behavioral therapy, group treatment or a referral to a pain clinic. I will reduce or stop my medicine when my care team tells me to do so.  2. I will take my medicines as prescribed. I will not change the dose or schedule unless my care team tells me to. There will be no refills if I  run out early.   I may be contactedwithout warning and asked to complete a urine drug test or pill count at any time.   3. I will keep all my appointments, and understand this is part of the monitoring of controlled substances. My care team may require an office visit for EVERY controlled substance refill. If I miss appointments or don t follow instructions, my care team may stop my medicine.  4. I will not ask other providers to  prescribe controlled substances, and I will not accept controlled substances from other people. If I need another prescribed controlled substance for a new reason, I will tell my care team within 1 business day.  5. I will use one pharmacy to fill all of my controlled substance prescriptions, and it is up to me to make sure that I do not run out of my medicines on weekends or holidays. If my care team is willing to refill my controlled substance prescription without a visit, I must request refills only during office hours, refills may take up to 3 days to process, and it may take up to 5 to 7 days for my medicine to be mailed and ready at my pharmacy. Prescriptions will not be mailed anywhere except my pharmacy.    6. I am responsible for my prescriptions. If the medicine/prescription is lost or stolen, it will not be replaced. I also agree not to share controlled substance medicines with anyone.              [facility]  03/12/19  Patient:  Mamadou Reece  YOB: 1990  Medical Record Number: [id number]  CSN: [id number]    7. I agree to not use ANY illegal or recreational drugs. This includes marijuana, cocaine, bath salts or other drugs. I agree not to use alcohol unless my care team says I may. I agree to give urine samples whenever asked. If I don t give a urine sample, the care team may stop my medicine.    8. If I enroll in the [place] Medical Marijuana program, I will tell my care team. I will also sign an agreement to share my medical records with my care team.    9. I will bring in my list of medicines (or my medicine bottles) each time I come to the clinic.   10. I will tell my care team right away if I become pregnant or have a new medical problem treated outside of my regular clinic.  11. I understand that this medicine can affect my thinking and judgment. It may be unsafe for me to drive, use machinery and do dangerous tasks. I will not do any of these things until I know how the  medicine affects me. If my dose changes, I will wait to see how it affects me. I will contact my care team if I have concerns about medicine side effects.    I understand that if I do not follow any of the conditions above, my prescriptions or treatment may be stopped.      I agree that my provider, clinic care team, and pharmacy may work with any city, state or federal law enforcement agency that investigates the misuse, sale, or other diversion of my controlled medicine. I will allow my provider to discuss my care with or share a copy of this agreement with any other treating provider, pharmacy or emergency room where I receive care. I agree to give up (waive) any right of privacy or confidentiality with respect to these consents.   I have read this agreement and have asked questions about anything I did not understand.    ____________________________________________________    ____________  ________  Patient signature                                                         Date      Time    ____________________________________________________     ____________  ________  Witness                                                          Date      Time    ____________________________________________________  Provider signature

## 2019-03-12 NOTE — PROGRESS NOTES
SUBJECTIVE:   Mamadou Reece is a 28 year old male who presents to clinic today for the following health issues:      Medication Followup of Adderall     Taking Medication as prescribed: yes    Side Effects:  None    Medication Helping Symptoms:  yes           Problem list and histories reviewed & adjusted, as indicated.  Additional history:     See under ROS     Patient Active Problem List   Diagnosis     ADD (attention deficit disorder)     Craniopharyngioma (H)     CARDIOVASCULAR SCREENING; LDL GOAL LESS THAN 160     Mood change     Legal blindness     Hypothyroidism     Panhypopituitarism (H)     History of craniopharyngioma     Testosterone deficiency     BCC (basal cell carcinoma), face     Urinary frequency       Current Outpatient Medications   Medication Sig Dispense Refill     amphetamine-dextroamphetamine (ADDERALL XR) 30 MG 24 hr capsule Take 1 capsule (30 mg) by mouth daily 30 capsule 0     Calcium Citrate-Vitamin D (CALCIUM + D) 315-200 MG-UNIT TABS Take by mouth 2 times daily        Cholecalciferol (VITAMIN D) 1000 UNIT capsule Take 1 capsule by mouth daily        Cyanocobalamin (B-12) 1000 MCG TBCR Take 2,000 mcg by mouth daily  100 tablet 1     desmopressin (DDAVP) 0.01 % spray 3 sprays once daily and 1 spray at bedtime 10 mL 3     levothyroxine (SYNTHROID/LEVOTHROID) 137 MCG tablet TAKE ONE TABLET BY MOUTH EVERY MORNING BEFORE BREAKFAST 30 tablet 6     predniSONE (DELTASONE) 1 MG tablet TAKE THREE TABLETS (3 MG) BY MOUTH IN THE MORNING AND TWO TABLETS (2 MG) AT NIGHT. 200 tablet 11     somatropin (NORDITROPIN FLEXPRO) 10 MG/1.5ML SOLN PEN injection Inject 0.7 mg Subcutaneous daily 10 mL 1     testosterone cypionate (DEPOTESTOSTERONE) 200 MG/ML injection Inject 1 mL (200 mg) into the muscle every 14 days 1 mL 5     testosterone cypionate (DEPOTESTOSTERONE) 200 MG/ML injection Inject 1 mL (200 mg) into the muscle every 14 days 1 mL 5         Reviewed and updated as needed this visit by clinical  "staff  Tobacco  Allergies  Meds  Med Hx  Surg Hx  Fam Hx  Soc Hx      Reviewed and updated as needed this visit by Provider         ROS:  CONSTITUTIONAL:NEGATIVE for fever, chills, change in weight  CV: NEGATIVE for chest pain, palpitations or peripheral edema  PSYCHIATRIC: NEGATIVE for changes in mood or affect    Moved to an apartment.   Some trouble with sleeping.    Weekdays work at [employer]. 11:30-3:00.        OBJECTIVE:     /80 (BP Location: Right arm, Cuff Size: Adult Regular)   Pulse 77   Temp 98  F (36.7  C) (Oral)   Resp 16   Ht 1.956 m (6' 5\")   Wt 86.9 kg (191 lb 8 oz)   SpO2 99%   BMI 22.71 kg/m    Body mass index is 22.71 kg/m .  GENERAL APPEARANCE: alert and no distress  CV: regular rates and rhythm  PSYCH: mentation appears normal and affect normal/bright        ASSESSMENT/PLAN:       1. Attention deficit disorder, unspecified hyperactivity presence  Doing well with this.  Reviewed potential side effects.  Completed CSA.  - amphetamine-dextroamphetamine (ADDERALL XR) 30 MG 24 hr capsule; Take 1 capsule (30 mg) by mouth daily  Dispense: 30 capsule; Refill: 0    2. Insomnia, unspecified type  Wondering if melatonin OK to use.   Did discuss briefly. Would recommend taking a few hours prior to bedtime.     3. Panhypopituitarism (H)  Sees Endocrinology.    Mariluz Mae MD, MD  [facility] [place]  "

## 2019-03-28 ENCOUNTER — ALLIED HEALTH/NURSE VISIT (OUTPATIENT)
Dept: NURSING | Facility: CLINIC | Age: 29
End: 2019-03-28
Payer: MEDICARE

## 2019-03-28 DIAGNOSIS — E34.9 TESTOSTERONE DEFICIENCY: Primary | ICD-10-CM

## 2019-03-28 PROCEDURE — 96372 THER/PROPH/DIAG INJ SC/IM: CPT

## 2019-03-28 RX ADMIN — TESTOSTERONE CYPIONATE 200 MG: 200 INJECTION, SOLUTION INTRAMUSCULAR at 08:15

## 2019-03-28 NOTE — NURSING NOTE
The following medication was given:     MEDICATION: Depo Testosterone  200 mg  ROUTE: IM  SITE: Ventrogluteal - Right  DOSE: 200 mg  LOT #: [id number]  :  [employer]  EXPIRATION DATE:  02/2021  NDC#: 7877-6847-74    Prior to injection, verified patient identity using patient's name and date of birth.  Due to injection administration, patient instructed to remain in clinic for 15 minutes  afterwards, and to report any adverse reaction to me immediately.    Prisca Melendrez RN, BS  Clinical Nurse Triage.

## 2019-03-30 DIAGNOSIS — E23.2 DIABETES INSIPIDUS (H): ICD-10-CM

## 2019-03-30 NOTE — TELEPHONE ENCOUNTER
Requested Prescriptions   Pending Prescriptions Disp Refills     desmopressin (DDAVP) 0.01 % SOLN spray [Pharmacy Med Name: DESMOPRESSIN ACE SPRAY RE 0.01 SOLN] 10 mL 3     Sig: USE THREE SPRAYS INTO NOSTRIL ONCE DAILY AND USE ONE SPRAY INTO NOSTRIL AT BEDTIME    There is no refill protocol information for this order        Last Written Prescription Date:  12/13/18  Last Fill Quantity: 10,  # refills: 3   Last office visit: 12/13/2018 with prescribing provider:  Phone visit   Future Office Visit:   Next 5 appointments (look out 90 days)    Apr 18, 2019  9:30 AM CDT  [facility] with [facility]onso [facility]  [facility] [address] [facility] [address]  [facility] [address] [phone]

## 2019-04-04 RX ORDER — DESMOPRESSIN ACETATE 0.1 MG/ML
SOLUTION NASAL
Qty: 10 ML | Refills: 3 | Status: SHIPPED | OUTPATIENT
Start: 2019-04-04 | End: 2019-07-16

## 2019-04-04 NOTE — TELEPHONE ENCOUNTER
LAST OFFICE/VIRTUAL VISIT:  12/13/18 - phone    FUTURE OFFICE/VIRTUAL VISIT:  04/18/19    Annalee Nieto CMA  [facility]  [place]

## 2019-04-04 NOTE — TELEPHONE ENCOUNTER
Reason for Call:  Medication or medication refill:    Do you use a [facility]? Yes       Name of the pharmacy and phone number for the current request:  [facility]     Name of the medication requested: DDAVP    Other request: pt. Is completely out     Can we leave a detailed message on this number? YES    Phone number patient can be reached at: Other phone number:  [phone]  Johnathan pruitt     Best Time: asap    Call taken on 4/4/2019 at 8:55 AM by Clarisa Franks

## 2019-04-18 ENCOUNTER — OFFICE VISIT (OUTPATIENT)
Dept: ENDOCRINOLOGY | Facility: CLINIC | Age: 29
End: 2019-04-18
Payer: MEDICARE

## 2019-04-18 ENCOUNTER — ALLIED HEALTH/NURSE VISIT (OUTPATIENT)
Dept: NURSING | Facility: CLINIC | Age: 29
End: 2019-04-18
Payer: MEDICARE

## 2019-04-18 VITALS
SYSTOLIC BLOOD PRESSURE: 114 MMHG | HEART RATE: 70 BPM | TEMPERATURE: 97.8 F | BODY MASS INDEX: 23.64 KG/M2 | WEIGHT: 200.2 LBS | OXYGEN SATURATION: 98 % | HEIGHT: 77 IN | DIASTOLIC BLOOD PRESSURE: 76 MMHG

## 2019-04-18 DIAGNOSIS — E03.9 HYPOTHYROIDISM, UNSPECIFIED TYPE: ICD-10-CM

## 2019-04-18 DIAGNOSIS — E23.0 PANHYPOPITUITARISM (H): Primary | ICD-10-CM

## 2019-04-18 DIAGNOSIS — Z86.03 HISTORY OF CRANIOPHARYNGIOMA: ICD-10-CM

## 2019-04-18 DIAGNOSIS — E03.9 ACQUIRED HYPOTHYROIDISM: ICD-10-CM

## 2019-04-18 DIAGNOSIS — E34.9 TESTOSTERONE DEFICIENCY: Primary | ICD-10-CM

## 2019-04-18 PROCEDURE — 99215 OFFICE O/P EST HI 40 MIN: CPT | Performed by: INTERNAL MEDICINE

## 2019-04-18 PROCEDURE — 96372 THER/PROPH/DIAG INJ SC/IM: CPT

## 2019-04-18 PROCEDURE — 99207 ZZC NO CHARGE NURSE ONLY: CPT

## 2019-04-18 RX ORDER — LEVOTHYROXINE SODIUM 137 UG/1
TABLET ORAL
Qty: 30 TABLET | Refills: 11 | Status: SHIPPED | OUTPATIENT
Start: 2019-04-18 | End: 2020-05-19

## 2019-04-18 RX ADMIN — TESTOSTERONE CYPIONATE 200 MG: 200 INJECTION, SOLUTION INTRAMUSCULAR at 08:41

## 2019-04-18 ASSESSMENT — MIFFLIN-ST. JEOR: SCORE: 2000.48

## 2019-04-18 NOTE — NURSING NOTE
ENDOCRINOLOGY INTAKE FORM    Patient Name:  Mamadou Reece  :  1990    Is patient Diabetic?   No  Does patient have non-diabetic or other endocrine issues?  Yes: testosterone def., panhypopituitarism, hypothyroidism, craniopharyngioma    Vitals: There were no vitals taken for this visit.  BMI= There is no height or weight on file to calculate BMI.    Flu vaccine:  No  Pneumonia vaccine:  No    Smoking and Alcohol use:  Social History     Tobacco Use     Smoking status: Never Smoker     Smokeless tobacco: Never Used   Substance Use Topics     Alcohol use: Yes     Alcohol/week: 0.0 oz     Comment: sip at holidays     Drug use: No       Annalee Nieto CMA  [facility]  [place]

## 2019-04-18 NOTE — PATIENT INSTRUCTIONS
[facility]   Dr Alonso, Endocrinology Department      [facility]   [address]  [place] [address]  Appointment [phone]  Fax: [phone]  [place]: Monday and Tuesday         [facility]   [address]  [place] [address]  Appointment [phone]  Fax: [phone]  [place]: Wednesday and Thursday            Morning fasting midcycle sample labs in Oct 2019  Please make a lab appointment for blood work and follow up clinic appointment in 1 week after that to discuss results.    Continue current hormone replacements    Take Levothyroxine on an empty stomach. Take it with a full glass of water at least 30 minutes to 1 hour before eating breakfast.   This medicine should be taken at least 4 hours before or 4 hours after these medicines: antacids (Maalox , Mylanta , Tums ), calcium supplements, cholestyramine (Prevalite , Questran ), colestipol (Colestid ), iron supplements, orlistat (Karthik , Xenical ), simethicone (Gas-X , Mylicon ), and sucralfate (Carafate ).   Swallow the capsule whole. Do not cut or crush it.

## 2019-04-18 NOTE — LETTER
4/18/2019         RE: Mamadou Reece  [address] [address]        Dear Colleague,    Thank you for referring your patient, Mamadou Reece, to the [facility]. Please see a copy of my visit note below.    Name: Mamadou Reece  Seen for f/u of panhypopituitarism. Here with mother.  Chief Complaint   Patient presents with     RECHECK     panhypopituitarism      HPI:  Mamadou Reece is a 28 year old male who presents for the evaluation of  Above.  Was seen in [facility] before. Records reviewed. Last visit with Endo was 9/2016  [facility]  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 He had stereotactic  radiation therapy at age 11.   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  Followed by Pediatrics oncology; getting MRIs every 2 yrs now. Due for MRI and has upcoming appointment.  Has not had recurrence since his radiation therapy    He is currently on full hormonal treatment with medications listed below.  He is also on growth hormone replacement.  I have discussed role of growth hormone replacement in adults is controversial  Patient's mother reports that she was told by her pediatric endocrinologist that he should never be getting off of growth hormone  They would like to continue it.    Feeling OK.  No major concerns today.    1. HYpogonadism:  On testosterone cypionate 200 mg IM q 2 weeks.  Goes to clinic.   Last testosterone checked October 2018 was in acceptable range in 800s.  Hemoglobin levels appears stable.  Energy level is OK  Shaving is OK  Muscle strength is OK    2. Diabetes Insipidus:  On desmopressiin 10 mcg- 3 sprays in evening and 1 at bedtime. (Nighttime dose was added in 2018 as he was waking up frequently at night)  Not waking up at night.  No major concerns. No excessive  thirst or frequent urination.  Drinks to thirst.    3. Growth hormone deficiency:  On Norditropin 0.7 mg/day.   Doing ok.  Takes own injections.  Living in apartment.    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  Reports compliance. But taking it at night.  No CP, palpitations, diarrhea or constipation.  + weight stable.  Wt Readings from Last 2 Encounters:   04/18/19 90.8 kg (200 lb 3.2 oz)   03/12/19 86.9 kg (191 lb 8 oz)       5. Secondary adrenal insufficiency:  On prednsione 3 mg AM and 2 mg PM. On this dose X many years.  No nausea. No vomiting.no dizziness.  BP stable.  Wt Readings from Last 2 Encounters:   04/18/19 90.8 kg (200 lb 3.2 oz)   03/12/19 86.9 kg (191 lb 8 oz)       PMH/PSH:  1. Diabetes insipidus   2. Panhypopituitarism   3. History of ADD.  4. Central hypothyroidism   5. Secondary adrenal insufficiency  6. Growth hormone deficiency   7. Hypogonadotropic hypogonadism     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, [facility]     Legal blindness     Craniopharyngioma     Mood disorder (H)     Craniopharyngioma     Radiation age 11    Craniopharyngioma     Past Surgical History:   Procedure Laterality Date     CRANIOTOMY, EXCISE TUMOR COMPLEX, COMBINED  age 6    craniopharyngioma     MOHS MICROGRAPHIC PROCEDURE Left 05/31/2018    left cheek nodular BCC     Family Hx:  Family History   Problem Relation Age of Onset     Cancer Father         Melanoma     Cerebrovascular Disease Maternal Grandmother 91     Diabetes Maternal Grandmother      Cerebrovascular Disease Paternal Grandmother      Cerebrovascular Disease Paternal Grandfather      Social Hx:  Social History     Socioeconomic History     Marital status: Single     Spouse name: Not on file     Number of children: Not on file     Years of education: Not on file     Highest education level: Not on file   Occupational History     Not on file   Social Needs     Financial resource strain: Not  "on file     Food insecurity:     Worry: Not on file     Inability: Not on file     Transportation needs:     Medical: Not on file     Non-medical: Not on file   Tobacco Use     Smoking status: Never Smoker     Smokeless tobacco: Never Used   Substance and Sexual Activity     Alcohol use: Yes     Alcohol/week: 0.0 oz     Comment: sip at holidays     Drug use: No     Sexual activity: Never   Lifestyle     Physical activity:     Days per week: Not on file     Minutes per session: Not on file     Stress: Not on file   Relationships     Social connections:     Talks on phone: Not on file     Gets together: Not on file     Attends Voodoo service: Not on file     Active member of club or organization: Not on file     Attends meetings of clubs or organizations: Not on file     Relationship status: Not on file     Intimate partner violence:     Fear of current or ex partner: Not on file     Emotionally abused: Not on file     Physically abused: Not on file     Forced sexual activity: Not on file   Other Topics Concern     Parent/sibling w/ CABG, MI or angioplasty before 65F 55M? Not Asked   Social History Narrative     Not on file          MEDICATIONS:  has a current medication list which includes the following prescription(s): amphetamine-dextroamphetamine, calcium + d, vitamin d, b-12, desmopressin, levothyroxine, prednisone, somatropin, and testosterone cypionate, and the following Facility-Administered Medications: testosterone cypionate.    ROS     ROS: 10 point ROS neg other than the symptoms noted above in the HPI.    Physical Exam   VS: /76 (BP Location: Right arm, Patient Position: Chair, Cuff Size: Adult Regular)   Pulse 70   Temp 97.8  F (36.6  C) (Oral)   Ht 1.956 m (6' 5\")   Wt 90.8 kg (200 lb 3.2 oz)   SpO2 98%   BMI 23.74 kg/m     GENERAL: AXOX3, NAD, well dressed, answering questions appropriately, appears stated age.  HEENT: No exopthalmous, no proptosis, EOMI, no lig lag, no retraction  NECK: " Thyroid normal in size, non tender, no nodules were palpated.  CV: RRR  LUNGS: CTAB  ABDOMEN: +BS  NEUROLOGY: CN grossly intact, no tremors  PSYCH: normal affect and mood      LABS:  ENDO THYROID LABS-P Latest Ref Rng & Units 1/3/2019 6/20/2018   T4 FREE 0.76 - 1.46 ng/dL 1.17 1.38     ENDO THYROID LABS-[facility] Latest Ref Rng & Units 10/5/2017   T4 FREE 0.76 - 1.46 ng/dL 1.18     ENDO PITUITARY LABS-P Latest Ref Rng & Units 10/5/2017   PROLACTIN 2 - 18 ug/L 1 (L)   TESTOSTERONE TOTAL 240 - 950 ng/dL 647   PSA 0 - 4 ug/L 0.17    - 144 mmol/L 141   POTASSIUM 3.4 - 5.3 mmol/L 3.8   INS GROWTH FACTOR 1 94 - 271 ng/ml 298 (H)     ENDO PITUITARY LABS-[facility] Latest Ref Rng & Units 10/4/2018   TESTOSTERONE TOTAL 240 - 950 ng/dL 859     ENDO PITUITARY LABS-[facility] Latest Ref Rng & Units 1/3/2019    - 144 mmol/L 138   POTASSIUM 3.4 - 5.3 mmol/L    INS GROWTH FACTOR 1 90 - 262 ng/ml 320 (H)     All pertinent notes, labs, and images personally reviewed by me.     A/P  Mr.Jared Reece is a 28 year old here for the evaluation of above:    1. Hypogonadism:  On testosterone cypionate 200 mg IM q 2 weeks.  Goes to clinic for njections.  Clinically looks okay.  No major concerns  Last testosterone checked in 800s.  Plan to continue same dose.  Labs in and 1 spray at night October in 2019    2. Diabetes Insipidus:  On desmopressiin 10 mcg- 3 sprays at night.  No major concerns. No excessive thirst or frequent urination.    -- continue current dose of DDAVP  Repeat labs in October 2019      3. Growth hormone deficiency:  On Norditropin 0.7 mg/day.   Discussed GH replacement in adults.  They would like to continue it. Agree with it.   labs and follow-up in October 2019    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  Reports compliance. But taking it at night.  Clinically looks euthyroid.   Repeat labs in October 2019    5. Secondary adrenal insufficiency:  On prednsione 3 mg AM and 2 mg PM. On this dose X many  years.  No nausea. No vomiting.  -- continue current dose  Discussed sick days.   Repeat labs in October 2019      More than 50% of face to face time spent with Mr. Reece on counseling / coordinating his care.      All questions were answered.  The patient indicates understanding of the above issues and agrees with the plan set forth. 30 min.       Follow-up:  October 2019      Sumaya Aolnso MD  Endocrinology   [facility]    CC: Mariluz Mae     Disclaimer: This note consists of symbols derived from keyboarding, dictation and/or voice recognition software. As a result, there may be errors in the script that have gone undetected. Please consider this when interpreting information found in this chart.    Addendum to above note and clinic visit:    Labs reviewed.    See result note/telephone encounter.            Again, thank you for allowing me to participate in the care of your patient.        Sincerely,        Sumaya Alonso MD

## 2019-04-18 NOTE — PROGRESS NOTES
Name: Mamadou Reece  Seen for f/u of panhypopituitarism. Here with mother.  Chief Complaint   Patient presents with     RECHECK     panhypopituitarism      HPI:  Mmaadou Reece is a 28 year old male who presents for the evaluation of  Above.  Was seen in [facility] before. Records reviewed. Last visit with Endo was 9/2016  [facility]  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 He had stereotactic  radiation therapy at age 11.   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  Followed by Pediatrics oncology; getting MRIs every 2 yrs now. Due for MRI and has upcoming appointment.  Has not had recurrence since his radiation therapy    He is currently on full hormonal treatment with medications listed below.  He is also on growth hormone replacement.  I have discussed role of growth hormone replacement in adults is controversial  Patient's mother reports that she was told by her pediatric endocrinologist that he should never be getting off of growth hormone  They would like to continue it.    Feeling OK.  No major concerns today.    1. HYpogonadism:  On testosterone cypionate 200 mg IM q 2 weeks.  Goes to clinic.   Last testosterone checked October 2018 was in acceptable range in 800s.  Hemoglobin levels appears stable.  Energy level is OK  Shaving is OK  Muscle strength is OK    2. Diabetes Insipidus:  On desmopressiin 10 mcg- 3 sprays in evening and 1 at bedtime. (Nighttime dose was added in 2018 as he was waking up frequently at night)  Not waking up at night.  No major concerns. No excessive thirst or frequent urination.  Drinks to thirst.    3. Growth hormone deficiency:  On Norditropin 0.7 mg/day.   Doing ok.  Takes own injections.  Living in apartment.    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  Reports  compliance. But taking it at night.  No CP, palpitations, diarrhea or constipation.  + weight stable.  Wt Readings from Last 2 Encounters:   04/18/19 90.8 kg (200 lb 3.2 oz)   03/12/19 86.9 kg (191 lb 8 oz)       5. Secondary adrenal insufficiency:  On prednsione 3 mg AM and 2 mg PM. On this dose X many years.  No nausea. No vomiting.no dizziness.  BP stable.  Wt Readings from Last 2 Encounters:   04/18/19 90.8 kg (200 lb 3.2 oz)   03/12/19 86.9 kg (191 lb 8 oz)       PMH/PSH:  1. Diabetes insipidus   2. Panhypopituitarism   3. History of ADD.  4. Central hypothyroidism   5. Secondary adrenal insufficiency  6. Growth hormone deficiency   7. Hypogonadotropic hypogonadism     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, [facility]     Legal blindness     Craniopharyngioma     Mood disorder (H)     Craniopharyngioma     Radiation age 11    Craniopharyngioma     Past Surgical History:   Procedure Laterality Date     CRANIOTOMY, EXCISE TUMOR COMPLEX, COMBINED  age 6    craniopharyngioma     MOHS MICROGRAPHIC PROCEDURE Left 05/31/2018    left cheek nodular BCC     Family Hx:  Family History   Problem Relation Age of Onset     Cancer Father         Melanoma     Cerebrovascular Disease Maternal Grandmother 91     Diabetes Maternal Grandmother      Cerebrovascular Disease Paternal Grandmother      Cerebrovascular Disease Paternal Grandfather      Social Hx:  Social History     Socioeconomic History     Marital status: Single     Spouse name: Not on file     Number of children: Not on file     Years of education: Not on file     Highest education level: Not on file   Occupational History     Not on file   Social Needs     Financial resource strain: Not on file     Food insecurity:     Worry: Not on file     Inability: Not on file     Transportation needs:     Medical: Not on file     Non-medical: Not on file   Tobacco Use     Smoking status: Never Smoker     Smokeless tobacco: Never  "Used   Substance and Sexual Activity     Alcohol use: Yes     Alcohol/week: 0.0 oz     Comment: sip at holidays     Drug use: No     Sexual activity: Never   Lifestyle     Physical activity:     Days per week: Not on file     Minutes per session: Not on file     Stress: Not on file   Relationships     Social connections:     Talks on phone: Not on file     Gets together: Not on file     Attends [employer] service: Not on file     Active member of club or organization: Not on file     Attends meetings of clubs or organizations: Not on file     Relationship status: Not on file     Intimate partner violence:     Fear of current or ex partner: Not on file     Emotionally abused: Not on file     Physically abused: Not on file     Forced sexual activity: Not on file   Other Topics Concern     Parent/sibling w/ CABG, MI or angioplasty before 65F 55M? Not Asked   Social History Narrative     Not on file          MEDICATIONS:  has a current medication list which includes the following prescription(s): amphetamine-dextroamphetamine, calcium + d, vitamin d, b-12, desmopressin, levothyroxine, prednisone, somatropin, and testosterone cypionate, and the following Facility-Administered Medications: testosterone cypionate.    ROS     ROS: 10 point ROS neg other than the symptoms noted above in the HPI.    Physical Exam   VS: /76 (BP Location: Right arm, Patient Position: Chair, Cuff Size: Adult Regular)   Pulse 70   Temp 97.8  F (36.6  C) (Oral)   Ht 1.956 m (6' 5\")   Wt 90.8 kg (200 lb 3.2 oz)   SpO2 98%   BMI 23.74 kg/m    GENERAL: AXOX3, NAD, well dressed, answering questions appropriately, appears stated age.  HEENT: No exopthalmous, no proptosis, EOMI, no lig lag, no retraction  NECK: Thyroid normal in size, non tender, no nodules were palpated.  CV: RRR  LUNGS: CTAB  ABDOMEN: +BS  NEUROLOGY: CN grossly intact, no tremors  PSYCH: normal affect and mood      LABS:  ENDO THYROID LABS-[facility] Latest Ref Rng & Units " 1/3/2019 6/20/2018   T4 FREE 0.76 - 1.46 ng/dL 1.17 1.38     ENDO THYROID LABS-[facility] Latest Ref Rng & Units 10/5/2017   T4 FREE 0.76 - 1.46 ng/dL 1.18     ENDO PITUITARY LABS-[facility] Latest Ref Rng & Units 10/5/2017   PROLACTIN 2 - 18 ug/L 1 (L)   TESTOSTERONE TOTAL 240 - 950 ng/dL 647   PSA 0 - 4 ug/L 0.17    - 144 mmol/L 141   POTASSIUM 3.4 - 5.3 mmol/L 3.8   INS GROWTH FACTOR 1 94 - 271 ng/ml 298 (H)     ENDO PITUITARY LABS-[facility] Latest Ref Rng & Units 10/4/2018   TESTOSTERONE TOTAL 240 - 950 ng/dL 859     ENDO PITUITARY LABS-[facility] Latest Ref Rng & Units 1/3/2019    - 144 mmol/L 138   POTASSIUM 3.4 - 5.3 mmol/L    INS GROWTH FACTOR 1 90 - 262 ng/ml 320 (H)     All pertinent notes, labs, and images personally reviewed by me.     A/P  Mr.Jared Reece is a 28 year old here for the evaluation of above:    1. Hypogonadism:  On testosterone cypionate 200 mg IM q 2 weeks.  Goes to clinic for njections.  Clinically looks okay.  No major concerns  Last testosterone checked in 800s.  Plan to continue same dose.  Labs in and 1 spray at night October in 2019    2. Diabetes Insipidus:  On desmopressiin 10 mcg- 3 sprays at night.  No major concerns. No excessive thirst or frequent urination.    -- continue current dose of DDAVP  Repeat labs in October 2019      3. Growth hormone deficiency:  On Norditropin 0.7 mg/day.   Discussed GH replacement in adults.  They would like to continue it. Agree with it.   labs and follow-up in October 2019    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  Reports compliance. But taking it at night.  Clinically looks euthyroid.   Repeat labs in October 2019    5. Secondary adrenal insufficiency:  On prednsione 3 mg AM and 2 mg PM. On this dose X many years.  No nausea. No vomiting.  -- continue current dose  Discussed sick days.   Repeat labs in October 2019      More than 50% of face to face time spent with Mr. Reece on counseling / coordinating his care.      All questions were  answered.  The patient indicates understanding of the above issues and agrees with the plan set forth. 30 min.       Follow-up:  October 2019      Sumaya Alonso MD  Endocrinology   [facility]    CC: Mariluz Mae     Disclaimer: This note consists of symbols derived from keyboarding, dictation and/or voice recognition software. As a result, there may be errors in the script that have gone undetected. Please consider this when interpreting information found in this chart.    Addendum to above note and clinic visit:    Labs reviewed.    See result note/telephone encounter.

## 2019-04-18 NOTE — NURSING NOTE
The following medication was given:      MEDICATION: Depo Testosterone  200 mg  ROUTE: IM  SITE: Ventrogluteal - Left  DOSE: 200 mg  LOT #: [id number]  :  [employer]  EXPIRATION DATE:  02/2021  NDC#: 6907-2109-82     Prior to injection, verified patient identity using patient's name and date of birth.  Due to injection administration, patient instructed to remain in clinic for 15 minutes  afterwards, and to report any adverse reaction to me immediately.     Brittaney Davis RN

## 2019-04-26 ENCOUNTER — TELEPHONE (OUTPATIENT)
Dept: FAMILY MEDICINE | Facility: CLINIC | Age: 29
End: 2019-04-26

## 2019-04-26 NOTE — TELEPHONE ENCOUNTER
Patient is leaving town on May 6th and needs Adderall xr 30mg to be filled before he leaves. Wondering if it okay to fill on May 4th?     It would be okay to be filled on May 8th   Last fill/ was April 10th       Thank you.    Kaylan Doran, [place]  [facility]  [address] [place] [address] [phone]

## 2019-05-02 ENCOUNTER — TELEPHONE (OUTPATIENT)
Dept: ENDOCRINOLOGY | Facility: CLINIC | Age: 29
End: 2019-05-02

## 2019-05-02 NOTE — TELEPHONE ENCOUNTER
Prior Authorization Approval    Authorization Effective Date: 2/1/2019  Authorization Expiration Date: 5/1/2020  Medication: Norditropin-PA Approved  Approved Dose/Quantity: 0.7mg daily  Reference #:   N/A  Insurance Company: [employer] Part D - Phone [phone] Fax [phone]  Expected CoPay:     Unknown  CoPay Card Available:      Foundation Assistance Needed:    Which Pharmacy is filling the prescription (Not needed for infusion/clinic administered): [facility] - [address] [place] [address]  Pharmacy Notified:  Yes  Patient Notified:  Yes

## 2019-05-03 ENCOUNTER — TELEPHONE (OUTPATIENT)
Dept: FAMILY MEDICINE | Facility: CLINIC | Age: 29
End: 2019-05-03

## 2019-05-03 ENCOUNTER — ALLIED HEALTH/NURSE VISIT (OUTPATIENT)
Dept: NURSING | Facility: CLINIC | Age: 29
End: 2019-05-03
Payer: MEDICARE

## 2019-05-03 DIAGNOSIS — E34.9 TESTOSTERONE DEFICIENCY: Primary | ICD-10-CM

## 2019-05-03 PROCEDURE — 99207 ZZC NO CHARGE NURSE ONLY: CPT | Performed by: FAMILY MEDICINE

## 2019-05-03 PROCEDURE — 96372 THER/PROPH/DIAG INJ SC/IM: CPT | Performed by: FAMILY MEDICINE

## 2019-05-03 RX ADMIN — TESTOSTERONE CYPIONATE 200 MG: 200 INJECTION, SOLUTION INTRAMUSCULAR at 08:48

## 2019-05-03 NOTE — PROGRESS NOTES
The following medication was given:     MEDICATION: Depo Testosterone  200 mg  ROUTE: IM  SITE: Ventrogluteal - Left  DOSE: 200 mg  LOT #: [id number]  :  [employer]  EXPIRATION DATE:  02/2021  NDC#: 6191841116    Due to injection administration, patient instructed to remain in clinic for 15 minutes  afterwards, and to report any adverse reaction to me immediately.    Darling Sheppard RN -- [facility]

## 2019-05-21 ENCOUNTER — TELEPHONE (OUTPATIENT)
Dept: ENDOCRINOLOGY | Facility: CLINIC | Age: 29
End: 2019-05-21

## 2019-05-21 DIAGNOSIS — E34.9 TESTOSTERONE DEFICIENCY: ICD-10-CM

## 2019-05-21 RX ORDER — TESTOSTERONE CYPIONATE 200 MG/ML
200 INJECTION, SOLUTION INTRAMUSCULAR
Qty: 1 ML | Refills: 5 | Status: SHIPPED | OUTPATIENT
Start: 2019-05-21 | End: 2019-12-31

## 2019-05-21 RX ORDER — TESTOSTERONE CYPIONATE 200 MG/ML
200 INJECTION, SOLUTION INTRAMUSCULAR
Qty: 1 ML | Refills: 12 | Status: CANCELLED
Start: 2019-05-21 | End: 2019-11-21

## 2019-05-21 NOTE — TELEPHONE ENCOUNTER
Mom calls, scheduled nurse only testosterone, wonders why order for testosterone faxed to pharmacy??, reviewed, [facility], use clinic supply, informed endo staff faxed rx to pharmacy, may disregard, order only written x 2.5 months as pt gets 2 injections per month, routed FYI to endo, please sign new order if want 6 months, mother thought was to get lab work in 5 month for 6 month f/u appointment, order t'd up, if ok may close    Mariluz Salmon RN, BSN  Message handled by Nurse Triage.

## 2019-05-21 NOTE — TELEPHONE ENCOUNTER
testosterone cypionate (DEPOTESTOSTERONE) 200 MG/ML injection  Last Written Prescription Date:  12/13/19  Last Fill Quantity: 1,   # refills: 5  Last Office Visit: 4/18/19  Future Office visit:       Routing refill request to provider for review/approval because:  Drug not on the [facility], [facility] or [facility] refill protocol or controlled substance    Please review and authorize if appropriate,     Thank you,   Lizzette CHIN RN

## 2019-05-23 ENCOUNTER — ALLIED HEALTH/NURSE VISIT (OUTPATIENT)
Dept: NURSING | Facility: CLINIC | Age: 29
End: 2019-05-23
Payer: MEDICARE

## 2019-05-23 DIAGNOSIS — E34.9 TESTOSTERONE DEFICIENCY: Primary | ICD-10-CM

## 2019-05-23 PROCEDURE — 96372 THER/PROPH/DIAG INJ SC/IM: CPT

## 2019-05-23 PROCEDURE — 99207 ZZC NO CHARGE NURSE ONLY: CPT

## 2019-05-23 RX ORDER — TESTOSTERONE CYPIONATE 200 MG/ML
200 INJECTION, SOLUTION INTRAMUSCULAR
Status: DISCONTINUED | OUTPATIENT
Start: 2019-06-06 | End: 2019-10-23

## 2019-05-23 RX ADMIN — TESTOSTERONE CYPIONATE 200 MG: 200 INJECTION, SOLUTION INTRAMUSCULAR at 08:50

## 2019-05-23 NOTE — TELEPHONE ENCOUNTER
To clarify- needs updated order on MAR.  Today is last day of order for Testosterone.  Saw provider 4/18/19.  Labs due in October.  Please update order on MAR.  Testosterone ordered incorrectly on med list and sent to pharmacy 5/21/19.  Brittaney Davis RN    4/18/19  A/P  Mr.Jared Reece is a 28 year old here for the evaluation of above:     1. Hypogonadism:  On testosterone cypionate 200 mg IM q 2 weeks.  Goes to clinic for njections.  Clinically looks okay.  No major concerns  Last testosterone checked in 800s.  Plan to continue same dose.  Labs in and 1 spray at night October in 2019     2. Diabetes Insipidus:  On desmopressiin 10 mcg- 3 sprays at night.  No major concerns. No excessive thirst or frequent urination.    -- continue current dose of DDAVP  Repeat labs in October 2019        3. Growth hormone deficiency:  On Norditropin 0.7 mg/day.   Discussed GH replacement in adults.  They would like to continue it. Agree with it.   labs and follow-up in October 2019     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  Reports compliance. But taking it at night.  Clinically looks euthyroid.   Repeat labs in October 2019     5. Secondary adrenal insufficiency:  On prednsione 3 mg AM and 2 mg PM. On this dose X many years.  No nausea. No vomiting.  -- continue current dose  Discussed sick days.   Repeat labs in October 2019        More than 50% of face to face time spent with Mr. Reece on counseling / coordinating his care.       All questions were answered.  The patient indicates understanding of the above issues and agrees with the plan set forth. 30 min.        Follow-up:  October 2019        Sumaya Alonso MD  Endocrinology   [facility]

## 2019-05-23 NOTE — PROGRESS NOTES
The following medication was given:      MEDICATION: Depo Testosterone  200 mg  ROUTE: IM  SITE: Ventrogluteal - Right  DOSE: 200 mg  LOT #: [id number]  :  [employer]  EXPIRATION DATE:  05/2021  NDC#: [id number]     Due to injection administration, patient instructed to remain in clinic for 15 minutes  afterwards, and to report any adverse reaction to me immediately.    Brittaney Davis RN

## 2019-05-23 NOTE — TELEPHONE ENCOUNTER
Will send Endo provider a MAR order for testosterone for signature. Unable to sign CAM order under a TE.    Estefania YA RN, BSN, PHN

## 2019-05-29 ENCOUNTER — MEDICAL CORRESPONDENCE (OUTPATIENT)
Dept: HEALTH INFORMATION MANAGEMENT | Facility: CLINIC | Age: 29
End: 2019-05-29

## 2019-06-06 ENCOUNTER — ALLIED HEALTH/NURSE VISIT (OUTPATIENT)
Dept: NURSING | Facility: CLINIC | Age: 29
End: 2019-06-06
Payer: MEDICARE

## 2019-06-06 DIAGNOSIS — E03.9 HYPOTHYROIDISM, UNSPECIFIED TYPE: Primary | ICD-10-CM

## 2019-06-06 PROCEDURE — 99207 ZZC NO CHARGE NURSE ONLY: CPT

## 2019-06-06 PROCEDURE — 96372 THER/PROPH/DIAG INJ SC/IM: CPT

## 2019-06-06 RX ADMIN — TESTOSTERONE CYPIONATE 200 MG: 200 INJECTION, SOLUTION INTRAMUSCULAR at 10:06

## 2019-06-06 NOTE — NURSING NOTE
The following medication was given:     MEDICATION: Depo Testosterone  200 mg  ROUTE: IM  SITE: Left Ventrogluteal  DOSE: 200 mg  LOT #: [id number]  :  [employer]  EXPIRATION DATE:  02/2021  NDC#: 5616-4659-42     Prior to injection, verified patient identity using patient's name and date of birth.  Due to injection administration, patient instructed to remain in clinic for 15 minutes  afterwards, and to report any adverse reaction to me immediately.      Prisca Melendrez RN, BS  Clinical Nurse Triage.

## 2019-06-13 DIAGNOSIS — E23.0 PANHYPOPITUITARISM (H): ICD-10-CM

## 2019-06-14 NOTE — TELEPHONE ENCOUNTER
Requested Prescriptions   Pending Prescriptions Disp Refills     somatropin (NORDITROPIN FLEXPRO) 10 MG/1.5ML SOLN PEN injection 10 mL 1     Sig: Inject 0.7 mg Subcutaneous daily       There is no refill protocol information for this order        Routing refill request to provider for review/approval because:  Drug not on the [facility] refill protocol

## 2019-06-18 NOTE — TELEPHONE ENCOUNTER
I called and spoke to patient's father Pat, and confirmed he is taking 0.7mg daily.    Kendal Claros, CMA

## 2019-06-20 ENCOUNTER — ALLIED HEALTH/NURSE VISIT (OUTPATIENT)
Dept: NURSING | Facility: CLINIC | Age: 29
End: 2019-06-20
Payer: MEDICARE

## 2019-06-20 DIAGNOSIS — E34.9 TESTOSTERONE DEFICIENCY: Primary | ICD-10-CM

## 2019-06-20 PROCEDURE — 96372 THER/PROPH/DIAG INJ SC/IM: CPT

## 2019-06-20 RX ADMIN — TESTOSTERONE CYPIONATE 200 MG: 200 INJECTION, SOLUTION INTRAMUSCULAR at 08:49

## 2019-06-20 NOTE — NURSING NOTE
Prior to injection, verified patient identity using patient's name and date of birth.  Due to injection administration, patient instructed to remain in clinic for 15 minutes  afterwards, and to report any adverse reaction to me immediately.    Testosterone    Drug Amount Wasted:  None.  Vial/Syringe: Single dose vial  Lot: [id number]  Expiration Date:  5/21  NDC: 0239-1356-47    Brittaney Davis RN

## 2019-07-05 ENCOUNTER — ALLIED HEALTH/NURSE VISIT (OUTPATIENT)
Dept: NURSING | Facility: CLINIC | Age: 29
End: 2019-07-05
Payer: MEDICARE

## 2019-07-05 DIAGNOSIS — E34.9 TESTOSTERONE DEFICIENCY: Primary | ICD-10-CM

## 2019-07-05 PROCEDURE — 96372 THER/PROPH/DIAG INJ SC/IM: CPT

## 2019-07-05 PROCEDURE — 99207 ZZC NO CHARGE NURSE ONLY: CPT

## 2019-07-05 RX ADMIN — TESTOSTERONE CYPIONATE 200 MG: 200 INJECTION, SOLUTION INTRAMUSCULAR at 09:02

## 2019-07-05 NOTE — PROGRESS NOTES
Prior to injection, verified patient identity using patient's name and date of birth.  Due to injection administration, patient instructed to remain in clinic for 15 minutes  afterwards, and to report any adverse reaction to me immediately.    Testosterone    Drug Amount Wasted:  None.  Vial/Syringe: Single dose vial  NDC- 6608-7158-71  Lot- [id number]  Expiration Date:  5/21  (L) sunil Davis RN

## 2019-07-09 DIAGNOSIS — F98.8 ATTENTION DEFICIT DISORDER, UNSPECIFIED HYPERACTIVITY PRESENCE: ICD-10-CM

## 2019-07-09 NOTE — TELEPHONE ENCOUNTER
Requested Prescriptions   Pending Prescriptions Disp Refills     amphetamine-dextroamphetamine (ADDERALL XR) 30 MG 24 hr capsule 30 capsule 0     Sig: Take 1 capsule (30 mg) by mouth daily       There is no refill protocol information for this order        Last Written Prescription Date:  6/6/19  Last Fill Quantity: 30,  # refills: 0   Last office visit: 3/12/2019 with prescribing provider:  Mariluz Mae MD   Future Office Visit:   Next 5 appointments (look out 90 days)    Jul 19, 2019  8:30 AM CDT  Nurse Only with CR RN  [facility] [address] [facility] [address]  [phone]   Aug 08, 2019  8:30 AM CDT  Nurse Only with CR RN  [facility] [address] [facility] [address]  [phone]   Aug 22, 2019  8:30 AM CDT  Nurse Only with CR RN  [facility] ([facility]) [address] [facility] [address]  [phone]

## 2019-07-09 NOTE — LETTER
August 19, 2019      [address]amadou Reece  [address] [address]        Dear Mamadou,     In order for your medication to continue to be refilled, you will need to schedule an appointment. Please contact the clinic at [phone] to get this appointment scheduled.     Sincerely,     Mariluz Mae MD

## 2019-07-11 RX ORDER — DEXTROAMPHETAMINE SACCHARATE, AMPHETAMINE ASPARTATE MONOHYDRATE, DEXTROAMPHETAMINE SULFATE AND AMPHETAMINE SULFATE 7.5; 7.5; 7.5; 7.5 MG/1; MG/1; MG/1; MG/1
30 CAPSULE, EXTENDED RELEASE ORAL DAILY
Qty: 30 CAPSULE | Refills: 0 | Status: SHIPPED | OUTPATIENT
Start: 2019-07-11 | End: 2019-07-11

## 2019-07-11 RX ORDER — DEXTROAMPHETAMINE SACCHARATE, AMPHETAMINE ASPARTATE MONOHYDRATE, DEXTROAMPHETAMINE SULFATE AND AMPHETAMINE SULFATE 7.5; 7.5; 7.5; 7.5 MG/1; MG/1; MG/1; MG/1
30 CAPSULE, EXTENDED RELEASE ORAL DAILY
Qty: 30 CAPSULE | Refills: 0 | Status: SHIPPED | OUTPATIENT
Start: 2019-08-10 | End: 2019-09-10

## 2019-07-11 NOTE — TELEPHONE ENCOUNTER
I printed out this month and next month (I believe we can do this for this medication with the new law; if not, next month will need to be re-written).    Please see if someone will sign for me.  He is due for appointment mid September.  Might be good to call and get him scheduled at this time.    Thanks!

## 2019-07-11 NOTE — TELEPHONE ENCOUNTER
Routing refill request to provider for review/approval because:  Drug not on the [facility], [facility] or [facility] refill protocol or controlled substance    Adderall XR 30 mg cap:      Last Written Prescription Date:  6/6/19  Last Fill Quantity: 30,   # refills: 0  Last Office Visit: 3/12/19 with   Future Office visit:    Next 5 appointments (look out 90 days)    Jul 19, 2019  8:30 AM CDT  [facility] with CR RN  [facility] [address] [facility] [address]  [phone]   Aug 08, 2019  8:30 AM CDT  Nurse Only with CR RN  [facility] [address] [facility] [address]  [phone]   Aug 22, 2019  8:30 AM CDT  Nurse Only with CR RN  [facility] [address] [facility] [address]  [phone]          checked: Last filled for 30 capsules each on 6/7/19, 5/4/19, 4/10/19    Debbi Tomas RN

## 2019-07-16 DIAGNOSIS — E23.2 DIABETES INSIPIDUS (H): ICD-10-CM

## 2019-07-16 NOTE — TELEPHONE ENCOUNTER
Patient's father calling to say they would like to pick this up before Friday as they are leaving out of town for vacation.

## 2019-07-16 NOTE — TELEPHONE ENCOUNTER
Requested Prescriptions   Pending Prescriptions Disp Refills     desmopressin (DDAVP) 0.01 % SOLN spray [Pharmacy Med Name: DESMOPRESSIN ACE SPRAY RE 0.01 SOLN] 10 mL 3     Sig: USE THREE SPRAYS INTO NOSTRIL ONCE DAILY AND USE ONE SPRAY INTO NOSTRIL AT BEDTIME       There is no refill protocol information for this order              Last Written Prescription Date:  04/04/19  Last Fill Quantity: 10,   # refills: 3  Last Office Visit: 04/18/19  Future Office visit:    Next 5 appointments (look out 90 days)    Jul 19, 2019  8:30 AM CDT  Nurse Only with CR RN  [facility] [address] [place] [address]  [phone]   Aug 08, 2019  8:30 AM CDT  Nurse Only with CR RN  [facility] ([facility]) [address] [facility] [address]  [phone]   Aug 22, 2019  8:30 AM CDT  Nurse Only with CR RN  [facility] ([facility]) [address] [place] [address]  [phone]           Routing refill request to provider for review/approval because:  Drug not on the [facility], [facility] or [facility] refill protocol or controlled substance

## 2019-07-17 RX ORDER — DESMOPRESSIN ACETATE 0.1 MG/ML
SOLUTION NASAL
Qty: 10 ML | Refills: 3 | Status: SHIPPED | OUTPATIENT
Start: 2019-07-17 | End: 2019-10-29

## 2019-07-17 NOTE — TELEPHONE ENCOUNTER
Called and spoke to Dad(ok per consent to communicate) and informed him that the prescription has been sent to the pharmacy.  Dad verbalized understanding.

## 2019-07-19 ENCOUNTER — ALLIED HEALTH/NURSE VISIT (OUTPATIENT)
Dept: NURSING | Facility: CLINIC | Age: 29
End: 2019-07-19
Payer: MEDICARE

## 2019-07-19 DIAGNOSIS — E34.9 TESTOSTERONE DEFICIENCY: Primary | ICD-10-CM

## 2019-07-19 PROCEDURE — 99207 ZZC NO CHARGE NURSE ONLY: CPT

## 2019-07-19 PROCEDURE — 96372 THER/PROPH/DIAG INJ SC/IM: CPT

## 2019-07-19 RX ADMIN — TESTOSTERONE CYPIONATE 200 MG: 200 INJECTION, SOLUTION INTRAMUSCULAR at 09:37

## 2019-07-19 NOTE — NURSING NOTE
"Mamadou Reece;   Chief Complaint   Patient presents with     Imm/Inj     patient here for testosterone injection last received 7/5/19 - within timeframe of Q14 days      Initial There were no vitals taken for this visit. Estimated body mass index is 23.74 kg/m  as calculated from the following:    Height as of 4/18/19: 1.956 m (6' 5\").    Weight as of 4/18/19: 90.8 kg (200 lb 3.2 oz)..  BP completed using cuff size NA (Not Taken).      Clinic Administered Medication Documentation      Testosterone Documentation     Prior to injection, verified patient identity using patient's name and date of birth. Medication was administered. Please see MAR and medication order for additional information. Patient instructed to report any adverse reaction to staff immediately .    Reminders     - Check vial for refills remaining and initiate refill request if no refills remain.     Clinic Administered Medication     Was entire vial of medication used? Yes  Vial/Syringe: Single dose vial  Expiration Date:  0409-6562-02  Was this medication supplied by the patient? No     Leslie Santos Nurse   [facility]   "

## 2019-07-19 NOTE — NURSING NOTE
Testosterone     Drug Amount Wasted:  None.  Vial/Syringe: Single dose vial  NDC- 8222-3047-49  Lot- [id number]  Expiration Date:  5/21  (R) sunil Vázquez Registered Nurse   [facility]

## 2019-08-08 ENCOUNTER — ALLIED HEALTH/NURSE VISIT (OUTPATIENT)
Dept: NURSING | Facility: CLINIC | Age: 29
End: 2019-08-08
Payer: MEDICARE

## 2019-08-08 DIAGNOSIS — E34.9 TESTOSTERONE DEFICIENCY: Primary | ICD-10-CM

## 2019-08-08 PROCEDURE — 99207 ZZC NO CHARGE NURSE ONLY: CPT

## 2019-08-08 PROCEDURE — 96372 THER/PROPH/DIAG INJ SC/IM: CPT

## 2019-08-08 RX ADMIN — TESTOSTERONE CYPIONATE 200 MG: 200 INJECTION, SOLUTION INTRAMUSCULAR at 08:47

## 2019-08-08 NOTE — PROGRESS NOTES
"Clinic Administered Medication Documentation      Testosterone Documentation     Prior to injection, verified patient identity using patient's name and date of birth. Medication was administered. Please see MAR and medication order for additional information. Patient instructed to remain in clinic for 15 minutes and report any adverse reaction to staff immediately .    Reminders     - Check vial for refills remaining and initiate refill request if no refills remain.      - Verify with patient that medication was paid for at pharmacy. If it was, check the \"patient supplied\" box on the MAR.     Was entire vial of medication used? Yes  Vial/Syringe: Single dose vial  Expiration Date:  5/21  Was this medication supplied by the patient? No    Lot- [id number]  NDC- 7642-2732-22  Site- (L) ventroglut    Brittaney Davis RN    "

## 2019-08-22 ENCOUNTER — ALLIED HEALTH/NURSE VISIT (OUTPATIENT)
Dept: NURSING | Facility: CLINIC | Age: 29
End: 2019-08-22
Payer: MEDICARE

## 2019-08-22 DIAGNOSIS — E34.9 TESTOSTERONE DEFICIENCY: Primary | ICD-10-CM

## 2019-08-22 DIAGNOSIS — E03.9 HYPOTHYROIDISM, UNSPECIFIED TYPE: ICD-10-CM

## 2019-08-22 PROCEDURE — 99207 ZZC NO CHARGE NURSE ONLY: CPT

## 2019-08-22 PROCEDURE — 96372 THER/PROPH/DIAG INJ SC/IM: CPT

## 2019-08-22 RX ADMIN — TESTOSTERONE CYPIONATE 200 MG: 200 INJECTION, SOLUTION INTRAMUSCULAR at 08:45

## 2019-08-22 NOTE — PROGRESS NOTES
"Clinic Administered Medication Documentation        Testosterone Documentation      Prior to injection, verified patient identity using patient's name and date of birth. Medication was administered. Please see MAR and medication order for additional information. Patient instructed to remain in clinic for 15 minutes and report any adverse reaction to staff immediately .     Reminders      - Check vial for refills remaining and initiate refill request if no refills remain.      - Verify with patient that medication was paid for at pharmacy. If it was, check the \"patient supplied\" box on the MAR.      Was entire vial of medication used? Yes  Vial/Syringe: Single dose vial  Expiration Date:  5/21  Was this medication supplied by the patient? No     Lot- [id number]  NDC- 4744-0662-21  Site- (R) ventroglut     Brittaney Davis RN     "

## 2019-09-05 ENCOUNTER — ALLIED HEALTH/NURSE VISIT (OUTPATIENT)
Dept: NURSING | Facility: CLINIC | Age: 29
End: 2019-09-05
Payer: MEDICARE

## 2019-09-05 DIAGNOSIS — E34.9 TESTOSTERONE DEFICIENCY: Primary | ICD-10-CM

## 2019-09-05 PROCEDURE — 99207 ZZC NO CHARGE NURSE ONLY: CPT

## 2019-09-05 PROCEDURE — 96372 THER/PROPH/DIAG INJ SC/IM: CPT

## 2019-09-05 RX ADMIN — TESTOSTERONE CYPIONATE 200 MG: 200 INJECTION, SOLUTION INTRAMUSCULAR at 08:43

## 2019-09-05 NOTE — PROGRESS NOTES
"Clinic Administered Medication Documentation        Testosterone Documentation      Prior to injection, verified patient identity using patient's name and date of birth. Medication was administered. Please see MAR and medication order for additional information. Patient instructed to remain in clinic for 15 minutes and report any adverse reaction to staff immediately .     Reminders      - Check vial for refills remaining and initiate refill request if no refills remain.      - Verify with patient that medication was paid for at pharmacy. If it was, check the \"patient supplied\" box on the MAR.      Was entire vial of medication used? Yes  Vial/Syringe: Single dose vial  Expiration Date:  4/21  Was this medication supplied by the patient? No     Lot- [id number]  NDC- 3223-6598-99  Site- (L) ventroglut     Brittaney Davis RN     "

## 2019-09-10 DIAGNOSIS — F98.8 ATTENTION DEFICIT DISORDER, UNSPECIFIED HYPERACTIVITY PRESENCE: ICD-10-CM

## 2019-09-10 NOTE — TELEPHONE ENCOUNTER
Requested Prescriptions   Pending Prescriptions Disp Refills     amphetamine-dextroamphetamine (ADDERALL XR) 30 MG 24 hr capsule [Pharmacy Med Name: AMPHETAMINE SALTS *ER* 30MG CAP] 30 capsule 0     Sig: TAKE ONE CAPSULE BY MOUTH ONCE DAILY       There is no refill protocol information for this order        Last Written Prescription Date:  8/10/19  Last Fill Quantity: 30,  # refills: 0   Last office visit: 3/12/2019 with prescribing provider:  Mariluz Mae MD   Future Office Visit:

## 2019-09-10 NOTE — LETTER
[facility]  [address] [facility] [address]  Phone: [phone]        September 27, 2019      Mamadou Reece                                                                                                                                [address] [facility] [address]            Dear Mr. Reece,    We are concerned about your health care.  We recently provided you with a medication refill.  Many medications require routine follow-up with your Doctor.      At this time we ask that: You schedule a routine office visit with your physician to follow your medication.    Your prescription: Has been refilled for 1 month so you may have time for the above noted follow-up.      Thank you,      Mariluz Mae MD/ [place]

## 2019-09-12 RX ORDER — DEXTROAMPHETAMINE SACCHARATE, AMPHETAMINE ASPARTATE MONOHYDRATE, DEXTROAMPHETAMINE SULFATE AND AMPHETAMINE SULFATE 7.5; 7.5; 7.5; 7.5 MG/1; MG/1; MG/1; MG/1
CAPSULE, EXTENDED RELEASE ORAL
Qty: 30 CAPSULE | Refills: 0 | Status: SHIPPED | OUTPATIENT
Start: 2019-09-12 | End: 2019-10-14

## 2019-09-12 NOTE — TELEPHONE ENCOUNTER
Last 4 refills per [facility] ;    8/9/2019, 7/12/2019, 6/7/2019, 5/4/2019. Qty 30, prescriptions per MH and RM provider.    Due for OV 9/2019.

## 2019-09-19 ENCOUNTER — ALLIED HEALTH/NURSE VISIT (OUTPATIENT)
Dept: NURSING | Facility: CLINIC | Age: 29
End: 2019-09-19
Payer: MEDICARE

## 2019-09-19 DIAGNOSIS — E34.9 TESTOSTERONE DEFICIENCY: Primary | ICD-10-CM

## 2019-09-19 PROCEDURE — 96372 THER/PROPH/DIAG INJ SC/IM: CPT

## 2019-09-19 PROCEDURE — 99207 ZZC NO CHARGE NURSE ONLY: CPT

## 2019-09-19 RX ADMIN — TESTOSTERONE CYPIONATE 200 MG: 200 INJECTION, SOLUTION INTRAMUSCULAR at 08:58

## 2019-09-19 NOTE — PROGRESS NOTES
"Clinic Administered Medication Documentation    MEDICATION LIST:   Testosterone Documentation     Prior to injection, verified patient identity using patient's name and date of birth. Medication was administered. Please see MAR and medication order for additional information. Patient instructed to remain in clinic for 15 minutes and report any adverse reaction to staff immediately .    Reminders     - Check vial for refills remaining and initiate refill request if no refills remain.      - Verify with patient that medication was paid for at pharmacy. If it was, check the \"patient supplied\" box on the MAR.     Was entire vial of medication used? Yes  Vial/Syringe: Single dose vial  Expiration Date:  04/2021  Was this medication supplied by the patient? No     Lot - [id number]  NDC - 3356-4760-77  Site - (R) [facility]    Aubree Strong, SALINASN, RN, PHN        "

## 2019-10-03 ENCOUNTER — ALLIED HEALTH/NURSE VISIT (OUTPATIENT)
Dept: NURSING | Facility: CLINIC | Age: 29
End: 2019-10-03
Payer: MEDICARE

## 2019-10-03 DIAGNOSIS — E34.9 TESTOSTERONE DEFICIENCY: Primary | ICD-10-CM

## 2019-10-03 PROCEDURE — 96372 THER/PROPH/DIAG INJ SC/IM: CPT

## 2019-10-03 PROCEDURE — 99207 ZZC NO CHARGE NURSE ONLY: CPT

## 2019-10-03 RX ADMIN — TESTOSTERONE CYPIONATE 200 MG: 200 INJECTION, SOLUTION INTRAMUSCULAR at 08:40

## 2019-10-03 NOTE — PROGRESS NOTES
"Clinic Administered Medication Documentation    MEDICATION LIST:   Testosterone Documentation     Prior to injection, verified patient identity using patient's name and date of birth. Medication was administered. Please see MAR and medication order for additional information. Patient instructed to report any adverse reaction to staff immediately .    Reminders     - Check vial for refills remaining and initiate refill request if no refills remain.      - Verify with patient that medication was paid for at pharmacy. If it was, check the \"patient supplied\" box on the MAR.     Was entire vial of medication used? Yes  Vial/Syringe: Single dose vial  Expiration Date:  04/2021  Was this medication supplied by the patient? No     The following medication was given:     MEDICATION: Depo Testosterone  200 mg  ROUTE: IM  SITE: Ventrogluteal - Left  DOSE: 200 mg  LOT #: [id number]  :  Hospira  EXPIRATION DATE:  04/2021  NDC#: [id number]    Darling Sheppard RN -- [facility]            "

## 2019-10-14 DIAGNOSIS — F98.8 ATTENTION DEFICIT DISORDER, UNSPECIFIED HYPERACTIVITY PRESENCE: ICD-10-CM

## 2019-10-15 RX ORDER — DEXTROAMPHETAMINE SACCHARATE, AMPHETAMINE ASPARTATE MONOHYDRATE, DEXTROAMPHETAMINE SULFATE AND AMPHETAMINE SULFATE 7.5; 7.5; 7.5; 7.5 MG/1; MG/1; MG/1; MG/1
CAPSULE, EXTENDED RELEASE ORAL
Qty: 30 CAPSULE | Refills: 0 | Status: SHIPPED | OUTPATIENT
Start: 2019-10-15 | End: 2019-11-12

## 2019-10-15 NOTE — TELEPHONE ENCOUNTER
PCP out of the office, routing to covering provider. Patient has appt with PCP on 12/10/19.  -Sylvie Tatum

## 2019-10-15 NOTE — TELEPHONE ENCOUNTER
Requested Prescriptions   Pending Prescriptions Disp Refills     amphetamine-dextroamphetamine (ADDERALL XR) 30 MG 24 hr capsule [Pharmacy Med Name: AMPHETAMINE SALTS *ER* 30MG CAP] 30 capsule 0     Sig: TAKE ONE CAPSULE BY MOUTH ONCE DAILY       There is no refill protocol information for this order       Last Written Prescription Date:  9/12/19  Last Fill Quantity: 30,  # refills: 0   Last office visit: 3/12/2019 with prescribing provider:  Mariluz Mae MD   Future Office Visit:   Next 5 appointments (look out 90 days)    Dec 10, 2019  7:30 AM CST  Office Visit with Mariluz Mae MD  [facility] ([facility]) [address] [place] [address] [phone]   Dec 31, 2019  9:30 AM CST  Return Visit with Sumaya Alonso MD  [facility] ([facility]) [address] [place] [address] [address]  [facility] [address] [phone]

## 2019-10-17 ENCOUNTER — ALLIED HEALTH/NURSE VISIT (OUTPATIENT)
Dept: NURSING | Facility: CLINIC | Age: 29
End: 2019-10-17
Payer: MEDICARE

## 2019-10-17 DIAGNOSIS — E34.9 TESTOSTERONE DEFICIENCY: Primary | ICD-10-CM

## 2019-10-17 PROCEDURE — 96372 THER/PROPH/DIAG INJ SC/IM: CPT

## 2019-10-17 PROCEDURE — 99207 ZZC NO CHARGE NURSE ONLY: CPT

## 2019-10-17 RX ADMIN — TESTOSTERONE CYPIONATE 200 MG: 200 INJECTION, SOLUTION INTRAMUSCULAR at 09:34

## 2019-10-17 NOTE — PROGRESS NOTES
The following medication was given:     MEDICATION: Testosterone 200 mg  ROUTE: IM  SITE: Ventrogluteal - Right  DOSE: 200 mg  LOT #: [id number]  :  Svetaira  EXPIRATION DATE:  05/2021  NDC#: 5652-5210-36      Patient/patient's father informed he has one more injection left on current Rx and will need new Rx after next injection.     Emilia Henley, MSN, RN, PHN

## 2019-10-23 ENCOUNTER — TELEPHONE (OUTPATIENT)
Dept: ENDOCRINOLOGY | Facility: CLINIC | Age: 29
End: 2019-10-23

## 2019-10-23 DIAGNOSIS — E34.9 TESTOSTERONE DEFICIENCY: ICD-10-CM

## 2019-10-23 RX ORDER — TESTOSTERONE CYPIONATE 200 MG/ML
200 INJECTION, SOLUTION INTRAMUSCULAR
Status: ACTIVE | OUTPATIENT
Start: 2019-11-14 | End: 2020-02-05

## 2019-10-23 NOTE — TELEPHONE ENCOUNTER
He is getting testosterone shots- he gets it administered in clinic by RN  Please pend testosterone. Same dose of testosterone.

## 2019-10-23 NOTE — TELEPHONE ENCOUNTER
Patient's father calling to make sure the testosterone shot order is updated so Mamadou can continue getting those shots every two weeks. They have an appointment scheduled to see Dr Alonso on 12/31/19.

## 2019-10-29 DIAGNOSIS — E23.2 DIABETES INSIPIDUS (H): ICD-10-CM

## 2019-10-30 DIAGNOSIS — E34.9 TESTOSTERONE DEFICIENCY: Primary | ICD-10-CM

## 2019-10-30 RX ORDER — DESMOPRESSIN ACETATE 0.1 MG/ML
SOLUTION NASAL
Qty: 10 ML | Refills: 3 | Status: SHIPPED | OUTPATIENT
Start: 2019-10-30 | End: 2020-02-24

## 2019-10-30 RX ORDER — TESTOSTERONE CYPIONATE 200 MG/ML
200 INJECTION, SOLUTION INTRAMUSCULAR ONCE
Status: COMPLETED | OUTPATIENT
Start: 2019-10-30 | End: 2019-10-31

## 2019-10-30 NOTE — PROGRESS NOTES
Yes.  Please pend the orders with correct quantity, select pharmacy and then send for signature. Also associate with correct diagnosis.    Thank you.    Sumaya Alonso MD

## 2019-10-30 NOTE — TELEPHONE ENCOUNTER
Requested Prescriptions   Pending Prescriptions Disp Refills     desmopressin (DDAVP) 0.01 % SOLN spray [Pharmacy Med Name: DESMOPRESSIN ACE SPRAY RE 0.01 SOLN] 10 mL 3     Sig: USE THREE SPRAYS INTO NOSTRIL ONCE DAILY AND USE ONE SPRAY INTO NOSTRIL AT BEDTIME       There is no refill protocol information for this order        Last Written Prescription Date:  07/17/19  Last Fill Quantity: 10,  # refills: 3   Last office visit: 4/18/2019 with prescribing provider:  yes   Future Office Visit:   Next 5 appointments (look out 90 days)    Oct 31, 2019  8:30 AM CDT  Nurse Visit with CR RN  [facility] ([facility]) [address] [place] [address]  [phone]   Oct 31, 2019  8:40 AM CDT  Non provider visit with GUERITA ROQUE/LPN  [facility] ([facility]) [address] [place] [address]  [phone]   Dec 10, 2019  7:30 AM CST  Office Visit with Mariluz Mae MD  [facility] ([facility]) [address] [facility] [address] [phone]   Dec 31, 2019  9:30 AM CST  Return Visit with Sum[facility]onso MD  [facility] [address] [facility]  [address]  [facility] [id number] [phone]

## 2019-10-31 ENCOUNTER — ALLIED HEALTH/NURSE VISIT (OUTPATIENT)
Dept: FAMILY MEDICINE | Facility: CLINIC | Age: 29
End: 2019-10-31
Payer: MEDICARE

## 2019-10-31 DIAGNOSIS — E34.9 TESTOSTERONE DEFICIENCY: Primary | ICD-10-CM

## 2019-10-31 DIAGNOSIS — Z23 NEED FOR PROPHYLACTIC VACCINATION AND INOCULATION AGAINST INFLUENZA: Primary | ICD-10-CM

## 2019-10-31 PROCEDURE — 90686 IIV4 VACC NO PRSV 0.5 ML IM: CPT

## 2019-10-31 PROCEDURE — 99207 ZZC NO CHARGE NURSE ONLY: CPT

## 2019-10-31 PROCEDURE — 96372 THER/PROPH/DIAG INJ SC/IM: CPT

## 2019-10-31 PROCEDURE — G0008 ADMIN INFLUENZA VIRUS VAC: HCPCS

## 2019-10-31 RX ADMIN — TESTOSTERONE CYPIONATE 200 MG: 200 INJECTION, SOLUTION INTRAMUSCULAR at 09:04

## 2019-10-31 NOTE — PROGRESS NOTES
"Clinic Administered Medication Documentation    MEDICATION LIST:   Testosterone Documentation     Prior to injection, verified patient identity using patient's name and date of birth. Medication was administered. Please see MAR and medication order for additional information. Patient instructed to report any adverse reaction to staff immediately .    Reminders     - Check vial for refills remaining and initiate refill request if no refills remain.      - Verify with patient that medication was paid for at pharmacy. If it was, check the \"patient supplied\" box on the MAR.     Was entire vial of medication used? Yes  Vial/Syringe: Single dose vial  Expiration Date:  07/2021  Was this medication supplied by the patient? No     The following medication was given:     MEDICATION: Depo Testosterone  200 mg  ROUTE: IM  SITE: Ventrogluteal - Left  DOSE: 200 mg  LOT #: [id number]  :  Hospira  EXPIRATION DATE:  07/2021  NDC#: [id number]    Darling Sheppard RN -- [facility]                  "

## 2019-11-12 DIAGNOSIS — F98.8 ATTENTION DEFICIT DISORDER, UNSPECIFIED HYPERACTIVITY PRESENCE: ICD-10-CM

## 2019-11-12 RX ORDER — DEXTROAMPHETAMINE SACCHARATE, AMPHETAMINE ASPARTATE MONOHYDRATE, DEXTROAMPHETAMINE SULFATE AND AMPHETAMINE SULFATE 7.5; 7.5; 7.5; 7.5 MG/1; MG/1; MG/1; MG/1
CAPSULE, EXTENDED RELEASE ORAL
Qty: 30 CAPSULE | Refills: 0 | Status: SHIPPED | OUTPATIENT
Start: 2019-11-12 | End: 2019-12-10

## 2019-11-12 NOTE — TELEPHONE ENCOUNTER
Requested Prescriptions   Pending Prescriptions Disp Refills     amphetamine-dextroamphetamine (ADDERALL XR) 30 MG 24 hr capsule [Pharmacy Med Name: AMPHETAMINE SALTS *ER* 30MG CAP] 30 capsule 0     Sig: TAKE ONE CAPSULE BY MOUTH ONCE DAILY   Last Written Prescription Date:  10/15/19  Last Fill Quantity: 30,  # refills: 0   Last office visit: 10/31/2019 with prescribing provider:  Mariluz Mae MD   Future Office Visit:   Next 5 appointments (look out 90 days)    Dec 10, 2019  7:30 AM CST  Office Visit with Mariluz Mae MD  [facility] ([facility]) [address] [place] [address] [phone]   Dec 31, 2019  9:30 AM CST  Return Visit with Sumaya Alonso MD  [facility] ([facility]) [address] [place] [address] [address]  [facility] [address] [phone]             There is no refill protocol information for this order

## 2019-11-14 ENCOUNTER — ALLIED HEALTH/NURSE VISIT (OUTPATIENT)
Dept: FAMILY MEDICINE | Facility: CLINIC | Age: 29
End: 2019-11-14
Payer: MEDICARE

## 2019-11-14 DIAGNOSIS — E34.9 TESTOSTERONE DEFICIENCY: Primary | ICD-10-CM

## 2019-11-14 PROCEDURE — 96372 THER/PROPH/DIAG INJ SC/IM: CPT

## 2019-11-14 PROCEDURE — 99207 ZZC NO CHARGE NURSE ONLY: CPT

## 2019-11-14 RX ADMIN — TESTOSTERONE CYPIONATE 200 MG: 200 INJECTION, SOLUTION INTRAMUSCULAR at 09:00

## 2019-11-14 NOTE — NURSING NOTE
The following medication was given:     MEDICATION: Depo Testosterone  200 mg  ROUTE: IM  SITE: Ventrogluteal - Right  DOSE: 200 mg  LOT #: [id number]  :  Pfizer  EXPIRATION DATE:  07/2021  NDC#: [id number]     Due to injection administration, patient instructed to remain in clinic for 15 minutes  afterwards, and to report any adverse reaction to me immediately.    Prisca Melendrez RN, BS  Clinical Nurse Triage.

## 2019-11-17 DIAGNOSIS — E23.0 PANHYPOPITUITARISM (H): ICD-10-CM

## 2019-11-18 NOTE — TELEPHONE ENCOUNTER
Routing refill request to provider for review/approval because:  Drug not on the [facility] refill protocol   Next 5 appointments (look out 90 days)    Dec 10, 2019  7:30 AM CST  Office Visit with Mariluz Mae MD  [facility] ([facility]) [address] [place] [address] [phone]   Dec 31, 2019  9:30 AM CST  Return Visit with Sumaya Alonso MD  [facility] [address] [place] [address] [phone]

## 2019-11-18 NOTE — TELEPHONE ENCOUNTER
Requested Prescriptions   Pending Prescriptions Disp Refills     NORDITROPIN FLEXPRO 10 MG/1.5ML SOLN PEN injection [Pharmacy Med Name: NORDITROPIN FLEXPRO 10MG/ 1.5]  3     Sig: Inject 0.7 mg under the skin daily.. Expires 28 days after initial use.       There is no refill protocol information for this order        Last Written Prescription Date:  06/18/19  Last Fill Quantity: 10,  # refills: 1   Last office visit: 4/18/2019 with prescribing provider:  yes   Future Office Visit:   Next 5 appointments (look out 90 days)    Dec 10, 2019  7:30 AM CST  Office Visit with Mariluz Mae MD  [facility]) [address] [place] [address] [phone]   Dec 31, 2019  9:30 AM CST  Return Visit with Sumaya Alonso MD  [facility] ([facility]) [address] [place] [address] [phone]

## 2019-12-05 ENCOUNTER — ALLIED HEALTH/NURSE VISIT (OUTPATIENT)
Dept: FAMILY MEDICINE | Facility: CLINIC | Age: 29
End: 2019-12-05
Payer: MEDICARE

## 2019-12-05 DIAGNOSIS — E34.9 TESTOSTERONE DEFICIENCY: Primary | ICD-10-CM

## 2019-12-05 PROCEDURE — 96372 THER/PROPH/DIAG INJ SC/IM: CPT

## 2019-12-05 PROCEDURE — 99207 ZZC NO CHARGE NURSE ONLY: CPT

## 2019-12-05 RX ADMIN — TESTOSTERONE CYPIONATE 200 MG: 200 INJECTION, SOLUTION INTRAMUSCULAR at 08:50

## 2019-12-05 NOTE — PROGRESS NOTES
"Clinic Administered Medication Documentation    MEDICATION LIST:   Testosterone Documentation     Prior to injection, verified patient identity using patient's name and date of birth. Medication was administered. Please see MAR and medication order for additional information. Patient instructed to remain in clinic for 15 minutes and report any adverse reaction to staff immediately .    Reminders     - Check vial for refills remaining and initiate refill request if no refills remain.      - Verify with patient that medication was paid for at pharmacy. If it was, check the \"patient supplied\" box on the MAR.     Was entire vial of medication used? Yes  Vial/Syringe: Single dose vial  Expiration Date:  07/2021  Was this medication supplied by the patient? No    SITE: Ventrogluteal - Left  LOT #: [id number]  :  Pfizer   NDC#: 1685-8413-98     Aubree Strong, SALINASN, RN, PHN    "

## 2019-12-10 ENCOUNTER — OFFICE VISIT (OUTPATIENT)
Dept: FAMILY MEDICINE | Facility: CLINIC | Age: 29
End: 2019-12-10
Payer: MEDICARE

## 2019-12-10 VITALS
HEART RATE: 77 BPM | HEIGHT: 77 IN | WEIGHT: 197.6 LBS | BODY MASS INDEX: 23.33 KG/M2 | DIASTOLIC BLOOD PRESSURE: 84 MMHG | RESPIRATION RATE: 16 BRPM | TEMPERATURE: 97.7 F | SYSTOLIC BLOOD PRESSURE: 128 MMHG | OXYGEN SATURATION: 99 %

## 2019-12-10 DIAGNOSIS — Z23 NEED FOR TDAP VACCINATION: ICD-10-CM

## 2019-12-10 DIAGNOSIS — F98.8 ATTENTION DEFICIT DISORDER, UNSPECIFIED HYPERACTIVITY PRESENCE: ICD-10-CM

## 2019-12-10 DIAGNOSIS — E23.0 PANHYPOPITUITARISM (H): Primary | ICD-10-CM

## 2019-12-10 PROCEDURE — 99213 OFFICE O/P EST LOW 20 MIN: CPT | Mod: 25 | Performed by: FAMILY MEDICINE

## 2019-12-10 PROCEDURE — 90715 TDAP VACCINE 7 YRS/> IM: CPT | Performed by: FAMILY MEDICINE

## 2019-12-10 PROCEDURE — 90471 IMMUNIZATION ADMIN: CPT | Performed by: FAMILY MEDICINE

## 2019-12-10 RX ORDER — DEXTROAMPHETAMINE SACCHARATE, AMPHETAMINE ASPARTATE MONOHYDRATE, DEXTROAMPHETAMINE SULFATE AND AMPHETAMINE SULFATE 7.5; 7.5; 7.5; 7.5 MG/1; MG/1; MG/1; MG/1
CAPSULE, EXTENDED RELEASE ORAL
Qty: 30 CAPSULE | Refills: 0 | Status: SHIPPED | OUTPATIENT
Start: 2020-02-08 | End: 2020-03-20

## 2019-12-10 RX ORDER — DEXTROAMPHETAMINE SACCHARATE, AMPHETAMINE ASPARTATE MONOHYDRATE, DEXTROAMPHETAMINE SULFATE AND AMPHETAMINE SULFATE 7.5; 7.5; 7.5; 7.5 MG/1; MG/1; MG/1; MG/1
CAPSULE, EXTENDED RELEASE ORAL
Qty: 30 CAPSULE | Refills: 0 | Status: SHIPPED | OUTPATIENT
Start: 2019-12-10 | End: 2019-12-10

## 2019-12-10 RX ORDER — DEXTROAMPHETAMINE SACCHARATE, AMPHETAMINE ASPARTATE MONOHYDRATE, DEXTROAMPHETAMINE SULFATE AND AMPHETAMINE SULFATE 7.5; 7.5; 7.5; 7.5 MG/1; MG/1; MG/1; MG/1
CAPSULE, EXTENDED RELEASE ORAL
Qty: 30 CAPSULE | Refills: 0 | Status: SHIPPED | OUTPATIENT
Start: 2020-01-09 | End: 2019-12-10

## 2019-12-10 ASSESSMENT — MIFFLIN-ST. JEOR: SCORE: 1983.69

## 2019-12-10 NOTE — PROGRESS NOTES
Subjective     Mamadou Reece is a 29 year old male who presents to clinic today for the following health issues:    HPI   Medication Followup of Adderall    Taking Medication as prescribed: yes    Side Effects:  None    Medication Helping Symptoms:  yes           See under ROS    Patient Active Problem List   Diagnosis     ADD (attention deficit disorder)     Craniopharyngioma (H)     CARDIOVASCULAR SCREENING; LDL GOAL LESS THAN 160     Mood change     Legal blindness     Hypothyroidism     Panhypopituitarism (H)     History of craniopharyngioma     Testosterone deficiency     BCC (basal cell carcinoma), face     Urinary frequency       Current Outpatient Medications   Medication Sig Dispense Refill     amphetamine-dextroamphetamine (ADDERALL XR) 30 MG 24 hr capsule TAKE ONE CAPSULE BY MOUTH ONCE DAILY 30 capsule 0     Calcium Citrate-Vitamin D (CALCIUM + D) 315-200 MG-UNIT TABS Take by mouth 2 times daily        Cholecalciferol (VITAMIN D) 1000 UNIT capsule Take 1 capsule by mouth daily        Cyanocobalamin (B-12) 1000 MCG TBCR Take 2,000 mcg by mouth daily  100 tablet 1     desmopressin (DDAVP) 0.01 % SOLN spray USE THREE SPRAYS INTO NOSTRIL ONCE DAILY AND USE ONE SPRAY INTO NOSTRIL AT BEDTIME 10 mL 3     levothyroxine (SYNTHROID/LEVOTHROID) 137 MCG tablet TAKE ONE TABLET BY MOUTH EVERY MORNING BEFORE BREAKFAST 30 tablet 11     NORDITROPIN FLEXPRO 10 MG/1.5ML SOLN PEN injection Inject 0.7 mg under the skin daily.. Expires 28 days after initial use. 10 mL 2     predniSONE (DELTASONE) 1 MG tablet TAKE THREE TABLETS (3 MG) BY MOUTH IN THE MORNING AND TWO TABLETS (2 MG) AT NIGHT. 200 tablet 11     testosterone cypionate (DEPOTESTOSTERONE) 200 MG/ML injection Inject 1 mL (200 mg) into the muscle every 14 days 1 mL 5       Reviewed and updated as needed this visit by Provider        Immunization History   Administered Date(s) Administered     DTAP (<7y) 1991, 04/03/1992, 07/19/1992, 04/26/1993, 09/05/1996     KAYLI  "08/13/2009     HepB 04/06/1995     Hib (PRP-T) 1991, 02/07/1992, 04/03/1992, 01/26/1993     Influenza (IIV3) PF 11/24/2009, 11/02/2012, 10/01/2013     Influenza Vaccine IM > 6 months Valent IIV4 09/23/2015, 10/31/2019     Influenza Vaccine, 3 YRS +, IM (QUADRIVALENT W/PRESERVATIVES) 01/24/2018     MMR 01/26/1993, 01/06/2000     Meningococcal (Menomune ) 12/14/2005     Poliovirus, inactivated (IPV) 1991, 02/07/1992, 04/26/1993, 09/05/1996     TD (ADULT, 7+) 01/29/2003     TDAP Vaccine (Adacel) 08/13/2009     Varicella 09/05/1996         Review of Systems   Here with mother.   ROS COMP: CONSTITUTIONAL:NEGATIVE for fever, chills, change in weight  CV: NEGATIVE for chest pain, palpitations or peripheral edema  PSYCHIATRIC: NEGATIVE for changes in mood or affect    Sleep going well now.   Mother notes there may have been some stress with a move at the last visit.  He continues to work at [employer]; this continues to go well.         Objective    /84 (BP Location: Right arm, Cuff Size: Adult Regular)   Pulse 77   Temp 97.7  F (36.5  C) (Oral)   Resp 16   Ht 1.956 m (6' 5\")   Wt 89.6 kg (197 lb 9.6 oz)   SpO2 99%   BMI 23.43 kg/m    Body mass index is 23.43 kg/m .  Physical Exam   GENERAL APPEARANCE: alert and no distress  CV: regular rates and rhythm  PSYCH: mentation appears normal and affect normal/bright            Assessment & Plan     1. Attention deficit disorder, unspecified hyperactivity presence  Doing well. Did send over 3 months of refills.   Can do this again in 3 months with a visit in 6.  - amphetamine-dextroamphetamine (ADDERALL XR) 30 MG 24 hr capsule; TAKE ONE CAPSULE BY MOUTH ONCE DAILY  Dispense: 30 capsule; Refill: 0  - amphetamine-dextroamphetamine (ADDERALL XR) 30 MG 24 hr capsule; TAKE ONE CAPSULE BY MOUTH ONCE DAILY  Dispense: 30 capsule; Refill: 0  - amphetamine-dextroamphetamine (ADDERALL XR) 30 MG 24 hr capsule; TAKE ONE CAPSULE BY MOUTH ONCE DAILY  Dispense: 30 capsule; " Refill: 0    2. Panhypopituitarism (H)  Has appointment to follow up with Dr. Alonso.     3. Need for Tdap vaccination    - TDAP, IM (10 - 64 YRS) - Adacel  - ADMIN 1st VACCINE      Return in about 6 months (around 6/10/2020).    Mariluz Mae MD, MD  [facility]

## 2019-12-12 DIAGNOSIS — E23.0 PANHYPOPITUITARISM (H): ICD-10-CM

## 2019-12-12 DIAGNOSIS — E03.9 ACQUIRED HYPOTHYROIDISM: ICD-10-CM

## 2019-12-12 DIAGNOSIS — E03.9 HYPOTHYROIDISM, UNSPECIFIED TYPE: ICD-10-CM

## 2019-12-12 DIAGNOSIS — Z86.03 HISTORY OF CRANIOPHARYNGIOMA: ICD-10-CM

## 2019-12-12 LAB
ANION GAP SERPL CALCULATED.3IONS-SCNC: 5 MMOL/L (ref 3–14)
BUN SERPL-MCNC: 12 MG/DL (ref 7–30)
CALCIUM SERPL-MCNC: 9.3 MG/DL (ref 8.5–10.1)
CHLORIDE SERPL-SCNC: 106 MMOL/L (ref 94–109)
CO2 SERPL-SCNC: 29 MMOL/L (ref 20–32)
CREAT SERPL-MCNC: 0.98 MG/DL (ref 0.66–1.25)
GFR SERPL CREATININE-BSD FRML MDRD: >90 ML/MIN/{1.73_M2}
GLUCOSE SERPL-MCNC: 67 MG/DL (ref 70–99)
HGB BLD-MCNC: 16.2 G/DL (ref 13.3–17.7)
POTASSIUM SERPL-SCNC: 3.7 MMOL/L (ref 3.4–5.3)
PROLACTIN SERPL-MCNC: <1 UG/L (ref 2–18)
SODIUM SERPL-SCNC: 140 MMOL/L (ref 133–144)
T4 FREE SERPL-MCNC: 1.45 NG/DL (ref 0.76–1.46)

## 2019-12-12 PROCEDURE — 84403 ASSAY OF TOTAL TESTOSTERONE: CPT | Performed by: INTERNAL MEDICINE

## 2019-12-12 PROCEDURE — 80048 BASIC METABOLIC PNL TOTAL CA: CPT | Performed by: INTERNAL MEDICINE

## 2019-12-12 PROCEDURE — 84146 ASSAY OF PROLACTIN: CPT | Performed by: INTERNAL MEDICINE

## 2019-12-12 PROCEDURE — 84270 ASSAY OF SEX HORMONE GLOBUL: CPT | Performed by: INTERNAL MEDICINE

## 2019-12-12 PROCEDURE — 84439 ASSAY OF FREE THYROXINE: CPT | Performed by: INTERNAL MEDICINE

## 2019-12-12 PROCEDURE — 36415 COLL VENOUS BLD VENIPUNCTURE: CPT | Performed by: INTERNAL MEDICINE

## 2019-12-12 PROCEDURE — 84305 ASSAY OF SOMATOMEDIN: CPT | Performed by: INTERNAL MEDICINE

## 2019-12-12 PROCEDURE — 85018 HEMOGLOBIN: CPT | Performed by: INTERNAL MEDICINE

## 2019-12-13 LAB — IGF-I BLD-MCNC: 254 NG/ML (ref 87–255)

## 2019-12-14 LAB
SHBG SERPL-SCNC: 23 NMOL/L (ref 11–80)
TESTOST FREE SERPL-MCNC: 19.09 NG/DL (ref 4.7–24.4)
TESTOST SERPL-MCNC: 712 NG/DL (ref 240–950)

## 2019-12-19 ENCOUNTER — ALLIED HEALTH/NURSE VISIT (OUTPATIENT)
Dept: FAMILY MEDICINE | Facility: CLINIC | Age: 29
End: 2019-12-19
Payer: MEDICARE

## 2019-12-19 DIAGNOSIS — E34.9 TESTOSTERONE DEFICIENCY: Primary | ICD-10-CM

## 2019-12-19 PROCEDURE — 99207 ZZC NO CHARGE NURSE ONLY: CPT

## 2019-12-19 PROCEDURE — 96372 THER/PROPH/DIAG INJ SC/IM: CPT

## 2019-12-19 RX ADMIN — TESTOSTERONE CYPIONATE 200 MG: 200 INJECTION, SOLUTION INTRAMUSCULAR at 08:45

## 2019-12-19 NOTE — NURSING NOTE
The following medication was given:     MEDICATION: Testosterone 200 mg  ROUTE: IM  SITE: Ventrogluteal - Right  DOSE: 200 mg  LOT #: [id number]  :  Pfizer  EXPIRATION DATE:  07/2021  NDC#: 0838-4081-77    Due to injection administration, patient instructed to remain in clinic for 15 minutes  afterwards, and to report any adverse reaction to me immediately.  Prisca Melendrez RN, BS  Clinical Nurse Triage.

## 2019-12-31 ENCOUNTER — OFFICE VISIT (OUTPATIENT)
Dept: ENDOCRINOLOGY | Facility: CLINIC | Age: 29
End: 2019-12-31
Payer: MEDICARE

## 2019-12-31 VITALS
HEART RATE: 73 BPM | RESPIRATION RATE: 16 BRPM | BODY MASS INDEX: 23.41 KG/M2 | SYSTOLIC BLOOD PRESSURE: 120 MMHG | TEMPERATURE: 97.6 F | OXYGEN SATURATION: 100 % | DIASTOLIC BLOOD PRESSURE: 82 MMHG | WEIGHT: 198.3 LBS | HEIGHT: 77 IN

## 2019-12-31 DIAGNOSIS — E23.0 PANHYPOPITUITARISM (H): Primary | ICD-10-CM

## 2019-12-31 DIAGNOSIS — E03.9 ACQUIRED HYPOTHYROIDISM: ICD-10-CM

## 2019-12-31 DIAGNOSIS — E34.9 TESTOSTERONE DEFICIENCY: ICD-10-CM

## 2019-12-31 DIAGNOSIS — Z86.03 HISTORY OF CRANIOPHARYNGIOMA: ICD-10-CM

## 2019-12-31 PROCEDURE — 99214 OFFICE O/P EST MOD 30 MIN: CPT | Performed by: INTERNAL MEDICINE

## 2019-12-31 RX ORDER — TESTOSTERONE CYPIONATE 200 MG/ML
200 INJECTION, SOLUTION INTRAMUSCULAR
Qty: 1 ML | Refills: 5
Start: 2019-12-31 | End: 2021-08-12

## 2019-12-31 ASSESSMENT — MIFFLIN-ST. JEOR: SCORE: 1986.86

## 2019-12-31 NOTE — LETTER
12/31/2019         RE: Mamadou Reece  [address] [place] [address]        Dear Colleague,    Thank you for referring your patient, Mamadou Reece, to the [facility]. Please see a copy of my visit note below.    Name: Mamadou Reece  Seen for f/u of panhypopituitarism. Last visit 4/2019.  Chief Complaint   Patient presents with     RECHECK      HPI:  Mamadou Reece is a 29 year old male who presents for the evaluation of  Above.  Was seen in [facility] before. Records reviewed. Last visit with Endo was 9/2016  Before that he was followed by Dr. Rendon at [facility].  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 He had stereotactic  radiation therapy at age 11.   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  Followed by Pediatrics oncology; getting MRIs every 2 yrs now. Due for MRI and has upcoming appointment.  Has not had recurrence since his radiation therapy    He is currently on full hormonal treatment with medications listed below.  He is also on growth hormone replacement.  I have discussed role of growth hormone replacement in adults is controversial  Patient's mother reports that she was told by her pediatric endocrinologist that he should never be getting off of growth hormone  They would like to continue it.    Feeling OK.  No major concerns today.    1. HYpogonadism:  On testosterone cypionate 200 mg IM q 2 weeks.  Goes to clinic.   Last testosterone checked 12/2019 was in acceptable range in 800s.  Hemoglobin levels appears stable.  Energy level is OK.  Shaving is normal.  Muscle strength is OK.    2. Diabetes Insipidus:  On desmopressiin 10 mcg- 3 sprays in evening and 1 at bedtime. (Nighttime dose was added in 2018 as he was waking up frequently at night)  Not waking up at night.  No major concerns. No excessive thirst or frequent  urination.  Drinks to thirst.    3. Growth hormone deficiency:  On Norditropin 0.7 mg/day.   Doing ok.  Takes own injections.  Living in apartment.    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  Reports compliance.   No CP, palpitations, diarrhea or constipation.  + weight stable.  Wt Readings from Last 2 Encounters:   12/31/19 89.9 kg (198 lb 4.8 oz)   12/10/19 89.6 kg (197 lb 9.6 oz)       5. Secondary adrenal insufficiency:  On prednsione 3 mg AM and 2 mg PM. On this dose X many years.  No nausea. No vomiting.no dizziness.  BP stable. Electrolytes are in range.  Wt Readings from Last 2 Encounters:   12/31/19 89.9 kg (198 lb 4.8 oz)   12/10/19 89.6 kg (197 lb 9.6 oz)       PMH/PSH:  1. Diabetes insipidus   2. Panhypopituitarism   3. History of ADD.  4. Central hypothyroidism   5. Secondary adrenal insufficiency  6. Growth hormone deficiency   7. Hypogonadotropic hypogonadism     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, [facility]     Legal blindness     Craniopharyngioma     Mood disorder (H)     Craniopharyngioma     Radiation age 11    Craniopharyngioma     Past Surgical History:   Procedure Laterality Date     CRANIOTOMY, EXCISE TUMOR COMPLEX, COMBINED  age 6    craniopharyngioma     MOHS MICROGRAPHIC PROCEDURE Left 05/31/2018    left cheek nodular BCC     Family Hx:  Family History   Problem Relation Age of Onset     Cancer Father         Melanoma     Cerebrovascular Disease Maternal Grandmother 91     Diabetes Maternal Grandmother      Cerebrovascular Disease Paternal Grandmother      Cerebrovascular Disease Paternal Grandfather      Social Hx:  Social History     Socioeconomic History     Marital status: Single     Spouse name: Not on file     Number of children: Not on file     Years of education: Not on file     Highest education level: Not on file   Occupational History     Not on file   Social Needs     Financial resource strain: Not on file      "Food insecurity:     Worry: Not on file     Inability: Not on file     Transportation needs:     Medical: Not on file     Non-medical: Not on file   Tobacco Use     Smoking status: Never Smoker     Smokeless tobacco: Never Used   Substance and Sexual Activity     Alcohol use: Yes     Alcohol/week: 0.0 standard drinks     Comment: sip at holidays     Drug use: No     Sexual activity: Never   Lifestyle     Physical activity:     Days per week: Not on file     Minutes per session: Not on file     Stress: Not on file   Relationships     Social connections:     Talks on phone: Not on file     Gets together: Not on file     Attends Episcopal service: Not on file     Active member of club or organization: Not on file     Attends meetings of clubs or organizations: Not on file     Relationship status: Not on file     Intimate partner violence:     Fear of current or ex partner: Not on file     Emotionally abused: Not on file     Physically abused: Not on file     Forced sexual activity: Not on file   Other Topics Concern     Parent/sibling w/ CABG, MI or angioplasty before 65F 55M? Not Asked   Social History Narrative     Not on file          MEDICATIONS:  has a current medication list which includes the following prescription(s): amphetamine-dextroamphetamine, calcium + d, vitamin d, b-12, desmopressin, levothyroxine, norditropin flexpro, prednisone, and testosterone cypionate, and the following Facility-Administered Medications: testosterone cypionate.    ROS     ROS: 10 point ROS neg other than the symptoms noted above in the HPI.    Physical Exam   VS: BP (!) 141/88 (BP Location: Right arm, Patient Position: Chair, Cuff Size: Adult Regular)   Pulse 73   Temp 97.6  F (36.4  C) (Oral)   Resp 16   Ht 1.956 m (6' 5\")   Wt 89.9 kg (198 lb 4.8 oz)   SpO2 100%   BMI 23.51 kg/m     GENERAL: AXOX3, NAD, well dressed, answering questions appropriately, appears stated age.  NEUROLOGY: CN grossly intact, no tremors  MSK: " grossly intact  Psych: normal mood    LABS:  Component      Latest Ref Rng & Units 12/12/2019   Testosterone Total      240 - 950 ng/dL 712   Sex Hormone Binding Globulin      11 - 80 nmol/L 23   Free Testosterone Calculated      4.7 - 24.4 ng/dL 19.09   Ins Growth Factor 1      87 - 255 ng/ml 254   Hemoglobin      13.3 - 17.7 g/dL 16.2   T4 Free      0.76 - 1.46 ng/dL 1.45   Prolactin      2 - 18 ug/L <1 (L)       ENDO THYROID LABS-[facility] Latest Ref Rng & Units 12/12/2019   T4 FREE 0.76 - 1.46 ng/dL 1.45     ENDO THYROID LABS-[facility] Latest Ref Rng & Units 1/3/2019 6/20/2018   T4 FREE 0.76 - 1.46 ng/dL 1.17 1.38     ENDO THYROID LABS-[facility] Latest Ref Rng & Units 10/5/2017   T4 FREE 0.76 - 1.46 ng/dL 1.18     ENDO PITUITARY LABS-[facility] Latest Ref Rng & Units 10/5/2017   PROLACTIN 2 - 18 ug/L 1 (L)   TESTOSTERONE TOTAL 240 - 950 ng/dL 647   PSA 0 - 4 ug/L 0.17    - 144 mmol/L 141   POTASSIUM 3.4 - 5.3 mmol/L 3.8   INS GROWTH FACTOR 1 94 - 271 ng/ml 298 (H)     ENDO PITUITARY LABS-[facility] Latest Ref Rng & Units 10/4/2018   TESTOSTERONE TOTAL 240 - 950 ng/dL 859     ENDO PITUITARY LABS-[facility] Latest Ref Rng & Units 1/3/2019    - 144 mmol/L 138   POTASSIUM 3.4 - 5.3 mmol/L    INS GROWTH FACTOR 1 90 - 262 ng/ml 320 (H)     All pertinent notes, labs, and images personally reviewed by me.     A/P  Mr.Jared Reece is a 29 year old here for the evaluation of above:    1. Hypogonadism:  On testosterone cypionate 200 mg IM q 2 weeks.  Goes to clinic for njections.  Clinically looks okay.  No major concerns  Last testosterone checked in 700s.  Plan to continue same dose.  Labs in 1 year.    2. Diabetes Insipidus:  On desmopressiin (0.01 % solution)10 mcg- 3 sprays in evening and 1 spray at bedtime.  No major concerns. No excessive thirst or frequent urination.    -- continue current dose of DDAVP  Repeat labs in 1 year.      3. Growth hormone deficiency:  On Norditropin 0.7 mg/day. ( 10 mg/1.5 ml solution)  Discussed GH replacement in  adults.  They would like to continue it. Agree with it.  Labs and follow-up in 1 year.    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  Reports compliance. But taking it at night.  Clinically looks euthyroid.   Repeat labs in 1 year.    5. Secondary adrenal insufficiency:  On prednsione 3 mg AM and 2 mg PM. On this dose X many years.  No nausea. No vomiting.  -- continue current dose  Discussed sick days.   Repeat labs (BMP) in 1 year.    6. Low BG:  Note don BMP.  Asymptomatic. It was fasting sample?  Plan to continue monitor.  Discussed s/s to watch for.      More than 50% of face to face time spent with Mr. Reece on counseling / coordinating his care.      Follow-up:  1 year.      Sumaya Alonso MD  Endocrinology   [facility]    CC: Mariluz Mae     Disclaimer: This note consists of symbols derived from keyboarding, dictation and/or voice recognition software. As a result, there may be errors in the script that have gone undetected. Please consider this when interpreting information found in this chart.    Addendum to above note and clinic visit:    Labs reviewed.    See result note/telephone encounter.            Again, thank you for allowing me to participate in the care of your patient.        Sincerely,        Sumaya Alonso MD

## 2019-12-31 NOTE — PROGRESS NOTES
Name: Mamadou Reece  Seen for f/u of panhypopituitarism. Last visit 4/2019.  Chief Complaint   Patient presents with     RECHECK      HPI:  Mamadou Reece is a 29 year old male who presents for the evaluation of  Above.  Was seen in [facility] before. Records reviewed. Last visit with Endo was 9/2016  [facility]  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 He had stereotactic  radiation therapy at age 11.   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  Followed by Pediatrics oncology; getting MRIs every 2 yrs now. Due for MRI and has upcoming appointment.  Has not had recurrence since his radiation therapy    He is currently on full hormonal treatment with medications listed below.  He is also on growth hormone replacement.  I have discussed role of growth hormone replacement in adults is controversial  Patient's mother reports that she was told by her pediatric endocrinologist that he should never be getting off of growth hormone  They would like to continue it.    Feeling OK.  No major concerns today.    1. HYpogonadism:  On testosterone cypionate 200 mg IM q 2 weeks.  Goes to clinic.   Last testosterone checked 12/2019 was in acceptable range in 800s.  Hemoglobin levels appears stable.  Energy level is OK.  Shaving is normal.  Muscle strength is OK.    2. Diabetes Insipidus:  On desmopressiin 10 mcg- 3 sprays in evening and 1 at bedtime. (Nighttime dose was added in 2018 as he was waking up frequently at night)  Not waking up at night.  No major concerns. No excessive thirst or frequent urination.  Drinks to thirst.    3. Growth hormone deficiency:  On Norditropin 0.7 mg/day.   Doing ok.  Takes own injections.  Living in apartment.    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  Reports compliance.   No CP,  palpitations, diarrhea or constipation.  + weight stable.  Wt Readings from Last 2 Encounters:   12/31/19 89.9 kg (198 lb 4.8 oz)   12/10/19 89.6 kg (197 lb 9.6 oz)       5. Secondary adrenal insufficiency:  On prednsione 3 mg AM and 2 mg PM. On this dose X many years.  No nausea. No vomiting.no dizziness.  BP stable. Electrolytes are in range.  Wt Readings from Last 2 Encounters:   12/31/19 89.9 kg (198 lb 4.8 oz)   12/10/19 89.6 kg (197 lb 9.6 oz)       PMH/PSH:  1. Diabetes insipidus   2. Panhypopituitarism   3. History of ADD.  4. Central hypothyroidism   5. Secondary adrenal insufficiency  6. Growth hormone deficiency   7. Hypogonadotropic hypogonadism     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     Legal blindness     Craniopharyngioma     Mood disorder (H)     Craniopharyngioma     Radiation age 11    Craniopharyngioma     Past Surgical History:   Procedure Laterality Date     CRANIOTOMY, EXCISE TUMOR COMPLEX, COMBINED  age 6    craniopharyngioma     MOHS MICROGRAPHIC PROCEDURE Left 05/31/2018    left cheek nodular BCC     Family Hx:  Family History   Problem Relation Age of Onset     Cancer Father         Melanoma     Cerebrovascular Disease Maternal Grandmother 91     Diabetes Maternal Grandmother      Cerebrovascular Disease Paternal Grandmother      Cerebrovascular Disease Paternal Grandfather      Social Hx:  Social History     Socioeconomic History     Marital status: Single     Spouse name: Not on file     Number of children: Not on file     Years of education: Not on file     Highest education level: Not on file   Occupational History     Not on file   Social Needs     Financial resource strain: Not on file     Food insecurity:     Worry: Not on file     Inability: Not on file     Transportation needs:     Medical: Not on file     Non-medical: Not on file   Tobacco Use     Smoking status: Never Smoker     Smokeless tobacco: Never Used  "  Substance and Sexual Activity     Alcohol use: Yes     Alcohol/week: 0.0 standard drinks     Comment: sip at holidays     Drug use: No     Sexual activity: Never   Lifestyle     Physical activity:     Days per week: Not on file     Minutes per session: Not on file     Stress: Not on file   Relationships     Social connections:     Talks on phone: Not on file     Gets together: Not on file     Attends Gnosticist service: Not on file     Active member of club or organization: Not on file     Attends meetings of clubs or organizations: Not on file     Relationship status: Not on file     Intimate partner violence:     Fear of current or ex partner: Not on file     Emotionally abused: Not on file     Physically abused: Not on file     Forced sexual activity: Not on file   Other Topics Concern     Parent/sibling w/ CABG, MI or angioplasty before 65F 55M? Not Asked   Social History Narrative     Not on file          MEDICATIONS:  has a current medication list which includes the following prescription(s): amphetamine-dextroamphetamine, calcium + d, vitamin d, b-12, desmopressin, levothyroxine, norditropin flexpro, prednisone, and testosterone cypionate, and the following Facility-Administered Medications: testosterone cypionate.    ROS     ROS: 10 point ROS neg other than the symptoms noted above in the HPI.    Physical Exam   VS: BP (!) 141/88 (BP Location: Right arm, Patient Position: Chair, Cuff Size: Adult Regular)   Pulse 73   Temp 97.6  F (36.4  C) (Oral)   Resp 16   Ht 1.956 m (6' 5\")   Wt 89.9 kg (198 lb 4.8 oz)   SpO2 100%   BMI 23.51 kg/m    GENERAL: AXOX3, NAD, well dressed, answering questions appropriately, appears stated age.  NEUROLOGY: CN grossly intact, no tremors  MSK: grossly intact  Psych: normal mood    LABS:  Component      Latest Ref Rng & Units 12/12/2019   Testosterone Total      240 - 950 ng/dL 712   Sex Hormone Binding Globulin      11 - 80 nmol/L 23   Free Testosterone Calculated      " 4.7 - 24.4 ng/dL 19.09   Ins Growth Factor 1      87 - 255 ng/ml 254   Hemoglobin      13.3 - 17.7 g/dL 16.2   T4 Free      0.76 - 1.46 ng/dL 1.45   Prolactin      2 - 18 ug/L <1 (L)       ENDO THYROID LABS-[facility] Latest Ref Rng & Units 12/12/2019   T4 FREE 0.76 - 1.46 ng/dL 1.45     ENDO THYROID LABS-[facility] Latest Ref Rng & Units 1/3/2019 6/20/2018   T4 FREE 0.76 - 1.46 ng/dL 1.17 1.38     ENDO THYROID LABS-[facility] Latest Ref Rng & Units 10/5/2017   T4 FREE 0.76 - 1.46 ng/dL 1.18     ENDO PITUITARY LABS-[facility] Latest Ref Rng & Units 10/5/2017   PROLACTIN 2 - 18 ug/L 1 (L)   TESTOSTERONE TOTAL 240 - 950 ng/dL 647   PSA 0 - 4 ug/L 0.17    - 144 mmol/L 141   POTASSIUM 3.4 - 5.3 mmol/L 3.8   INS GROWTH FACTOR 1 94 - 271 ng/ml 298 (H)     ENDO PITUITARY LABS-[facility] Latest Ref Rng & Units 10/4/2018   TESTOSTERONE TOTAL 240 - 950 ng/dL 859     ENDO PITUITARY LABS-[facility] Latest Ref Rng & Units 1/3/2019    - 144 mmol/L 138   POTASSIUM 3.4 - 5.3 mmol/L    INS GROWTH FACTOR 1 90 - 262 ng/ml 320 (H)     All pertinent notes, labs, and images personally reviewed by me.     A/P  Mr.Jared Reece is a 29 year old here for the evaluation of above:    1. Hypogonadism:  On testosterone cypionate 200 mg IM q 2 weeks.  Goes to clinic for njections.  Clinically looks okay.  No major concerns  Last testosterone checked in 700s.  Plan to continue same dose.  Labs in 1 year.    2. Diabetes Insipidus:  On desmopressiin (0.01 % solution)10 mcg- 3 sprays in evening and 1 spray at bedtime.  No major concerns. No excessive thirst or frequent urination.    -- continue current dose of DDAVP  Repeat labs in 1 year.      3. Growth hormone deficiency:  On Norditropin 0.7 mg/day. ( 10 mg/1.5 ml solution)  Discussed GH replacement in adults.  They would like to continue it. Agree with it.  Labs and follow-up in 1 year.    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  Reports compliance. But taking it at night.  Clinically looks euthyroid.    Repeat labs in 1 year.    5. Secondary adrenal insufficiency:  On prednsione 3 mg AM and 2 mg PM. On this dose X many years.  No nausea. No vomiting.  -- continue current dose  Discussed sick days.   Repeat labs (BMP) in 1 year.    6. Low BG:  Note don BMP.  Asymptomatic. It was fasting sample?  Plan to continue monitor.  Discussed s/s to watch for.      More than 50% of face to face time spent with Mr. Reece on counseling / coordinating his care.      Follow-up:  1 year.      Sumaya Alonso MD  Endocrinology   [facility]    CC: Mariluz Mae     Disclaimer: This note consists of symbols derived from keyboarding, dictation and/or voice recognition software. As a result, there may be errors in the script that have gone undetected. Please consider this when interpreting information found in this chart.    Addendum to above note and clinic visit:    Labs reviewed.    See result note/telephone encounter.

## 2019-12-31 NOTE — PATIENT INSTRUCTIONS
[facility]   Dr Alonso, Endocrinology Department      [facility]   [address]  [place] [address]  Appointment [phone]  Fax: [phone]  [place]: Monday and Tuesday         [facility]   [address] [facility] [address]  [place] [address]  Appointment [phone]  Fax: [phone]  [place]: Wednesday and Thursday            Please check the cost coverage and copay with insurance before recommended tests, services and medications (especially if new medications are prescribed).     If ordered, please get blood work done 1 week prior to your next appointment so they will be available to Dr. Alonso at your visit.    Continue current hormonal replacement.  Fasting morning labs in 1 year.  Please make a lab appointment for blood work and follow up clinic appointment in 1 week after that to discuss results.

## 2019-12-31 NOTE — NURSING NOTE
ENDOCRINOLOGY INTAKE FORM    Patient Name:  Mamadou Reece  :  1990    Is patient Diabetic?   No  Does patient have non-diabetic or other endocrine issues?  Yes: testosterone deficiency, panhypopituitarism, hypothyroidism, craniopharyngioma    Vitals: There were no vitals taken for this visit.  BMI= There is no height or weight on file to calculate BMI.    Smoking and Alcohol use:  Social History     Tobacco Use     Smoking status: Never Smoker     Smokeless tobacco: Never Used   Substance Use Topics     Alcohol use: Yes     Alcohol/week: 0.0 standard drinks     Comment: sip at holidays     Drug use: No     Annalee Nieto, HITESH  [facility]  [place]ya

## 2020-01-09 ENCOUNTER — ALLIED HEALTH/NURSE VISIT (OUTPATIENT)
Dept: FAMILY MEDICINE | Facility: CLINIC | Age: 30
End: 2020-01-09
Payer: MEDICARE

## 2020-01-09 DIAGNOSIS — E34.9 TESTOSTERONE DEFICIENCY: Primary | ICD-10-CM

## 2020-01-09 PROCEDURE — 99207 ZZC NO CHARGE NURSE ONLY: CPT

## 2020-01-09 PROCEDURE — 96372 THER/PROPH/DIAG INJ SC/IM: CPT

## 2020-01-09 RX ADMIN — TESTOSTERONE CYPIONATE 200 MG: 200 INJECTION, SOLUTION INTRAMUSCULAR at 08:45

## 2020-01-09 NOTE — NURSING NOTE
The following medication was given:     MEDICATION: Depo Testosterone  200 mg  ROUTE: IM  SITE: Ventrogluteal - Left  DOSE: 200 mg  LOT #: [id number]  :  [employer]  EXPIRATION DATE:  07/2021  NDC#: 2142-3770-37    Due to injection administration, patient instructed to remain in clinic for 15 minutes  afterwards, and to report any adverse reaction to me immediately.    Prisca Melendrez RN, BS  Clinical Nurse Triage.

## 2020-01-23 ENCOUNTER — ALLIED HEALTH/NURSE VISIT (OUTPATIENT)
Dept: FAMILY MEDICINE | Facility: CLINIC | Age: 30
End: 2020-01-23
Payer: MEDICARE

## 2020-01-23 DIAGNOSIS — E34.9 TESTOSTERONE DEFICIENCY: Primary | ICD-10-CM

## 2020-01-23 PROCEDURE — 96372 THER/PROPH/DIAG INJ SC/IM: CPT

## 2020-01-23 PROCEDURE — 99207 ZZC NO CHARGE NURSE ONLY: CPT

## 2020-01-23 RX ADMIN — TESTOSTERONE CYPIONATE 200 MG: 200 INJECTION, SOLUTION INTRAMUSCULAR at 09:05

## 2020-01-23 NOTE — LETTER
January 23, 2020      Mamadou Reece  [address] [address] [address]        Dear Mamadou,     I scheduled your next appointment for your testosterone.    Next 5 appointments (look out 90 days)    Feb 06, 2020  8:30 AM CST  (Arrive by 8:25 AM)  [facility] [address] [place] [id number] [phone]        Again, sorry about the wait.     Sincerely,    [facility] Luis A/Mariluz CORADO

## 2020-01-23 NOTE — PROGRESS NOTES
"Clinic Administered Medication Documentation    MEDICATION LIST:   Testosterone Documentation     Prior to injection, verified patient identity using patient's name and date of birth. Medication was administered. Please see MAR and medication order for additional information. Patient instructed to remain in clinic for 15 minutes and report any adverse reaction to staff immediately .    Reminders     - Check vial for refills remaining and initiate refill request if no refills remain.      - Verify with patient that medication was paid for at pharmacy. If it was, check the \"patient supplied\" box on the MAR.     Was entire vial of medication used? Yes  Vial/Syringe: Single dose vial  Expiration Date:  7/2021  Was this medication supplied by the patient? No   Mariluz Salmon RN, BSN  Message handled by Nurse Triage.        "

## 2020-02-12 ENCOUNTER — TELEPHONE (OUTPATIENT)
Dept: FAMILY MEDICINE | Facility: CLINIC | Age: 30
End: 2020-02-12

## 2020-02-12 DIAGNOSIS — E34.9 TESTOSTERONE DEFICIENCY: Primary | ICD-10-CM

## 2020-02-12 RX ORDER — TESTOSTERONE CYPIONATE 200 MG/ML
200 INJECTION, SOLUTION INTRAMUSCULAR
Status: COMPLETED | OUTPATIENT
Start: 2020-02-12 | End: 2020-11-05

## 2020-02-12 NOTE — TELEPHONE ENCOUNTER
Charles calling to schedule a Testerone shot for patient.    Was on hold and hung up    Please call to schedule    Saadia Hood

## 2020-02-12 NOTE — TELEPHONE ENCOUNTER
Order for testosterone is on med list and not ordered correctly so shows on MAR.  T'd up.  Routing to ordering provider to sign.  Scheduled him for tomorrow at 8:30 am.  Brittaney Davis RN

## 2020-02-13 ENCOUNTER — ALLIED HEALTH/NURSE VISIT (OUTPATIENT)
Dept: FAMILY MEDICINE | Facility: CLINIC | Age: 30
End: 2020-02-13
Payer: MEDICARE

## 2020-02-13 DIAGNOSIS — E34.9 TESTOSTERONE DEFICIENCY: Primary | ICD-10-CM

## 2020-02-13 PROCEDURE — 99207 ZZC NO CHARGE NURSE ONLY: CPT

## 2020-02-13 PROCEDURE — 96372 THER/PROPH/DIAG INJ SC/IM: CPT

## 2020-02-13 RX ADMIN — TESTOSTERONE CYPIONATE 200 MG: 200 INJECTION, SOLUTION INTRAMUSCULAR at 08:45

## 2020-02-13 NOTE — NURSING NOTE
The following medication was given:     MEDICATION: Depo Testosterone  200 mg  ROUTE: IM  SITE: Ventrogluteal - Left  DOSE: 200 mg  LOT #: [id number]  :  [place]  EXPIRATION DATE:  09/2021    Due to injection administration, patient instructed to remain in clinic for 15 minutes  afterwards, and to report any adverse reaction to me immediately.

## 2020-02-20 DIAGNOSIS — E23.2 DIABETES INSIPIDUS (H): ICD-10-CM

## 2020-02-20 DIAGNOSIS — E23.0 PANHYPOPITUITARISM (H): ICD-10-CM

## 2020-02-21 NOTE — TELEPHONE ENCOUNTER
Last Office Visit: 12/31/19  Future Office visit:       Routing refill request to provider for review/approval because:  Drug not on the [facility], [facility] or  Health refill protocol or controlled substance  Eunice Gibson RN

## 2020-02-21 NOTE — TELEPHONE ENCOUNTER
Requested Prescriptions   Pending Prescriptions Disp Refills     DESMOPRESSIN 0.01 % NA SOLN spray [Pharmacy Med Name: DESMOPRESSIN ACE SPRAY RE 0.01 SOLN] 10 mL 3     Sig: USE THREE SPRAYS INTO NOSTRIL ONCE DAILY AND USE ONE SPRAY INTO NOSTRIL AT BEDTIME       There is no refill protocol information for this order        predniSONE 1 MG PO tablet 200 tablet 11     Sig: TAKE THREE TABLETS (3 MG) BY MOUTH IN THE MORNING AND TWO TABLETS (2 MG) AT NIGHT       There is no refill protocol information for this order

## 2020-02-24 RX ORDER — DESMOPRESSIN ACETATE 0.1 MG/ML
SOLUTION NASAL
Qty: 10 ML | Refills: 3 | Status: SHIPPED | OUTPATIENT
Start: 2020-02-24 | End: 2020-06-18

## 2020-02-24 RX ORDER — PREDNISONE 1 MG/1
TABLET ORAL
Qty: 200 TABLET | Refills: 11 | Status: SHIPPED | OUTPATIENT
Start: 2020-02-24 | End: 2021-01-21

## 2020-02-27 ENCOUNTER — ALLIED HEALTH/NURSE VISIT (OUTPATIENT)
Dept: FAMILY MEDICINE | Facility: CLINIC | Age: 30
End: 2020-02-27
Payer: MEDICARE

## 2020-02-27 DIAGNOSIS — E34.9 TESTOSTERONE DEFICIENCY: Primary | ICD-10-CM

## 2020-02-27 PROCEDURE — 96372 THER/PROPH/DIAG INJ SC/IM: CPT

## 2020-02-27 PROCEDURE — 99207 ZZC NO CHARGE NURSE ONLY: CPT

## 2020-02-27 RX ADMIN — TESTOSTERONE CYPIONATE 200 MG: 200 INJECTION, SOLUTION INTRAMUSCULAR at 08:45

## 2020-02-27 NOTE — NURSING NOTE
The following medication was given:     MEDICATION: Depo Testosterone  200 mg  ROUTE: IM  SITE: Ventrogluteal - Right  DOSE: 200  LOT #: [id number]  :  [employer]  EXPIRATION DATE:  10/31057  NDC#: 5530-5927-37   Due to injection administration, patient instructed to remain in clinic for 15 minutes  afterwards, and to report any adverse reaction to me immediately  Sofia Delong RN

## 2020-03-12 ENCOUNTER — ALLIED HEALTH/NURSE VISIT (OUTPATIENT)
Dept: FAMILY MEDICINE | Facility: CLINIC | Age: 30
End: 2020-03-12
Payer: MEDICARE

## 2020-03-12 DIAGNOSIS — E34.9 TESTOSTERONE DEFICIENCY: Primary | ICD-10-CM

## 2020-03-12 PROCEDURE — 96372 THER/PROPH/DIAG INJ SC/IM: CPT

## 2020-03-12 PROCEDURE — 99207 ZZC NO CHARGE NURSE ONLY: CPT

## 2020-03-12 RX ADMIN — TESTOSTERONE CYPIONATE 200 MG: 200 INJECTION, SOLUTION INTRAMUSCULAR at 08:30

## 2020-03-12 NOTE — NURSING NOTE
Clinic Administered Medication Documentation      Testosterone Documentation     Prior to injection, verified patient identity using patient's name and date of birth. Medication was administered. Please see MAR and medication order for additional information. Patient instructed to remain in clinic for 15 minutes     Was entire vial of medication used? Yes  Vial/Syringe: Single dose vial  Expiration Date:  10/2021  Was this medication supplied by the patient? No   The following medication was given: testosterone    MEDICATION: Testosterone 200 mg  ROUTE: IM  SITE: Ventrogluteal - Left  DOSE:200mg  LOT #: [id number]  :  [employer]  EXPIRATION DATE:  10/2021  NDC#: 5488-8585-50   Sofia Delong RN

## 2020-03-19 DIAGNOSIS — F98.8 ATTENTION DEFICIT DISORDER, UNSPECIFIED HYPERACTIVITY PRESENCE: ICD-10-CM

## 2020-03-19 NOTE — TELEPHONE ENCOUNTER
Requested Prescriptions   Pending Prescriptions Disp Refills     amphetamine-dextroamphetamine (ADDERALL XR) 30 MG 24 hr capsule [Pharmacy Med Name: AMPHETAMINE SALTS *ER* 30MG CAP] 30 capsule 0     Sig: TAKE ONE CAPSULE BY MOUTH ONCE DAILY   Last Written Prescription Date:  2/8/20  Last Fill Quantity: 30,  # refills: 0   Last office visit: 3/12/2020 with prescribing provider:  Mariluz Mae MD    Future Office Visit:   Next 5 appointments (look out 90 days)    Mar 26, 2020  8:30 AM CDT  (Arrive by 8:25 AM)  Nurse Visit with CR RN  [facility] ([facility] [id number] [place] [id number] [phone]   Jun 12, 2020  8:50 AM CDT  Office Visit with Mariluz Mae MD  [facility] ([facility]) [address] [facility] [address] [phone]             There is no refill protocol information for this order

## 2020-03-20 RX ORDER — DEXTROAMPHETAMINE SACCHARATE, AMPHETAMINE ASPARTATE MONOHYDRATE, DEXTROAMPHETAMINE SULFATE AND AMPHETAMINE SULFATE 7.5; 7.5; 7.5; 7.5 MG/1; MG/1; MG/1; MG/1
CAPSULE, EXTENDED RELEASE ORAL
Qty: 30 CAPSULE | Refills: 0 | Status: SHIPPED | OUTPATIENT
Start: 2020-04-19 | End: 2020-06-12

## 2020-03-20 RX ORDER — DEXTROAMPHETAMINE SACCHARATE, AMPHETAMINE ASPARTATE MONOHYDRATE, DEXTROAMPHETAMINE SULFATE AND AMPHETAMINE SULFATE 7.5; 7.5; 7.5; 7.5 MG/1; MG/1; MG/1; MG/1
CAPSULE, EXTENDED RELEASE ORAL
Qty: 30 CAPSULE | Refills: 0 | Status: SHIPPED | OUTPATIENT
Start: 2020-03-20 | End: 2020-06-12

## 2020-03-20 RX ORDER — DEXTROAMPHETAMINE SACCHARATE, AMPHETAMINE ASPARTATE MONOHYDRATE, DEXTROAMPHETAMINE SULFATE AND AMPHETAMINE SULFATE 7.5; 7.5; 7.5; 7.5 MG/1; MG/1; MG/1; MG/1
CAPSULE, EXTENDED RELEASE ORAL
Qty: 30 CAPSULE | Refills: 0 | Status: SHIPPED | OUTPATIENT
Start: 2020-05-19 | End: 2020-06-12

## 2020-03-20 NOTE — TELEPHONE ENCOUNTER
Please let them know that it has been sent over.  I did 2 additional refills to keep on hand at the pharmacy also.

## 2020-03-20 NOTE — TELEPHONE ENCOUNTER
Patient's mom called stating that patient will run out of his medication over the weekend since the pharmacy is not open on the weekends and is hoping this can be filled today.    Jazmine Roca,

## 2020-03-26 ENCOUNTER — ALLIED HEALTH/NURSE VISIT (OUTPATIENT)
Dept: FAMILY MEDICINE | Facility: CLINIC | Age: 30
End: 2020-03-26
Payer: MEDICARE

## 2020-03-26 DIAGNOSIS — E34.9 TESTOSTERONE DEFICIENCY: Primary | ICD-10-CM

## 2020-03-26 PROCEDURE — 96372 THER/PROPH/DIAG INJ SC/IM: CPT

## 2020-03-26 PROCEDURE — 99207 ZZC NO CHARGE NURSE ONLY: CPT

## 2020-03-26 RX ADMIN — TESTOSTERONE CYPIONATE 200 MG: 200 INJECTION, SOLUTION INTRAMUSCULAR at 08:59

## 2020-03-26 NOTE — PROGRESS NOTES
"Clinic Administered Medication Documentation      Testosterone Documentation     Prior to injection, verified patient identity using patient's name and date of birth. Medication was administered. Please see MAR and medication order for additional information. Patient instructed to remain in clinic for 15 minutes and report any adverse reaction to staff immediately .    Reminders     - Check vial for refills remaining and initiate refill request if no refills remain.      - Verify with patient that medication was paid for at pharmacy. If it was, check the \"patient supplied\" box on the MAR.     Was entire vial of medication used? Yes  Vial/Syringe: Single dose vial  Expiration Date:  10/21/66450  Was this medication supplied by the patient? No   The following medication was given:     MEDICATION: Testosterone 200 mg  ROUTE: IM  SITE: Ventrogluteal - Right  DOSE: 200  LOT #: [id number]  :  [employer]  EXPIRATION DATE:  10/2021  NDC#: [address]-47  Due to injection administration, patient instructed to remain in clinic for 15 minutes  afterwards, and to report any adverse reaction to me immediately.         "

## 2020-04-09 ENCOUNTER — ALLIED HEALTH/NURSE VISIT (OUTPATIENT)
Dept: FAMILY MEDICINE | Facility: CLINIC | Age: 30
End: 2020-04-09
Payer: MEDICARE

## 2020-04-09 DIAGNOSIS — E34.9 TESTOSTERONE DEFICIENCY: Primary | ICD-10-CM

## 2020-04-09 PROCEDURE — 96372 THER/PROPH/DIAG INJ SC/IM: CPT

## 2020-04-09 RX ADMIN — TESTOSTERONE CYPIONATE 200 MG: 200 INJECTION, SOLUTION INTRAMUSCULAR at 09:10

## 2020-04-09 NOTE — PROGRESS NOTES
"Clinic Administered Medication Documentation      Testosterone Documentation     Prior to injection, verified patient identity using patient's name and date of birth. Medication was administered. Please see MAR and medication order for additional information. Patient instructed to remain in clinic for 15 minutes.    Reminders     - Check vial for refills remaining and initiate refill request if no refills remain.      - Verify with patient that medication was paid for at pharmacy. If it was, check the \"patient supplied\" box on the MAR.     Was entire vial of medication used? Yes  Vial/Syringe: Single dose vial  Expiration Date:  10/2021  Was this medication supplied by the patient? No   The following medication was given:     MEDICATION: Testosterone 200 mg  ROUTE: IM  SITE: Ventrogluteal - Right  DOSE: 200  LOT #: [id number]  :  [employer]  EXPIRATION DATE:  10/2021  NDC#: 5057-1537-96  Due to injection administration, patient instructed to remain in clinic for 15 minutes  afterwards, and to report any adverse reaction to me immediately.  Sofia Delong RN           "

## 2020-04-23 ENCOUNTER — ALLIED HEALTH/NURSE VISIT (OUTPATIENT)
Dept: FAMILY MEDICINE | Facility: CLINIC | Age: 30
End: 2020-04-23
Payer: MEDICARE

## 2020-04-23 DIAGNOSIS — E23.0 PANHYPOPITUITARISM (H): ICD-10-CM

## 2020-04-23 DIAGNOSIS — E34.9 TESTOSTERONE DEFICIENCY: Primary | ICD-10-CM

## 2020-04-23 PROCEDURE — 96372 THER/PROPH/DIAG INJ SC/IM: CPT

## 2020-04-23 PROCEDURE — 99207 ZZC NO CHARGE NURSE ONLY: CPT

## 2020-04-23 RX ADMIN — TESTOSTERONE CYPIONATE 200 MG: 200 INJECTION, SOLUTION INTRAMUSCULAR at 09:24

## 2020-04-23 NOTE — TELEPHONE ENCOUNTER
Med pended per pharmacist's recommendations.    Annalee Nieto, HITESH  [facility]  Adan/Yuliya

## 2020-04-23 NOTE — TELEPHONE ENCOUNTER
Patient's father is calling and the patient is all out of medicine.  They were under the impression this medicine would have enough refills for one year since that is how often he will be seeing Dr Alonso.    Requested Prescriptions   Pending Prescriptions Disp Refills     somatropin (NORDITROPIN FLEXPRO) 10 MG/1.5ML SOLN PEN injection  Last Written Prescription Date:  11/19/19  Last Fill Quantity: 10mL,  # refills: 2   Last office visit: 12/31/2019 with prescribing provider:  Gavin   Future Office Visit:   Next 5 appointments (look out 90 days)    Apr 23, 2020  9:00 AM CDT  (Arrive by 8:55 AM)  Nurse Visit with CR RN  [facility] ([facility]) [id number] [place] [address] [phone]   Jun 12, 2020  8:50 AM CDT  Office Visit with Mariluz Mae MD  [facility] ([facility]) [address] [facility] [address] [phone]          10 mL 2       There is no refill protocol information for this order

## 2020-04-23 NOTE — TELEPHONE ENCOUNTER
Endo staff- can you please take a look into this?  Can you please check quantity to dispense for 3 months with pharmacy for current dose?  Then can send refill X3 according to that.  Thank you.

## 2020-04-23 NOTE — PROGRESS NOTES
"Clinic Administered Medication Documentation      Testosterone Documentation     Prior to injection, verified patient identity using patient's name and date of birth. Medication was administered. Please see MAR and medication order for additional information. Patient instructed to remain in clinic for 15 minutes.    Reminders     - Check vial for refills remaining and initiate refill request if no refills remain.      - Verify with patient that medication was paid for at pharmacy. If it was, check the \"patient supplied\" box on the MAR.     Was entire vial of medication used? Yes  Vial/Syringe: Single dose vial  Expiration Date:  10/2021  Was this medication supplied by the patient? No   The following medication was given:     MEDICATION: Testosterone 200 mg  ROUTE: IM  SITE: Ventrogluteal - Left  DOSE: 200  LOT #: [id number]  :  [employer]  EXPIRATION DATE:  10/2021  NDC#: [id number]  Due to injection administration, patient instructed to remain in clinic for 15 minutes  afterwards, and to report any adverse reaction to me immediately.  Sofia Delong RN         "

## 2020-04-24 DIAGNOSIS — E23.0 PANHYPOPITUITARISM (H): ICD-10-CM

## 2020-05-07 ENCOUNTER — ALLIED HEALTH/NURSE VISIT (OUTPATIENT)
Dept: FAMILY MEDICINE | Facility: CLINIC | Age: 30
End: 2020-05-07
Payer: MEDICARE

## 2020-05-07 DIAGNOSIS — E34.9 TESTOSTERONE DEFICIENCY: Primary | ICD-10-CM

## 2020-05-07 PROCEDURE — 96372 THER/PROPH/DIAG INJ SC/IM: CPT

## 2020-05-07 PROCEDURE — 99207 ZZC NO CHARGE NURSE ONLY: CPT

## 2020-05-07 RX ADMIN — TESTOSTERONE CYPIONATE 200 MG: 200 INJECTION, SOLUTION INTRAMUSCULAR at 09:22

## 2020-05-07 NOTE — PROGRESS NOTES
"Clinic Administered Medication Documentation      Testosterone Documentation     Prior to injection, verified patient identity using patient's name and date of birth. Medication was administered. Please see MAR and medication order for additional information. Patient instructed to remain in clinic for 15 minutes.    Reminders     - Check vial for refills remaining and initiate refill request if no refills remain.      - Verify with patient that medication was paid for at pharmacy. If it was, check the \"patient supplied\" box on the MAR.     Was entire vial of medication used? Yes  Vial/Syringe: Single dose vial  Expiration Date:  04/2021  Was this medication supplied by the patient? No   TESTOSTERONE INJECTION TECHNIQUE  For Self Administration    NAME: Mamadou Reece     DATE: 5/7/2020     MEDICATION: testosterone cypionate    DOSE: 200 mg                              FREQUENCY: every 2 weeks     Due to injection administration, patient instructed to remain in clinic for 15 minutes  afterwards, and to report any adverse reaction to me immediately.  Sofia Delong RN                "

## 2020-05-15 DIAGNOSIS — E03.9 HYPOTHYROIDISM, UNSPECIFIED TYPE: ICD-10-CM

## 2020-05-19 RX ORDER — LEVOTHYROXINE SODIUM 137 UG/1
TABLET ORAL
Qty: 31 TABLET | Refills: 11 | Status: SHIPPED | OUTPATIENT
Start: 2020-05-19 | End: 2021-06-16

## 2020-05-19 NOTE — TELEPHONE ENCOUNTER
Patient calling. The pharmacy gave them 4 days worth. Patient  doesn't want him to go out of the medication. Please advise

## 2020-05-21 ENCOUNTER — ALLIED HEALTH/NURSE VISIT (OUTPATIENT)
Dept: FAMILY MEDICINE | Facility: CLINIC | Age: 30
End: 2020-05-21
Payer: MEDICARE

## 2020-05-21 DIAGNOSIS — E34.9 TESTOSTERONE DEFICIENCY: Primary | ICD-10-CM

## 2020-05-21 PROCEDURE — 99207 ZZC NO CHARGE NURSE ONLY: CPT

## 2020-05-21 PROCEDURE — 96372 THER/PROPH/DIAG INJ SC/IM: CPT

## 2020-05-21 RX ADMIN — TESTOSTERONE CYPIONATE 200 MG: 200 INJECTION, SOLUTION INTRAMUSCULAR at 09:33

## 2020-05-21 NOTE — PROGRESS NOTES
"Clinic Administered Medication Documentation      Testosterone Documentation     Prior to injection, verified patient identity using patient's name and date of birth. Medication was administered. Please see MAR and medication order for additional information. Patient instructed to remain in clinic for 15 minutes.    Reminders     - Check vial for refills remaining and initiate refill request if no refills remain.      - Verify with patient that medication was paid for at pharmacy. If it was, check the \"patient supplied\" box on the MAR.     Was entire vial of medication used? Yes  Vial/Syringe: Single dose vial  Expiration Date:  10/2022  Was this medication supplied by the patient? No  Given in Left Ventroglut    Eileen Wilkinson RN Flex    "

## 2020-06-04 ENCOUNTER — ALLIED HEALTH/NURSE VISIT (OUTPATIENT)
Dept: FAMILY MEDICINE | Facility: CLINIC | Age: 30
End: 2020-06-04
Payer: MEDICARE

## 2020-06-04 DIAGNOSIS — E34.9 TESTOSTERONE DEFICIENCY: Primary | ICD-10-CM

## 2020-06-04 PROCEDURE — 96372 THER/PROPH/DIAG INJ SC/IM: CPT

## 2020-06-04 PROCEDURE — 99207 ZZC NO CHARGE NURSE ONLY: CPT

## 2020-06-04 RX ADMIN — TESTOSTERONE CYPIONATE 200 MG: 200 INJECTION, SOLUTION INTRAMUSCULAR at 10:18

## 2020-06-04 NOTE — PROGRESS NOTES
"Clinic Administered Medication Documentation      Testosterone Documentation     Prior to injection, verified patient identity using patient's name and date of birth. Medication was administered. Please see MAR and medication order for additional information. Patient instructed to remain in clinic for 15 minutes.    Reminders     - Check vial for refills remaining and initiate refill request if no refills remain.      - Verify with patient that medication was paid for at pharmacy. If it was, check the \"patient supplied\" box on the MAR.     Was entire vial of medication used? Yes  Vial/Syringe: Single dose vial  Expiration Date:  10/201  Was this medication supplied by the patient? No   The following medication was given:     MEDICATION: Depo Testosterone  200 mg  ROUTE: IM  SITE: Ventrogluteal - Right  DOSE: 200  LOT #: [id number]  :  [employer]  EXPIRATION DATE:  10/2021  NDC#: [id number]    Due to injection administration, patient instructed to remain in clinic for 15 minutes  afterwards, and to report any adverse reaction to me immediately.  Sofia Delong RN        "

## 2020-06-12 ENCOUNTER — VIRTUAL VISIT (OUTPATIENT)
Dept: FAMILY MEDICINE | Facility: CLINIC | Age: 30
End: 2020-06-12
Payer: MEDICARE

## 2020-06-12 DIAGNOSIS — E23.0 PANHYPOPITUITARISM (H): Primary | ICD-10-CM

## 2020-06-12 DIAGNOSIS — F98.8 ATTENTION DEFICIT DISORDER, UNSPECIFIED HYPERACTIVITY PRESENCE: ICD-10-CM

## 2020-06-12 PROCEDURE — 99441 ZZC PHYSICIAN TELEPHONE EVALUATION 5-10 MIN: CPT | Performed by: FAMILY MEDICINE

## 2020-06-12 RX ORDER — DEXTROAMPHETAMINE SACCHARATE, AMPHETAMINE ASPARTATE MONOHYDRATE, DEXTROAMPHETAMINE SULFATE AND AMPHETAMINE SULFATE 7.5; 7.5; 7.5; 7.5 MG/1; MG/1; MG/1; MG/1
CAPSULE, EXTENDED RELEASE ORAL
Qty: 30 CAPSULE | Refills: 0 | Status: SHIPPED | OUTPATIENT
Start: 2020-07-18 | End: 2020-10-02

## 2020-06-12 RX ORDER — DEXTROAMPHETAMINE SACCHARATE, AMPHETAMINE ASPARTATE MONOHYDRATE, DEXTROAMPHETAMINE SULFATE AND AMPHETAMINE SULFATE 7.5; 7.5; 7.5; 7.5 MG/1; MG/1; MG/1; MG/1
CAPSULE, EXTENDED RELEASE ORAL
Qty: 30 CAPSULE | Refills: 0 | Status: SHIPPED | OUTPATIENT
Start: 2020-08-17 | End: 2020-10-02

## 2020-06-12 RX ORDER — DEXTROAMPHETAMINE SACCHARATE, AMPHETAMINE ASPARTATE MONOHYDRATE, DEXTROAMPHETAMINE SULFATE AND AMPHETAMINE SULFATE 7.5; 7.5; 7.5; 7.5 MG/1; MG/1; MG/1; MG/1
CAPSULE, EXTENDED RELEASE ORAL
Qty: 30 CAPSULE | Refills: 0 | Status: SHIPPED | OUTPATIENT
Start: 2020-06-18 | End: 2020-10-02

## 2020-06-12 NOTE — PROGRESS NOTES
"Mamadou Reece is a 29 year old male who is being evaluated via a billable telephone visit.      [phone]     The patient has been notified of following:     \"This telephone visit will be conducted via a call between you and your physician/provider. We have found that certain health care needs can be provided without the need for a physical exam.  This service lets us provide the care you need with a short phone conversation.  If a prescription is necessary we can send it directly to your pharmacy.  If lab work is needed we can place an order for that and you can then stop by our lab to have the test done at a later time.    Telephone visits are billed at different rates depending on your insurance coverage. During this emergency period, for some insurers they may be billed the same as an in-person visit.  Please reach out to your insurance provider with any questions.    If during the course of the call the physician/provider feels a telephone visit is not appropriate, you will not be charged for this service.\"    Patient has given verbal consent for Telephone visit?  Yes    What phone number would you like to be contacted at?     How would you like to obtain your AVS? Mail a copy    Subjective     Mamadou Reece is a 29 year old male who presents via phone visit today for the following health issues:    HPI  Medication Followup of Adderall    Taking Medication as prescribed: yes    Side Effects:  None    Medication Helping Symptoms:  yes     See under ROS    Patient Active Problem List   Diagnosis     ADD (attention deficit disorder)     Craniopharyngioma (H)     CARDIOVASCULAR SCREENING; LDL GOAL LESS THAN 160     Mood change     Legal blindness     Hypothyroidism     Panhypopituitarism (H)     History of craniopharyngioma     Testosterone deficiency     BCC (basal cell carcinoma), face     Urinary frequency       Current Outpatient Medications   Medication Sig Dispense Refill     amphetamine-dextroamphetamine " (ADDERALL XR) 30 MG 24 hr capsule TAKE ONE CAPSULE BY MOUTH ONCE DAILY 30 capsule 0     Calcium Citrate-Vitamin D (CALCIUM + D) 315-200 MG-UNIT TABS Take by mouth 2 times daily        Cholecalciferol (VITAMIN D) 1000 UNIT capsule Take 1 capsule by mouth daily        Cyanocobalamin (B-12) 1000 MCG TBCR Take 2,000 mcg by mouth daily  100 tablet 1     desmopressin (DDAVP) 0.01 % SOLN spray USE THREE SPRAYS INTO NOSTRIL ONCE DAILY AND USE ONE SPRAY INTO NOSTRIL AT BEDTIME 10 mL 3     levothyroxine (SYNTHROID/LEVOTHROID) 137 MCG tablet TAKE ONE TABLET BY MOUTH EVERY MORNING BEFORE BREAKFAST 31 tablet 11     predniSONE (DELTASONE) 1 MG tablet TAKE THREE TABLETS (3 MG) BY MOUTH IN THE MORNING AND TWO TABLETS (2 MG) AT NIGHT 200 tablet 11     somatropin (NORDITROPIN FLEXPRO) 10 MG/1.5ML SOLN PEN injection Inject 0.7 mg under the skin daily.. Expires 28 days after initial use. 9 mL 4     testosterone cypionate (DEPOTESTOSTERONE) 200 MG/ML injection Inject 1 mL (200 mg) into the muscle every 14 days 1 mL 5     amphetamine-dextroamphetamine (ADDERALL XR) 30 MG 24 hr capsule TAKE ONE CAPSULE BY MOUTH ONCE DAILY 30 capsule 0     amphetamine-dextroamphetamine (ADDERALL XR) 30 MG 24 hr capsule TAKE ONE CAPSULE BY MOUTH ONCE DAILY 30 capsule 0                  Reviewed and updated as needed this visit by Provider         Review of Systems   CONSTITUTIONAL:NEGATIVE for fever, chills, change in weight; no scale   CV: NEGATIVE for chest pain, palpitations or peripheral edema  PSYCHIATRIC: NEGATIVE for changes in mood or affect    Still working; does feel safe.    Feels the medication continues to work for him.   Denies side effects.   Has not had a chance to check bp.       Objective   Reported vitals:  There were no vitals taken for this visit.   Reviewed prior blood pressures.  alert and no distress  PSYCH: Alert and oriented times 3; coherent speech, normal   rate and volume, able to articulate logical thoughts, able   to abstract  reason. His affect is normal  RESP: No cough, no audible wheezing, able to talk in full sentences  Remainder of exam unable to be completed due to telephone visits            Assessment/Plan:  1. Attention deficit disorder, unspecified hyperactivity presence  Stable; doing well.   Will continue at same dose.  - amphetamine-dextroamphetamine (ADDERALL XR) 30 MG 24 hr capsule; TAKE ONE CAPSULE BY MOUTH ONCE DAILY  Dispense: 30 capsule; Refill: 0  - amphetamine-dextroamphetamine (ADDERALL XR) 30 MG 24 hr capsule; TAKE ONE CAPSULE BY MOUTH ONCE DAILY  Dispense: 30 capsule; Refill: 0  - amphetamine-dextroamphetamine (ADDERALL XR) 30 MG 24 hr capsule; TAKE ONE CAPSULE BY MOUTH ONCE DAILY  Dispense: 30 capsule; Refill: 0    2. Panhypopituitarism (H)  Continues to work with Endocrinology.       Return in about 6 months (around 12/12/2020) for Medication recheck.      Phone call duration:  5 minutes    Mariluz Mae MD, MD

## 2020-06-18 ENCOUNTER — ALLIED HEALTH/NURSE VISIT (OUTPATIENT)
Dept: FAMILY MEDICINE | Facility: CLINIC | Age: 30
End: 2020-06-18
Payer: MEDICARE

## 2020-06-18 DIAGNOSIS — E34.9 TESTOSTERONE DEFICIENCY: ICD-10-CM

## 2020-06-18 DIAGNOSIS — E23.0 PANHYPOPITUITARISM (H): Primary | ICD-10-CM

## 2020-06-18 PROCEDURE — 99207 ZZC NO CHARGE NURSE ONLY: CPT

## 2020-06-18 PROCEDURE — 96372 THER/PROPH/DIAG INJ SC/IM: CPT

## 2020-06-18 RX ADMIN — TESTOSTERONE CYPIONATE 200 MG: 200 INJECTION, SOLUTION INTRAMUSCULAR at 09:05

## 2020-06-18 NOTE — PROGRESS NOTES
"Clinic Administered Medication Documentation      Testosterone Documentation     Prior to injection, verified patient identity using patient's name and date of birth. Medication was administered. Please see MAR and medication order for additional information. Patient instructed to remain in clinic for 15 minutes, report any adverse reaction to staff immediately  and stay in clinic after the injection but patient declined.    Reminders     - Check vial for refills remaining and initiate refill request if no refills remain.      - Verify with patient that medication was paid for at pharmacy. If it was, check the \"patient supplied\" box on the MAR.     Was entire vial of medication used? Yes  Vial/Syringe: Single dose vial  Expiration Date:  10/2021  Was this medication supplied by the patient? No   Site: Left ventrogluteal    Chong GROSS RN        "

## 2020-07-02 ENCOUNTER — ALLIED HEALTH/NURSE VISIT (OUTPATIENT)
Dept: FAMILY MEDICINE | Facility: CLINIC | Age: 30
End: 2020-07-02
Payer: MEDICARE

## 2020-07-02 DIAGNOSIS — E34.9 TESTOSTERONE DEFICIENCY: Primary | ICD-10-CM

## 2020-07-02 PROCEDURE — 99207 ZZC NO CHARGE NURSE ONLY: CPT

## 2020-07-02 PROCEDURE — 96372 THER/PROPH/DIAG INJ SC/IM: CPT

## 2020-07-02 RX ADMIN — TESTOSTERONE CYPIONATE 200 MG: 200 INJECTION, SOLUTION INTRAMUSCULAR at 09:10

## 2020-07-02 NOTE — PROGRESS NOTES
"Clinic Administered Medication Documentation      Testosterone Documentation     Prior to injection, verified patient identity using patient's name and date of birth. Medication was administered. Please see MAR and medication order for additional information. Patient instructed to remain in clinic for 15 minutes.    Reminders     - Check vial for refills remaining and initiate refill request if no refills remain.      - Verify with patient that medication was paid for at pharmacy. If it was, check the \"patient supplied\" box on the MAR.     Was entire vial of medication used? Yes  Vial/Syringe: Single dose vial  Expiration Date:  10/2022  Was this medication supplied by the patient? No     The following medication was given:     MEDICATION: Testosterone 200 mg  ROUTE: IM  SITE: Ventrogluteal - Right  DOSE: 200  LOT #: [id number]  :  [employer]  EXPIRATION DATE:  10/2022    Leslie MARTINEZ RN, BSN         "

## 2020-07-16 ENCOUNTER — ALLIED HEALTH/NURSE VISIT (OUTPATIENT)
Dept: FAMILY MEDICINE | Facility: CLINIC | Age: 30
End: 2020-07-16
Payer: MEDICARE

## 2020-07-16 DIAGNOSIS — E34.9 TESTOSTERONE DEFICIENCY: Primary | ICD-10-CM

## 2020-07-16 PROCEDURE — 99207 ZZC NO CHARGE NURSE ONLY: CPT

## 2020-07-16 PROCEDURE — 96372 THER/PROPH/DIAG INJ SC/IM: CPT

## 2020-07-16 RX ADMIN — TESTOSTERONE CYPIONATE 200 MG: 200 INJECTION, SOLUTION INTRAMUSCULAR at 09:10

## 2020-07-16 NOTE — PROGRESS NOTES
"Clinic Administered Medication Documentation      Testosterone Documentation     Prior to injection, verified patient identity using patient's name and date of birth. Medication was administered. Please see MAR and medication order for additional information. Patient instructed to remain in clinic for 15 minutes and report any adverse reaction to staff immediately .    Reminders     - Check vial for refills remaining and initiate refill request if no refills remain.      - Verify with patient that medication was paid for at pharmacy. If it was, check the \"patient supplied\" box on the MAR.     Was entire vial of medication used? Yes  Vial/Syringe: Single dose vial  Expiration Date:  11/2022  Was this medication supplied by the patient? No     The following medication was given:     MEDICATION: Depo Testosterone  200 mg  ROUTE: IM  SITE: Ventrogluteal - Left  DOSE: 200 mg  LOT #: [id number]   :  [employer]  EXPIRATION DATE:  11/2022  NDC#: 5843-8773-26    Due to injection administration, patient instructed to remain in clinic for 15 minutes  afterwards, and to report any adverse reaction to me immediately.    Chong GROSS RN           "

## 2020-07-17 ENCOUNTER — TELEPHONE (OUTPATIENT)
Dept: INTERNAL MEDICINE | Facility: CLINIC | Age: 30
End: 2020-07-17

## 2020-07-17 NOTE — TELEPHONE ENCOUNTER
Prior Authorization Retail Medication Request    Medication/Dose: somatropin (NORDITROPIN FLEXPRO) 10 MG/1.5ML SOLN PEN injection   ICD code (if different than what is on RX):  Panhypopituitarism (H) [E23.0]   Previously Tried and Failed:    Rationale:      covermymed Key [id number]    Insurance Name:    Insurance ID:        Pharmacy Information (if different than what is on RX)  Name:  [facility]  Phone:  [phone]

## 2020-07-21 NOTE — TELEPHONE ENCOUNTER
PA Initiation    Medication: somatropin (NORDITROPIN FLEXPRO) 10 MG/1.5ML SOLN PEN injection   Insurance Company: [employer] - Phone [phone] Fax [phone]  Pharmacy Filling the Rx: [facility], [address]  Filling Pharmacy Phone: [phone]  Filling Pharmacy Fax: [phone]  Start Date: 7/21/2020    Central Prior Authorization Team   Phone: [phone]      Plan is not set up fo ePA. Manually faxed prior authorization form to [facility] at fax# [phone]

## 2020-07-22 NOTE — TELEPHONE ENCOUNTER
Prior Authorization Approval    Authorization Effective Date: 4/22/2020  Authorization Expiration Date: 7/21/2021  Medication: somatropin (NORDITROPIN FLEXPRO) 10 MG/1.5ML SOLN PEN injection   Approved Dose/Quantity: 10mg  Reference #: n/a   Insurance Company: [employer] - Phone [phone] Fax [phone]  Which Pharmacy is filling the prescription (Not needed for infusion/clinic administered): [facility] - [place], [place] - [address] [place] [address]  Pharmacy Notified: Yes  Patient Notified:  **Instructed pharmacy to notify patient when script is ready to /ship.**

## 2020-07-30 ENCOUNTER — ALLIED HEALTH/NURSE VISIT (OUTPATIENT)
Dept: FAMILY MEDICINE | Facility: CLINIC | Age: 30
End: 2020-07-30
Payer: MEDICARE

## 2020-07-30 DIAGNOSIS — E34.9 TESTOSTERONE DEFICIENCY: Primary | ICD-10-CM

## 2020-07-30 PROCEDURE — 96372 THER/PROPH/DIAG INJ SC/IM: CPT

## 2020-07-30 PROCEDURE — 99207 ZZC NO CHARGE NURSE ONLY: CPT

## 2020-07-30 RX ADMIN — TESTOSTERONE CYPIONATE 200 MG: 200 INJECTION, SOLUTION INTRAMUSCULAR at 09:26

## 2020-07-30 NOTE — PROGRESS NOTES
"Clinic Administered Medication Documentation      Testosterone Documentation     Prior to injection, verified patient identity using patient's name and date of birth. Medication was administered. Please see MAR and medication order for additional information. Patient instructed to remain in clinic for 15 minutes.    Reminders     - Check vial for refills remaining and initiate refill request if no refills remain.      - Verify with patient that medication was paid for at pharmacy. If it was, check the \"patient supplied\" box on the MAR.     Was entire vial of medication used? Yes  Vial/Syringe: Single dose vial  Expiration Date:  10/2022  Was this medication supplied by the patient? No    The following medication was given:     MEDICATION: Depo Testosterone  200 mg  ROUTE: IM  SITE: Ventrogluteal - Right  DOSE: 200  LOT #: [id number]  :  [employer]  EXPIRATION DATE:  10/2022  NDC#: 3326-8517-83   Due to injection administration, patient instructed to remain in clinic for 15 minutes  afterwards, and to report any adverse reaction to me immediately.  Sofia Delong RN    "

## 2020-08-13 ENCOUNTER — ALLIED HEALTH/NURSE VISIT (OUTPATIENT)
Dept: FAMILY MEDICINE | Facility: CLINIC | Age: 30
End: 2020-08-13
Payer: MEDICARE

## 2020-08-13 DIAGNOSIS — E34.9 TESTOSTERONE DEFICIENCY: Primary | ICD-10-CM

## 2020-08-13 PROCEDURE — 99207 ZZC NO CHARGE NURSE ONLY: CPT

## 2020-08-13 PROCEDURE — 96372 THER/PROPH/DIAG INJ SC/IM: CPT

## 2020-08-13 RX ADMIN — TESTOSTERONE CYPIONATE 200 MG: 200 INJECTION, SOLUTION INTRAMUSCULAR at 09:24

## 2020-08-13 NOTE — PROGRESS NOTES
"Clinic Administered Medication Documentation      Testosterone Documentation     Prior to injection, verified patient identity using patient's name and date of birth. Medication was administered. Please see MAR and medication order for additional information. Patient instructed to remain in clinic for 15 minutes.    Reminders     - Check vial for refills remaining and initiate refill request if no refills remain.      - Verify with patient that medication was paid for at pharmacy. If it was, check the \"patient supplied\" box on the MAR.     Was entire vial of medication used? Yes  Vial/Syringe: Single dose vial  Expiration Date:  10/2021  Was this medication supplied by the patient? No       The following medication was given:     MEDICATION: Testosterone 200 mg  ROUTE: IM  SITE: Ventrogluteal - Left  DOSE: 200  LOT #: [id number]  :  [employer]  EXPIRATION DATE:  10/2021  NDC#: 7891-2404-06       Due to injection administration, patient instructed to remain in clinic for 15 minutes  afterwards, and to report any adverse reaction to me immediately.  Sofia Delong RN    "

## 2020-08-27 ENCOUNTER — ALLIED HEALTH/NURSE VISIT (OUTPATIENT)
Dept: FAMILY MEDICINE | Facility: CLINIC | Age: 30
End: 2020-08-27
Payer: MEDICARE

## 2020-08-27 DIAGNOSIS — E34.9 TESTOSTERONE DEFICIENCY: Primary | ICD-10-CM

## 2020-08-27 PROCEDURE — 99207 ZZC NO CHARGE NURSE ONLY: CPT

## 2020-08-27 PROCEDURE — 96372 THER/PROPH/DIAG INJ SC/IM: CPT

## 2020-08-27 RX ADMIN — TESTOSTERONE CYPIONATE 200 MG: 200 INJECTION, SOLUTION INTRAMUSCULAR at 10:51

## 2020-08-27 NOTE — PROGRESS NOTES
"Clinic Administered Medication Documentation      Testosterone Documentation     Prior to injection, verified patient identity using patient's name and date of birth. Medication was administered. Please see MAR and medication order for additional information. Patient instructed to remain in clinic for 15 minutes.    Reminders     - Check vial for refills remaining and initiate refill request if no refills remain.      - Verify with patient that medication was paid for at pharmacy. If it was, check the \"patient supplied\" box on the MAR.     Was entire vial of medication used? Yes  Vial/Syringe: Single dose vial  Expiration Date:  10/2022  Was this medication supplied by the patient? No     Alexandrea Cooper, Registered Nurse, Patient Advocate Liaison [facility]        "

## 2020-09-10 ENCOUNTER — ALLIED HEALTH/NURSE VISIT (OUTPATIENT)
Dept: FAMILY MEDICINE | Facility: CLINIC | Age: 30
End: 2020-09-10
Payer: MEDICARE

## 2020-09-10 DIAGNOSIS — E34.9 TESTOSTERONE DEFICIENCY: Primary | ICD-10-CM

## 2020-09-10 PROCEDURE — 99207 ZZC NO CHARGE NURSE ONLY: CPT

## 2020-09-10 PROCEDURE — 96372 THER/PROPH/DIAG INJ SC/IM: CPT

## 2020-09-10 RX ADMIN — TESTOSTERONE CYPIONATE 200 MG: 200 INJECTION, SOLUTION INTRAMUSCULAR at 09:11

## 2020-09-10 NOTE — PROGRESS NOTES
"Clinic Administered Medication Documentation      Testosterone Documentation     Prior to injection, verified patient identity using patient's name and date of birth. Medication was administered. Please see MAR and medication order for additional information. Patient instructed to stay in clinic after the injection but patient declined.    Reminders     - Check vial for refills remaining and initiate refill request if no refills remain.      - Verify with patient that medication was paid for at pharmacy. If it was, check the \"patient supplied\" box on the MAR.     Was entire vial of medication used? Yes  Vial/Syringe: Single dose vial  NDC: 2441-8829-46  Lot: [id number]  Expiration Date:  11/22  (L) gluteal  Was this medication supplied by the patient? No     Brittaney Davis RN        "

## 2020-09-16 DIAGNOSIS — E23.0 PANHYPOPITUITARISM (H): ICD-10-CM

## 2020-09-17 NOTE — TELEPHONE ENCOUNTER
Pending Prescriptions:                       Disp   Refills    NORDITROPIN FLEXPRO 10 MG/1.5ML SOLN PEN i*                Sig: INJECT 0.7 MG UNDER THE SKIN DAILY. DISCARD 28 DAYS           AFTER INITIAL USE    Routing refill request to provider for review/approval because:  Drug not on the [facility] refill protocol

## 2020-09-21 ENCOUNTER — TELEPHONE (OUTPATIENT)
Dept: URGENT CARE | Facility: URGENT CARE | Age: 30
End: 2020-09-21

## 2020-09-21 ENCOUNTER — OFFICE VISIT (OUTPATIENT)
Dept: URGENT CARE | Facility: URGENT CARE | Age: 30
End: 2020-09-21
Payer: MEDICARE

## 2020-09-21 VITALS
OXYGEN SATURATION: 98 % | BODY MASS INDEX: 23.36 KG/M2 | SYSTOLIC BLOOD PRESSURE: 122 MMHG | HEART RATE: 72 BPM | TEMPERATURE: 98.3 F | DIASTOLIC BLOOD PRESSURE: 86 MMHG | WEIGHT: 197 LBS

## 2020-09-21 DIAGNOSIS — R42 DIZZINESS: Primary | ICD-10-CM

## 2020-09-21 LAB
ANION GAP SERPL CALCULATED.3IONS-SCNC: 10 MMOL/L (ref 3–14)
BASOPHILS # BLD AUTO: 0 10E9/L (ref 0–0.2)
BASOPHILS NFR BLD AUTO: 0.4 %
BUN SERPL-MCNC: 15 MG/DL (ref 7–30)
CALCIUM SERPL-MCNC: 9.2 MG/DL (ref 8.5–10.1)
CHLORIDE SERPL-SCNC: 100 MMOL/L (ref 94–109)
CO2 SERPL-SCNC: 31 MMOL/L (ref 20–32)
CREAT SERPL-MCNC: 1.1 MG/DL (ref 0.66–1.25)
DIFFERENTIAL METHOD BLD: NORMAL
EOSINOPHIL # BLD AUTO: 0.1 10E9/L (ref 0–0.7)
EOSINOPHIL NFR BLD AUTO: 0.8 %
ERYTHROCYTE [DISTWIDTH] IN BLOOD BY AUTOMATED COUNT: 12.6 % (ref 10–15)
GFR SERPL CREATININE-BSD FRML MDRD: >90 ML/MIN/{1.73_M2}
GLUCOSE SERPL-MCNC: 99 MG/DL (ref 70–99)
HCT VFR BLD AUTO: 49.4 % (ref 40–53)
HGB BLD-MCNC: 16.4 G/DL (ref 13.3–17.7)
LYMPHOCYTES # BLD AUTO: 1.6 10E9/L (ref 0.8–5.3)
LYMPHOCYTES NFR BLD AUTO: 20.5 %
MCH RBC QN AUTO: 30.9 PG (ref 26.5–33)
MCHC RBC AUTO-ENTMCNC: 33.2 G/DL (ref 31.5–36.5)
MCV RBC AUTO: 93 FL (ref 78–100)
MONOCYTES # BLD AUTO: 0.6 10E9/L (ref 0–1.3)
MONOCYTES NFR BLD AUTO: 7.2 %
NEUTROPHILS # BLD AUTO: 5.4 10E9/L (ref 1.6–8.3)
NEUTROPHILS NFR BLD AUTO: 71.1 %
PLATELET # BLD AUTO: 295 10E9/L (ref 150–450)
POTASSIUM SERPL-SCNC: 4.4 MMOL/L (ref 3.4–5.3)
RBC # BLD AUTO: 5.31 10E12/L (ref 4.4–5.9)
SODIUM SERPL-SCNC: 141 MMOL/L (ref 133–144)
WBC # BLD AUTO: 7.6 10E9/L (ref 4–11)

## 2020-09-21 PROCEDURE — 99214 OFFICE O/P EST MOD 30 MIN: CPT | Performed by: PHYSICIAN ASSISTANT

## 2020-09-21 PROCEDURE — 80048 BASIC METABOLIC PNL TOTAL CA: CPT | Performed by: PHYSICIAN ASSISTANT

## 2020-09-21 PROCEDURE — 36415 COLL VENOUS BLD VENIPUNCTURE: CPT | Performed by: PHYSICIAN ASSISTANT

## 2020-09-21 PROCEDURE — 85025 COMPLETE CBC W/AUTO DIFF WBC: CPT | Performed by: PHYSICIAN ASSISTANT

## 2020-09-21 RX ORDER — MECLIZINE HYDROCHLORIDE 25 MG/1
25 TABLET ORAL 3 TIMES DAILY PRN
Qty: 20 TABLET | Refills: 0 | Status: SHIPPED | OUTPATIENT
Start: 2020-09-21 | End: 2020-11-09

## 2020-09-21 NOTE — TELEPHONE ENCOUNTER
I called mom and left a detail msg regarding to the letter. We cannot email letter to her due to HIPPA violation. I will print the letter out and put the letter at the . The letter will be at the  under his last name.     FLORY Sun on 9/21/2020 at 4:14 PM

## 2020-09-21 NOTE — Clinical Note
Kristian Mae,  I saw Mamadou in the clinic today for dizziness. We are going to trial Meclizine. Advised follow-up with you in the next week or so. He has upcoming MRI (for childhood craniopharyngioma) in OCT.     Thanks,  Dai Jackman

## 2020-09-21 NOTE — TELEPHONE ENCOUNTER
Mom calling stating that she realized they never got the work letter. Mom is hoping this can be emailed to her asap at [email or web address]. Any questions, please call mom at [phone].    Jazmine Roca,

## 2020-09-21 NOTE — PATIENT INSTRUCTIONS
Please follow-up with Dr. Mae to discuss how Meclizine is working.   Try to move up MRI if possible    Patient Education     Dizziness (Uncertain Cause)  Dizziness is a common symptom. It may be described as lightheadedness, spinning, or feeling like you are going to faint. Dizziness can have many causes.  Be sure to tell the healthcare provider about:    All medicines you take, including prescription, over-the-counter, herbs, and supplements    Any other symptoms you have    Any health problems you are being treated for    Any past major health problems you've had, such as a heart attack, balance issues, hearing problems, or blood pressure problems    Anything that causes the dizziness to get worse or better  Today's exam did not show an exact cause for your dizziness. Other tests may be needed. Follow up with your healthcare provider.  Home care    Dizziness that occurs with sudden standing may be a sign of mild dehydration. Drink extra fluids for the next few days.    If you recently started a new medicine, stopped a medicine, or had the dose of a current medicine changed, talk with the prescribing healthcare provider. Your medicine plan may need adjustment.    If dizziness lasts more than a few seconds, sit or lie down until it passes. This may help prevent injury in case you pass out. Get up slowly when you feel better.    Don't drive or use power tools or dangerous equipment until you have had no dizziness for at least 48 hours.  Follow-up care  Follow up with your healthcare provider for further evaluation within the next 7 days or as advised.  When to seek medical advice  Call your healthcare provider for any of the following:    Worsening of symptoms or new symptoms    Passing out or seizure    Repeated vomiting    Headache    Palpitations (the sense that your heart is fluttering or beating fast or hard)    Shortness of breath    Blood in vomit or stool (black or red color)    Weakness of an arm or leg or  1 side of the face    Vision or hearing changes    Trouble walking or speaking    Chest, arm, neck, back, or jaw pain  Date Last Reviewed: 11/1/2017 2000-2019 [employer]. [address] [place] [address], [place] [address]. All rights reserved. This information is not intended as a substitute for professional medical care. Always follow your healthcare professional's instructions.

## 2020-09-22 ENCOUNTER — TRANSFERRED RECORDS (OUTPATIENT)
Dept: HEALTH INFORMATION MANAGEMENT | Facility: CLINIC | Age: 30
End: 2020-09-22

## 2020-09-22 NOTE — PROGRESS NOTES
CHIEF COMPLAINT:   Chief Complaint   Patient presents with     Urgent Care     Dizziness     worsening dizziness x 2-3 weeks       HPI: Mamadou Reece is a 29 year old male with a history of craniopharyngioma, legal blindness who presents to clinic today for evaluation of dizziness.  Reports the dizziness started approximately 3 weeks ago.  Dizziness presents daily, although it is intermittent.  Describes dizziness as feeling like swaying, not lightheaded, but also does not feel like the room is spinning around him.  Feels worse when he stands up, or changes position quickly.  He endorses having tinnitus, but denies having decreased hearing. He endorses having some headaches, usually to loud noises, but this feels like baseline for him. He denies having fever, chills, chest pain, sob, nausea, abd pain, change in gait, numbness/ tingling / weakness in face or extremities.     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, [facility]     Legal blindness     Craniopharyngioma     Mood disorder (H)     Craniopharyngioma     Radiation age 11    Craniopharyngioma     Past Surgical History:   Procedure Laterality Date     CRANIOTOMY, EXCISE TUMOR COMPLEX, COMBINED  age 6    craniopharyngioma     MOHS MICROGRAPHIC PROCEDURE Left 05/31/2018    left cheek nodular BCC     Social History     Tobacco Use     Smoking status: Never Smoker     Smokeless tobacco: Never Used   Substance Use Topics     Alcohol use: Yes     Alcohol/week: 0.0 standard drinks     Comment: sip at holidays     Current Outpatient Medications   Medication     amphetamine-dextroamphetamine (ADDERALL XR) 30 MG 24 hr capsule     amphetamine-dextroamphetamine (ADDERALL XR) 30 MG 24 hr capsule     amphetamine-dextroamphetamine (ADDERALL XR) 30 MG 24 hr capsule     Calcium Citrate-Vitamin D (CALCIUM + D) 315-200 MG-UNIT TABS     Cholecalciferol (VITAMIN D) 1000 UNIT capsule     Cyanocobalamin (B-12) 1000 MCG TBCR      desmopressin (DDAVP) 0.01 % SOLN spray     levothyroxine (SYNTHROID/LEVOTHROID) 137 MCG tablet     meclizine (ANTIVERT) 25 MG tablet     NORDITROPIN FLEXPRO 10 MG/1.5ML SOLN PEN injection     predniSONE (DELTASONE) 1 MG tablet     testosterone cypionate (DEPOTESTOSTERONE) 200 MG/ML injection     Current Facility-Administered Medications   Medication     testosterone cypionate (DEPOTESTOSTERONE) injection 200 mg     No Known Allergies    10 point ROS of systems including Constitutional, Eyes, Respiratory, Cardiovascular, Gastroenterology, Genitourinary, Integumentary, Muscularskeletal, Psychiatric were all negative except for pertinent positives noted in my HPI.        Exam:  /86   Pulse 72   Temp 98.3  F (36.8  C) (Temporal)   Wt 89.4 kg (197 lb)   SpO2 98%   BMI 23.36 kg/m    Constitutional: healthy, alert and no distress  Head: Normocephalic, atraumatic.  Eyes: conjunctiva clear, no drainage  ENT: TMs clear and shiny callie, nasal mucosa pink and moist, throat without tonsillar hypertrophy or erythema  Neck: neck is supple, no cervical lymphadenopathy or nuchal rigidity  Cardiovascular: RRR  Respiratory: CTA bilaterally, no rhonchi or rales  Gastrointestinal: soft and nontender  Skin: no rashes  Neurologic: Speech clear, gait normal. Moves all extremities. Coordination intact.     Results for orders placed or performed in visit on 09/21/20   CBC with platelets and differential     Status: None   Result Value Ref Range    WBC 7.6 4.0 - 11.0 10e9/L    RBC Count 5.31 4.4 - 5.9 10e12/L    Hemoglobin 16.4 13.3 - 17.7 g/dL    Hematocrit 49.4 40.0 - 53.0 %    MCV 93 78 - 100 fl    MCH 30.9 26.5 - 33.0 pg    MCHC 33.2 31.5 - 36.5 g/dL    RDW 12.6 10.0 - 15.0 %    Platelet Count 295 150 - 450 10e9/L    % Neutrophils 71.1 %    % Lymphocytes 20.5 %    % Monocytes 7.2 %    % Eosinophils 0.8 %    % Basophils 0.4 %    Absolute Neutrophil 5.4 1.6 - 8.3 10e9/L    Absolute Lymphocytes 1.6 0.8 - 5.3 10e9/L    Absolute  Monocytes 0.6 0.0 - 1.3 10e9/L    Absolute Eosinophils 0.1 0.0 - 0.7 10e9/L    Absolute Basophils 0.0 0.0 - 0.2 10e9/L    Diff Method Automated Method    Basic metabolic panel  (Ca, Cl, CO2, Creat, Gluc, K, Na, BUN)     Status: None   Result Value Ref Range    Sodium 141 133 - 144 mmol/L    Potassium 4.4 3.4 - 5.3 mmol/L    Chloride 100 94 - 109 mmol/L    Carbon Dioxide 31 20 - 32 mmol/L    Anion Gap 10 3 - 14 mmol/L    Glucose 99 70 - 99 mg/dL    Urea Nitrogen 15 7 - 30 mg/dL    Creatinine 1.10 0.66 - 1.25 mg/dL    GFR Estimate >90 >60 mL/min/[1.73_m2]    GFR Estimate If Black >90 >60 mL/min/[1.73_m2]    Calcium 9.2 8.5 - 10.1 mg/dL         ASSESSMENT/PLAN:  1. Dizziness  Patient with dizziness for the past couple weeks presents to  for evaluation. On exam, Pulses are strong and equal and he does not complain of CP, pleurisy or SOB, doubt cardiac etiology. All his VSS. He has not been ill recently. Denies having severe headache, gait changes or vision changes. Neurological exam is wnl.  Labs are all wnl, do not suspect electrolyte abnormality. Dizziness appears to be vertiginous in nature. Will trial meclizine for dizziness.   He does have a history of craniopharyngioma with upcoming MRI in Oct. Would like mom to see if she is able to move this up given new dizziness. Advised follow-up with Dr. Mae in a week or so, to discuss dizziness/meclizine etc.   Mamadou and mom agree with treatment plan.   - CBC with platelets and differential  - Basic metabolic panel  (Ca, Cl, CO2, Creat, Gluc, K, Na, BUN)  - meclizine (ANTIVERT) 25 MG tablet; Take 1 tablet (25 mg) by mouth 3 times daily as needed for dizziness  Dispense: 20 tablet; Refill: 0      Rosie Jackman PA-C

## 2020-09-24 ENCOUNTER — ALLIED HEALTH/NURSE VISIT (OUTPATIENT)
Dept: FAMILY MEDICINE | Facility: CLINIC | Age: 30
End: 2020-09-24
Payer: MEDICARE

## 2020-09-24 DIAGNOSIS — E34.9 TESTOSTERONE DEFICIENCY: Primary | ICD-10-CM

## 2020-09-24 PROCEDURE — 96372 THER/PROPH/DIAG INJ SC/IM: CPT

## 2020-09-24 PROCEDURE — 99207 ZZC NO CHARGE NURSE ONLY: CPT

## 2020-09-24 RX ADMIN — TESTOSTERONE CYPIONATE 200 MG: 200 INJECTION, SOLUTION INTRAMUSCULAR at 09:18

## 2020-09-24 NOTE — PROGRESS NOTES
"Clinic Administered Medication Documentation        Testosterone Documentation      Prior to injection, verified patient identity using patient's name and date of birth. Medication was administered. Please see MAR and medication order for additional information. Patient instructed to stay in clinic after the injection but patient declined.     Reminders      - Check vial for refills remaining and initiate refill request if no refills remain.      - Verify with patient that medication was paid for at pharmacy. If it was, check the \"patient supplied\" box on the MAR.      Was entire vial of medication used? Yes  Vial/Syringe: Single dose vial  NDC: 3101-3281-82  Lot: [id number]  Expiration Date:  11/22  (R) ventrogluteal  Was this medication supplied by the patient? No      Brittaney Davis RN        "

## 2020-10-01 ENCOUNTER — TRANSFERRED RECORDS (OUTPATIENT)
Dept: HEALTH INFORMATION MANAGEMENT | Facility: CLINIC | Age: 30
End: 2020-10-01

## 2020-10-02 DIAGNOSIS — F98.8 ATTENTION DEFICIT DISORDER, UNSPECIFIED HYPERACTIVITY PRESENCE: ICD-10-CM

## 2020-10-02 RX ORDER — DEXTROAMPHETAMINE SACCHARATE, AMPHETAMINE ASPARTATE MONOHYDRATE, DEXTROAMPHETAMINE SULFATE AND AMPHETAMINE SULFATE 7.5; 7.5; 7.5; 7.5 MG/1; MG/1; MG/1; MG/1
CAPSULE, EXTENDED RELEASE ORAL
Qty: 30 CAPSULE | Refills: 0 | Status: SHIPPED | OUTPATIENT
Start: 2020-10-02 | End: 2020-12-03

## 2020-10-02 RX ORDER — DEXTROAMPHETAMINE SACCHARATE, AMPHETAMINE ASPARTATE MONOHYDRATE, DEXTROAMPHETAMINE SULFATE AND AMPHETAMINE SULFATE 7.5; 7.5; 7.5; 7.5 MG/1; MG/1; MG/1; MG/1
CAPSULE, EXTENDED RELEASE ORAL
Qty: 30 CAPSULE | Refills: 0 | Status: SHIPPED | OUTPATIENT
Start: 2020-12-01 | End: 2020-12-03

## 2020-10-02 RX ORDER — DEXTROAMPHETAMINE SACCHARATE, AMPHETAMINE ASPARTATE MONOHYDRATE, DEXTROAMPHETAMINE SULFATE AND AMPHETAMINE SULFATE 7.5; 7.5; 7.5; 7.5 MG/1; MG/1; MG/1; MG/1
CAPSULE, EXTENDED RELEASE ORAL
Qty: 30 CAPSULE | Refills: 0 | Status: SHIPPED | OUTPATIENT
Start: 2020-11-01 | End: 2020-12-03

## 2020-10-02 NOTE — TELEPHONE ENCOUNTER
Routing refill request to provider for review/approval because:  Drug not on the [facility] refill protocol     Eileen Wilkinson RN Flex

## 2020-10-08 ENCOUNTER — ALLIED HEALTH/NURSE VISIT (OUTPATIENT)
Dept: FAMILY MEDICINE | Facility: CLINIC | Age: 30
End: 2020-10-08
Payer: MEDICARE

## 2020-10-08 DIAGNOSIS — E34.9 TESTOSTERONE DEFICIENCY: Primary | ICD-10-CM

## 2020-10-08 PROCEDURE — 96372 THER/PROPH/DIAG INJ SC/IM: CPT

## 2020-10-08 PROCEDURE — 99207 PR NO CHARGE NURSE ONLY: CPT

## 2020-10-08 RX ADMIN — TESTOSTERONE CYPIONATE 200 MG: 200 INJECTION, SOLUTION INTRAMUSCULAR at 09:08

## 2020-10-08 NOTE — PROGRESS NOTES
"Clinic Administered Medication Documentation        Testosterone Documentation      Prior to injection, verified patient identity using patient's name and date of birth. Medication was administered. Please see MAR and medication order for additional information. Patient instructed to stay in clinic after the injection but patient declined.     Reminders      - Check vial for refills remaining and initiate refill request if no refills remain.      - Verify with patient that medication was paid for at pharmacy. If it was, check the \"patient supplied\" box on the MAR.      Was entire vial of medication used? Yes  Vial/Syringe: Single dose vial  NDC: 2986-4551-77  Lot: [id number]  Expiration Date:  11/22  (L) ventrogluteal  Was this medication supplied by the patient? No      Brittaney Davis RN  "

## 2020-10-22 ENCOUNTER — ALLIED HEALTH/NURSE VISIT (OUTPATIENT)
Dept: FAMILY MEDICINE | Facility: CLINIC | Age: 30
End: 2020-10-22
Payer: MEDICARE

## 2020-10-22 DIAGNOSIS — E34.9 TESTOSTERONE DEFICIENCY: Primary | ICD-10-CM

## 2020-10-22 PROCEDURE — 96372 THER/PROPH/DIAG INJ SC/IM: CPT

## 2020-10-22 RX ADMIN — TESTOSTERONE CYPIONATE 200 MG: 200 INJECTION, SOLUTION INTRAMUSCULAR at 09:33

## 2020-10-22 NOTE — PROGRESS NOTES
"Clinic Administered Medication Documentation        Testosterone Documentation      Prior to injection, verified patient identity using patient's name and date of birth. Medication was administered. Please see MAR and medication order for additional information. Patient instructed to stay in clinic after the injection but patient declined.     Reminders      - Check vial for refills remaining and initiate refill request if no refills remain.      - Verify with patient that medication was paid for at pharmacy. If it was, check the \"patient supplied\" box on the MAR.      Was entire vial of medication used? Yes  Vial/Syringe: Single dose vial  NDC: 2208-5744-74  Lot: [id number]   Expiration Date:  10/21  (R) ventrogluteal  Was this medication supplied by the patient? No      Brittaney Davis RN  "

## 2020-10-23 DIAGNOSIS — R42 DIZZINESS: ICD-10-CM

## 2020-10-23 RX ORDER — MECLIZINE HYDROCHLORIDE 25 MG/1
25 TABLET ORAL 3 TIMES DAILY PRN
Qty: 20 TABLET | Refills: 0 | OUTPATIENT
Start: 2020-10-23

## 2020-10-23 NOTE — TELEPHONE ENCOUNTER
Please call...     This is usually not an ongoing medication for this indication.  The UC doctor that prescribed it recommended follow up in a week or so to discuss further...     So I am thinking if he is still needing this, we should probably do a visit with him.

## 2020-10-23 NOTE — TELEPHONE ENCOUNTER
Routing refill request to provider for review/approval because:  Previously prescribed by another provider    Leslie MARTINEZ RN, BSN

## 2020-10-23 NOTE — TELEPHONE ENCOUNTER
Reason for Call:  Medication or medication refill:    Do you use a [facility]?  Name of the pharmacy and phone number for the current request:  [facility] - [phone]    Name of the medication requested: meclizine (ANTIVERT) 25 MG tablet    Other request: Patient was prescribed by [facility].   Can we leave a detailed message on this number? YES    Phone number patient can be reached at: Other phone number:  [phone]    Best Time: any    Call taken on 10/23/2020 at 9:53 AM by LIZZETH LATIF

## 2020-11-05 ENCOUNTER — ALLIED HEALTH/NURSE VISIT (OUTPATIENT)
Dept: FAMILY MEDICINE | Facility: CLINIC | Age: 30
End: 2020-11-05
Payer: MEDICARE

## 2020-11-05 ENCOUNTER — TELEPHONE (OUTPATIENT)
Dept: ENDOCRINOLOGY | Facility: CLINIC | Age: 30
End: 2020-11-05

## 2020-11-05 DIAGNOSIS — E34.9 TESTOSTERONE DEFICIENCY: Primary | ICD-10-CM

## 2020-11-05 PROCEDURE — 96372 THER/PROPH/DIAG INJ SC/IM: CPT

## 2020-11-05 PROCEDURE — 99207 PR NO CHARGE NURSE ONLY: CPT

## 2020-11-05 RX ORDER — TESTOSTERONE CYPIONATE 200 MG/ML
200 INJECTION, SOLUTION INTRAMUSCULAR
Status: COMPLETED | OUTPATIENT
Start: 2020-11-18 | End: 2021-03-25

## 2020-11-05 RX ADMIN — TESTOSTERONE CYPIONATE 200 MG: 200 INJECTION, SOLUTION INTRAMUSCULAR at 09:15

## 2020-11-05 NOTE — TELEPHONE ENCOUNTER
Testosterone order will  20.  Next injection due 20.  Did advise yearly appt with Dr. Alonso is due 20 and appts are booking out a couple of months.  Asked if he wanted me to call a parent to schedule.  He said he would let his mom know to schedule.  Order T'd up.  Brittaney Davis RN

## 2020-11-05 NOTE — PROGRESS NOTES
"Clinic Administered Medication Documentation        Testosterone Documentation      Prior to injection, verified patient identity using patient's name and date of birth. Medication was administered. Please see MAR and medication order for additional information. Patient instructed to stay in clinic after the injection but patient declined.     Reminders      - Check vial for refills remaining and initiate refill request if no refills remain.      - Verify with patient that medication was paid for at pharmacy. If it was, check the \"patient supplied\" box on the MAR.      Was entire vial of medication used? Yes  Vial/Syringe: Single dose vial  NDC: 5397-1254-41  Lot: [id number]   Expiration Date:  10/21  (L) ventrogluteal  Was this medication supplied by the patient? No     Advised appt with Dr. Alonso due 12/31/20.  He will let his mom know to schedule.     Brittaney Davis RN  "

## 2020-11-09 DIAGNOSIS — E34.9 TESTOSTERONE DEFICIENCY: ICD-10-CM

## 2020-11-09 NOTE — TELEPHONE ENCOUNTER
Father calling in to make sure that order is reviewed and in placed.    Advised on order and notes from 11/5/2020 date.     Father expressed understanding and acceptance of the plan and had no further questions at this time.     Eileen Wilkinson RN Flex

## 2020-11-16 DIAGNOSIS — E23.2 DIABETES INSIPIDUS (H): ICD-10-CM

## 2020-11-17 NOTE — TELEPHONE ENCOUNTER
Pending Prescriptions:                       Disp   Refills    desmopressin (DDAVP) 0.01 % SOLN spray [Ph*       4        Sig: USE THREE SPRAYS INTO NOSTRIL ONCE DAILY AND USE ONE           SPRAY INTO NOSTRIL AT BEDTIME      Routing refill request to provider for review/approval because:  Drug not on the [facility] refill protocol     Please advise, thanks.

## 2020-11-18 RX ORDER — DESMOPRESSIN ACETATE 0.1 MG/ML
SOLUTION NASAL
Qty: 10 ML | Refills: 4 | Status: SHIPPED | OUTPATIENT
Start: 2020-11-18 | End: 2021-01-21

## 2020-11-19 ENCOUNTER — ALLIED HEALTH/NURSE VISIT (OUTPATIENT)
Dept: FAMILY MEDICINE | Facility: CLINIC | Age: 30
End: 2020-11-19
Payer: MEDICARE

## 2020-11-19 DIAGNOSIS — E34.9 TESTOSTERONE DEFICIENCY: Primary | ICD-10-CM

## 2020-11-19 PROCEDURE — 99207 PR NO CHARGE NURSE ONLY: CPT

## 2020-11-19 PROCEDURE — 96372 THER/PROPH/DIAG INJ SC/IM: CPT

## 2020-11-19 RX ADMIN — TESTOSTERONE CYPIONATE 200 MG: 200 INJECTION, SOLUTION INTRAMUSCULAR at 09:01

## 2020-11-19 NOTE — PROGRESS NOTES
"Clinic Administered Medication Documentation        Testosterone Documentation      Prior to injection, verified patient identity using patient's name and date of birth. Medication was administered. Please see MAR and medication order for additional information. Patient instructed to stay in clinic after the injection but patient declined.     Reminders      - Check vial for refills remaining and initiate refill request if no refills remain.      - Verify with patient that medication was paid for at pharmacy. If it was, check the \"patient supplied\" box on the MAR.      Was entire vial of medication used? Yes  Vial/Syringe: Single dose vial  NDC: 9090-8222-26  Lot: [id number]  Expiration Date:  11/22  (L) ventrogluteal  Was this medication supplied by the patient? No      Brittaney Davis RN  "

## 2020-12-03 ENCOUNTER — ALLIED HEALTH/NURSE VISIT (OUTPATIENT)
Dept: FAMILY MEDICINE | Facility: CLINIC | Age: 30
End: 2020-12-03
Payer: MEDICARE

## 2020-12-03 ENCOUNTER — VIRTUAL VISIT (OUTPATIENT)
Dept: FAMILY MEDICINE | Facility: CLINIC | Age: 30
End: 2020-12-03
Payer: MEDICARE

## 2020-12-03 DIAGNOSIS — E34.9 TESTOSTERONE DEFICIENCY: Primary | ICD-10-CM

## 2020-12-03 DIAGNOSIS — F98.8 ATTENTION DEFICIT DISORDER, UNSPECIFIED HYPERACTIVITY PRESENCE: ICD-10-CM

## 2020-12-03 PROCEDURE — 99207 PR NO CHARGE NURSE ONLY: CPT

## 2020-12-03 PROCEDURE — 99441 PR PHYSICIAN TELEPHONE EVALUATION 5-10 MIN: CPT | Mod: 95 | Performed by: FAMILY MEDICINE

## 2020-12-03 PROCEDURE — 96372 THER/PROPH/DIAG INJ SC/IM: CPT

## 2020-12-03 RX ORDER — DEXTROAMPHETAMINE SACCHARATE, AMPHETAMINE ASPARTATE MONOHYDRATE, DEXTROAMPHETAMINE SULFATE AND AMPHETAMINE SULFATE 7.5; 7.5; 7.5; 7.5 MG/1; MG/1; MG/1; MG/1
30 CAPSULE, EXTENDED RELEASE ORAL EVERY MORNING
Qty: 30 CAPSULE | Refills: 0 | Status: SHIPPED | OUTPATIENT
Start: 2020-12-03 | End: 2021-03-19

## 2020-12-03 RX ADMIN — TESTOSTERONE CYPIONATE 200 MG: 200 INJECTION, SOLUTION INTRAMUSCULAR at 09:21

## 2020-12-03 NOTE — PROGRESS NOTES
"Mamadou Reece is a 30 year old male who is being evaluated via a billable telephone visit.      [phone]    The patient has been notified of following:     \"This telephone visit will be conducted via a call between you and your physician/provider. We have found that certain health care needs can be provided without the need for a physical exam.  This service lets us provide the care you need with a short phone conversation.  If a prescription is necessary we can send it directly to your pharmacy.  If lab work is needed we can place an order for that and you can then stop by our lab to have the test done at a later time.    Telephone visits are billed at different rates depending on your insurance coverage. During this emergency period, for some insurers they may be billed the same as an in-person visit.  Please reach out to your insurance provider with any questions.    If during the course of the call the physician/provider feels a telephone visit is not appropriate, you will not be charged for this service.\"    Patient has given verbal consent for Telephone visit?  Yes    What phone number would you like to be contacted at? [phone]    How would you like to obtain your AVS? Mail a copy    Subjective     Mamadou Reece is a 30 year old male who presents via phone visit today for the following health issues:    HPI     Medication Followup of Adderall    Taking Medication as prescribed: yes    Side Effects:  None    Medication Helping Symptoms:  yes          See under ROS     Patient Active Problem List   Diagnosis     ADD (attention deficit disorder)     Craniopharyngioma (H)     CARDIOVASCULAR SCREENING; LDL GOAL LESS THAN 160     Mood change     Legal blindness     Hypothyroidism     Panhypopituitarism (H)     History of craniopharyngioma     Testosterone deficiency     BCC (basal cell carcinoma), face     Urinary frequency       Current Outpatient Medications   Medication Sig Dispense Refill     " amphetamine-dextroamphetamine (ADDERALL XR) 30 MG 24 hr capsule TAKE ONE CAPSULE BY MOUTH ONCE DAILY 30 capsule 0     Calcium Citrate-Vitamin D (CALCIUM + D) 315-200 MG-UNIT TABS Take by mouth 2 times daily        Cholecalciferol (VITAMIN D) 1000 UNIT capsule Take 1 capsule by mouth daily        Cyanocobalamin (B-12) 1000 MCG TBCR Take 1,000 mcg by mouth daily  100 tablet 1     desmopressin (DDAVP) 0.01 % SOLN spray USE THREE SPRAYS INTO NOSTRIL ONCE DAILY AND USE ONE SPRAY INTO NOSTRIL AT BEDTIME 10 mL 4     levothyroxine (SYNTHROID/LEVOTHROID) 137 MCG tablet TAKE ONE TABLET BY MOUTH EVERY MORNING BEFORE BREAKFAST 31 tablet 11     meclizine (ANTIVERT) 25 MG tablet Take 1 tablet (25 mg) by mouth 3 times daily as needed for dizziness 20 tablet 0     NORDITROPIN FLEXPRO 10 MG/1.5ML SOLN PEN injection INJECT 0.7 MG UNDER THE SKIN DAILY. DISCARD 28 DAYS AFTER INITIAL USE 10 mL 1     predniSONE (DELTASONE) 1 MG tablet TAKE THREE TABLETS (3 MG) BY MOUTH IN THE MORNING AND TWO TABLETS (2 MG) AT NIGHT 200 tablet 11     testosterone cypionate (DEPOTESTOSTERONE) 200 MG/ML injection Inject 1 mL (200 mg) into the muscle every 14 days 1 mL 5         Review of Systems   CONSTITUTIONAL:NEGATIVE for fever, chills, change in weight  RESP:NEGATIVE for significant cough or SOB  CV: NEGATIVE for chest pain, palpitations or peripheral edema  PSYCHIATRIC: NEGATIVE for changes in mood or affect    Family in the background.  Goes to work.  Believes safe; works at [employer].  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     Temp and O2 sats checked daily.    Had MRI follow up (gets q 2 years at [facility]) and bp was checked there. All was OK.        Objective          Vitals:  No vitals were obtained today due to virtual visit.    healthy, alert and no distress  PSYCH: Alert and oriented times 3; coherent speech, normal   rate and volume, able to articulate logical thoughts, able   to abstract reason.  His affect is normal  RESP: No cough, no audible  wheezing, able to talk in full sentences  Remainder of exam unable to be completed due to telephone visits            Assessment/Plan:    Assessment & Plan     Attention deficit disorder, unspecified hyperactivity presence  Refilling. Sounds like things are stable.   - amphetamine-dextroamphetamine (ADDERALL XR) 30 MG 24 hr capsule; Take 1 capsule (30 mg) by mouth every morning  - amphetamine-dextroamphetamine (ADDERALL XR) 30 MG 24 hr capsule; Take 1 capsule (30 mg) by mouth every morning          Return in about 6 months (around 6/3/2021), or if symptoms worsen or fail to improve, for Medication recheck.    Mariluz Mae MD, MD  [facility]    Phone call duration:  6 minutes

## 2020-12-03 NOTE — PROGRESS NOTES
"Testosterone level due 12/12/20.  Appointment with Dr. Alonso 1/21/20.  Does have lab orders.  Will do labs the week before his appt.    Clinic Administered Medication Documentation        Testosterone Documentation      Prior to injection, verified patient identity using patient's name and date of birth. Medication was administered. Please see MAR and medication order for additional information. Patient instructed to stay in clinic after the injection but patient declined.     Reminders      - Check vial for refills remaining and initiate refill request if no refills remain.      - Verify with patient that medication was paid for at pharmacy. If it was, check the \"patient supplied\" box on the MAR.      Was entire vial of medication used? Yes  Vial/Syringe: Single dose vial  NDC: 0346-2648-35  Lot: [id number]  Expiration Date:  11/21  (R) ventrogluteal  Was this medication supplied by the patient? No      Brittaney Davis RN  "

## 2020-12-17 ENCOUNTER — ALLIED HEALTH/NURSE VISIT (OUTPATIENT)
Dept: FAMILY MEDICINE | Facility: CLINIC | Age: 30
End: 2020-12-17
Payer: MEDICARE

## 2020-12-17 DIAGNOSIS — E34.9 TESTOSTERONE DEFICIENCY: Primary | ICD-10-CM

## 2020-12-17 PROCEDURE — 96372 THER/PROPH/DIAG INJ SC/IM: CPT

## 2020-12-17 PROCEDURE — 99207 PR NO CHARGE NURSE ONLY: CPT

## 2020-12-17 RX ADMIN — TESTOSTERONE CYPIONATE 200 MG: 200 INJECTION, SOLUTION INTRAMUSCULAR at 08:47

## 2020-12-17 NOTE — PROGRESS NOTES
"Testosterone level due 12/12/20.  Appointment with Dr. Alonso 1/21/20.  Does have lab orders.  Will do labs the week before his appt.     Clinic Administered Medication Documentation        Testosterone Documentation      Prior to injection, verified patient identity using patient's name and date of birth. Medication was administered. Please see MAR and medication order for additional information. Patient instructed to stay in clinic after the injection but patient declined.     Reminders      - Check vial for refills remaining and initiate refill request if no refills remain.      - Verify with patient that medication was paid for at pharmacy. If it was, check the \"patient supplied\" box on the MAR.      Was entire vial of medication used? Yes  Vial/Syringe: Single dose vial  NDC: 0489-8319-10  Lot: [id number]  Expiration Date:  11/21  (L) ventrogluteal  Was this medication supplied by the patient? No      Brittaney Davis RN  "

## 2020-12-31 ENCOUNTER — ALLIED HEALTH/NURSE VISIT (OUTPATIENT)
Dept: FAMILY MEDICINE | Facility: CLINIC | Age: 30
End: 2020-12-31
Payer: MEDICARE

## 2020-12-31 DIAGNOSIS — E34.9 TESTOSTERONE DEFICIENCY: Primary | ICD-10-CM

## 2020-12-31 PROCEDURE — 96372 THER/PROPH/DIAG INJ SC/IM: CPT

## 2020-12-31 PROCEDURE — 99207 PR NO CHARGE NURSE ONLY: CPT

## 2020-12-31 RX ADMIN — TESTOSTERONE CYPIONATE 200 MG: 200 INJECTION, SOLUTION INTRAMUSCULAR at 08:45

## 2020-12-31 NOTE — PROGRESS NOTES
"Clinic Administered Medication Documentation        Testosterone Documentation      Prior to injection, verified patient identity using patient's name and date of birth. Medication was administered. Please see MAR and medication order for additional information. Patient instructed to stay in clinic after the injection but patient declined.     Reminders      - Check vial for refills remaining and initiate refill request if no refills remain.      - Verify with patient that medication was paid for at pharmacy. If it was, check the \"patient supplied\" box on the MAR.      Was entire vial of medication used? Yes  Vial/Syringe: Single dose vial  NDC: 4184-5703-12  Lot: [id number]  Expiration Date:  11/21  (R) ventrogluteal  Was this medication supplied by the patient? No      Brittaney Davis RN  "

## 2021-01-07 DIAGNOSIS — E34.9 TESTOSTERONE DEFICIENCY: ICD-10-CM

## 2021-01-07 DIAGNOSIS — E23.0 PANHYPOPITUITARISM (H): ICD-10-CM

## 2021-01-07 LAB
ANION GAP SERPL CALCULATED.3IONS-SCNC: 3 MMOL/L (ref 3–14)
BUN SERPL-MCNC: 12 MG/DL (ref 7–30)
CALCIUM SERPL-MCNC: 8.9 MG/DL (ref 8.5–10.1)
CHLORIDE SERPL-SCNC: 105 MMOL/L (ref 94–109)
CO2 SERPL-SCNC: 32 MMOL/L (ref 20–32)
CREAT SERPL-MCNC: 1.01 MG/DL (ref 0.66–1.25)
FSH SERPL-ACNC: <0.2 IU/L (ref 0.7–10.8)
GFR SERPL CREATININE-BSD FRML MDRD: >90 ML/MIN/{1.73_M2}
GLUCOSE SERPL-MCNC: 68 MG/DL (ref 70–99)
HGB BLD-MCNC: 15.8 G/DL (ref 13.3–17.7)
LH SERPL-ACNC: <0.2 IU/L (ref 1.5–9.3)
POTASSIUM SERPL-SCNC: 3.3 MMOL/L (ref 3.4–5.3)
PROLACTIN SERPL-MCNC: <1 UG/L (ref 2–18)
SODIUM SERPL-SCNC: 140 MMOL/L (ref 133–144)
T4 FREE SERPL-MCNC: 1.29 NG/DL (ref 0.76–1.46)

## 2021-01-07 PROCEDURE — 80048 BASIC METABOLIC PNL TOTAL CA: CPT | Performed by: INTERNAL MEDICINE

## 2021-01-07 PROCEDURE — 85018 HEMOGLOBIN: CPT | Performed by: INTERNAL MEDICINE

## 2021-01-07 PROCEDURE — 99000 SPECIMEN HANDLING OFFICE-LAB: CPT | Performed by: INTERNAL MEDICINE

## 2021-01-07 PROCEDURE — 83002 ASSAY OF GONADOTROPIN (LH): CPT | Performed by: INTERNAL MEDICINE

## 2021-01-07 PROCEDURE — 36415 COLL VENOUS BLD VENIPUNCTURE: CPT | Performed by: INTERNAL MEDICINE

## 2021-01-07 PROCEDURE — 84305 ASSAY OF SOMATOMEDIN: CPT | Performed by: INTERNAL MEDICINE

## 2021-01-07 PROCEDURE — 84403 ASSAY OF TOTAL TESTOSTERONE: CPT | Mod: 90 | Performed by: INTERNAL MEDICINE

## 2021-01-07 PROCEDURE — 83001 ASSAY OF GONADOTROPIN (FSH): CPT | Performed by: INTERNAL MEDICINE

## 2021-01-07 PROCEDURE — 84146 ASSAY OF PROLACTIN: CPT | Performed by: INTERNAL MEDICINE

## 2021-01-07 PROCEDURE — 84270 ASSAY OF SEX HORMONE GLOBUL: CPT | Performed by: INTERNAL MEDICINE

## 2021-01-07 PROCEDURE — 84439 ASSAY OF FREE THYROXINE: CPT | Performed by: INTERNAL MEDICINE

## 2021-01-08 LAB — IGF-I BLD-MCNC: 160 NG/ML (ref 84–250)

## 2021-01-11 LAB
SHBG SERPL-SCNC: 21 NMOL/L (ref 11–80)
TESTOST FREE SERPL-MCNC: 26.34 NG/DL (ref 4.7–24.4)
TESTOST SERPL-MCNC: 907 NG/DL (ref 240–950)

## 2021-01-14 ENCOUNTER — ALLIED HEALTH/NURSE VISIT (OUTPATIENT)
Dept: FAMILY MEDICINE | Facility: CLINIC | Age: 31
End: 2021-01-14
Payer: MEDICARE

## 2021-01-14 DIAGNOSIS — E34.9 TESTOSTERONE DEFICIENCY: Primary | ICD-10-CM

## 2021-01-14 PROCEDURE — 99207 PR NO CHARGE NURSE ONLY: CPT

## 2021-01-14 PROCEDURE — 96372 THER/PROPH/DIAG INJ SC/IM: CPT

## 2021-01-14 RX ADMIN — TESTOSTERONE CYPIONATE 200 MG: 200 INJECTION, SOLUTION INTRAMUSCULAR at 08:38

## 2021-01-14 NOTE — PROGRESS NOTES
"Clinic Administered Medication Documentation        Testosterone Documentation      Prior to injection, verified patient identity using patient's name and date of birth. Medication was administered. Please see MAR and medication order for additional information. Patient instructed to stay in clinic after the injection but patient declined.     Reminders      - Check vial for refills remaining and initiate refill request if no refills remain.      - Verify with patient that medication was paid for at pharmacy. If it was, check the \"patient supplied\" box on the MAR.      Was entire vial of medication used? Yes  Vial/Syringe: Single dose vial  NDC: 5208-1674-88  Lot: [id number]  Expiration Date:  11/21  (L) ventrogluteal  Was this medication supplied by the patient? No      Brittaney Davis RN  "

## 2021-01-21 ENCOUNTER — VIRTUAL VISIT (OUTPATIENT)
Dept: ENDOCRINOLOGY | Facility: CLINIC | Age: 31
End: 2021-01-21
Payer: MEDICARE

## 2021-01-21 DIAGNOSIS — E34.9 TESTOSTERONE DEFICIENCY: ICD-10-CM

## 2021-01-21 DIAGNOSIS — E03.9 HYPOTHYROIDISM, UNSPECIFIED TYPE: ICD-10-CM

## 2021-01-21 DIAGNOSIS — E23.2 DIABETES INSIPIDUS (H): Primary | ICD-10-CM

## 2021-01-21 DIAGNOSIS — E23.0 PANHYPOPITUITARISM (H): ICD-10-CM

## 2021-01-21 PROCEDURE — 99443 PR PHYSICIAN TELEPHONE EVALUATION 21-30 MIN: CPT | Performed by: INTERNAL MEDICINE

## 2021-01-21 RX ORDER — DESMOPRESSIN ACETATE 0.1 MG/ML
SOLUTION NASAL
Qty: 10 ML | Refills: 4 | Status: SHIPPED | OUTPATIENT
Start: 2021-01-21 | End: 2021-06-16

## 2021-01-21 RX ORDER — PEN NEEDLE, DIABETIC 30 GX5/16"
NEEDLE, DISPOSABLE MISCELLANEOUS
Qty: 90 EACH | Refills: 3 | Status: SHIPPED | OUTPATIENT
Start: 2021-01-21

## 2021-01-21 RX ORDER — PREDNISONE 1 MG/1
TABLET ORAL
Qty: 200 TABLET | Refills: 11 | Status: SHIPPED | OUTPATIENT
Start: 2021-01-21 | End: 2022-01-18

## 2021-01-21 NOTE — LETTER
"    1/21/2021         RE: Mamadou Reece  [address] [facility] [address]        Dear Colleague,    Thank you for referring your patient, Mamadou Reece, to the [facility]. Please see a copy of my visit note below.    This is a telephone encounter.  Declined video.  1/21/2021  His parent were on phone with him.    Start time: 8:45    End time: 9:10    More than 10 min spent reviewing chart, labs, results, imaging studies and in patient communication.    In the setting of COVID-19 outbreak patients visits are transitioned to phone visits/ virtual visits as per system and leadership guidelines.  I have personally reviewed data including notes, lab tests. I have reviewed and interpreted images personally.  This encounter is potentially equivalent to established 4 level (80361) clinic visit.    The patient has been notified of following:      \"This telephone visit will be conducted via a call between you and your physician/provider. We have found that certain health care needs can be provided without the need for a physical exam.  This service lets us provide the care you need with a short phone conversation.  If a prescription is necessary we can send it directly to your pharmacy.  If lab work is needed we can place an order for that and you can then stop by our lab to have the test done at a later time.     We will bill your insurance company for this service.  Please check with your medical insurance if this type of visit is covered. You may be responsible for the cost of this type of visit if insurance coverage is denied.  The typical cost is $30 (10min), $59 (11-20min) and $85 (21-30min).  Most often these visits are shorter than 10 minutes. With new updates with corona virus patient might be billed as clinic visit.     If during the course of the call the physician/provider feels a telephone visit is not appropriate, you will not be charged for this service.\"      Past medical " history, social history, family history, allergy and medications were reviewed and updated as appropriate.  Reviewed all pertinent labs, notes and imaging studies as appropriate.    Patient verbally consented to phone visit today: yes.    Disclaimer: This note consists of symbols derived from keyboarding, dictation and/or voice recognition software. As a result, there may be errors in the script that have gone undetected. Please consider this when interpreting information found in this chart.      ROS neg expect noted in notes.  Patient feels well at this time and denies any tachycardia, palpitations, heat intolerance, tremor, insomnia, diarrhea, or unexplained weight loss.  Patient also denies  cold intolerance, constipation, or unexplained weight gain.     Name: Mamadou Reece  Seen for f/u of panhypopituitarism. Last visit 12/2019.  Chief Complaint   Patient presents with     Thyroid Problem      HPI:  Mamadou Reece is a 30 year old male who presents for the evaluation of  Above.  Was seen in [facility] before. Records reviewed. Last visit with Endo was 9/2016  Before that he was followed by Dr. Rendon at [facility].  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 He had stereotactic  radiation therapy at age 11.   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  Followed by Pediatrics oncology; getting MRIs every 2 yrs now. Due for MRI and has upcoming appointment.  Has not had recurrence since his radiation therapy    He is currently on full hormonal treatment with medications listed below.  He is also on growth hormone replacement.  I have discussed role of growth hormone replacement in adults is controversial  Patient's mother reports that she was told by her pediatric endocrinologist that he should never be getting off of growth hormone  They would like to continue  it.    Feeling OK.  No major concerns today.    1. HYpogonadism:  On testosterone cypionate 200 mg IM q 2 weeks.  Goes to clinic.   Last testosterone checked 1/2021 was in acceptable range in 900s.  Hemoglobin levels appears stable.  Energy level is OK.  Shaving is normal. No change.  Muscle strength is OK.  Weight is stable.      2. Diabetes Insipidus:  On desmopressiin 10 mcg- 3 sprays in evening and 1 at bedtime. (Nighttime dose was added in 2018 as he was waking up frequently at night)  Not waking up at night.  No major concerns. No excessive thirst or frequent urination.  Drinks to thirst.    3. Growth hormone deficiency:  On Norditropin 0.7 mg/day.   Doing Ok.  Takes own injections.  Living in apartment.    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  Reports compliance.   No CP, palpitations, diarrhea,dysphagia, cold or warm intolerance or constipation.  + weight stable.  Has dry hands and feet: chronic condition.  Wt Readings from Last 2 Encounters:   09/21/20 89.4 kg (197 lb)   12/31/19 89.9 kg (198 lb 4.8 oz)       5. Secondary adrenal insufficiency:  On prednsione 3 mg AM and 2 mg PM. On this dose X many years.  No nausea. No vomiting.no dizziness.  BP stable. Electrolytes are in range.  Wt Readings from Last 2 Encounters:   09/21/20 89.4 kg (197 lb)   12/31/19 89.9 kg (198 lb 4.8 oz)       PMH/PSH:  1. Diabetes insipidus   2. Panhypopituitarism   3. History of ADD.  4. Central hypothyroidism   5. Secondary adrenal insufficiency  6. Growth hormone deficiency   7. Hypogonadotropic hypogonadism     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, [facility]     Legal blindness     Craniopharyngioma     Mood disorder (H)     Craniopharyngioma     Radiation age 11    Craniopharyngioma     Past Surgical History:   Procedure Laterality Date     CRANIOTOMY, EXCISE TUMOR COMPLEX, COMBINED  age 6    craniopharyngioma     MOHS MICROGRAPHIC PROCEDURE Left 05/31/2018     left cheek nodular BCC     Family Hx:  Family History   Problem Relation Age of Onset     Cancer Father         Melanoma     Cerebrovascular Disease Maternal Grandmother 91     Diabetes Maternal Grandmother      Cerebrovascular Disease Paternal Grandmother      Cerebrovascular Disease Paternal Grandfather      Social Hx:  Social History     Socioeconomic History     Marital status: Single     Spouse name: Not on file     Number of children: Not on file     Years of education: Not on file     Highest education level: Not on file   Occupational History     Not on file   Social Needs     Financial resource strain: Not on file     Food insecurity     Worry: Not on file     Inability: Not on file     Transportation needs     Medical: Not on file     Non-medical: Not on file   Tobacco Use     Smoking status: Never Smoker     Smokeless tobacco: Never Used   Substance and Sexual Activity     Alcohol use: Yes     Alcohol/week: 0.0 standard drinks     Comment: sip at holidays     Drug use: No     Sexual activity: Never   Lifestyle     Physical activity     Days per week: Not on file     Minutes per session: Not on file     Stress: Not on file   Relationships     Social connections     Talks on phone: Not on file     Gets together: Not on file     Attends Alevism service: Not on file     Active member of club or organization: Not on file     Attends meetings of clubs or organizations: Not on file     Relationship status: Not on file     Intimate partner violence     Fear of current or ex partner: Not on file     Emotionally abused: Not on file     Physically abused: Not on file     Forced sexual activity: Not on file   Other Topics Concern     Parent/sibling w/ CABG, MI or angioplasty before 65F 55M? Not Asked   Social History Narrative     Not on file          MEDICATIONS:  has a current medication list which includes the following prescription(s): amphetamine-dextroamphetamine, amphetamine-dextroamphetamine, calcium +  d, vitamin d, b-12, desmopressin, levothyroxine, meclizine, norditropin flexpro, prednisone, and testosterone cypionate, and the following Facility-Administered Medications: testosterone cypionate.    ROS     ROS: 10 point ROS neg other than the symptoms noted above in the HPI.    Physical Exam   VS: There were no vitals taken for this visit.    LABS:  Component      Latest Ref Rng & Units 12/12/2019   Testosterone Total      240 - 950 ng/dL 712   Sex Hormone Binding Globulin      11 - 80 nmol/L 23   Free Testosterone Calculated      4.7 - 24.4 ng/dL 19.09   Ins Growth Factor 1      87 - 255 ng/ml 254   Hemoglobin      13.3 - 17.7 g/dL 16.2   T4 Free      0.76 - 1.46 ng/dL 1.45   Prolactin      2 - 18 ug/L <1 (L)       END[facility] Latest Ref Rng & Units 12/12/2019   T4 FREE 0.76 - 1.46 ng/dL 1.45     ENDO THYROID LABS[facility] Latest Ref Rng & Units 1/3/2019 6/20/2018   T4 FREE 0.76 - 1.46 ng/dL 1.17 1.38     ENDO THYROID LABS[facility] Latest Ref Rng & Units 10/5/2017   T4 FREE 0.76 - 1.46 ng/dL 1.18     ENDO PITUITARY LABS-[facility] Latest Ref Rng & Units 10/5/2017   PROLACTIN 2 - 18 ug/L 1 (L)   TESTOSTERONE TOTAL 240 - 950 ng/dL 647   PSA 0 - 4 ug/L 0.17    - 144 mmol/L 141   POTASSIUM 3.4 - 5.3 mmol/L 3.8   INS GROWTH FACTOR 1 94 - 271 ng/ml 298 (H)     ENDO PITUITARY LABS[facility] Latest Ref Rng & Units 10/4/2018   TESTOSTERONE TOTAL 240 - 950 ng/dL 859     ENDO PITUITARY LABS[facility] Latest Ref Rng & Units 1/3/2019    - 144 mmol/L 138   POTASSIUM 3.4 - 5.3 mmol/L    INS GROWTH FACTOR 1 90 - 262 ng/ml 320 (H)     All pertinent notes, labs, and images personally reviewed by me.     A/P  Mr.Jared Reece is a 89year old here for the evaluation of above:    1. Hypogonadism:  On testosterone cypionate 200 mg IM q 2 weeks.  Goes to clinic for njections.  Clinically looks okay.  No major concerns  Last testosterone checked in 900s.  Plan to continue same dose and recheck labs in 3-4 months to see the trend.  His  testosterone is in upper normal range then can decrease the dose based on labs in future.    2. Diabetes Insipidus:  On desmopressiin (0.01 % solution)10 mcg- 3 sprays in evening and 1 spray at bedtime.  No major concerns. No excessive thirst or frequent urination.    Electrolytes are in acceptable range.  -- continue current dose of DDAVP  Repeat labs in 1 year.      3. Growth hormone deficiency:  On Norditropin 0.7 mg/day. ( 10 mg/1.5 ml solution)  Discussed GH replacement in adults.  They would like to continue it. Agree with it.  Labs and follow-up in 1 year.    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  Reports compliance. But taking it at night.  Clinically looks euthyroid.   Repeat labs in 1 year.    5. Secondary adrenal insufficiency:  On prednsione 3 mg AM and 2 mg PM. On this dose X many years.  No nausea. No vomiting.  -- continue current dose  Discussed sick days.   Repeat labs (BMP) in 1 year.    6.  Low glucose level:  Glucose level 68 on BMP panel.  He is asymptomatic.  Plan to continue to monitor.    More than 50% of face to face time spent with Mr. Reece on counseling / coordinating his care.      Follow-up:  1 year.      Sumaya Alonso MD  Endocrinology   [facility]    CC: Mariluz Mae     Disclaimer: This note consists of symbols derived from keyboarding, dictation and/or voice recognition software. As a result, there may be errors in the script that have gone undetected. Please consider this when interpreting information found in this chart.    Addendum to above note and clinic visit:    Labs reviewed.    See result note/telephone encounter.              Again, thank you for allowing me to participate in the care of your patient.        Sincerely,        Sumaya Alonso MD

## 2021-01-21 NOTE — PATIENT INSTRUCTIONS
[facility]   Dr Alonso, Endocrinology Department      [facility]   [address]  [place] [address]  Appointment [phone]  Fax: [phone]  [place]: Monday and Tuesday         [facility]   [address]  [place] [address]  Appointment [phone]  Fax: [phone]  [place]: Wednesday and Thursday            Please check the cost coverage and copay with insurance before recommended tests, services and medications (especially if new medications are prescribed).     If ordered, please get blood work done 1 week prior to your next appointment so they will be available to Dr. Alonso at your visit.      Continue current doses of medication.  Testosterone labs in 3-4 months.  Needs morning midcycle sample.  Please make a lab appointment for blood work and follow up clinic appointment in 1 week after that to discuss results.

## 2021-01-21 NOTE — PROGRESS NOTES
"This is a telephone encounter.  Declined video.  1/21/2021  His parent were on phone with him.    Start time: 8:45    End time: 9:10    More than 10 min spent reviewing chart, labs, results, imaging studies and in patient communication.    In the setting of COVID-19 outbreak patients visits are transitioned to phone visits/ virtual visits as per system and leadership guidelines.  I have personally reviewed data including notes, lab tests. I have reviewed and interpreted images personally.  This encounter is potentially equivalent to established 4 level (64012) clinic visit.    The patient has been notified of following:      \"This telephone visit will be conducted via a call between you and your physician/provider. We have found that certain health care needs can be provided without the need for a physical exam.  This service lets us provide the care you need with a short phone conversation.  If a prescription is necessary we can send it directly to your pharmacy.  If lab work is needed we can place an order for that and you can then stop by our lab to have the test done at a later time.     We will bill your insurance company for this service.  Please check with your medical insurance if this type of visit is covered. You may be responsible for the cost of this type of visit if insurance coverage is denied.  The typical cost is $30 (10min), $59 (11-20min) and $85 (21-30min).  Most often these visits are shorter than 10 minutes. With new updates with corona virus patient might be billed as clinic visit.     If during the course of the call the physician/provider feels a telephone visit is not appropriate, you will not be charged for this service.\"      Past medical history, social history, family history, allergy and medications were reviewed and updated as appropriate.  Reviewed all pertinent labs, notes and imaging studies as appropriate.    Patient verbally consented to phone visit today: yes.    Disclaimer: This " note consists of symbols derived from keyboarding, dictation and/or voice recognition software. As a result, there may be errors in the script that have gone undetected. Please consider this when interpreting information found in this chart.      ROS neg expect noted in notes.  Patient feels well at this time and denies any tachycardia, palpitations, heat intolerance, tremor, insomnia, diarrhea, or unexplained weight loss.  Patient also denies  cold intolerance, constipation, or unexplained weight gain.     Name: Mamadou Reece  Seen for f/u of panhypopituitarism. Last visit 12/2019.  Chief Complaint   Patient presents with     Thyroid Problem      HPI:  Mamadou Reece is a 30 year old male who presents for the evaluation of  Above.  Was seen in [facility] before. Records reviewed. Last visit with Endo was 9/2016  Before that he was followed by Dr. Rendon at [facility].  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 He had stereotactic  radiation therapy at age 11.   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  Followed by Pediatrics oncology; getting MRIs every 2 yrs now. Due for MRI and has upcoming appointment.  Has not had recurrence since his radiation therapy    He is currently on full hormonal treatment with medications listed below.  He is also on growth hormone replacement.  I have discussed role of growth hormone replacement in adults is controversial  Patient's mother reports that she was told by her pediatric endocrinologist that he should never be getting off of growth hormone  They would like to continue it.    Feeling OK.  No major concerns today.    1. HYpogonadism:  On testosterone cypionate 200 mg IM q 2 weeks.  Goes to clinic.   Last testosterone checked 1/2021 was in acceptable range in 900s.  Hemoglobin levels appears stable.  Energy level is  OK.  Shaving is normal. No change.  Muscle strength is OK.  Weight is stable.      2. Diabetes Insipidus:  On desmopressiin 10 mcg- 3 sprays in evening and 1 at bedtime. (Nighttime dose was added in 2018 as he was waking up frequently at night)  Not waking up at night.  No major concerns. No excessive thirst or frequent urination.  Drinks to thirst.    3. Growth hormone deficiency:  On Norditropin 0.7 mg/day.   Doing Ok.  Takes own injections.  Living in apartment.    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  Reports compliance.   No CP, palpitations, diarrhea,dysphagia, cold or warm intolerance or constipation.  + weight stable.  Has dry hands and feet: chronic condition.  Wt Readings from Last 2 Encounters:   09/21/20 89.4 kg (197 lb)   12/31/19 89.9 kg (198 lb 4.8 oz)       5. Secondary adrenal insufficiency:  On prednsione 3 mg AM and 2 mg PM. On this dose X many years.  No nausea. No vomiting.no dizziness.  BP stable. Electrolytes are in range.  Wt Readings from Last 2 Encounters:   09/21/20 89.4 kg (197 lb)   12/31/19 89.9 kg (198 lb 4.8 oz)       PMH/PSH:  1. Diabetes insipidus   2. Panhypopituitarism   3. History of ADD.  4. Central hypothyroidism   5. Secondary adrenal insufficiency  6. Growth hormone deficiency   7. Hypogonadotropic hypogonadism     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, [facility]     Legal blindness     Craniopharyngioma     Mood disorder (H)     Craniopharyngioma     Radiation age 11    Craniopharyngioma     Past Surgical History:   Procedure Laterality Date     CRANIOTOMY, EXCISE TUMOR COMPLEX, COMBINED  age 6    craniopharyngioma     MOHS MICROGRAPHIC PROCEDURE Left 05/31/2018    left cheek nodular BCC     Family Hx:  Family History   Problem Relation Age of Onset     Cancer Father         Melanoma     Cerebrovascular Disease Maternal Grandmother 91     Diabetes Maternal Grandmother      Cerebrovascular Disease Paternal  Grandmother      Cerebrovascular Disease Paternal Grandfather      Social Hx:  Social History     Socioeconomic History     Marital status: Single     Spouse name: Not on file     Number of children: Not on file     Years of education: Not on file     Highest education level: Not on file   Occupational History     Not on file   Social Needs     Financial resource strain: Not on file     Food insecurity     Worry: Not on file     Inability: Not on file     Transportation needs     Medical: Not on file     Non-medical: Not on file   Tobacco Use     Smoking status: Never Smoker     Smokeless tobacco: Never Used   Substance and Sexual Activity     Alcohol use: Yes     Alcohol/week: 0.0 standard drinks     Comment: sip at holidays     Drug use: No     Sexual activity: Never   Lifestyle     Physical activity     Days per week: Not on file     Minutes per session: Not on file     Stress: Not on file   Relationships     Social connections     Talks on phone: Not on file     Gets together: Not on file     Attends Jew service: Not on file     Active member of club or organization: Not on file     Attends meetings of clubs or organizations: Not on file     Relationship status: Not on file     Intimate partner violence     Fear of current or ex partner: Not on file     Emotionally abused: Not on file     Physically abused: Not on file     Forced sexual activity: Not on file   Other Topics Concern     Parent/sibling w/ CABG, MI or angioplasty before 65F 55M? Not Asked   Social History Narrative     Not on file          MEDICATIONS:  has a current medication list which includes the following prescription(s): amphetamine-dextroamphetamine, amphetamine-dextroamphetamine, calcium + d, vitamin d, b-12, desmopressin, levothyroxine, meclizine, norditropin flexpro, prednisone, and testosterone cypionate, and the following Facility-Administered Medications: testosterone cypionate.    ROS     ROS: 10 point ROS neg other than the  symptoms noted above in the HPI.    Physical Exam   VS: There were no vitals taken for this visit.    LABS:  Component      Latest Ref Rng & Units 12/12/2019   Testosterone Total      240 - 950 ng/dL 712   Sex Hormone Binding Globulin      11 - 80 nmol/L 23   Free Testosterone Calculated      4.7 - 24.4 ng/dL 19.09   Ins Growth Factor 1      87 - 255 ng/ml 254   Hemoglobin      13.3 - 17.7 g/dL 16.2   T4 Free      0.76 - 1.46 ng/dL 1.45   Prolactin      2 - 18 ug/L <1 (L)       ENDO THYROID LABS-[facility] [place] Latest Ref Rng & Units 12/12/2019   T4 FREE 0.76 - 1.46 ng/dL 1.45     ENDO THYROID LABS-[facility] [place] Latest Ref Rng & Units 1/3/2019 6/20/2018   T4 FREE 0.76 - 1.46 ng/dL 1.17 1.38     ENDO THYROID LABS-[facility] [place] Latest Ref Rng & Units 10/5/2017   T4 FREE 0.76 - 1.46 ng/dL 1.18     ENDO PITUITARY LABS-[facility] [place] Latest Ref Rng & Units 10/5/2017   PROLACTIN 2 - 18 ug/L 1 (L)   TESTOSTERONE TOTAL 240 - 950 ng/dL 647   PSA 0 - 4 ug/L 0.17    - 144 mmol/L 141   POTASSIUM 3.4 - 5.3 mmol/L 3.8   INS GROWTH FACTOR 1 94 - 271 ng/ml 298 (H)     ENDO PITUITARY LABS-[facility] [place] Latest Ref Rng & Units 10/4/2018   TESTOSTERONE TOTAL 240 - 950 ng/dL 859     ENDO PITUITARY LABS-[facility] [place] Latest Ref Rng & Units 1/3/2019    - 144 mmol/L 138   POTASSIUM 3.4 - 5.3 mmol/L    INS GROWTH FACTOR 1 90 - 262 ng/ml 320 (H)     All pertinent notes, labs, and images personally reviewed by me.     A/P  Mr.Jared Reece is a 89year old here for the evaluation of above:    1. Hypogonadism:  On testosterone cypionate 200 mg IM q 2 weeks.  Goes to clinic for njections.  Clinically looks okay.  No major concerns  Last testosterone checked in 900s.  Plan to continue same dose and recheck labs in 3-4 months to see the trend.  His testosterone is in upper normal range then can decrease the dose based on labs in future.    2. Diabetes Insipidus:  On desmopressiin (0.01 % solution)10 mcg- 3 sprays in evening and 1 spray at bedtime.  No major concerns. No excessive thirst or  frequent urination.    Electrolytes are in acceptable range.  -- continue current dose of DDAVP  Repeat labs in 1 year.      3. Growth hormone deficiency:  On Norditropin 0.7 mg/day. ( 10 mg/1.5 ml solution)  Discussed GH replacement in adults.  They would like to continue it. Agree with it.  Labs and follow-up in 1 year.    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  Reports compliance. But taking it at night.  Clinically looks euthyroid.   Repeat labs in 1 year.    5. Secondary adrenal insufficiency:  On prednsione 3 mg AM and 2 mg PM. On this dose X many years.  No nausea. No vomiting.  -- continue current dose  Discussed sick days.   Repeat labs (BMP) in 1 year.    6.  Low glucose level:  Glucose level 68 on BMP panel.  He is asymptomatic.  Plan to continue to monitor.    More than 50% of face to face time spent with Mr. Reece on counseling / coordinating his care.      Follow-up:  1 year.      Sumaya Alonso MD  Endocrinology   [facility]    CC: Mariluz Mae     Disclaimer: This note consists of symbols derived from keyboarding, dictation and/or voice recognition software. As a result, there may be errors in the script that have gone undetected. Please consider this when interpreting information found in this chart.    Addendum to above note and clinic visit:    Labs reviewed.    See result note/telephone encounter.

## 2021-01-28 ENCOUNTER — ALLIED HEALTH/NURSE VISIT (OUTPATIENT)
Dept: FAMILY MEDICINE | Facility: CLINIC | Age: 31
End: 2021-01-28
Payer: MEDICARE

## 2021-01-28 DIAGNOSIS — E34.9 TESTOSTERONE DEFICIENCY: Primary | ICD-10-CM

## 2021-01-28 PROCEDURE — 99207 PR NO CHARGE NURSE ONLY: CPT

## 2021-01-28 PROCEDURE — 96372 THER/PROPH/DIAG INJ SC/IM: CPT

## 2021-01-28 RX ADMIN — TESTOSTERONE CYPIONATE 200 MG: 200 INJECTION, SOLUTION INTRAMUSCULAR at 08:38

## 2021-01-28 NOTE — PROGRESS NOTES
"Clinic Administered Medication Documentation        Testosterone Documentation      Prior to injection, verified patient identity using patient's name and date of birth. Medication was administered. Please see MAR and medication order for additional information. Patient instructed to stay in clinic after the injection but patient declined.     Reminders      - Check vial for refills remaining and initiate refill request if no refills remain.      - Verify with patient that medication was paid for at pharmacy. If it was, check the \"patient supplied\" box on the MAR.      Was entire vial of medication used? Yes  Vial/Syringe: Single dose vial  NDC: 9754-8184-92  Lot: [id number]  Expiration Date:  12/22  (R) ventrogluteal  Was this medication supplied by the patient? No      Brittaney Davis RN  "

## 2021-02-04 NOTE — PROGRESS NOTES
This was all done prior to routing.  I entered one time order to be given in clinic and have diagnosis associated all prior to routing.  Just needs signature.  Thanks, MICKIE Machado coming for Testosterone injection tomorrow 10/31/19.  New order entered but start date is 11/14/19.  Can we get one time order for tomorrow's testosterone injection.  Thanks, Brittaney Davis RN     negative No oral lesions; no gross abnormalities

## 2021-02-11 ENCOUNTER — ALLIED HEALTH/NURSE VISIT (OUTPATIENT)
Dept: FAMILY MEDICINE | Facility: CLINIC | Age: 31
End: 2021-02-11
Payer: MEDICARE

## 2021-02-11 DIAGNOSIS — E34.9 TESTOSTERONE DEFICIENCY: Primary | ICD-10-CM

## 2021-02-11 PROCEDURE — 96372 THER/PROPH/DIAG INJ SC/IM: CPT

## 2021-02-11 PROCEDURE — 99207 PR NO CHARGE NURSE ONLY: CPT

## 2021-02-11 RX ADMIN — TESTOSTERONE CYPIONATE 200 MG: 200 INJECTION, SOLUTION INTRAMUSCULAR at 08:50

## 2021-02-11 NOTE — PROGRESS NOTES
"Clinic Administered Medication Documentation      Testosterone Documentation     Prior to injection, verified patient identity using patient's name and date of birth. Medication was administered. Please see MAR and medication order for additional information. Patient instructed to remain in clinic for 15 minutes.    Reminders     - Check vial for refills remaining and initiate refill request if no refills remain.      - Verify with patient that medication was paid for at pharmacy. If it was, check the \"patient supplied\" box on the MAR.     Was entire vial of medication used? Yes  Vial/Syringe: Single dose vial  Expiration Date:  12/2022  Was this medication supplied by the patient? No   Site: Left gluteal     Eileen Wilkinson RN Flex        "

## 2021-02-22 DIAGNOSIS — E23.0 PANHYPOPITUITARISM (H): ICD-10-CM

## 2021-02-22 NOTE — TELEPHONE ENCOUNTER
Requested Prescriptions   Pending Prescriptions Disp Refills     Somatropin 10 MG/1.5ML SOCT  Last Written Prescription Date:  01/21/21  Last Fill Quantity: 10 ml0,  # refills: 2   Last office visit: 4/18/2019 with prescribing provider:  01/21/21   Future Office Visit:           10 mL 2     Sig: Inject 0.7 mg Subcutaneous daily Sig: INJECT 0.7 MG UNDER THE SKIN DAILY. DISCARD 28 DAYS AFTER INITIAL USE       There is no refill protocol information for this order

## 2021-02-25 ENCOUNTER — ALLIED HEALTH/NURSE VISIT (OUTPATIENT)
Dept: FAMILY MEDICINE | Facility: CLINIC | Age: 31
End: 2021-02-25
Payer: MEDICARE

## 2021-02-25 DIAGNOSIS — E34.9 TESTOSTERONE DEFICIENCY: Primary | ICD-10-CM

## 2021-02-25 PROCEDURE — 96372 THER/PROPH/DIAG INJ SC/IM: CPT

## 2021-02-25 PROCEDURE — 99207 PR NO CHARGE NURSE ONLY: CPT

## 2021-02-25 RX ADMIN — TESTOSTERONE CYPIONATE 200 MG: 200 INJECTION, SOLUTION INTRAMUSCULAR at 08:49

## 2021-02-25 NOTE — PROGRESS NOTES
"Clinic Administered Medication Documentation      Testosterone Documentation     Prior to injection, verified patient identity using patient's name and date of birth. Medication was administered. Please see MAR and medication order for additional information. Patient instructed to remain in clinic for 15 minutes, report any adverse reaction to staff immediately  and stay in clinic after the injection but patient declined.    Reminders     - Check vial for refills remaining and initiate refill request if no refills remain.      - Verify with patient that medication was paid for at pharmacy. If it was, check the \"patient supplied\" box on the MAR.     Was entire vial of medication used? Yes  Vial/Syringe: Single dose vial   ROUTE: left ventrogluteal  Expiration Date:  EH3529  Was this medication supplied by the patient? No   Mariluz Salmon RN, BSN  Message handled by CLINIC NURSE.        "

## 2021-03-11 ENCOUNTER — ALLIED HEALTH/NURSE VISIT (OUTPATIENT)
Dept: FAMILY MEDICINE | Facility: CLINIC | Age: 31
End: 2021-03-11
Payer: MEDICARE

## 2021-03-11 DIAGNOSIS — E34.9 TESTOSTERONE DEFICIENCY: Primary | ICD-10-CM

## 2021-03-11 PROCEDURE — 96372 THER/PROPH/DIAG INJ SC/IM: CPT

## 2021-03-11 PROCEDURE — 99207 PR NO CHARGE NURSE ONLY: CPT

## 2021-03-11 RX ADMIN — TESTOSTERONE CYPIONATE 200 MG: 200 INJECTION, SOLUTION INTRAMUSCULAR at 08:46

## 2021-03-11 NOTE — PROGRESS NOTES
"Clinic Administered Medication Documentation      Testosterone Documentation     Prior to injection, verified patient identity using patient's name and date of birth. Medication was administered. Please see MAR and medication order for additional information. Patient instructed to remain in clinic for 15 minutes.    Reminders     - Check vial for refills remaining and initiate refill request if no refills remain.      - Verify with patient that medication was paid for at pharmacy. If it was, check the \"patient supplied\" box on the MAR.     Was entire vial of medication used? Yes  Vial/Syringe: Single dose vial  Expiration Date:  6/23  Lot: [id number]  NDC: 2666-4781-17  Was this medication supplied by the patient? No    Brittaney Davis RN      " Call immediately to report any decreased vision or visual distortion.

## 2021-03-19 DIAGNOSIS — F98.8 ATTENTION DEFICIT DISORDER, UNSPECIFIED HYPERACTIVITY PRESENCE: ICD-10-CM

## 2021-03-19 RX ORDER — DEXTROAMPHETAMINE SACCHARATE, AMPHETAMINE ASPARTATE MONOHYDRATE, DEXTROAMPHETAMINE SULFATE AND AMPHETAMINE SULFATE 7.5; 7.5; 7.5; 7.5 MG/1; MG/1; MG/1; MG/1
CAPSULE, EXTENDED RELEASE ORAL
Qty: 30 CAPSULE | Refills: 0 | Status: SHIPPED | OUTPATIENT
Start: 2021-03-19 | End: 2021-06-29

## 2021-03-19 RX ORDER — DEXTROAMPHETAMINE SACCHARATE, AMPHETAMINE ASPARTATE MONOHYDRATE, DEXTROAMPHETAMINE SULFATE AND AMPHETAMINE SULFATE 7.5; 7.5; 7.5; 7.5 MG/1; MG/1; MG/1; MG/1
30 CAPSULE, EXTENDED RELEASE ORAL EVERY MORNING
Qty: 30 CAPSULE | Refills: 0 | Status: SHIPPED | OUTPATIENT
Start: 2021-05-18 | End: 2021-06-15

## 2021-03-19 RX ORDER — DEXTROAMPHETAMINE SACCHARATE, AMPHETAMINE ASPARTATE MONOHYDRATE, DEXTROAMPHETAMINE SULFATE AND AMPHETAMINE SULFATE 7.5; 7.5; 7.5; 7.5 MG/1; MG/1; MG/1; MG/1
30 CAPSULE, EXTENDED RELEASE ORAL EVERY MORNING
Qty: 30 CAPSULE | Refills: 0 | Status: SHIPPED | OUTPATIENT
Start: 2021-04-18 | End: 2021-06-29

## 2021-03-19 NOTE — TELEPHONE ENCOUNTER
I clicked on the PDMP button that usually goes to the  and I got something from :    Mamadou Reece, 30M  Powered by        It does look like the last prescription he got was 2/16.    I did do the next 3 refills

## 2021-03-19 NOTE — TELEPHONE ENCOUNTER
amphetamine-dextroamphetamine (ADDERALL XR) 30 MG 24 hr capsule      Last Written Prescription Date:  12/3/2020  Last Fill Quantity: 30,   # refills: 0  Last Office Visit: 12/3/2020  (VIRTUAL VISIT)  Future Office visit:    Next 5 appointments (look out 90 days)    Mar 25, 2021  8:30 AM  (Arrive by 8:25 AM)  Nurse Visit with GUERITA CORADO  [facility] ([facility] ) [address] [place] [id number] [phone]           Routing refill request to provider for review/approval because:  Drug not on the [facility], [facility] or [facility] refill protocol or controlled substance.    Unable to check .  Not working.    Keturah Suh RN

## 2021-03-25 ENCOUNTER — TELEPHONE (OUTPATIENT)
Dept: ENDOCRINOLOGY | Facility: CLINIC | Age: 31
End: 2021-03-25

## 2021-03-25 ENCOUNTER — ALLIED HEALTH/NURSE VISIT (OUTPATIENT)
Dept: FAMILY MEDICINE | Facility: CLINIC | Age: 31
End: 2021-03-25
Payer: MEDICARE

## 2021-03-25 DIAGNOSIS — E34.9 TESTOSTERONE DEFICIENCY: Primary | ICD-10-CM

## 2021-03-25 DIAGNOSIS — E23.0 PANHYPOPITUITARISM (H): ICD-10-CM

## 2021-03-25 PROCEDURE — 96372 THER/PROPH/DIAG INJ SC/IM: CPT

## 2021-03-25 PROCEDURE — 99207 PR NO CHARGE NURSE ONLY: CPT

## 2021-03-25 RX ORDER — TESTOSTERONE CYPIONATE 200 MG/ML
200 INJECTION, SOLUTION INTRAMUSCULAR
Status: ACTIVE | OUTPATIENT
Start: 2021-03-25 | End: 2022-05-19

## 2021-03-25 RX ADMIN — TESTOSTERONE CYPIONATE 200 MG: 200 INJECTION, SOLUTION INTRAMUSCULAR at 08:46

## 2021-03-25 NOTE — TELEPHONE ENCOUNTER
Signed.  He will need testosterone replacement for many years.  Let me know if you have any questions.  Thank you.

## 2021-03-25 NOTE — PROGRESS NOTES
"Clinic Administered Medication Documentation        Testosterone Documentation      Prior to injection, verified patient identity using patient's name and date of birth. Medication was administered. Please see MAR and medication order for additional information. Patient instructed to remain in clinic for 15 minutes.     Reminders      - Check vial for refills remaining and initiate refill request if no refills remain.      - Verify with patient that medication was paid for at pharmacy. If it was, check the \"patient supplied\" box on the MAR.      Was entire vial of medication used? Yes  Vial/Syringe: Single dose vial  Expiration Date:  6/23  Lot: [id number]  NDC: 0640-0876-01  (L) ventrogluteal  Was this medication supplied by the patient? No  Dr. Gavin Davis, MICKIE  "

## 2021-03-25 NOTE — TELEPHONE ENCOUNTER
Please sign new order for Testosterone.  Lab due 4/21-5/21/21.  T'd up.  Please advise on how many doses.  Brittaney Davis RN    1/21/2021  [facility]  1. HYpogonadism:  On testosterone cypionate 200 mg IM q 2 weeks.  Goes to clinic.   Last testosterone checked 1/2021 was in acceptable range in 900s.  Hemoglobin levels appears stable.  Energy level is OK.  Shaving is normal. No change.  Muscle strength is OK.  Weight is stable.    1. Hypogonadism:  On testosterone cypionate 200 mg IM q 2 weeks.  Goes to clinic for njections.  Clinically looks okay.  No major concerns  Last testosterone checked in 900s.  Plan to continue same dose and recheck labs in 3-4 months to see the trend.  His testosterone is in upper normal range then can decrease the dose based on labs in future.

## 2021-03-26 NOTE — LETTER
[facility]  [address]  [address]  [place] [address]  Phone: [phone]  Fax: [phone]    September 21, 2020        Mamadou Reece  [address] [address]          To whom it may concern:    RE: Mamadou Reece    Patient was seen and treated today at our clinic. Please excuse from work 9/21/2020 - 10/05/2020. He may return to work sooner if symptoms improve / resolve.     Please contact me for questions or concerns.      Sincerely,        Rosie Jackman PA-C   Spoke with TM regarding positive results and SBAR info. No exposures noted. SBAR sent to manager/IP/EH for final review. TM will document symptoms through [employer] [place] each day. Follow up again on . Name: Christine García  : 1977   MRN: [id number]   Emp ID: [id number]  Site team member works: [facility] [employer]  Department TM works: [employer]  Manager/Leader: Sin Donato    Have you received a COVID Vaccine? no  How many doses? Type of Vaccine (Pfizer/Moderna)? Date of Symptom Onset: 3/24  Date tested/Positive Test: 3/25  Date resulted: 3/26    48-hour infectious period prior to symptom on set or Positive Test if Asymptomatic: 3/22-3/23  Last Date Worked (shift): 3/24 5335-3376    What dates and shifts did you work (including all dates from infectious period up to last date worked)? 3/23 8946-2856, 3/24 0427-2175    Did you have direct patient contact? Yes on 3/23  Were the patients wearing masks? yes  What type of PPE do you wear (including type of mask and eye protection)? Surgical mask, goggles    Did you come into contact with anyone (<6ft, >15 min cumulative time) without PPE? no  Did you take any breaks alone or with others (if with others, who)?  Did not take a break either of those days worked    [facility] [employer], patients exposed/potentially exposed and location/dates:  None noted    Planned follow up date:

## 2021-04-07 ENCOUNTER — IMMUNIZATION (OUTPATIENT)
Dept: NURSING | Facility: CLINIC | Age: 31
End: 2021-04-07
Payer: MEDICARE

## 2021-04-07 PROCEDURE — 0001A PR COVID VAC PFIZER DIL RECON 30 MCG/0.3 ML IM: CPT

## 2021-04-07 PROCEDURE — 91300 PR COVID VAC PFIZER DIL RECON 30 MCG/0.3 ML IM: CPT

## 2021-04-08 ENCOUNTER — ALLIED HEALTH/NURSE VISIT (OUTPATIENT)
Dept: FAMILY MEDICINE | Facility: CLINIC | Age: 31
End: 2021-04-08
Payer: MEDICARE

## 2021-04-08 DIAGNOSIS — E34.9 TESTOSTERONE DEFICIENCY: Primary | ICD-10-CM

## 2021-04-08 PROCEDURE — 99207 PR NO CHARGE NURSE ONLY: CPT

## 2021-04-08 PROCEDURE — 96372 THER/PROPH/DIAG INJ SC/IM: CPT

## 2021-04-08 RX ADMIN — TESTOSTERONE CYPIONATE 200 MG: 200 INJECTION, SOLUTION INTRAMUSCULAR at 08:46

## 2021-04-08 NOTE — PROGRESS NOTES
"Clinic Administered Medication Documentation        Testosterone Documentation      Prior to injection, verified patient identity using patient's name and date of birth. Medication was administered. Please see MAR and medication order for additional information. Patient instructed to remain in clinic for 15 minutes.     Reminders      - Check vial for refills remaining and initiate refill request if no refills remain.      - Verify with patient that medication was paid for at pharmacy. If it was, check the \"patient supplied\" box on the MAR.      Was entire vial of medication used? Yes  Vial/Syringe: Single dose vial  Expiration Date:  3/23  Lot: [id number]  NDC: 6496-0576-00  (R) ventrogluteal  Was this medication supplied by the patient? No  Dr. Gavin Davis, MICKIE  "

## 2021-04-22 ENCOUNTER — ALLIED HEALTH/NURSE VISIT (OUTPATIENT)
Dept: FAMILY MEDICINE | Facility: CLINIC | Age: 31
End: 2021-04-22
Payer: MEDICARE

## 2021-04-22 DIAGNOSIS — E34.9 TESTOSTERONE DEFICIENCY: Primary | ICD-10-CM

## 2021-04-22 PROCEDURE — 96372 THER/PROPH/DIAG INJ SC/IM: CPT

## 2021-04-22 PROCEDURE — 99207 PR NO CHARGE NURSE ONLY: CPT

## 2021-04-22 RX ADMIN — TESTOSTERONE CYPIONATE 200 MG: 200 INJECTION, SOLUTION INTRAMUSCULAR at 08:41

## 2021-04-22 NOTE — PROGRESS NOTES
"Clinic Administered Medication Documentation        Testosterone Documentation      Prior to injection, verified patient identity using patient's name and date of birth. Medication was administered. Please see MAR and medication order for additional information. Patient instructed to remain in clinic for 15 minutes.     Reminders      - Check vial for refills remaining and initiate refill request if no refills remain.      - Verify with patient that medication was paid for at pharmacy. If it was, check the \"patient supplied\" box on the MAR.      Was entire vial of medication used? Yes  Vial/Syringe: Single dose vial  Expiration Date:  3/23  Lot: [id number]  NDC: 9537-9732-34  (L) ventrogluteal  Was this medication supplied by the patient? No  Dr. Gavin Davis, MICKIE  "

## 2021-04-28 ENCOUNTER — IMMUNIZATION (OUTPATIENT)
Dept: NURSING | Facility: CLINIC | Age: 31
End: 2021-04-28
Attending: INTERNAL MEDICINE
Payer: MEDICARE

## 2021-04-28 PROCEDURE — 0002A PR COVID VAC PFIZER DIL RECON 30 MCG/0.3 ML IM: CPT

## 2021-04-28 PROCEDURE — 91300 PR COVID VAC PFIZER DIL RECON 30 MCG/0.3 ML IM: CPT

## 2021-05-06 ENCOUNTER — ALLIED HEALTH/NURSE VISIT (OUTPATIENT)
Dept: FAMILY MEDICINE | Facility: CLINIC | Age: 31
End: 2021-05-06
Payer: MEDICARE

## 2021-05-06 DIAGNOSIS — E34.9 TESTOSTERONE DEFICIENCY: Primary | ICD-10-CM

## 2021-05-06 PROCEDURE — 99207 PR NO CHARGE NURSE ONLY: CPT

## 2021-05-06 NOTE — PROGRESS NOTES
"Clinic Administered Medication Documentation        Testosterone Documentation      Prior to injection, verified patient identity using patient's name and date of birth. Medication was administered. Please see MAR and medication order for additional information. Patient instructed to remain in clinic for 15 minutes.     Reminders      - Check vial for refills remaining and initiate refill request if no refills remain.      - Verify with patient that medication was paid for at pharmacy. If it was, check the \"patient supplied\" box on the MAR.      Was entire vial of medication used? Yes  Vial/Syringe: Single dose vial  Expiration Date:  11/21  Lot: [id number]  NDC: 8341-6215-35  (R) ventrogluteal  Was this medication supplied by the patient? No  Dr. Gavin Davis, MICKIE  "

## 2021-05-20 ENCOUNTER — ALLIED HEALTH/NURSE VISIT (OUTPATIENT)
Dept: FAMILY MEDICINE | Facility: CLINIC | Age: 31
End: 2021-05-20
Payer: MEDICARE

## 2021-05-20 DIAGNOSIS — E34.9 TESTOSTERONE DEFICIENCY: Primary | ICD-10-CM

## 2021-05-20 PROCEDURE — 99207 PR NO CHARGE NURSE ONLY: CPT

## 2021-05-20 NOTE — NURSING NOTE
Clinic Administered Medication Documentation        Testosterone Documentation      Prior to injection, verified patient identity using patient's name and date of birth. Medication was administered. Please see MAR and medication order for additional information. Patient instructed to remain in clinic for 15 minutes.     Was entire vial of medication used? Yes  Vial/Syringe: Single dose vial  Expiration Date:  11/21  Lot: [id number]  NDC: 2109-6311-15  (L) ventrogluteal  Was this medication supplied by the patient? No  Dr. Gavin Gray RN

## 2021-05-29 ENCOUNTER — HEALTH MAINTENANCE LETTER (OUTPATIENT)
Age: 31
End: 2021-05-29

## 2021-06-03 ENCOUNTER — ALLIED HEALTH/NURSE VISIT (OUTPATIENT)
Dept: FAMILY MEDICINE | Facility: CLINIC | Age: 31
End: 2021-06-03
Payer: MEDICARE

## 2021-06-03 DIAGNOSIS — E34.9 TESTOSTERONE DEFICIENCY: Primary | ICD-10-CM

## 2021-06-03 DIAGNOSIS — E23.0 PANHYPOPITUITARISM (H): ICD-10-CM

## 2021-06-03 PROCEDURE — 96372 THER/PROPH/DIAG INJ SC/IM: CPT

## 2021-06-03 PROCEDURE — 99207 PR NO CHARGE NURSE ONLY: CPT

## 2021-06-03 RX ADMIN — TESTOSTERONE CYPIONATE 200 MG: 200 INJECTION, SOLUTION INTRAMUSCULAR at 17:13

## 2021-06-03 NOTE — PROGRESS NOTES
"Clinic Administered Medication Documentation      Testosterone Documentation     Prior to injection, verified patient identity using patient's name and date of birth. Medication was administered. Please see MAR and medication order for additional information. Patient instructed to remain in clinic for 15 minutes, report any adverse reaction to staff immediately  and stay in clinic after the injection but patient declined.    Reminders     - Check vial for refills remaining and initiate refill request if no refills remain.      - Verify with patient that medication was paid for at pharmacy. If it was, check the \"patient supplied\" box on the MAR.     Was entire vial of medication used? Yes  Vial/Syringe: Single dose vial  Expiration Date:  06/2023  Was this medication supplied by the patient? No     Route: Right     KAMILLE Coleman, RN - Patient Advocate and Liaison (PAL)   [facility]   [phone]          "

## 2021-06-14 DIAGNOSIS — E03.9 HYPOTHYROIDISM, UNSPECIFIED TYPE: ICD-10-CM

## 2021-06-15 DIAGNOSIS — F98.8 ATTENTION DEFICIT DISORDER, UNSPECIFIED HYPERACTIVITY PRESENCE: ICD-10-CM

## 2021-06-15 RX ORDER — DEXTROAMPHETAMINE SACCHARATE, AMPHETAMINE ASPARTATE MONOHYDRATE, DEXTROAMPHETAMINE SULFATE AND AMPHETAMINE SULFATE 7.5; 7.5; 7.5; 7.5 MG/1; MG/1; MG/1; MG/1
30 CAPSULE, EXTENDED RELEASE ORAL EVERY MORNING
Qty: 30 CAPSULE | Refills: 0 | Status: SHIPPED | OUTPATIENT
Start: 2021-06-15 | End: 2022-01-20

## 2021-06-15 NOTE — TELEPHONE ENCOUNTER
Patient's father is calling and said they are leaving for vacation on Saturday 6/19/21 and the pharmacy is closed on Saturday. Please fill this before Friday if able.

## 2021-06-15 NOTE — TELEPHONE ENCOUNTER
This was done.    He is due for appointment for additional refills.   See if you can help him schedule.

## 2021-06-15 NOTE — TELEPHONE ENCOUNTER
Dad is calling the refill. They are leaving for vacation on Saturday 6/19/21 so they need this sent to the pharmacy by Friday and also need permission to be able to pick it up early.

## 2021-06-15 NOTE — LETTER
June 23, 2021      Mamadou Reece  [address]    [facility] [address]        Dear Mr. Mamadou Reece,    We recently received a call from your pharmacy requesting a refill of your medication Adderall.    We are contacting you today to notify you that you are due for a MEDICATION CHECK for further refills.    We have authorized one refill of your medication to allow time for you to schedule your appointment.    Please call [phone] to schedule an appointment or if you have MyChart you can schedule with your provider as well.    Taking care of your health is important to us, an ongoing visits with your provider are vital to your care. We look forward to seeing you in the near future.    Thank you for using [facility] for your Medical Needs.          Sincerely,     Mariluz Mae MD

## 2021-06-16 RX ORDER — LEVOTHYROXINE SODIUM 137 UG/1
TABLET ORAL
Qty: 31 TABLET | Refills: 11 | Status: SHIPPED | OUTPATIENT
Start: 2021-06-16 | End: 2021-07-15

## 2021-06-17 ENCOUNTER — ALLIED HEALTH/NURSE VISIT (OUTPATIENT)
Dept: FAMILY MEDICINE | Facility: CLINIC | Age: 31
End: 2021-06-17
Payer: MEDICARE

## 2021-06-17 DIAGNOSIS — E34.9 TESTOSTERONE DEFICIENCY: Primary | ICD-10-CM

## 2021-06-17 PROCEDURE — 99207 PR NO CHARGE NURSE ONLY: CPT

## 2021-06-17 PROCEDURE — 96372 THER/PROPH/DIAG INJ SC/IM: CPT

## 2021-06-17 RX ADMIN — TESTOSTERONE CYPIONATE 200 MG: 200 INJECTION, SOLUTION INTRAMUSCULAR at 16:59

## 2021-06-17 NOTE — PROGRESS NOTES
"Clinic Administered Medication Documentation      Testosterone Documentation     Prior to injection, verified patient identity using patient's name and date of birth. Medication was administered. Please see MAR and medication order for additional information. Patient instructed to remain in clinic for 15 minutes, report any adverse reaction to staff immediately  and stay in clinic after the injection but patient declined.    Reminders     - Check vial for refills remaining and initiate refill request if no refills remain.      - Verify with patient that medication was paid for at pharmacy. If it was, check the \"patient supplied\" box on the MAR.     Was entire vial of medication used? Yes  Vial/Syringe: Single dose vial  Expiration Date:  11/2021  RT: LVG  Was this medication supplied by the patient? No   Pt declines next testosterone injection appointment   Mariluz Salmon, RN, BSN  Message handled by CLINIC NURSE.        "

## 2021-06-28 DIAGNOSIS — E23.2 DIABETES INSIPIDUS (H): ICD-10-CM

## 2021-06-29 ENCOUNTER — VIRTUAL VISIT (OUTPATIENT)
Dept: FAMILY MEDICINE | Facility: CLINIC | Age: 31
End: 2021-06-29
Payer: MEDICARE

## 2021-06-29 DIAGNOSIS — F98.8 ATTENTION DEFICIT DISORDER, UNSPECIFIED HYPERACTIVITY PRESENCE: ICD-10-CM

## 2021-06-29 PROCEDURE — 99213 OFFICE O/P EST LOW 20 MIN: CPT | Mod: 95 | Performed by: PHYSICIAN ASSISTANT

## 2021-06-29 RX ORDER — DEXTROAMPHETAMINE SACCHARATE, AMPHETAMINE ASPARTATE MONOHYDRATE, DEXTROAMPHETAMINE SULFATE AND AMPHETAMINE SULFATE 7.5; 7.5; 7.5; 7.5 MG/1; MG/1; MG/1; MG/1
30 CAPSULE, EXTENDED RELEASE ORAL DAILY
Qty: 30 CAPSULE | Refills: 0 | Status: SHIPPED | OUTPATIENT
Start: 2021-08-23 | End: 2021-09-22

## 2021-06-29 RX ORDER — DEXTROAMPHETAMINE SACCHARATE, AMPHETAMINE ASPARTATE MONOHYDRATE, DEXTROAMPHETAMINE SULFATE AND AMPHETAMINE SULFATE 7.5; 7.5; 7.5; 7.5 MG/1; MG/1; MG/1; MG/1
30 CAPSULE, EXTENDED RELEASE ORAL EVERY MORNING
Qty: 30 CAPSULE | Refills: 0 | Status: CANCELLED | OUTPATIENT
Start: 2021-06-29

## 2021-06-29 RX ORDER — DEXTROAMPHETAMINE SACCHARATE, AMPHETAMINE ASPARTATE MONOHYDRATE, DEXTROAMPHETAMINE SULFATE AND AMPHETAMINE SULFATE 7.5; 7.5; 7.5; 7.5 MG/1; MG/1; MG/1; MG/1
30 CAPSULE, EXTENDED RELEASE ORAL DAILY
Qty: 30 CAPSULE | Refills: 0 | Status: SHIPPED | OUTPATIENT
Start: 2021-07-15 | End: 2021-08-14

## 2021-06-29 RX ORDER — DEXTROAMPHETAMINE SACCHARATE, AMPHETAMINE ASPARTATE MONOHYDRATE, DEXTROAMPHETAMINE SULFATE AND AMPHETAMINE SULFATE 7.5; 7.5; 7.5; 7.5 MG/1; MG/1; MG/1; MG/1
30 CAPSULE, EXTENDED RELEASE ORAL DAILY
Qty: 30 CAPSULE | Refills: 0 | Status: SHIPPED | OUTPATIENT
Start: 2021-09-23 | End: 2021-10-23

## 2021-06-29 NOTE — PROGRESS NOTES
Mamadou is a 30 year old who is being evaluated via a billable video visit.      How would you like to obtain your AVS? MyChart  If the video visit is dropped, the invitation should be resent by: Text to cell phone: [phone]  Will anyone else be joining your video visit? No    Video Start Time: 9:35 AM     Assessment & Plan     Attention deficit disorder, unspecified hyperactivity presence  Chronic, stable, well controlled. No side effects. Refills given. Discussed he would need clinic visit in 3 months for med check as he has not been seen in person for a while. Risks and benefits of treatment plan discussed. Patient and/or parent acknowledges and agrees with plan of care, all questions answered.    - amphetamine-dextroamphetamine (ADDERALL XR) 30 MG 24 hr capsule; Take 1 capsule (30 mg) by mouth daily  - amphetamine-dextroamphetamine (ADDERALL XR) 30 MG 24 hr capsule; Take 1 capsule (30 mg) by mouth daily  - amphetamine-dextroamphetamine (ADDERALL XR) 30 MG 24 hr capsule; Take 1 capsule (30 mg) by mouth daily    Return in about 3 months (around 9/29/2021) for Med Check, Preventive Physical Exam.    Jessica Naylor PA-C  [facility]   Mamadou is a 30 year old who presents for the following health issues  accompanied by his mother:    HPI     Medication Followup of Adderall-  Will need his medication before or on 7/15/21 since they are going on vacation.    Taking Medication as prescribed: yes    Side Effects:  None    Medication Helping Symptoms:  yes     Taking adderall regularly. No concerns. No side effects. No insomnia, appetite changes, chest pain, palpitations. His last refill was filled early due to vacation (filled on 6/17/21). He still has 25 pills left. He will be leaving for vacation in [place] from 7/16/21 - 8/1/21 and needs his next refill early as well. Subsequent refills will be based on original fill date. No other concerns today.    Review of Systems    Constitutional, HEENT, cardiovascular, pulmonary, gi and gu systems are negative, except as otherwise noted.      Objective           Vitals:  No vitals were obtained today due to virtual visit.    Physical Exam   GENERAL: Healthy, alert and no distress  EYES: Eyes grossly normal to inspection.  No discharge or erythema, or obvious scleral/conjunctival abnormalities.  RESP: No audible wheeze, cough, or visible cyanosis.  No visible retractions or increased work of breathing.    SKIN: Visible skin clear. No significant rash, abnormal pigmentation or lesions.  NEURO: Cranial nerves grossly intact.  Mentation and speech appropriate for age.  PSYCH: Mentation appears normal, affect normal/bright, judgement and insight intact, normal speech and appearance well-groomed.      Video-Visit Details    Type of service:  Video Visit    Video End Time: 9:42 AM    Originating Location (pt. Location): Home    Distant Location (provider location):  [facility]     Platform used for Video Visit: Wikets

## 2021-07-01 ENCOUNTER — ALLIED HEALTH/NURSE VISIT (OUTPATIENT)
Dept: FAMILY MEDICINE | Facility: CLINIC | Age: 31
End: 2021-07-01
Payer: MEDICARE

## 2021-07-01 DIAGNOSIS — E34.9 TESTOSTERONE DEFICIENCY: Primary | ICD-10-CM

## 2021-07-01 PROCEDURE — 96372 THER/PROPH/DIAG INJ SC/IM: CPT | Performed by: INTERNAL MEDICINE

## 2021-07-01 PROCEDURE — 99207 PR NO CHARGE NURSE ONLY: CPT

## 2021-07-01 RX ORDER — DESMOPRESSIN ACETATE 0.1 MG/ML
SOLUTION NASAL
Refills: 4 | OUTPATIENT
Start: 2021-07-01

## 2021-07-01 RX ADMIN — TESTOSTERONE CYPIONATE 200 MG: 200 INJECTION, SOLUTION INTRAMUSCULAR at 16:56

## 2021-07-01 NOTE — PROGRESS NOTES
"Clinic Administered Medication Documentation        Testosterone Documentation      Prior to injection, verified patient identity using patient's name and date of birth. Medication was administered. Please see MAR and medication order for additional information. Patient instructed to remain in clinic for 15 minutes, report any adverse reaction to staff immediately  and stay in clinic after the injection but patient declined.     Reminders      - Check vial for refills remaining and initiate refill request if no refills remain.      - Verify with patient that medication was paid for at pharmacy. If it was, check the \"patient supplied\" box on the MAR.      Was entire vial of medication used? Yes  Vial/Syringe: Single dose vial  NDC: 9910-0921-54  Lot: [id number]  Expiration Date:  2/23  RT: ALYSIA  Was this medication supplied by the patient? No   Provider: ERINN Davis RN    "

## 2021-07-06 ENCOUNTER — TELEPHONE (OUTPATIENT)
Dept: ENDOCRINOLOGY | Facility: CLINIC | Age: 31
End: 2021-07-06

## 2021-07-06 NOTE — TELEPHONE ENCOUNTER
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

## 2021-07-07 NOTE — TELEPHONE ENCOUNTER
PA Initiation    Medication: NORDITROPIN FLEXPRO 10 MG/1.5ML SOLN PEN injection   Insurance Company: [employer] - Phone [phone] Fax [phone]  Pharmacy Filling the Rx: [facility] [address]  Filling Pharmacy Phone:    Filling Pharmacy Fax:    Start Date: 7/7/2021    Central Prior Authorization Team   Phone: [phone]      Manually faxed prior authorization form to [employer] at fax# [phone]

## 2021-07-07 NOTE — TELEPHONE ENCOUNTER
Prior Authorization Approval    Authorization Effective Date: 4/7/2021  Authorization Expiration Date: 7/7/2022  Medication: NORDITROPIN FLEXPRO 10 MG/1.5ML SOLN PEN injection   Approved Dose/Quantity:   Reference #:     Insurance Company: [employer] - Phone [phone] Fax [phone]  Which Pharmacy is filling the prescription (Not needed for infusion/clinic administered): [facility] - [place], [place] - [address] [place] [address]  Renewal - previous PA is good until 07/21/21, no interruption in therapy

## 2021-07-13 DIAGNOSIS — E03.9 HYPOTHYROIDISM, UNSPECIFIED TYPE: ICD-10-CM

## 2021-07-15 ENCOUNTER — ALLIED HEALTH/NURSE VISIT (OUTPATIENT)
Dept: FAMILY MEDICINE | Facility: CLINIC | Age: 31
End: 2021-07-15
Payer: MEDICARE

## 2021-07-15 DIAGNOSIS — E34.9 TESTOSTERONE DEFICIENCY: Primary | ICD-10-CM

## 2021-07-15 PROCEDURE — 99207 PR NO CHARGE NURSE ONLY: CPT

## 2021-07-15 PROCEDURE — 96372 THER/PROPH/DIAG INJ SC/IM: CPT | Performed by: INTERNAL MEDICINE

## 2021-07-15 RX ORDER — LEVOTHYROXINE SODIUM 137 UG/1
TABLET ORAL
Qty: 31 TABLET | Refills: 11 | Status: SHIPPED | OUTPATIENT
Start: 2021-07-15 | End: 2022-09-06

## 2021-07-15 RX ADMIN — TESTOSTERONE CYPIONATE 200 MG: 200 INJECTION, SOLUTION INTRAMUSCULAR at 16:52

## 2021-07-15 NOTE — TELEPHONE ENCOUNTER
Pending Prescriptions:                       Disp   Refills    levothyroxine (SYNTHROID/LEVOTHROID) 137 M*31 tab*11       Sig: TAKE ONE TABLET BY MOUTH EVERY MORNING BEFORE           BREAKFAST  Routing refill request to provider for review/approval because:  No results found for: TSH

## 2021-07-15 NOTE — PROGRESS NOTES
"Clinic Administered Medication Documentation        Testosterone Documentation      Prior to injection, verified patient identity using patient's name and date of birth. Medication was administered. Please see MAR and medication order for additional information. Patient instructed to remain in clinic for 15 minutes, report any adverse reaction to staff immediately  and stay in clinic after the injection but patient declined.     Reminders      - Check vial for refills remaining and initiate refill request if no refills remain.      - Verify with patient that medication was paid for at pharmacy. If it was, check the \"patient supplied\" box on the MAR.      Was entire vial of medication used? Yes  Vial/Syringe: Single dose vial  NDC: 6806-8175-25  Lot: [id number]  Expiration Date:  2/23  RT: LVG  Was this medication supplied by the patient? No   Provider: ERINN    Going to [place] at time of next injection so wanted next appt in 3 weeks.      Brittaney Davis RN  "

## 2021-07-22 DIAGNOSIS — E23.0 PANHYPOPITUITARISM (H): Primary | ICD-10-CM

## 2021-07-22 RX ORDER — SOMATROPIN 10 MG/1.5ML
INJECTION, SOLUTION SUBCUTANEOUS
Qty: 10 ML | Refills: 1 | Status: SHIPPED | OUTPATIENT
Start: 2021-07-22 | End: 2021-12-28

## 2021-07-22 NOTE — TELEPHONE ENCOUNTER
Pending Prescriptions:                       Disp   Refills    NORDITROPIN FLEXPRO 10 MG/1.5ML SOPN PEN i*                Sig: INJECT 0.7 MG UNDER THE SKIN DAILY. DISCARD 28 DAYS           AFTER INITIAL USE    Routing refill request to provider for review/approval because:  Drug not on the [facility] refill protocol

## 2021-07-22 NOTE — TELEPHONE ENCOUNTER
Last refill 7/12/21?  Why does he need new Rx?  Please check with pharmacy.    Please pend the orders with correct quantity, select pharmacy and then send for signature. Also associate with correct diagnosis.    Thank you.    Sumaya Alonso MD

## 2021-07-22 NOTE — TELEPHONE ENCOUNTER
RN'S - I was on hold for 10 minutes, could you print the Rx and fax it please?    Annalee Nieto [facility]  [phone]

## 2021-08-12 ENCOUNTER — ALLIED HEALTH/NURSE VISIT (OUTPATIENT)
Dept: FAMILY MEDICINE | Facility: CLINIC | Age: 31
End: 2021-08-12
Payer: MEDICARE

## 2021-08-12 ENCOUNTER — TELEPHONE (OUTPATIENT)
Dept: NURSING | Facility: CLINIC | Age: 31
End: 2021-08-12

## 2021-08-12 DIAGNOSIS — E34.9 TESTOSTERONE DEFICIENCY: ICD-10-CM

## 2021-08-12 DIAGNOSIS — E34.9 TESTOSTERONE DEFICIENCY: Primary | ICD-10-CM

## 2021-08-12 PROCEDURE — 96372 THER/PROPH/DIAG INJ SC/IM: CPT | Performed by: INTERNAL MEDICINE

## 2021-08-12 PROCEDURE — 99207 PR NO CHARGE NURSE ONLY: CPT

## 2021-08-12 RX ORDER — TESTOSTERONE CYPIONATE 200 MG/ML
200 INJECTION, SOLUTION INTRAMUSCULAR
Qty: 1 ML | Refills: 5 | Status: SHIPPED | OUTPATIENT
Start: 2021-08-26 | End: 2021-08-25

## 2021-08-12 RX ADMIN — TESTOSTERONE CYPIONATE 200 MG: 200 INJECTION, SOLUTION INTRAMUSCULAR at 16:55

## 2021-08-12 NOTE — TELEPHONE ENCOUNTER
Controlled substance qty refill limit noted. AV RNs will monitor refills. Thanks.   Darrel Gray RN

## 2021-08-12 NOTE — TELEPHONE ENCOUNTER
Rx request for Dr. Hidalgo to send Rx to [facility] AV pharmacy.  Pending Prescriptions:                       Disp   Refills    testosterone cypionate (DEPOTESTOSTERONE)*1 mL   19           Sig: Inject 1 mL (200 mg) into the muscle every 14           days    We talked with dad/guardian, Erendira. Informed clinics are no longer stocking testosterone. Mamadou may receive today's injection from remaining stock.     For nurse visits beginning 8/26/21 will need to  the Rx in pharmacy before each nurse visit, leave in sealed package then check in for RN visit for administration. We gave them option of continuing in our clinic, PCP office in  or other [facility] clinic with pharmacy.  Dad prefers continue here in AV. Mamadou lives nearby and [facility] provides transportation.  Dad will request [facility] staff drop off in clinic a few minutes earlier to allow time for pharmacy to dispense. Dad let us know that Mamadou is more than capable of handling transactions in the pharmacy.     Darrel Gray, RN

## 2021-08-12 NOTE — PROGRESS NOTES
"Clinic Administered Medication Documentation          Testosterone Documentation     Prior to injection, verified patient identity using patient's name and date of birth. Medication was administered. Please see MAR and medication order for additional information. Patient instructed to remain in clinic for 15 minutes, report any adverse reaction to staff immediately  and stay in clinic after the injection but patient declined.    Reminders     - Check vial for refills remaining and initiate refill request if no refills remain.      - Verify with patient that medication was paid for at pharmacy. If it was, check the \"patient supplied\" box on the MAR.     Was entire vial of medication used? Yes  Vial/Syringe: Single dose vial  Expiration Date:  03/2023  Was this medication supplied by the patient? No   Site: [facility]      KAMILLE Swartz, RN  [facility]      "

## 2021-08-12 NOTE — TELEPHONE ENCOUNTER
Rx faxed.    Annalee Nieto [facility] [place]  [phone]

## 2021-08-25 ENCOUNTER — TELEPHONE (OUTPATIENT)
Dept: FAMILY MEDICINE | Facility: CLINIC | Age: 31
End: 2021-08-25

## 2021-08-25 DIAGNOSIS — E34.9 TESTOSTERONE DEFICIENCY: ICD-10-CM

## 2021-08-25 RX ORDER — TESTOSTERONE CYPIONATE 200 MG/ML
200 INJECTION, SOLUTION INTRAMUSCULAR
Qty: 1 ML | Refills: 5 | Status: SHIPPED | OUTPATIENT
Start: 2021-08-26 | End: 2021-08-26

## 2021-08-25 NOTE — TELEPHONE ENCOUNTER
Dr. Alonos,    Please resend pt's testosterone script to the [facility]  Previous med request was sent to local print    Pt has an appt tomorrow at 1645     Med pended- Please review    Thank you  Obinna Hazel RN on 8/25/2021 at 4:45 PM

## 2021-08-26 ENCOUNTER — TELEPHONE (OUTPATIENT)
Dept: ENDOCRINOLOGY | Facility: CLINIC | Age: 31
End: 2021-08-26

## 2021-08-26 ENCOUNTER — ALLIED HEALTH/NURSE VISIT (OUTPATIENT)
Dept: FAMILY MEDICINE | Facility: CLINIC | Age: 31
End: 2021-08-26
Payer: MEDICARE

## 2021-08-26 ENCOUNTER — TELEPHONE (OUTPATIENT)
Dept: FAMILY MEDICINE | Facility: CLINIC | Age: 31
End: 2021-08-26

## 2021-08-26 DIAGNOSIS — E34.9 TESTOSTERONE DEFICIENCY: Primary | ICD-10-CM

## 2021-08-26 DIAGNOSIS — E34.9 TESTOSTERONE DEFICIENCY: ICD-10-CM

## 2021-08-26 PROCEDURE — 96372 THER/PROPH/DIAG INJ SC/IM: CPT | Performed by: INTERNAL MEDICINE

## 2021-08-26 PROCEDURE — 99207 PR NO CHARGE NURSE ONLY: CPT

## 2021-08-26 RX ORDER — TESTOSTERONE CYPIONATE 200 MG/ML
200 INJECTION, SOLUTION INTRAMUSCULAR
Qty: 1 ML | Refills: 5 | Status: SHIPPED | OUTPATIENT
Start: 2021-08-26 | End: 2021-12-01

## 2021-08-26 RX ADMIN — TESTOSTERONE CYPIONATE 200 MG: 200 INJECTION, SOLUTION INTRAMUSCULAR at 16:48

## 2021-08-26 NOTE — TELEPHONE ENCOUNTER
[facility] Call [facility]    Phone Message    May a detailed message be left on voicemail: yes     Reason for Call: Medication Question or concern regarding medication   Prescription Clarification    Name of Medication:   * testosterone cypionate (DEPOTESTOSTERONE) 200 MG/ML injection     Prescribing Provider: Sumaya Alonso,      Pharmacy: [facility] [address]     What on the order needs clarification? Per Hina is needing to have the medication be sent over Electronically ASAP. Patient has an appt today at 4:30pm and needing the medication sent over before the appt. Please advise.           Action Taken: Message routed to:  [facility] Endo    Travel Screening: Not Applicable

## 2021-08-26 NOTE — TELEPHONE ENCOUNTER
Our pharmacy confirmed receipt and will have Rx ready for patient to . Dana informed.   Darrel Gray RN

## 2021-08-26 NOTE — TELEPHONE ENCOUNTER
Central Prior Authorization Team   Phone: [phone]      PA Initiation    Medication: testosterone inj - INITIATED  Insurance Company: [employer] - Phone [phone] Fax [phone]  Pharmacy Filling the Rx: [place], [place] - [address]  Filling Pharmacy Phone: [phone]  Filling Pharmacy Fax: [phone]  Start Date: 8/26/2021

## 2021-08-26 NOTE — PROGRESS NOTES
" Clinic Administered Medication Documentation          Testosterone Documentation     Prior to injection, verified patient identity using patient's name and date of birth. Medication was administered. Please see MAR and medication order for additional information. Patient instructed to remain in clinic for 15 minutes, report any adverse reaction to staff immediately  and stay in clinic after the injection but patient declined.    Reminders     - Check vial for refills remaining and initiate refill request if no refills remain.      - Verify with patient that medication was paid for at pharmacy. If it was, check the \"patient supplied\" box on the MAR.     Was entire vial of medication used? Yes  Vial/Syringe: Single dose vial  Expiration Date:  03/2023  Was this medication supplied by the patient? No     KAMILLE Swartz, RN  [facility]        "

## 2021-08-26 NOTE — TELEPHONE ENCOUNTER
Patient's insurance requires a prior authorization for the testosterone injectable to be covered through pharmacy insurance.  Before it had been covered through the clinic as medical billing but now it has to go through his pharmacy coverage.    He has an appointment this afternoon to receive this shot - he is supposed to  the medication with us at the pharmacy and then bring it to his appointment.    If possible, please make this urgent.    Please do not close this encounter until this has been addressed.  (prior auth approved/denied, prescriber refusal to complete prior auth or medication changed/discontinued)    Prior Authorization needed on: testosterone inj  Drug NDC: [address]-78     Insurance: [employer]  Bin: [id number]  Pcn:  [id number]  Member ID: [id number]   Insurance phone #: [phone]    Pharmacy NPI: [id number]  Pharmacy Phone #: [phone]  Pharmacy Fax #: [phone]    Please let us know if the PA gets approved or denied or if medication is changed    Thanks so much!!!  Ilana Nicole CPhT  [facility]  [phone]

## 2021-08-26 NOTE — TELEPHONE ENCOUNTER
Per Dana mom/guardian testosterone Rx not received in pharmacy.   Call to endo. Nurse not available to take our call. Endo staff agrees to relay message to nursing staff to fax hard copy to  pharmacy STAT for scheduled RN visit this afternoon.  Darrel Gray, [phone]

## 2021-08-27 NOTE — TELEPHONE ENCOUNTER
Prior Authorization Approval    Authorization Effective Date: 5/29/2021  Authorization Expiration Date: 8/27/2022  Medication: testosterone inj - APPROVED  Approved Dose/Quantity: 1ml per 28 days   Insurance Company: [employer] - Phone [phone] Fax [phone]  Which Pharmacy is filling the prescription (Not needed for infusion/clinic administered): [facility] [place], [place] - [address] [facility]  Pharmacy Notified: Yes  Patient Notified: Yes Pharmacy will notify patient once order is ready.

## 2021-09-09 ENCOUNTER — ALLIED HEALTH/NURSE VISIT (OUTPATIENT)
Dept: FAMILY MEDICINE | Facility: CLINIC | Age: 31
End: 2021-09-09
Payer: MEDICARE

## 2021-09-09 ENCOUNTER — OFFICE VISIT (OUTPATIENT)
Dept: FAMILY MEDICINE | Facility: CLINIC | Age: 31
End: 2021-09-09
Payer: MEDICARE

## 2021-09-09 VITALS
BODY MASS INDEX: 27.01 KG/M2 | WEIGHT: 203.8 LBS | HEIGHT: 73 IN | DIASTOLIC BLOOD PRESSURE: 80 MMHG | OXYGEN SATURATION: 100 % | HEART RATE: 79 BPM | TEMPERATURE: 98 F | RESPIRATION RATE: 16 BRPM | SYSTOLIC BLOOD PRESSURE: 112 MMHG

## 2021-09-09 DIAGNOSIS — H54.8 LEGAL BLINDNESS: ICD-10-CM

## 2021-09-09 DIAGNOSIS — Z13.6 CARDIOVASCULAR SCREENING; LDL GOAL LESS THAN 160: ICD-10-CM

## 2021-09-09 DIAGNOSIS — D44.4 CRANIOPHARYNGIOMA (H): ICD-10-CM

## 2021-09-09 DIAGNOSIS — E34.9 TESTOSTERONE DEFICIENCY: ICD-10-CM

## 2021-09-09 DIAGNOSIS — E23.0 PANHYPOPITUITARISM (H): ICD-10-CM

## 2021-09-09 DIAGNOSIS — E34.9 TESTOSTERONE DEFICIENCY: Primary | ICD-10-CM

## 2021-09-09 DIAGNOSIS — F98.8 ATTENTION DEFICIT DISORDER, UNSPECIFIED HYPERACTIVITY PRESENCE: ICD-10-CM

## 2021-09-09 DIAGNOSIS — Z86.03 HISTORY OF CRANIOPHARYNGIOMA: ICD-10-CM

## 2021-09-09 DIAGNOSIS — Z00.00 ENCOUNTER FOR MEDICARE ANNUAL WELLNESS EXAM: Primary | ICD-10-CM

## 2021-09-09 PROCEDURE — 99213 OFFICE O/P EST LOW 20 MIN: CPT | Mod: 25 | Performed by: NURSE PRACTITIONER

## 2021-09-09 PROCEDURE — 96372 THER/PROPH/DIAG INJ SC/IM: CPT | Performed by: INTERNAL MEDICINE

## 2021-09-09 PROCEDURE — 99207 PR NO CHARGE NURSE ONLY: CPT

## 2021-09-09 PROCEDURE — G0438 PPPS, INITIAL VISIT: HCPCS | Performed by: NURSE PRACTITIONER

## 2021-09-09 RX ORDER — DEXTROAMPHETAMINE SACCHARATE, AMPHETAMINE ASPARTATE MONOHYDRATE, DEXTROAMPHETAMINE SULFATE AND AMPHETAMINE SULFATE 7.5; 7.5; 7.5; 7.5 MG/1; MG/1; MG/1; MG/1
30 CAPSULE, EXTENDED RELEASE ORAL DAILY
Qty: 30 CAPSULE | Refills: 0 | Status: SHIPPED | OUTPATIENT
Start: 2021-09-16 | End: 2021-10-16

## 2021-09-09 RX ORDER — DEXTROAMPHETAMINE SACCHARATE, AMPHETAMINE ASPARTATE MONOHYDRATE, DEXTROAMPHETAMINE SULFATE AND AMPHETAMINE SULFATE 7.5; 7.5; 7.5; 7.5 MG/1; MG/1; MG/1; MG/1
30 CAPSULE, EXTENDED RELEASE ORAL DAILY
Qty: 30 CAPSULE | Refills: 0 | Status: SHIPPED | OUTPATIENT
Start: 2021-11-16 | End: 2021-12-16

## 2021-09-09 RX ORDER — DEXTROAMPHETAMINE SACCHARATE, AMPHETAMINE ASPARTATE MONOHYDRATE, DEXTROAMPHETAMINE SULFATE AND AMPHETAMINE SULFATE 7.5; 7.5; 7.5; 7.5 MG/1; MG/1; MG/1; MG/1
30 CAPSULE, EXTENDED RELEASE ORAL DAILY
Qty: 30 CAPSULE | Refills: 0 | Status: SHIPPED | OUTPATIENT
Start: 2021-10-16 | End: 2021-11-15

## 2021-09-09 RX ADMIN — TESTOSTERONE CYPIONATE 200 MG: 200 INJECTION, SOLUTION INTRAMUSCULAR at 16:57

## 2021-09-09 ASSESSMENT — ENCOUNTER SYMPTOMS
DYSURIA: 0
PARESTHESIAS: 0
NERVOUS/ANXIOUS: 0
HEMATURIA: 0
SHORTNESS OF BREATH: 0
WEAKNESS: 0
ARTHRALGIAS: 0
DIARRHEA: 0
FEVER: 0
DIZZINESS: 0
HEARTBURN: 0
JOINT SWELLING: 0
EYE PAIN: 0
HEMATOCHEZIA: 0
HEADACHES: 0
PALPITATIONS: 0
CHILLS: 0
CONSTIPATION: 0
COUGH: 0
ABDOMINAL PAIN: 0
MYALGIAS: 0
FREQUENCY: 0
NAUSEA: 0
SORE THROAT: 0

## 2021-09-09 ASSESSMENT — MIFFLIN-ST. JEOR: SCORE: 1939.34

## 2021-09-09 ASSESSMENT — ACTIVITIES OF DAILY LIVING (ADL)
CURRENT_FUNCTION: MONEY MANAGEMENT REQUIRES ASSISTANCE
CURRENT_FUNCTION: HOUSEWORK REQUIRES ASSISTANCE
CURRENT_FUNCTION: PREPARING MEALS REQUIRES ASSISTANCE

## 2021-09-09 ASSESSMENT — PAIN SCALES - GENERAL: PAINLEVEL: NO PAIN (0)

## 2021-09-09 NOTE — PROGRESS NOTES
"SUBJECTIVE:   CC: Mamadou Reece is an 30 year old male who presents for preventative health visit.     {Split Bill scripting  The purpose of this visit is to discuss your medical history and prevent health problems before you are sick. You may be responsible for a co-pay, coinsurance, or deductible if your visit today includes services such as checking on a sore throat, having an x-ray or lab test, or treating and evaluating a new or existing condition :263165}  Patient has been advised of split billing requirements and indicates understanding: {Yes and No:692618}  Healthy Habits:     In general, how would you rate your overall health?  Good    Frequency of exercise:  None    Do you usually eat at least 4 servings of fruit and vegetables a day, include whole grains    & fiber and avoid regularly eating high fat or \"junk\" foods?  No    Taking medications regularly:  Yes    Medication side effects:  None    Ability to successfully perform activities of daily living:  Preparing meals requires assistance, housework requires assistance and money management requires assistance    Home Safety:  No safety concerns identified    Hearing Impairment:  No hearing concerns    In the past 6 months, have you been bothered by leaking of urine?  No    In general, how would you rate your overall mental or emotional health?  Good      PHQ-2 Total Score: 0    Additional concerns today:  No    {Add if <65 person on Medicare  - Required Questions (Optional):664711}  {Outside tests to abstract? :825906}    {additional problems to add (Optional):229576}    Today's PHQ-2 Score:   PHQ-2 ( 1999 [employer]) 9/9/2021   Q1: Little interest or pleasure in doing things 0   Q2: Feeling down, depressed or hopeless 0   PHQ-2 Score 0   Q1: Little interest or pleasure in doing things Not at all   Q2: Feeling down, depressed or hopeless Not at all   PHQ-2 Score 0       Abuse: Current or Past(Physical, Sexual or Emotional)- { :091235}  Do you feel safe in " "your environment? { :589259}        Social History     Tobacco Use     Smoking status: Never Smoker     Smokeless tobacco: Never Used   Substance Use Topics     Alcohol use: Yes     Alcohol/week: 0.0 standard drinks     Comment: sip at holidays     {Rooming Staff- Complete this question if Prescreen response is not shown below for today's visit. If you drink alcohol do you typically have >3 drinks per day or >7 drinks per week? (Optional):201294}    Alcohol Use 9/9/2021   Prescreen: >3 drinks/day or >7 drinks/week? Not Applicable   Prescreen: >3 drinks/day or >7 drinks/week? -   {add AUDIT responses (Optional) (A score of 7 for adult men is an indication of hazardous drinking; a score of 8 or more is an indication of an alcohol use disorder.  A score of 7 or more for adult women is an indication of hazardous drinking or an alchohol use disorder):107280}    Last PSA:   PSA   Date Value Ref Range Status   10/05/2017 0.17 0 - 4 ug/L Final     Comment:     Assay Method:  Chemiluminescence using Siemens Vista analyzer       Reviewed orders with patient. Reviewed health maintenance and updated orders accordingly - { :347120::\"Yes\"}  {Chronicprobdata (optional):298481}    Reviewed and updated as needed this visit by clinical staff                 Reviewed and updated as needed this visit by Provider                {HISTORY OPTIONS (Optional):595522}    Review of Systems   Constitutional: Negative for chills and fever.   HENT: Negative for congestion, ear pain, hearing loss and sore throat.    Eyes: Negative for pain and visual disturbance.   Respiratory: Negative for cough and shortness of breath.    Cardiovascular: Negative for chest pain, palpitations and peripheral edema.   Gastrointestinal: Negative for abdominal pain, constipation, diarrhea, heartburn, hematochezia and nausea.   Genitourinary: Negative for discharge, dysuria, frequency, genital sores, hematuria, impotence and urgency.   Musculoskeletal: Negative for " "arthralgias, joint swelling and myalgias.   Skin: Negative for rash.   Neurological: Negative for dizziness, weakness, headaches and paresthesias.   Psychiatric/Behavioral: Negative for mood changes. The patient is not nervous/anxious.      {MALE ROS (Optional):843308::\"CONSTITUTIONAL: NEGATIVE for fever, chills, change in weight\",\"INTEGUMENTARY/SKIN: NEGATIVE for worrisome rashes, moles or lesions\",\"EYES: NEGATIVE for vision changes or irritation\",\"ENT: NEGATIVE for ear, mouth and throat problems\",\"RESP: NEGATIVE for significant cough or SOB\",\"CV: NEGATIVE for chest pain, palpitations or peripheral edema\",\"GI: NEGATIVE for nausea, abdominal pain, heartburn, or change in bowel habits\",\" male: negative for dysuria, hematuria, decreased urinary stream, erectile dysfunction, urethral discharge\",\"MUSCULOSKELETAL: NEGATIVE for significant arthralgias or myalgia\",\"NEURO: NEGATIVE for weakness, dizziness or paresthesias\",\"PSYCHIATRIC: NEGATIVE for changes in mood or affect\"}    OBJECTIVE:   There were no vitals taken for this visit.    Physical Exam  {Exam Choices (Optional):128658}    {Diagnostic Test Results (Optional):635943::\"Diagnostic Test Results:\",\"Labs reviewed in Epic\"}    ASSESSMENT/PLAN:   {Diag Picklist:541339}    Patient has been advised of split billing requirements and indicates understanding: {YES / NO:695586::\"Yes\"}  COUNSELING:   {MALE COUNSELING MESSAGES:290595::\"Reviewed preventive health counseling, as reflected in patient instructions\"}    Estimated body mass index is 23.36 kg/m  as calculated from the following:    Height as of 12/31/19: 1.956 m (6' 5\").    Weight as of 9/21/20: 89.4 kg (197 lb).     {Weight Management Plan (ACO) Complete if BMI is abnormal-  Ages 18-64  BMI >24.9.  Age 65+ with BMI <23 or >30 (Optional):694193}    He reports that he has never smoked. He has never used smokeless tobacco.      Counseling Resources:  ATP IV Guidelines  Pooled Cohorts Equation Calculator  FRAX Risk " Assessment  ICSI Preventive Guidelines  Dietary Guidelines for Americans, 2010  USDA's MyPlate  ASA Prophylaxis  Lung CA Screening    ADITHYA Aguirre Ra, CNP  [facility]

## 2021-09-09 NOTE — PROGRESS NOTES
"SUBJECTIVE:   Mamadou Reece is a 30 year old male who presents for Preventive Visit.      Patient has been advised of split billing requirements and indicates understanding: Yes   Are you in the first 12 months of your Medicare coverage?  No    Healthy Habits:     In general, how would you rate your overall health?  Good    Frequency of exercise:  None    Do you usually eat at least 4 servings of fruit and vegetables a day, include whole grains    & fiber and avoid regularly eating high fat or \"junk\" foods?  No    Taking medications regularly:  Yes    Medication side effects:  None    Ability to successfully perform activities of daily living:  Preparing meals requires assistance, housework requires assistance and money management requires assistance    Home Safety:  No safety concerns identified    Hearing Impairment:  No hearing concerns    In the past 6 months, have you been bothered by leaking of urine?  No    In general, how would you rate your overall mental or emotional health?  Good      PHQ-2 Total Score: 0    Additional concerns today:  No    Do you feel safe in your environment? Yes    Have you ever done Advance Care Planning? (For example, a Health Directive, POLST, or a discussion with a medical provider or your loved ones about your wishes): Yes, advance care planning is on file.       Fall risk  Fallen 2 or more times in the past year?: No  Any fall with injury in the past year?: No    Cognitive Screening   1) Repeat 3 items (Leader, Season, Table)    2) Clock draw: NORMAL  3) 3 item recall: Recalls 2 objects   Results: NORMAL clock, 1-2 items recalled: COGNITIVE IMPAIRMENT LESS LIKELY    Mini-CogTM Copyright SUZI Freire. Licensed by the author for use in [facility]; reprinted with permission (stephanie@.[facility]). All rights reserved.      Do you have sleep apnea, excessive snoring or daytime drowsiness?: no    Reviewed and updated as needed this visit by clinical staff  Tobacco  Allergies  " Meds   Med Hx  Surg Hx  Fam Hx  Soc Hx        Reviewed and updated as needed this visit by Provider                Social History     Tobacco Use     Smoking status: Never Smoker     Smokeless tobacco: Never Used   Substance Use Topics     Alcohol use: Yes     Alcohol/week: 0.0 standard drinks     Comment: sip at holidays     If you drink alcohol do you typically have >3 drinks per day or >7 drinks per week? No    Alcohol Use 9/9/2021   Prescreen: >3 drinks/day or >7 drinks/week? Not Applicable   Prescreen: >3 drinks/day or >7 drinks/week? -           Current providers sharing in care for this patient include:   Patient Care Team:  Mariluz Mae MD as PCP - General (Family Practice)  Mariluz Mae MD as Assigned PCP  Sumaya Alonso MD as Assigned Endocrinology Provider    The following health maintenance items are reviewed in Epic and correct as of today:  Health Maintenance Due   Topic Date Due     ANNUAL REVIEW OF HM ORDERS  Never done     HEPATITIS B IMMUNIZATION (2 of 3 - 3-dose primary series) 05/04/1995     HIV SCREENING  Never done     HEPATITIS C SCREENING  Never done     INFLUENZA VACCINE (1) 09/01/2021     Patient Active Problem List   Diagnosis     ADD (attention deficit disorder)     Craniopharyngioma (H)     CARDIOVASCULAR SCREENING; LDL GOAL LESS THAN 160     Mood change     Legal blindness     Hypothyroidism     Panhypopituitarism (H)     History of craniopharyngioma     Testosterone deficiency     BCC (basal cell carcinoma), face     Urinary frequency     Past Surgical History:   Procedure Laterality Date     CRANIOTOMY, EXCISE TUMOR COMPLEX, COMBINED  age 6    craniopharyngioma     MOHS MICROGRAPHIC PROCEDURE Left 05/31/2018    left cheek nodular BCC       Social History     Tobacco Use     Smoking status: Never Smoker     Smokeless tobacco: Never Used   Substance Use Topics     Alcohol use: Yes     Alcohol/week: 0.0 standard drinks     Comment: sip at holidays     Family History  "  Problem Relation Age of Onset     Cancer Father         Melanoma     Cerebrovascular Disease Maternal Grandmother 91     Diabetes Maternal Grandmother      Cerebrovascular Disease Paternal Grandmother      Cerebrovascular Disease Paternal Grandfather          Stable on adderall.  No change in sleep or mood.  No palpitations.          Review of Systems   Constitutional: Negative for chills and fever.   HENT: Negative for congestion, ear pain, hearing loss and sore throat.    Eyes: Negative for pain and visual disturbance.   Respiratory: Negative for cough and shortness of breath.    Cardiovascular: Negative for chest pain, palpitations and peripheral edema.   Gastrointestinal: Negative for abdominal pain, constipation, diarrhea, heartburn, hematochezia and nausea.   Genitourinary: Negative for discharge, dysuria, frequency, genital sores, hematuria, impotence and urgency.   Musculoskeletal: Negative for arthralgias, joint swelling and myalgias.   Skin: Negative for rash.   Neurological: Negative for dizziness, weakness, headaches and paresthesias.   Psychiatric/Behavioral: Negative for mood changes. The patient is not nervous/anxious.          OBJECTIVE:   /80 (BP Location: Right arm, Patient Position: Sitting, Cuff Size: Adult Regular)   Pulse 79   Temp 98  F (36.7  C) (Oral)   Resp 16   Ht 1.848 m (6' 0.75\")   Wt 92.4 kg (203 lb 12.8 oz)   SpO2 100%   BMI 27.07 kg/m   Estimated body mass index is 27.07 kg/m  as calculated from the following:    Height as of this encounter: 1.848 m (6' 0.75\").    Weight as of this encounter: 92.4 kg (203 lb 12.8 oz).  Physical Exam  GENERAL: healthy, alert and no distress  EYES: Eyes grossly normal to inspection  HENT: ear canals and TM's normal, nose and mouth without ulcers or lesions  NECK: no adenopathy, no asymmetry, masses, or scars and thyroid normal to palpation  RESP: lungs clear to auscultation - no rales, rhonchi or wheezes  CV: regular rate and rhythm, " "normal S1 S2, no S3 or S4, no murmur, click or rub, no peripheral edema and peripheral pulses strong  ABDOMEN: soft, nontender, no hepatosplenomegaly, no masses and bowel sounds normal  NEURO: Normal strength and tone, mentation intact and speech normal  PSYCH: mentation appears normal, affect normal/bright        ASSESSMENT / PLAN:   (Z00.00) Encounter for Medicare annual wellness exam  (primary encounter diagnosis)    (F98.8) Attention deficit disorder, unspecified hyperactivity presence  Comment: stable.    Plan: amphetamine-dextroamphetamine (ADDERALL XR) 30         MG 24 hr capsule, amphetamine-dextroamphetamine        (ADDERALL XR) 30 MG 24 hr capsule,         amphetamine-dextroamphetamine (ADDERALL XR) 30         MG 24 hr capsule, OFFICE/OUTPT VISIT,GA SWAN III        Request an additional 3 months when needed.  Visit in 6 months.    (Z13.6) CARDIOVASCULAR SCREENING; LDL GOAL LESS THAN 160  Plan: Lipid panel reflex to direct LDL Fasting    (E23.0) Panhypopituitarism (H)  Follows with endocrinology.  No recent changes, will see again in December.    (Z86.011) History of craniopharyngioma  Follows with peds oncology, MRIs every 2 years.    (H54.8) Legal blindness    (E34.9) Testosterone deficiency  Comment: follows with endocrinology.    (D44.4) Craniopharyngioma (H)  Comment: follows with peds oncology.    Patient has been advised of split billing requirements and indicates understanding: Yes  COUNSELING:  Reviewed preventive health counseling, as reflected in patient instructions    Estimated body mass index is 27.07 kg/m  as calculated from the following:    Height as of this encounter: 1.848 m (6' 0.75\").    Weight as of this encounter: 92.4 kg (203 lb 12.8 oz).        He reports that he has never smoked. He has never used smokeless tobacco.      Appropriate preventive services were discussed with this patient, including applicable screening as appropriate for cardiovascular disease, diabetes, " osteopenia/osteoporosis, and glaucoma.  As appropriate for age/gender, discussed screening for colorectal cancer, prostate cancer, breast cancer, and cervical cancer. Checklist reviewing preventive services available has been given to the patient.    Reviewed patients plan of care and provided an AVS. The Intermediate Care Plan ( asthma action plan, low back pain action plan, and migraine action plan) for Mamadou meets the Care Plan requirement. This Care Plan has been established and reviewed with the Patient and mother.    Counseling Resources:  ATP IV Guidelines  Pooled Cohorts Equation Calculator  Breast Cancer Risk Calculator  Breast Cancer: Medication to Reduce Risk  FRAX Risk Assessment  ICSI Preventive Guidelines  Dietary Guidelines for Americans, 2010  USDA's MyPlate  ASA Prophylaxis  Lung CA Screening    ADITHYA Aguirre [facility]

## 2021-09-09 NOTE — PROGRESS NOTES
"Clinic Administered Medication Documentation    Administrations This Visit     testosterone cypionate (DEPOTESTOSTERONE) injection 200 mg     Admin Date  09/09/2021 Action  Given Dose  200 mg Route  Intramuscular Site  Right Ventrogluteal Administered By  Swapna Mckeon, RN    Ordering Provider: Sumaya Alonso MD    Patient Supplied?: No                  Testosterone Documentation     Prior to injection, verified patient identity using patient's name and date of birth. Medication was administered. Please see MAR and medication order for additional information. Patient instructed to remain in clinic for 15 minutes, report any adverse reaction to staff immediately  and stay in clinic after the injection but patient declined.    Reminders     - Check vial for refills remaining and initiate refill request if no refills remain.      - Verify with patient that medication was paid for at pharmacy. If it was, check the \"patient supplied\" box on the MAR.     Was entire vial of medication used? Yes  Vial/Syringe: Single dose vial  Expiration Date:  10/2023  Was this medication supplied by the patient? Yes, Medication was received directly from patient in a tamper proof bag (follow site specific policies)     [facility] location    KAMILLE Swartz, RN  [facility]        "

## 2021-09-09 NOTE — PATIENT INSTRUCTIONS
Patient Education   Personalized Prevention Plan  You are due for the preventive services outlined below.  Your care team is available to assist you in scheduling these services.  If you have already completed any of these items, please share that information with your care team to update in your medical record.  Health Maintenance Due   Topic Date Due     ANNUAL REVIEW OF HM ORDERS  Never done     Hepatitis B Vaccine (2 of 3 - 3-dose primary series) 05/04/1995     HIV Screening  Never done     Hepatitis C Screening  Never done     Flu Vaccine (1) 09/01/2021       Exercise for a Healthier Heart  You may wonder how you can improve the health of your heart. If you re thinking about exercise, you re on the right track. You don t need to become an athlete. But you do need a certain amount of brisk exercise to help strengthen your heart. If you have been diagnosed with a heart condition, your healthcare provider may advise exercise to help stabilize your condition. To help make exercise a habit, choose safe, fun activities.      Exercise with a friend. When activity is fun, you're more likely to stick with it.   Before you start  Check with your healthcare provider before starting an exercise program. This is especially important if you have not been active for a while. It's also important if you have a long-term (chronic) health problem such as heart disease, diabetes, or obesity. Or if you are at high risk for having these problems.   Why exercise?  Exercising regularly offers many healthy rewards. It can help you do all of the following:     Improve your blood cholesterol level to help prevent further heart trouble    Lower your blood pressure to help prevent a stroke or heart attack    Control diabetes, or reduce your risk of getting this disease    Improve your heart and lung function    Reach and stay at a healthy weight    Make your muscles stronger so you can stay active    Prevent falls and fractures by  slowing the loss of bone mass (osteoporosis)    Manage stress better    Reduce your blood pressure    Improve your sense of self and your body image  Exercise tips      Ease into your routine. Set small goals. Then build on them. If you are not sure what your activity level should be, talk with your healthcare provider first before starting an exercise routine.    Exercise on most days. Aim for a total of 150 minutes (2 hours and 30 minutes) or more of moderate-intensity aerobic activity each week. Or 75 minutes (1 hour and 15 minutes) or more of vigorous-intensity aerobic activity each week. Or try for a combination of both. Moderate activity means that you breathe heavier and your heart rate increases but you can still talk. Think about doing 40 minutes of moderate exercise, 3 to 4 times a week. For best results, activity should last for about 40 minutes to lower blood pressure and cholesterol. It's OK to work up to the 40-minute period over time. Examples of moderate-intensity activity are walking 1 mile in 15 minutes. Or doing 30 to 45 minutes of yard work.    Step up your daily activity level.  Along with your exercise program, try being more active the whole day. Walk instead of drive. Or park further away so that you take more steps each day. Do more household tasks or yard work. You may not be able to meet the advised mount of physical activity. But doing some moderate- or vigorous-intensity aerobic activity can help reduce your risk for heart disease. Your healthcare provider can help you figure out what is best for you.    Choose 1 or more activities you enjoy.  Walking is one of the easiest things you can do. You can also try swimming, riding a bike, dancing, or taking an exercise class.    When to call your healthcare provider  Call your healthcare provider if you have any of these:     Chest pain or feel dizzy or lightheaded    Burning, tightness, pressure, or heaviness in your chest, neck, shoulders,  back, or arms    Abnormal shortness of breath    More joint or muscle pain    A very fast or irregular heartbeat (palpitations)  [employer] last reviewed this educational content on 7/1/2019 2000-2021 [employer]. All rights reserved. This information is not intended as a substitute for professional medical care. Always follow your healthcare professional's instructions.          Understanding USDA MyPlate  The USDA has guidelines to help you make healthy food choices. These are called MyPlate. MyPlate shows the food groups that make up healthy meals using the image of a place setting. Before you eat, think about the healthiest choices for what to put on your plate or in your cup or bowl. To learn more about building a healthy plate, visit www.choosemyplate.gov.    The food groups    Fruits. Any fruit or 100% fruit juice counts as part of the Fruit Group. Fruits may be fresh, canned, frozen, or dried, and may be whole, cut-up, or pureed. Make 1/2 of your plate fruits and vegetables.    Vegetables. Any vegetable or 100% vegetable juice counts as a member of the Vegetable Group. Vegetables may be fresh, frozen, canned, or dried. They can be served raw or cooked and may be whole, cut-up, or mashed. Make 1/2 of your plate fruits and vegetables.    Grains. All foods made from grains are part of the Grains Group. These include wheat, rice, oats, cornmeal, and barley. Grains are often used to make foods such as bread, pasta, oatmeal, cereal, tortillas, and grits. Grains should be no more than 1/4 of your plate. At least half of your grains should be whole grains.    Protein. This group includes meat, poultry, seafood, beans and peas, eggs, processed soy products (such as tofu), nuts (including nut butters), and seeds. Make protein choices no more than 1/4 of your plate. Meat and poultry choices should be lean or low fat.    Dairy. The Dairy Group includes all fluid milk products and foods made from milk that  contain calcium, such as yogurt and cheese. (Foods that have little calcium, such as cream, butter, and cream cheese, are not part of this group.) Most dairy choices should be low-fat or fat-free.    Oils. Oils aren't a food group, but they do contain essential nutrients. However it's important to watch your intake of oils. These are fats that are liquid at room temperature. They include canola, corn, olive, soybean, vegetable, and sunflower oil. Foods that are mainly oil include mayonnaise, certain salad dressings, and soft margarines. You likely already get your daily oil allowance from the foods you eat.  Things to limit  Eating healthy also means limiting these things in your diet:       Salt (sodium). Many processed foods have a lot of sodium. To keep sodium intake down, eat fresh vegetables, meats, poultry, and seafood when possible. Purchase low-sodium, reduced-sodium, or no-salt-added food products at the store. And don't add salt to your meals at home. Instead, season them with herbs and spices such as dill, oregano, cumin, and paprika. Or try adding flavor with lemon or lime zest and juice.    Saturated fat. Saturated fats are most often found in animal products such as beef, pork, and chicken. They are often solid at room temperature, such as butter. To reduce your saturated fat intake, choose leaner cuts of meat and poultry. And try healthier cooking methods such as grilling, broiling, roasting, or baking. For a simple lower-fat swap, use plain nonfat yogurt instead of mayonnaise when making potato salad or macaroni salad.    Added sugars. These are sugars added to foods. They are in foods such as ice cream, candy, soda, fruit drinks, sports drinks, energy drinks, cookies, pastries, jams, and syrups. Cut down on added sugars by sharing sweet treats with a family member or friend. You can also choose fruit for dessert, and drink water or other unsweetened beverages.     Chloe last reviewed this  educational content on 6/1/2020 2000-2021 [employer]. All rights reserved. This information is not intended as a substitute for professional medical care. Always follow your healthcare professional's instructions.        Activities of Daily Living    Your Health Risk Assessment indicates you have difficulties with activities of daily living such as housework, bathing, preparing meals, taking medication, etc. Please make a follow up appointment for us to address this issue in more detail.

## 2021-09-18 ENCOUNTER — HEALTH MAINTENANCE LETTER (OUTPATIENT)
Age: 31
End: 2021-09-18

## 2021-09-23 ENCOUNTER — ALLIED HEALTH/NURSE VISIT (OUTPATIENT)
Dept: FAMILY MEDICINE | Facility: CLINIC | Age: 31
End: 2021-09-23
Payer: MEDICARE

## 2021-09-23 DIAGNOSIS — E34.9 TESTOSTERONE DEFICIENCY: Primary | ICD-10-CM

## 2021-09-23 PROCEDURE — 96372 THER/PROPH/DIAG INJ SC/IM: CPT | Performed by: INTERNAL MEDICINE

## 2021-09-23 PROCEDURE — 99207 PR NO CHARGE NURSE ONLY: CPT

## 2021-09-23 RX ADMIN — TESTOSTERONE CYPIONATE 200 MG: 200 INJECTION, SOLUTION INTRAMUSCULAR at 16:53

## 2021-09-23 NOTE — PROGRESS NOTES
"Clinic Administered Medication Documentation    Administrations This Visit     testosterone cypionate (DEPOTESTOSTERONE) injection 200 mg     Admin Date  09/23/2021 Action  Given Dose  200 mg Route  Intramuscular Site  Left Ventrogluteal Administered By  Swapna Mckeon, RN    Ordering Provider: Sumaya Alonso MD    Patient Supplied?: No                  Testosterone Documentation     Prior to injection, verified patient identity using patient's name and date of birth. Medication was administered. Please see MAR and medication order for additional information. Patient instructed to remain in clinic for 15 minutes, report any adverse reaction to staff immediately  and stay in clinic after the injection but patient declined.    Reminders     - Check vial for refills remaining and initiate refill request if no refills remain.      - Verify with patient that medication was paid for at pharmacy. If it was, check the \"patient supplied\" box on the MAR.     Was entire vial of medication used? Yes  Vial/Syringe: Single dose vial  Expiration Date:  10/2023  Was this medication supplied by the patient? Yes, Medication was received directly from patient in a tamper proof bag (follow site specific policies)     [facility] site    KAMILLE Swartz, RN  [facility]        "

## 2021-10-07 ENCOUNTER — ALLIED HEALTH/NURSE VISIT (OUTPATIENT)
Dept: FAMILY MEDICINE | Facility: CLINIC | Age: 31
End: 2021-10-07
Payer: MEDICARE

## 2021-10-07 DIAGNOSIS — E34.9 TESTOSTERONE DEFICIENCY: Primary | ICD-10-CM

## 2021-10-07 PROCEDURE — 96372 THER/PROPH/DIAG INJ SC/IM: CPT | Performed by: INTERNAL MEDICINE

## 2021-10-07 PROCEDURE — 99207 PR NO CHARGE NURSE ONLY: CPT

## 2021-10-07 RX ADMIN — TESTOSTERONE CYPIONATE 200 MG: 200 INJECTION, SOLUTION INTRAMUSCULAR at 16:36

## 2021-10-19 ENCOUNTER — TELEPHONE (OUTPATIENT)
Dept: ENDOCRINOLOGY | Facility: CLINIC | Age: 31
End: 2021-10-19

## 2021-10-19 NOTE — TELEPHONE ENCOUNTER
[facility] Call Center    Phone Message    May a detailed message be left on voicemail: yes     Reason for Call: Order(s): Other:   Reason for requested: Patient requesting lab orders for yearly testosterone blood work to be sent to [facility]   Date needed: by December for 1/18/2022 appoinment  Provider name: Dr. Torres      Action Taken: Other: Endo    Travel Screening: Not Applicable

## 2021-10-20 NOTE — TELEPHONE ENCOUNTER
There are orders in the system that do no  until 2022.    Lab scheduled  Kenyetta Valdes RN, BSN

## 2021-10-21 ENCOUNTER — ALLIED HEALTH/NURSE VISIT (OUTPATIENT)
Dept: FAMILY MEDICINE | Facility: CLINIC | Age: 31
End: 2021-10-21
Payer: MEDICARE

## 2021-10-21 DIAGNOSIS — E34.9 TESTOSTERONE DEFICIENCY: Primary | ICD-10-CM

## 2021-10-21 PROCEDURE — 96372 THER/PROPH/DIAG INJ SC/IM: CPT | Performed by: INTERNAL MEDICINE

## 2021-10-21 PROCEDURE — 99207 PR NO CHARGE NURSE ONLY: CPT

## 2021-10-21 RX ADMIN — TESTOSTERONE CYPIONATE 200 MG: 200 INJECTION, SOLUTION INTRAMUSCULAR at 16:43

## 2021-10-21 NOTE — PROGRESS NOTES
"Clinic Administered Medication Documentation    Administrations This Visit     testosterone cypionate (DEPOTESTOSTERONE) injection 200 mg     Admin Date  10/21/2021 Action  Given Dose  200 mg Route  Intramuscular Site  Left Ventrogluteal Administered By  Swapna Mckeon, RN    Ordering Provider: Sumaya Alonso MD    Patient Supplied?: No                  Testosterone Documentation     Prior to injection, verified patient identity using patient's name and date of birth. Medication was administered. Please see MAR and medication order for additional information. Patient instructed to remain in clinic for 15 minutes, report any adverse reaction to staff immediately  and stay in clinic after the injection but patient declined.    Reminders     - Check vial for refills remaining and initiate refill request if no refills remain.      - Verify with patient that medication was paid for at pharmacy. If it was, check the \"patient supplied\" box on the MAR.     Was entire vial of medication used? Yes  Vial/Syringe: Single dose vial  Expiration Date:  10/2023  Was this medication supplied by the patient? Yes, Medication was received directly from patient in a tamper proof bag (follow site specific policies)    KAMILLE Swartz, RN  [facility]    "

## 2021-11-04 ENCOUNTER — ALLIED HEALTH/NURSE VISIT (OUTPATIENT)
Dept: FAMILY MEDICINE | Facility: CLINIC | Age: 31
End: 2021-11-04
Payer: MEDICARE

## 2021-11-04 DIAGNOSIS — E34.9 TESTOSTERONE DEFICIENCY: Primary | ICD-10-CM

## 2021-11-04 PROCEDURE — 99207 PR NO CHARGE NURSE ONLY: CPT

## 2021-11-04 PROCEDURE — 96372 THER/PROPH/DIAG INJ SC/IM: CPT | Performed by: INTERNAL MEDICINE

## 2021-11-04 RX ADMIN — TESTOSTERONE CYPIONATE 200 MG: 200 INJECTION, SOLUTION INTRAMUSCULAR at 16:41

## 2021-11-04 NOTE — PROGRESS NOTES
"Clinic Administered Medication Documentation    Administrations This Visit     testosterone cypionate (DEPOTESTOSTERONE) injection 200 mg     Admin Date  11/04/2021 Action  Given Dose  200 mg Route  Intramuscular Site  Right Ventrogluteal Administered By  Swapna Mckeon, RN    Ordering Provider: Sumaya Alonso MD    Patient Supplied?: No                  Testosterone Documentation     Prior to injection, verified patient identity using patient's name and date of birth. Medication was administered. Please see MAR and medication order for additional information. Patient instructed to remain in clinic for 15 minutes, report any adverse reaction to staff immediately  and stay in clinic after the injection but patient declined.    Reminders     - Check vial for refills remaining and initiate refill request if no refills remain.      - Verify with patient that medication was paid for at pharmacy. If it was, check the \"patient supplied\" box on the MAR.     Was entire vial of medication used? Yes  Vial/Syringe: Single dose vial  Expiration Date:  10/2023  Was this medication supplied by the patient? Yes, Medication was received directly from patient in a tamper proof bag (follow site specific policies)     KAMILLE Swartz, RN  [facility]        "

## 2021-11-18 ENCOUNTER — ALLIED HEALTH/NURSE VISIT (OUTPATIENT)
Dept: FAMILY MEDICINE | Facility: CLINIC | Age: 31
End: 2021-11-18
Payer: MEDICARE

## 2021-11-18 DIAGNOSIS — E23.2 DIABETES INSIPIDUS (H): ICD-10-CM

## 2021-11-18 DIAGNOSIS — E25.0 TESTOSTERONE 17-BETA-DEHYDROGENASE DEFICIENCY (H): Primary | ICD-10-CM

## 2021-11-18 DIAGNOSIS — E34.9 TESTOSTERONE DEFICIENCY: ICD-10-CM

## 2021-11-18 PROCEDURE — 96372 THER/PROPH/DIAG INJ SC/IM: CPT | Performed by: INTERNAL MEDICINE

## 2021-11-18 PROCEDURE — 99207 PR NO CHARGE NURSE ONLY: CPT

## 2021-11-18 RX ADMIN — TESTOSTERONE CYPIONATE 200 MG: 200 INJECTION, SOLUTION INTRAMUSCULAR at 16:43

## 2021-11-18 NOTE — PROGRESS NOTES
"Clinic Administered Medication Documentation    Administrations This Visit     testosterone cypionate (DEPOTESTOSTERONE) injection 200 mg     Admin Date  11/18/2021 Action  Given Dose  200 mg Route  Intramuscular Site  Left Ventrogluteal Administered By  Sofia Delong RN    Ordering Provider: Sumaya Alonso MD    NDC: 6540-3914-27    Lot#: [id number]    : HOSPIRA    Patient Supplied?: No                  Testosterone Documentation     Prior to injection, verified patient identity using patient's name and date of birth. Medication was administered. Please see MAR and medication order for additional information. Patient instructed to remain in clinic for 15 minutes and report any adverse reaction to staff immediately .    Reminders     - Check vial for refills remaining and initiate refill request if no refills remain.      - Verify with patient that medication was paid for at pharmacy. If it was, check the \"patient supplied\" box on the MAR.     Was entire vial of medication used? Yes  Vial/Syringe: Single dose vial  Expiration Date:  10/2023  Was this medication supplied by the patient? Yes, Medication was received directly from patient in a tamper proof bag (follow site specific policies)   Sofia Delong. RN, BSN, PAL (Patient Advocate Liaison)  [facility]   [phone]        "

## 2021-11-19 RX ORDER — DESMOPRESSIN ACETATE 0.1 MG/ML
SOLUTION NASAL
Qty: 10 ML | Refills: 4 | Status: SHIPPED | OUTPATIENT
Start: 2021-11-19 | End: 2022-04-21

## 2021-11-19 NOTE — TELEPHONE ENCOUNTER
Routing refill request to provider for review/approval because:  Drug not on the [facility] refill protocol     Pending Prescriptions:                       Disp   Refills    desmopressin (DDAVP) 0.01 % SOLN spray [Ph*10 mL  4        Sig: USE THREE SPRAYS INTO NOSTRIL ONCE DAILY AND USE ONE           SPRAY INTO NOSTRIL AT BEDTIME

## 2021-11-29 DIAGNOSIS — E34.9 TESTOSTERONE DEFICIENCY: ICD-10-CM

## 2021-12-01 RX ORDER — TESTOSTERONE CYPIONATE 200 MG/ML
200 INJECTION, SOLUTION INTRAMUSCULAR
Qty: 10 ML | Refills: 5 | Status: SHIPPED | OUTPATIENT
Start: 2021-12-01 | End: 2022-02-08

## 2021-12-02 ENCOUNTER — ALLIED HEALTH/NURSE VISIT (OUTPATIENT)
Dept: FAMILY MEDICINE | Facility: CLINIC | Age: 31
End: 2021-12-02
Payer: MEDICARE

## 2021-12-02 DIAGNOSIS — E25.0 TESTOSTERONE 17-BETA-DEHYDROGENASE DEFICIENCY (H): Primary | ICD-10-CM

## 2021-12-02 PROCEDURE — 96372 THER/PROPH/DIAG INJ SC/IM: CPT | Performed by: INTERNAL MEDICINE

## 2021-12-02 PROCEDURE — 99207 PR NO CHARGE NURSE ONLY: CPT

## 2021-12-02 RX ADMIN — TESTOSTERONE CYPIONATE 200 MG: 200 INJECTION, SOLUTION INTRAMUSCULAR at 16:45

## 2021-12-02 NOTE — PROGRESS NOTES
"Clinic Administered Medication Documentation    Administrations This Visit     testosterone cypionate (DEPOTESTOSTERONE) injection 200 mg     Admin Date  12/02/2021 Action  Given Dose  200 mg Route  Intramuscular Site  Right Ventrogluteal Administered By  Sofia Delong RN    Ordering Provider: Sumaya Alonso MD    Patient Supplied?: No                  Testosterone Documentation     Prior to injection, verified patient identity using patient's name and date of birth. Medication was administered. Please see MAR and medication order for additional information. Patient instructed to remain in clinic for 15 minutes and report any adverse reaction to staff immediately .    Reminders     - Check vial for refills remaining and initiate refill request if no refills remain.      - Verify with patient that medication was paid for at pharmacy. If it was, check the \"patient supplied\" box on the MAR.     Was entire vial of medication used? Yes  Vial/Syringe: Single dose vial  Expiration Date:  10/1023  Was this medication supplied by the patient? Yes, Medication was received directly from patient in a tamper proof bag (follow site specific policies)   The following medication was given:     Due to injection administration, patient instructed to remain in clinic for 15 minutes  afterwards, and to report any adverse reaction to me immediately.    Sofia Delong. RN, BSN, PAL (Patient Advocate Liaison)  [facility]   [phone]         "

## 2021-12-09 ENCOUNTER — LAB (OUTPATIENT)
Dept: LAB | Facility: CLINIC | Age: 31
End: 2021-12-09
Payer: MEDICARE

## 2021-12-09 DIAGNOSIS — Z13.6 CARDIOVASCULAR SCREENING; LDL GOAL LESS THAN 160: ICD-10-CM

## 2021-12-09 DIAGNOSIS — E34.9 TESTOSTERONE DEFICIENCY: ICD-10-CM

## 2021-12-09 LAB
HGB BLD-MCNC: 17 G/DL (ref 13.3–17.7)
SHBG SERPL-SCNC: 22 NMOL/L (ref 11–80)

## 2021-12-09 PROCEDURE — 85018 HEMOGLOBIN: CPT

## 2021-12-09 PROCEDURE — 84403 ASSAY OF TOTAL TESTOSTERONE: CPT

## 2021-12-09 PROCEDURE — 36415 COLL VENOUS BLD VENIPUNCTURE: CPT

## 2021-12-09 PROCEDURE — 84270 ASSAY OF SEX HORMONE GLOBUL: CPT

## 2021-12-09 PROCEDURE — 80061 LIPID PANEL: CPT

## 2021-12-10 LAB
TESTOST FREE SERPL-MCNC: 38.3 NG/DL
TESTOST SERPL-MCNC: 1259 NG/DL (ref 240–950)

## 2021-12-16 ENCOUNTER — ALLIED HEALTH/NURSE VISIT (OUTPATIENT)
Dept: FAMILY MEDICINE | Facility: CLINIC | Age: 31
End: 2021-12-16
Payer: MEDICARE

## 2021-12-16 DIAGNOSIS — E34.9 TESTOSTERONE DEFICIENCY: Primary | ICD-10-CM

## 2021-12-16 LAB
CHOLEST SERPL-MCNC: 167 MG/DL
FASTING STATUS PATIENT QL REPORTED: NO
HDLC SERPL-MCNC: 55 MG/DL
LDLC SERPL CALC-MCNC: 95 MG/DL
NONHDLC SERPL-MCNC: 112 MG/DL
TRIGL SERPL-MCNC: 84 MG/DL

## 2021-12-16 PROCEDURE — 99207 PR NO CHARGE NURSE ONLY: CPT

## 2021-12-16 PROCEDURE — 96372 THER/PROPH/DIAG INJ SC/IM: CPT | Performed by: INTERNAL MEDICINE

## 2021-12-16 RX ADMIN — TESTOSTERONE CYPIONATE 200 MG: 200 INJECTION, SOLUTION INTRAMUSCULAR at 16:41

## 2021-12-16 NOTE — PROGRESS NOTES
"   Clinic Administered Medication Documentation    Administrations This Visit     testosterone cypionate (DEPOTESTOSTERONE) injection 200 mg     Admin Date  12/16/2021 Action  Given Dose  200 mg Route  Intramuscular Site  Left Ventrogluteal Administered By  Swapna Mckeon, RN    Ordering Provider: Sumaya Alonso MD    Patient Supplied?: No                  Testosterone Documentation     Prior to injection, verified patient identity using patient's name and date of birth. Medication was administered. Please see MAR and medication order for additional information. Patient instructed to remain in clinic for 15 minutes, report any adverse reaction to staff immediately  and stay in clinic after the injection but patient declined.    Reminders     - Check vial for refills remaining and initiate refill request if no refills remain.      - Verify with patient that medication was paid for at pharmacy. If it was, check the \"patient supplied\" box on the MAR.     Was entire vial of medication used? Yes  Vial/Syringe: Single dose vial  Expiration Date:  10/2023  Was this medication supplied by the patient? Yes, Medication was received directly from patient in a tamper proof bag (follow site specific policies)     KAMILLE Swartz, RN  [facility]      "

## 2021-12-16 NOTE — RESULT ENCOUNTER NOTE
Labs results/imaging studies results noted. Will discuss in next clinic visit 1/18/22.  Was it a fasting morning midcycle sample?    Here is a copy for your records.  Follow-up in endocrinology Clinic as scheduled/discussed. We will review these studies/results in further detail at that visit, but feel free to contact us with any other questions in the interim.    Please call endocrinology clinic (nurse line: [phone]) if questions.    Sumaya Alonso MD

## 2021-12-30 ENCOUNTER — ALLIED HEALTH/NURSE VISIT (OUTPATIENT)
Dept: FAMILY MEDICINE | Facility: CLINIC | Age: 31
End: 2021-12-30
Payer: MEDICARE

## 2021-12-30 DIAGNOSIS — E34.9 TESTOSTERONE DEFICIENCY: Primary | ICD-10-CM

## 2021-12-30 PROCEDURE — 99207 PR NO CHARGE NURSE ONLY: CPT

## 2021-12-30 PROCEDURE — 96372 THER/PROPH/DIAG INJ SC/IM: CPT | Performed by: INTERNAL MEDICINE

## 2021-12-30 RX ADMIN — TESTOSTERONE CYPIONATE 200 MG: 200 INJECTION, SOLUTION INTRAMUSCULAR at 16:42

## 2021-12-30 NOTE — PROGRESS NOTES
"Clinic Administered Medication Documentation    Administrations This Visit     testosterone cypionate (DEPOTESTOSTERONE) injection 200 mg     Admin Date  12/30/2021 Action  Given Dose  200 mg Route  Intramuscular Site  Left Quadriceps Administered By  Swapna Mckeon, RN    Ordering Provider: Sumaya Alonso MD    Patient Supplied?: No                  Testosterone Documentation     Prior to injection, verified patient identity using patient's name and date of birth. Medication was administered. Please see MAR and medication order for additional information. Patient instructed to remain in clinic for 15 minutes, report any adverse reaction to staff immediately  and stay in clinic after the injection but patient declined.    Reminders     - Check vial for refills remaining and initiate refill request if no refills remain.      - Verify with patient that medication was paid for at pharmacy. If it was, check the \"patient supplied\" box on the MAR.     Was entire vial of medication used? Yes  Vial/Syringe: Single dose vial  Expiration Date:  10/2023  Was this medication supplied by the patient? Yes, Medication was received directly from patient in a tamper proof bag (follow site specific policies)     KAMILLE Swartz, RN  [facility]        "

## 2022-01-13 ENCOUNTER — ALLIED HEALTH/NURSE VISIT (OUTPATIENT)
Dept: FAMILY MEDICINE | Facility: CLINIC | Age: 32
End: 2022-01-13
Payer: MEDICARE

## 2022-01-13 DIAGNOSIS — E34.9 TESTOSTERONE DEFICIENCY: Primary | ICD-10-CM

## 2022-01-13 PROCEDURE — 99207 PR NO CHARGE NURSE ONLY: CPT

## 2022-01-13 PROCEDURE — 96372 THER/PROPH/DIAG INJ SC/IM: CPT | Performed by: INTERNAL MEDICINE

## 2022-01-13 RX ADMIN — TESTOSTERONE CYPIONATE 200 MG: 200 INJECTION, SOLUTION INTRAMUSCULAR at 17:01

## 2022-01-13 NOTE — PROGRESS NOTES
"Clinic Administered Medication Documentation    Administrations This Visit     testosterone cypionate (DEPOTESTOSTERONE) injection 200 mg     Admin Date  01/13/2022 Action  Given Dose  200 mg Route  Intramuscular Site   Administered By  Mariluz Salmon RN    Ordering Provider: Sumaya Alonso MD    Patient Supplied?: No                  Testosterone Documentation     Prior to injection, verified patient identity using patient's name and date of birth. Medication was administered. Please see MAR and medication order for additional information. Patient instructed to remain in clinic for 15 minutes, report any adverse reaction to staff immediately  and stay in clinic after the injection but patient declined.    Reminders     - Check vial for refills remaining and initiate refill request if no refills remain.      - Verify with patient that medication was paid for at pharmacy. If it was, check the \"patient supplied\" box on the MAR.     Was entire vial of medication used? Yes  Vial/Syringe: Single dose vial   RT: IM left ventrogluteal   Expiration Date:  07/31/2023  Was this medication supplied by the patient? Yes, Medication was received directly from patient (follow site specific policies) and directly from patient in a tamper proof bag (follow site specific policies)         "

## 2022-01-17 ASSESSMENT — ENCOUNTER SYMPTOMS
BOWEL INCONTINENCE: 0
HALLUCINATIONS: 0
EXERCISE INTOLERANCE: 0
CHILLS: 0
BLOOD IN STOOL: 0
HEARTBURN: 0
HYPOTENSION: 0
JAUNDICE: 0
POOR WOUND HEALING: 0
NAIL CHANGES: 0
HYPERTENSION: 0
DECREASED CONCENTRATION: 0
POLYPHAGIA: 0
ALTERED TEMPERATURE REGULATION: 0
SKIN CHANGES: 0
NIGHT SWEATS: 0
NERVOUS/ANXIOUS: 0
BLOATING: 0
SLEEP DISTURBANCES DUE TO BREATHING: 0
CONSTIPATION: 1
FATIGUE: 1
INSOMNIA: 1
SYNCOPE: 0
DEPRESSION: 0
NAUSEA: 0
DECREASED APPETITE: 0
PANIC: 0
RECTAL PAIN: 0
WEIGHT GAIN: 0
FEVER: 0
WEIGHT LOSS: 0
POLYDIPSIA: 0
LIGHT-HEADEDNESS: 1
ABDOMINAL PAIN: 0
LEG PAIN: 0
PALPITATIONS: 0
DIARRHEA: 0
INCREASED ENERGY: 0
ORTHOPNEA: 0
VOMITING: 0

## 2022-01-18 ENCOUNTER — VIRTUAL VISIT (OUTPATIENT)
Dept: ENDOCRINOLOGY | Facility: CLINIC | Age: 32
End: 2022-01-18
Payer: MEDICARE

## 2022-01-18 DIAGNOSIS — E23.0 PANHYPOPITUITARISM (H): ICD-10-CM

## 2022-01-18 DIAGNOSIS — E03.9 ACQUIRED HYPOTHYROIDISM: Primary | ICD-10-CM

## 2022-01-18 DIAGNOSIS — E23.2 DIABETES INSIPIDUS (H): ICD-10-CM

## 2022-01-18 DIAGNOSIS — E34.9 TESTOSTERONE DEFICIENCY: ICD-10-CM

## 2022-01-18 DIAGNOSIS — E23.0 GROWTH HORMONE DEFICIENCY (H): ICD-10-CM

## 2022-01-18 DIAGNOSIS — E27.40 ADRENAL INSUFFICIENCY (H): ICD-10-CM

## 2022-01-18 PROCEDURE — 99214 OFFICE O/P EST MOD 30 MIN: CPT | Mod: 95 | Performed by: INTERNAL MEDICINE

## 2022-01-18 RX ORDER — PREDNISONE 1 MG/1
TABLET ORAL
Qty: 200 TABLET | Refills: 11 | Status: SHIPPED | OUTPATIENT
Start: 2022-01-18 | End: 2023-01-17

## 2022-01-18 NOTE — PATIENT INSTRUCTIONS
[facility]  Dr Alonso, Endocrinology Department    [place] [address] [id number]  [place] [address]  Appointment [phone]  Fax: [phone]  [place]: Monday - Thursday      Continue current regimen.  Labs needed (fasting morning midcycle sample)  Further dose adjustment based on labs.  Is stable- follow up in 1 year.    Take Levothyroxine on an empty stomach. Take it with a full glass of water at least 30 minutes to 1 hour before eating breakfast.   This medicine should be taken at least 4 hours before or 4 hours after these medicines: antacids (Maalox , Mylanta , Tums ), calcium supplements, cholestyramine (Prevalite , Questran ), colestipol (Colestid ), iron supplements, orlistat (Karthik , Xenical ), simethicone (Gas-X , Mylicon ), and sucralfate (Carafate ).   Swallow the capsule whole. Do not cut or crush it.

## 2022-01-18 NOTE — PROGRESS NOTES
Mamadou Reece  is being evaluated via a billable video visit.      How would you like to obtain your AVS? Flomio  For the video visit, send the invitation by: Text to cell phone: [phone]  Will anyone else be joining your video visit? Yes, parents on same device      Patient denies any changes since echeck-in regarding medication and allergies and states all information entered during echeck-in remains accurate.    Might need refills    Charla Marie, VF

## 2022-01-18 NOTE — PROGRESS NOTES
"THIS IS A VIDEO VISIT:    Phone call visit/virtual visit encounter:    Name of patient: Mamadou Reece    Date of encounter: 1/18/2022    Time of start of video visit: 9:01    Video started: 9:11    Video ended: 9:22    Provider location: working from [place]/ [facility]    Patient location: patients home.    Mode of transmission: video/ Doximity    Verbal consent: obtained before starting visit. Pt is agreeable.      The patient has been notified of following:      \"This VIDEO visit will be conducted via a call between you and your physician/provider. We have found that certain health care needs can be provided without the need for a physical exam.  This service lets us provide the care you need with a short phone conversation.  If a prescription is necessary we can send it directly to your pharmacy.  If lab work is needed we can place an order for that and you can then stop by our lab to have the test done at a later time.     With new updates with corona virus patient might be billed as clinic visit.     If during the course of the call the physician/provider feels a telephone visit is not appropriate, you will not be charged for this service.\"      Past medical history, social history, family history, allergy and medications were reviewed and updated as appropriate.  Reviewed pertinent labs, notes, imaging studies personally.    Name: Mamadou Reece  Seen for f/u of panhypopituitarism.   Chief Complaint   Patient presents with     Video Visit      HPI:  Mamadou Reece is a 31 year old male who presents for the evaluation of  Above.  Was seen in [facility] before. Records reviewed. Last visit with Endo was 9/2016  [facility]  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 He had stereotactic  radiation therapy at age 11.   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  Followed by Pediatrics oncology; getting MRIs every 2 yrs now. Due for MRI and has upcoming appointment.  Has not had recurrence since his radiation therapy    He is currently on full hormonal treatment with medications listed below.  He is also on growth hormone replacement.  I have discussed role of growth hormone replacement in adults is controversial  Patient's mother reports that she was told by her pediatric endocrinologist that he should never be getting off of growth hormone  They would like to continue it.    Feeling OK.  No major concerns today.    1. HYpogonadism:  On testosterone cypionate 200 mg IM q 2 weeks.  Goes to clinic.   Last testosterone checked 12/2021 was in higher range 1259  Hemoglobin levels appears stable.  Energy level is low on some days.  Shaving is normal. No change.  Muscle strength is OK.  Weight is stable.      2. Diabetes Insipidus:  On desmopressiin 10 mcg- 3 sprays in evening and 1 at bedtime. (Nighttime dose was added in 2018 as he was waking up frequently at night)  Not waking up at night.  No major concerns. No excessive thirst or frequent urination.  Drinks to thirst.    3. Growth hormone deficiency:  On Norditropin 0.7 mg/day.   Doing Ok.  Takes own injections.  Living in apartment.    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  Reports compliance.   No CP, palpitations, diarrhea,dysphagia, cold or warm intolerance or constipation.  + weight stable.  Has dry hands and feet: chronic condition.  Wt Readings from Last 2 Encounters:   09/09/21 92.4 kg (203 lb 12.8 oz)   09/21/20 89.4 kg (197 lb)       5. Secondary adrenal insufficiency:  On prednsione 3 mg AM and 2 mg PM. On this dose X many years.  No nausea. No vomiting.no dizziness.  BP stable. Electrolytes are in range.  Wt Readings from Last 2 Encounters:   09/09/21 92.4 kg (203 lb 12.8 oz)   09/21/20 89.4 kg (197 lb)       PMH/PSH:  1. Diabetes insipidus   2.  Panhypopituitarism   3. History of ADD.  4. Central hypothyroidism   5. Secondary adrenal insufficiency  6. Growth hormone deficiency   7. Hypogonadotropic hypogonadism     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, [facility] [place]     Legal blindness     Craniopharyngioma     Mood disorder (H)     Craniopharyngioma     Radiation age 11    Craniopharyngioma     Past Surgical History:   Procedure Laterality Date     CRANIOTOMY, EXCISE TUMOR COMPLEX, COMBINED  age 6    craniopharyngioma     MOHS MICROGRAPHIC PROCEDURE Left 05/31/2018    left cheek nodular BCC     Family Hx:  Family History   Problem Relation Age of Onset     Cancer Father         Melanoma     Cerebrovascular Disease Maternal Grandmother 91     Diabetes Maternal Grandmother      Cerebrovascular Disease Paternal Grandmother      Cerebrovascular Disease Paternal Grandfather      Social Hx:  Social History     Socioeconomic History     Marital status: Single     Spouse name: Not on file     Number of children: Not on file     Years of education: Not on file     Highest education level: Not on file   Occupational History     Not on file   Tobacco Use     Smoking status: Never Smoker     Smokeless tobacco: Never Used   Substance and Sexual Activity     Alcohol use: Yes     Alcohol/week: 0.0 standard drinks     Comment: sip at holidays     Drug use: No     Sexual activity: Never   Other Topics Concern     Parent/sibling w/ CABG, MI or angioplasty before 65F 55M? Not Asked   Social History Narrative     Not on file     Social Determinants of Health     Financial Resource Strain: Not on file   Food Insecurity: Not on file   Transportation Needs: Not on file   Physical Activity: Not on file   Stress: Not on file   Social Connections: Not on file   Intimate Partner Violence: Not on file   Housing Stability: Not on file          MEDICATIONS:  has a current medication list which includes the following prescription(s):  "amphetamine-dextroamphetamine, calcium + d, vitamin d, b-12, desmopressin, pen needles 5/16\", levothyroxine, meclizine, norditropin flexpro, prednisone, testosterone cypionate, and somatropin, and the following Facility-Administered Medications: testosterone cypionate.    ROS     ROS: 10 point ROS neg other than the symptoms noted above in the HPI.    Physical Exam   VS: There were no vitals taken for this visit.    LABS:  Component      Latest Ref Rng & Units 12/12/2019   Testosterone Total      240 - 950 ng/dL 712   Sex Hormone Binding Globulin      11 - 80 nmol/L 23   Free Testosterone Calculated      4.7 - 24.4 ng/dL 19.09   Ins Growth Factor 1      87 - 255 ng/ml 254   Hemoglobin      13.3 - 17.7 g/dL 16.2   T4 Free      0.76 - 1.46 ng/dL 1.45   Prolactin      2 - 18 ug/L <1 (L)       ENDO THYROID LABS-[facility] Latest Ref Rng & Units 12/12/2019   T4 FREE 0.76 - 1.46 ng/dL 1.45     ENDO THYROID LABS-[facility] Latest Ref Rng & Units 1/3/2019 6/20/2018   T4 FREE 0.76 - 1.46 ng/dL 1.17 1.38     ENDO THYROID LABS-[facility] Latest Ref Rng & Units 10/5/2017   T4 FREE 0.76 - 1.46 ng/dL 1.18     ENDO PITUITARY LABS-[facility] Latest Ref Rng & Units 10/5/2017   PROLACTIN 2 - 18 ug/L 1 (L)   TESTOSTERONE TOTAL 240 - 950 ng/dL 647   PSA 0 - 4 ug/L 0.17    - 144 mmol/L 141   POTASSIUM 3.4 - 5.3 mmol/L 3.8   INS GROWTH FACTOR 1 94 - 271 ng/ml 298 (H)     ENDO PITUITARY LABS-[facility] Latest Ref Rng & Units 10/4/2018   TESTOSTERONE TOTAL 240 - 950 ng/dL 859     ENDO PITUITARY LABS-[facility] Latest Ref Rng & Units 1/3/2019    - 144 mmol/L 138   POTASSIUM 3.4 - 5.3 mmol/L    INS GROWTH FACTOR 1 90 - 262 ng/ml 320 (H)     All pertinent notes, labs, and images personally reviewed by me.     A/P  Mr.Jared Reece is a 89year old here for the evaluation of above:    1. Hypogonadism:  On testosterone cypionate 200 mg IM q 2 weeks.  Goes to clinic for njections.  Clinically looks okay.  No major concerns  Last testosterone checked in 1200s in " 12/2021.  Plan:  Discussed diagnosis, pathophysiology, management and treatment options of condition with pt.  Plan to continue same dose for now.  Recheck labs and dose adjustment based on that.  His testosterone is in upper normal range then can decrease the dose based on labs in future.    2. Diabetes Insipidus:  On desmopressiin (0.01 % solution)10 mcg- 3 sprays in evening and 1 spray at bedtime.  No major concerns. No excessive thirst or frequent urination.    Electrolytes are in acceptable range.  -- continue current dose of DDAVP  Repeat labs.(NA levels)      3. Growth hormone deficiency:  On Norditropin 0.7 mg/day. ( 10 mg/1.5 ml solution)  Discussed GH replacement in adults.  They would like to continue it. Agree with it.  Labs ordered.    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  Reports compliance. But taking it at night.  Clinically looks euthyroid.   Repeat labs.  Dose change based on that.    5. Secondary adrenal insufficiency:  On prednsione 3 mg AM and 2 mg PM. On this dose X many years.  No nausea. No vomiting.  -- continue current dose  Discussed sick days.   Repeat labs ([facility]).    More than 50% of face to face time spent with Mr. Reece on counseling / coordinating his care.      Follow-up:  1 year/based on labs.      Sumaya Alonso MD  Endocrinology   [facility]    CC: Mariluz Mae     Disclaimer: This note consists of symbols derived from keyboarding, dictation and/or voice recognition software. As a result, there may be errors in the script that have gone undetected. Please consider this when interpreting information found in this chart.    Addendum to above note and clinic visit:    Labs reviewed.    See result note/telephone encounter.

## 2022-01-18 NOTE — LETTER
"    1/18/2022         RE: Mamadou Reece  [address]  [address]  [place] [place] [address]        Dear Colleague,    Thank you for referring your patient, Mamadou Reece, to the [facility]. Please see a copy of my visit note below.    Mamadou Reece  is being evaluated via a billable video visit.      How would you like to obtain your AVS? [employer]  For the video visit, send the invitation by: Text to cell phone: [phone]  Will anyone else be joining your video visit? Yes, parents on same device      Patient denies any changes since echeck-in regarding medication and allergies and states all information entered during echeck-in remains accurate.    Might need refills    BHARAT Singh        THIS IS A VIDEO VISIT:    Phone call visit/virtual visit encounter:    Name of patient: Mamadou Reece    Date of encounter: 1/18/2022    Time of start of video visit: 9:01    Video started: 9:11    Video ended: 9:22    Provider location: working from home/ [facility]    Patient location: patients home.    Mode of transmission: video/ [employer]    Verbal consent: obtained before starting visit. Pt is agreeable.      The patient has been notified of following:      \"This VIDEO visit will be conducted via a call between you and your physician/provider. We have found that certain health care needs can be provided without the need for a physical exam.  This service lets us provide the care you need with a short phone conversation.  If a prescription is necessary we can send it directly to your pharmacy.  If lab work is needed we can place an order for that and you can then stop by our lab to have the test done at a later time.     With new updates with corona virus patient might be billed as clinic visit.     If during the course of the call the physician/provider feels a telephone visit is not appropriate, you will not be charged for this service.\"      Past medical history, social history, family " history, allergy and medications were reviewed and updated as appropriate.  Reviewed pertinent labs, notes, imaging studies personally.    Name: Mamadou Reece  Seen for f/u of panhypopituitarism.   Chief Complaint   Patient presents with     Video Visit      HPI:  Mamadou Reece is a 31 year old male who presents for the evaluation of  Above.  Was seen in [facility] before. Records reviewed. Last visit with Endo was 9/2016  [facility]  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 He had stereotactic  radiation therapy at age 11.   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  Followed by Pediatrics oncology; getting MRIs every 2 yrs now. Due for MRI and has upcoming appointment.  Has not had recurrence since his radiation therapy    He is currently on full hormonal treatment with medications listed below.  He is also on growth hormone replacement.  I have discussed role of growth hormone replacement in adults is controversial  Patient's mother reports that she was told by her pediatric endocrinologist that he should never be getting off of growth hormone  They would like to continue it.    Feeling OK.  No major concerns today.    1. HYpogonadism:  On testosterone cypionate 200 mg IM q 2 weeks.  Goes to clinic.   Last testosterone checked 12/2021 was in higher range 1259  Hemoglobin levels appears stable.  Energy level is low on some days.  Shaving is normal. No change.  Muscle strength is OK.  Weight is stable.      2. Diabetes Insipidus:  On desmopressiin 10 mcg- 3 sprays in evening and 1 at bedtime. (Nighttime dose was added in 2018 as he was waking up frequently at night)  Not waking up at night.  No major concerns. No excessive thirst or frequent urination.  Drinks to thirst.    3. Growth hormone deficiency:  On Norditropin  0.7 mg/day.   Doing Ok.  Takes own injections.  Living in apartment.    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  Reports compliance.   No CP, palpitations, diarrhea,dysphagia, cold or warm intolerance or constipation.  + weight stable.  Has dry hands and feet: chronic condition.  Wt Readings from Last 2 Encounters:   09/09/21 92.4 kg (203 lb 12.8 oz)   09/21/20 89.4 kg (197 lb)       5. Secondary adrenal insufficiency:  On prednsione 3 mg AM and 2 mg PM. On this dose X many years.  No nausea. No vomiting.no dizziness.  BP stable. Electrolytes are in range.  Wt Readings from Last 2 Encounters:   09/09/21 92.4 kg (203 lb 12.8 oz)   09/21/20 89.4 kg (197 lb)       PMH/PSH:  1. Diabetes insipidus   2. Panhypopituitarism   3. History of ADD.  4. Central hypothyroidism   5. Secondary adrenal insufficiency  6. Growth hormone deficiency   7. Hypogonadotropic hypogonadism     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, [facility]     Legal blindness     Craniopharyngioma     Mood disorder (H)     Craniopharyngioma     Radiation age 11    Craniopharyngioma     Past Surgical History:   Procedure Laterality Date     CRANIOTOMY, EXCISE TUMOR COMPLEX, COMBINED  age 6    craniopharyngioma     MOHS MICROGRAPHIC PROCEDURE Left 05/31/2018    left cheek nodular BCC     Family Hx:  Family History   Problem Relation Age of Onset     Cancer Father         Melanoma     Cerebrovascular Disease Maternal Grandmother 91     Diabetes Maternal Grandmother      Cerebrovascular Disease Paternal Grandmother      Cerebrovascular Disease Paternal Grandfather      Social Hx:  Social History     Socioeconomic History     Marital status: Single     Spouse name: Not on file     Number of children: Not on file     Years of education: Not on file     Highest education level: Not on file   Occupational History     Not on file   Tobacco Use     Smoking status: Never Smoker     Smokeless tobacco:  "Never Used   Substance and Sexual Activity     Alcohol use: Yes     Alcohol/week: 0.0 standard drinks     Comment: sip at holidays     Drug use: No     Sexual activity: Never   Other Topics Concern     Parent/sibling w/ CABG, MI or angioplasty before 65F 55M? Not Asked   Social History Narrative     Not on file     Social Determinants of Health     Financial Resource Strain: Not on file   Food Insecurity: Not on file   Transportation Needs: Not on file   Physical Activity: Not on file   Stress: Not on file   Social Connections: Not on file   Intimate Partner Violence: Not on file   Housing Stability: Not on file          MEDICATIONS:  has a current medication list which includes the following prescription(s): amphetamine-dextroamphetamine, calcium + d, vitamin d, b-12, desmopressin, pen needles 5/16\", levothyroxine, meclizine, norditropin flexpro, prednisone, testosterone cypionate, and somatropin, and the following Facility-Administered Medications: testosterone cypionate.    ROS     ROS: 10 point ROS neg other than the symptoms noted above in the HPI.    Physical Exam   VS: There were no vitals taken for this visit.    LABS:  Component      Latest Ref Rng & Units 12/12/2019   Testosterone Total      240 - 950 ng/dL 712   Sex Hormone Binding Globulin      11 - 80 nmol/L 23   Free Testosterone Calculated      4.7 - 24.4 ng/dL 19.09   Ins Growth Factor 1      87 - 255 ng/ml 254   Hemoglobin      13.3 - 17.7 g/dL 16.2   T4 Free      0.76 - 1.46 ng/dL 1.45   Prolactin      2 - 18 ug/L <1 (L)       ENDO THYROID LABS-[facility] Latest Ref Rng & Units 12/12/2019   T4 FREE 0.76 - 1.46 ng/dL 1.45     ENDO THYROID LABS-[facility] Latest Ref Rng & Units 1/3/2019 6/20/2018   T4 FREE 0.76 - 1.46 ng/dL 1.17 1.38     ENDO THYROID LABS-[facility] Latest Ref Rng & Units 10/5/2017   T4 FREE 0.76 - 1.46 ng/dL 1.18     ENDO PITUITARY LABS-[facility] Latest Ref Rng & Units 10/5/2017   PROLACTIN 2 - 18 ug/L 1 (L)   TESTOSTERONE TOTAL 240 - 950 ng/dL 647   PSA 0 - " 4 ug/L 0.17    - 144 mmol/L 141   POTASSIUM 3.4 - 5.3 mmol/L 3.8   INS GROWTH FACTOR 1 94 - 271 ng/ml 298 (H)     ENDO PITUITARY LABS-[facility] Latest Ref Rng & Units 10/4/2018   TESTOSTERONE TOTAL 240 - 950 ng/dL 859     ENDO PITUITARY LABS-[facility] Latest Ref Rng & Units 1/3/2019    - 144 mmol/L 138   POTASSIUM 3.4 - 5.3 mmol/L    INS GROWTH FACTOR 1 90 - 262 ng/ml 320 (H)     All pertinent notes, labs, and images personally reviewed by me.     A/P  Mr.Jared Reece is a 89year old here for the evaluation of above:    1. Hypogonadism:  On testosterone cypionate 200 mg IM q 2 weeks.  Goes to clinic for njections.  Clinically looks okay.  No major concerns  Last testosterone checked in 1200s in 12/2021.  Plan:  Discussed diagnosis, pathophysiology, management and treatment options of condition with pt.  Plan to continue same dose for now.  Recheck labs and dose adjustment based on that.  His testosterone is in upper normal range then can decrease the dose based on labs in future.    2. Diabetes Insipidus:  On desmopressiin (0.01 % solution)10 mcg- 3 sprays in evening and 1 spray at bedtime.  No major concerns. No excessive thirst or frequent urination.    Electrolytes are in acceptable range.  -- continue current dose of DDAVP  Repeat labs.(NA levels)      3. Growth hormone deficiency:  On Norditropin 0.7 mg/day. ( 10 mg/1.5 ml solution)  Discussed GH replacement in adults.  They would like to continue it. Agree with it.  Labs ordered.    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  Reports compliance. But taking it at night.  Clinically looks euthyroid.   Repeat labs.  Dose change based on that.    5. Secondary adrenal insufficiency:  On prednsione 3 mg AM and 2 mg PM. On this dose X many years.  No nausea. No vomiting.  -- continue current dose  Discussed sick days.   Repeat labs ([facility]).    More than 50% of face to face time spent with Mr. Reece on counseling / coordinating his care.       Follow-up:  1 year/based on labs.      Sumaya Alonso MD  Endocrinology   [facility]/[place]    CC: Mariluz Mae     Disclaimer: This note consists of symbols derived from keyboarding, dictation and/or voice recognition software. As a result, there may be errors in the script that have gone undetected. Please consider this when interpreting information found in this chart.    Addendum to above note and clinic visit:    Labs reviewed.    See result note/telephone encounter.              Again, thank you for allowing me to participate in the care of your patient.        Sincerely,        Sumaya Alonso MD

## 2022-01-20 DIAGNOSIS — F98.8 ATTENTION DEFICIT DISORDER, UNSPECIFIED HYPERACTIVITY PRESENCE: ICD-10-CM

## 2022-01-20 RX ORDER — DEXTROAMPHETAMINE SACCHARATE, AMPHETAMINE ASPARTATE MONOHYDRATE, DEXTROAMPHETAMINE SULFATE AND AMPHETAMINE SULFATE 7.5; 7.5; 7.5; 7.5 MG/1; MG/1; MG/1; MG/1
CAPSULE, EXTENDED RELEASE ORAL
Qty: 30 CAPSULE | Refills: 0 | Status: SHIPPED | OUTPATIENT
Start: 2022-01-20 | End: 2022-02-28

## 2022-01-20 NOTE — TELEPHONE ENCOUNTER
Routing refill request to provider for review/approval because:  Drug not on the [facility] refill protocol     Darling Sheppard RN   [facility]  -- Triage Nurse

## 2022-01-27 ENCOUNTER — ALLIED HEALTH/NURSE VISIT (OUTPATIENT)
Dept: FAMILY MEDICINE | Facility: CLINIC | Age: 32
End: 2022-01-27
Payer: MEDICARE

## 2022-01-27 DIAGNOSIS — E34.9 TESTOSTERONE DEFICIENCY: Primary | ICD-10-CM

## 2022-01-27 PROCEDURE — 99207 PR NO CHARGE NURSE ONLY: CPT

## 2022-01-27 PROCEDURE — 96372 THER/PROPH/DIAG INJ SC/IM: CPT | Performed by: INTERNAL MEDICINE

## 2022-01-27 RX ADMIN — TESTOSTERONE CYPIONATE 200 MG: 200 INJECTION, SOLUTION INTRAMUSCULAR at 16:40

## 2022-01-27 NOTE — PROGRESS NOTES
"Clinic Administered Medication Documentation    Administrations This Visit     testosterone cypionate (DEPOTESTOSTERONE) injection 200 mg     Admin Date  01/27/2022 Action  Given Dose  200 mg Route  Intramuscular Site  Right Ventrogluteal Administered By  Swapna Mckeon, RN    Ordering Provider: Sumaya Alonso MD    Patient Supplied?: No                  Testosterone Documentation     Prior to injection, verified patient identity using patient's name and date of birth. Medication was administered. Please see MAR and medication order for additional information. Patient instructed to remain in clinic for 15 minutes, report any adverse reaction to staff immediately  and stay in clinic after the injection but patient declined.    Reminders     - Check vial for refills remaining and initiate refill request if no refills remain.      - Verify with patient that medication was paid for at pharmacy. If it was, check the \"patient supplied\" box on the MAR.     Was entire vial of medication used? Yes  Vial/Syringe: Single dose vial  Expiration Date:  08/2023  Was this medication supplied by the patient? Yes, Medication was received directly from patient in a tamper proof bag (follow site specific policies)     KAMILLE Swartz, RN  [facility]        "

## 2022-02-03 ENCOUNTER — LAB (OUTPATIENT)
Dept: LAB | Facility: CLINIC | Age: 32
End: 2022-02-03
Payer: MEDICARE

## 2022-02-03 DIAGNOSIS — E23.0 PANHYPOPITUITARISM (H): ICD-10-CM

## 2022-02-03 DIAGNOSIS — E34.9 TESTOSTERONE DEFICIENCY: ICD-10-CM

## 2022-02-03 DIAGNOSIS — E27.40 ADRENAL INSUFFICIENCY (H): ICD-10-CM

## 2022-02-03 DIAGNOSIS — E23.0 GROWTH HORMONE DEFICIENCY (H): ICD-10-CM

## 2022-02-03 DIAGNOSIS — E03.9 ACQUIRED HYPOTHYROIDISM: ICD-10-CM

## 2022-02-03 DIAGNOSIS — E23.2 DIABETES INSIPIDUS (H): ICD-10-CM

## 2022-02-03 LAB
ANION GAP SERPL CALCULATED.3IONS-SCNC: 2 MMOL/L (ref 3–14)
BUN SERPL-MCNC: 15 MG/DL (ref 7–30)
CALCIUM SERPL-MCNC: 9.1 MG/DL (ref 8.5–10.1)
CHLORIDE BLD-SCNC: 105 MMOL/L (ref 94–109)
CO2 SERPL-SCNC: 30 MMOL/L (ref 20–32)
CREAT SERPL-MCNC: 1.41 MG/DL (ref 0.66–1.25)
GFR SERPL CREATININE-BSD FRML MDRD: 69 ML/MIN/1.73M2
GLUCOSE BLD-MCNC: 86 MG/DL (ref 70–99)
HGB BLD-MCNC: 15.2 G/DL (ref 13.3–17.7)
POTASSIUM BLD-SCNC: 3.8 MMOL/L (ref 3.4–5.3)
PROLACTIN SERPL-MCNC: 2 UG/L (ref 2–18)
SHBG SERPL-SCNC: 21 NMOL/L (ref 11–80)
SODIUM SERPL-SCNC: 137 MMOL/L (ref 133–144)
T4 FREE SERPL-MCNC: 1.05 NG/DL (ref 0.76–1.46)
TSH SERPL DL<=0.005 MIU/L-ACNC: 0.1 MU/L (ref 0.4–4)

## 2022-02-03 PROCEDURE — 85018 HEMOGLOBIN: CPT

## 2022-02-03 PROCEDURE — 84305 ASSAY OF SOMATOMEDIN: CPT

## 2022-02-03 PROCEDURE — 84443 ASSAY THYROID STIM HORMONE: CPT

## 2022-02-03 PROCEDURE — 84270 ASSAY OF SEX HORMONE GLOBUL: CPT

## 2022-02-03 PROCEDURE — 36415 COLL VENOUS BLD VENIPUNCTURE: CPT

## 2022-02-03 PROCEDURE — 84146 ASSAY OF PROLACTIN: CPT

## 2022-02-03 PROCEDURE — 80048 BASIC METABOLIC PNL TOTAL CA: CPT

## 2022-02-03 PROCEDURE — 84403 ASSAY OF TOTAL TESTOSTERONE: CPT

## 2022-02-03 PROCEDURE — 84439 ASSAY OF FREE THYROXINE: CPT

## 2022-02-04 LAB
IGF-I BLD-MCNC: 162 NG/ML (ref 83–246)
TESTOST FREE SERPL-MCNC: 46.73 NG/DL
TESTOST SERPL-MCNC: 1469 NG/DL (ref 240–950)

## 2022-02-08 ENCOUNTER — TELEPHONE (OUTPATIENT)
Dept: ENDOCRINOLOGY | Facility: CLINIC | Age: 32
End: 2022-02-08
Payer: MEDICARE

## 2022-02-08 ENCOUNTER — MYC MEDICAL ADVICE (OUTPATIENT)
Dept: ENDOCRINOLOGY | Facility: CLINIC | Age: 32
End: 2022-02-08
Payer: MEDICARE

## 2022-02-08 DIAGNOSIS — E34.9 TESTOSTERONE DEFICIENCY: ICD-10-CM

## 2022-02-08 DIAGNOSIS — E23.0 PANHYPOPITUITARISM (H): ICD-10-CM

## 2022-02-08 DIAGNOSIS — E03.9 ACQUIRED HYPOTHYROIDISM: Primary | ICD-10-CM

## 2022-02-08 RX ORDER — TESTOSTERONE CYPIONATE 200 MG/ML
150 INJECTION, SOLUTION INTRAMUSCULAR
Qty: 10 ML | Refills: 5 | Status: SHIPPED | OUTPATIENT
Start: 2022-02-08 | End: 2022-05-04

## 2022-02-08 NOTE — TELEPHONE ENCOUNTER
Component      Latest Ref Rng & Units 2/3/2022   Sodium      133 - 144 mmol/L 137   Potassium      3.4 - 5.3 mmol/L 3.8   Chloride      94 - 109 mmol/L 105   Carbon Dioxide      20 - 32 mmol/L 30   Anion Gap      3 - 14 mmol/L 2 (L)   Urea Nitrogen      7 - 30 mg/dL 15   Creatinine      0.66 - 1.25 mg/dL 1.41 (H)   Calcium      8.5 - 10.1 mg/dL 9.1   Glucose      70 - 99 mg/dL 86   GFR Estimate      >60 mL/min/1.73m2 69   Free Testosterone Calculated      ng/dL 46.73   Testosterone Total      240 - 950 ng/dL 1,469 (H)   Prolactin      2 - 18 ug/L 2   T4 Free      0.76 - 1.46 ng/dL 1.05   TSH      0.40 - 4.00 mU/L 0.10 (L)   Ins Growth Factor 1      83 - 246 ng/mL 162   Hemoglobin      13.3 - 17.7 g/dL 15.2   Sex Hormone Binding Globulin      11 - 80 nmol/L 21     History of panhypopituitarism.  Thyroid levels in acceptable range-continue current dose of levothyroxine-  Hemoglobin, growth hormone, prolactin and electrolytes are also in acceptable range  Noted high testosterone levels-currently is taking testosterone 200 mg every 2 weeks.  Labs should have been done fasting, midcycle (about 8-10 days after testosterone injection)--> can you please check with patient or his parents when was testosterone injection in relation to the lab test which was done 2/3/2022?  Testosterone levels are high and based on that may need to consider decreasing dose of testosterone replacement.    Noted to have slightly elevated creatinine as compared to baseline.  Recommend adequate hydration and repeat labs in 4-5 weeks to follow trend.  If it persists then recommend to follow-up with primary care provider.

## 2022-02-08 NOTE — TELEPHONE ENCOUNTER
Mamadou s blood draw was mid-cycle between testosterone shots. Testosterone on 1/27/22 and blood draw on 2/3/22.  He did fast before blood draw. Per parent

## 2022-02-08 NOTE — TELEPHONE ENCOUNTER
ENDO PITUITARY LABS-[facility] Latest Ref Rng & Units 2/3/2022   TESTOSTERONE TOTAL 240 - 950 ng/dL 1,469 (H)     Testosterone levels are high. It was midcycle sample.  Based on that recommend to decrease dose of testosterone.  Take 150 mg every 14 days ( from 200 mg)  Labs in 3-4 months- it should be fasting, morning, midcycle sample ( about 8 after last injection if you are taking injection every 14 days)  Please make a lab appointment for blood work and follow up clinic appointment in 1 week after that to discuss results.    Please inform patient and help schedule virtual (video preferred)/ clinic visit.

## 2022-02-10 ENCOUNTER — TELEPHONE (OUTPATIENT)
Dept: ENDOCRINOLOGY | Facility: CLINIC | Age: 32
End: 2022-02-10
Payer: MEDICARE

## 2022-02-10 ENCOUNTER — ALLIED HEALTH/NURSE VISIT (OUTPATIENT)
Dept: FAMILY MEDICINE | Facility: CLINIC | Age: 32
End: 2022-02-10
Payer: MEDICARE

## 2022-02-10 DIAGNOSIS — E34.9 TESTOSTERONE DEFICIENCY: Primary | ICD-10-CM

## 2022-02-10 DIAGNOSIS — E25.0 TESTOSTERONE 17-BETA-DEHYDROGENASE DEFICIENCY (H): ICD-10-CM

## 2022-02-10 PROCEDURE — 96372 THER/PROPH/DIAG INJ SC/IM: CPT | Performed by: INTERNAL MEDICINE

## 2022-02-10 PROCEDURE — 99207 PR NO CHARGE NURSE ONLY: CPT

## 2022-02-10 RX ADMIN — TESTOSTERONE CYPIONATE 200 MG: 200 INJECTION, SOLUTION INTRAMUSCULAR at 16:57

## 2022-02-10 NOTE — TELEPHONE ENCOUNTER
Per  pt to decrease to Take 150 mg every 14 days ok to give at new dosage. (new order was placed on 2/8/22 as well)    (Please see encounter on 2/8/22)  PT/Parents already informed.       Follow up labs needed in 3-4 months- it should be fasting, morning, midcycle sample ( about 8 after last injection if you are taking injection every 14 days)

## 2022-02-10 NOTE — NURSING NOTE
"Clinic Administered Medication Documentation    Administrations This Visit     testosterone cypionate (DEPOTESTOSTERONE) injection 200 mg     Admin Date  02/10/2022 Action  Given Dose  200 mg Route  Intramuscular Site  Left Ventrogluteal Administered By  Mariluz Salmon RN    Ordering Provider: Sumaya Alonso MD    Patient Supplied?: Yes                  Testosterone Documentation     Prior to injection, verified patient identity using patient's name and date of birth. Medication was administered. Please see MAR and medication order for additional information. Patient instructed to remain in clinic for 15 minutes.    Reminders     - Check vial for refills remaining and initiate refill request if no refills remain.      - Verify with patient that medication was paid for at pharmacy. If it was, check the \"patient supplied\" box on the MAR.     Was entire vial of medication used? No, The remainder 50 MG of 200MG was discarded as unavoidable waste.  Vial/Syringe: Single dose vial  Expiration Date:  07/31/2023  RT: left VG  Was this medication supplied by the patient? Yes, Medication was received directly from patient in a tamper proof bag (follow site specific policies)     Mariluz Salmon RN, BSN  [facility]        "

## 2022-02-10 NOTE — TELEPHONE ENCOUNTER
Pt coming today for nurse only testosterone, see recent labs and follow up note to recheck labs, discussed with AV providers, recommend confirm with endo that appropriate to administer today, no follow up lab appointment scheduled    ROUTED HIGH PRIORITY TO ENDO AND ENDO STAFF, PLEASE ADVISE AND INFORM PT/PARENTS OF PLAN, can you huddle with another endo if Dr Alvarado not available    Component      Latest Ref Rng & Units 12/9/2021 2/3/2022   Free Testosterone Calculated      ng/dL 38.30 46.73   Testosterone Total      240 - 950 ng/dL 1,259 (H) 1,469 (H)   Sex Hormone Binding Globulin      11 - 80 nmol/L 22 21     Appointments in Next Year    Feb 10, 2022  4:45 PM  Nurse Visit with GUERITA CORADO  [facility] ([facility] ) [phone]          Mariluz Salmon RN, BSN  [facility]

## 2022-02-10 NOTE — TELEPHONE ENCOUNTER
See RN note, staff reviewed 2/8/22 phone encounter prior to sending endo message, will administer per RN note (providers here felt best to hold x 1 dose due to extremely high level and testosterol level increased after testosterone lab 2 months ago), FREDRICK to Joie Salmon, RN, BSN  [facility]

## 2022-02-24 ENCOUNTER — ALLIED HEALTH/NURSE VISIT (OUTPATIENT)
Dept: FAMILY MEDICINE | Facility: CLINIC | Age: 32
End: 2022-02-24
Payer: COMMERCIAL

## 2022-02-24 DIAGNOSIS — E34.9 TESTOSTERONE DEFICIENCY: Primary | ICD-10-CM

## 2022-02-24 PROCEDURE — 99207 PR NO CHARGE NURSE ONLY: CPT

## 2022-02-24 PROCEDURE — 96372 THER/PROPH/DIAG INJ SC/IM: CPT | Performed by: INTERNAL MEDICINE

## 2022-02-24 RX ADMIN — TESTOSTERONE CYPIONATE 200 MG: 200 INJECTION, SOLUTION INTRAMUSCULAR at 16:58

## 2022-02-24 NOTE — PROGRESS NOTES
"Clinic Administered Medication Documentation    Administrations This Visit     testosterone cypionate (DEPOTESTOSTERONE) injection 200 mg     Admin Date  02/24/2022 Action  Given Dose  200 mg Route  Intramuscular Site  Right Ventrogluteal Administered By  Joan Gibson RN    Ordering Provider: Sumaya Alonso MD    Patient Supplied?: Yes                  Testosterone Documentation     Prior to injection, verified patient identity using patient's name and date of birth. Medication was administered. Please see MAR and medication order for additional information. Patient instructed to remain in clinic for 15 minutes, report any adverse reaction to staff immediately  and stay in clinic after the injection but patient declined.    Reminders     - Check vial for refills remaining and initiate refill request if no refills remain.      - Verify with patient that medication was paid for at pharmacy. If it was, check the \"patient supplied\" box on the MAR.     Was entire vial of medication used? No, The remainder 50MG of 200MG was discarded as unavoidable waste.  Vial/Syringe: Single dose vial  Expiration Date:  08/30/2022  Was this medication supplied by the patient? Yes, Medication was received directly from patient in a tamper proof bag (follow site specific policies)    Joan Gibson RN      "

## 2022-02-28 DIAGNOSIS — F98.8 ATTENTION DEFICIT DISORDER, UNSPECIFIED HYPERACTIVITY PRESENCE: ICD-10-CM

## 2022-02-28 RX ORDER — DEXTROAMPHETAMINE SACCHARATE, AMPHETAMINE ASPARTATE MONOHYDRATE, DEXTROAMPHETAMINE SULFATE AND AMPHETAMINE SULFATE 7.5; 7.5; 7.5; 7.5 MG/1; MG/1; MG/1; MG/1
CAPSULE, EXTENDED RELEASE ORAL
Qty: 30 CAPSULE | Refills: 0 | Status: SHIPPED | OUTPATIENT
Start: 2022-02-28 | End: 2022-04-01

## 2022-03-10 ENCOUNTER — TELEPHONE (OUTPATIENT)
Dept: ENDOCRINOLOGY | Facility: CLINIC | Age: 32
End: 2022-03-10

## 2022-03-10 ENCOUNTER — ALLIED HEALTH/NURSE VISIT (OUTPATIENT)
Dept: FAMILY MEDICINE | Facility: CLINIC | Age: 32
End: 2022-03-10
Payer: COMMERCIAL

## 2022-03-10 DIAGNOSIS — E34.9 TESTOSTERONE DEFICIENCY: Primary | ICD-10-CM

## 2022-03-10 PROCEDURE — 99207 PR NO CHARGE NURSE ONLY: CPT

## 2022-03-10 PROCEDURE — 96372 THER/PROPH/DIAG INJ SC/IM: CPT | Performed by: INTERNAL MEDICINE

## 2022-03-10 RX ADMIN — TESTOSTERONE CYPIONATE 150 MG: 200 INJECTION, SOLUTION INTRAMUSCULAR at 16:56

## 2022-03-10 NOTE — PROGRESS NOTES
"Joie Nieto RN         2/10/22 2:44 PM  Note     Per  pt to decrease to Take 150 mg every 14 days ok to give at new dosage. (new order was placed on 2/8/22 as well)     (Please see encounter on 2/8/22)  PT/Parents already informed.         Follow up labs needed in 3-4 months- it should be fasting, morning, midcycle sample ( about 8 after last injection if you are taking injection every 14 days)        Clinic Administered Medication Documentation    Administrations This Visit     testosterone cypionate (DEPOTESTOSTERONE) injection 200 mg     Admin Date  03/10/2022 Action  Given Dose  150 mg Route  Intramuscular Site  Left Ventrogluteal Administered By  Brittaney Davis RN    Ordering Provider: Sumaya Alonso MD    Patient Supplied?: No                  Testosterone Documentation     Prior to injection, verified patient identity using patient's name and date of birth. Medication was administered. Please see MAR and medication order for additional information. Patient instructed to remain in clinic for 15 minutes and report any adverse reaction to staff immediately .    Reminders     - Check vial for refills remaining and initiate refill request if no refills remain.      - Verify with patient that medication was paid for at pharmacy. If it was, check the \"patient supplied\" box on the MAR.     Was entire vial of medication used? No, The remainder 50MG of 200MG was discarded as unavoidable waste.  Vial/Syringe: Single dose vial  Expiration Date:  8/23  Was this medication supplied by the patient? Yes, Medication was received directly from patient in a tamper proof bag (follow site specific policies)     Brittaney Davis RN        "

## 2022-03-10 NOTE — TELEPHONE ENCOUNTER
RX sent to pharmacy but new CAM order not entered for below dose change.  T'd up.  Please sign.  MICKIE Machado Shelby V, RN SA    2/10/22 2:44 PM  Note     Per  pt to decrease to Take 150 mg every 14 days ok to give at new dosage. (new order was placed on 2/8/22 as well)     (Please see encounter on 2/8/22)  PT/Parents already informed.         Follow up labs needed in 3-4 months- it should be fasting, morning, midcycle sample ( about 8 after last injection if you are taking injection every 14 days)

## 2022-03-14 RX ORDER — TESTOSTERONE CYPIONATE 200 MG/ML
150 INJECTION, SOLUTION INTRAMUSCULAR
Status: CANCELLED | OUTPATIENT
Start: 2022-03-14 | End: 2022-06-06

## 2022-03-15 RX ORDER — TESTOSTERONE CYPIONATE 200 MG/ML
150 INJECTION, SOLUTION INTRAMUSCULAR
Status: COMPLETED | OUTPATIENT
Start: 2022-03-15 | End: 2022-06-02

## 2022-03-15 NOTE — TELEPHONE ENCOUNTER
Please sign the pended order, as this is so the nurses can document the injections, the other order is the pharmacy order. Thanks

## 2022-03-24 ENCOUNTER — ALLIED HEALTH/NURSE VISIT (OUTPATIENT)
Dept: FAMILY MEDICINE | Facility: CLINIC | Age: 32
End: 2022-03-24
Payer: COMMERCIAL

## 2022-03-24 DIAGNOSIS — E25.0 TESTOSTERONE 17-BETA-DEHYDROGENASE DEFICIENCY (H): ICD-10-CM

## 2022-03-24 DIAGNOSIS — E34.9 TESTOSTERONE DEFICIENCY: Primary | ICD-10-CM

## 2022-03-24 PROCEDURE — 96372 THER/PROPH/DIAG INJ SC/IM: CPT | Performed by: INTERNAL MEDICINE

## 2022-03-24 PROCEDURE — 99207 PR NO CHARGE NURSE ONLY: CPT

## 2022-03-24 RX ADMIN — TESTOSTERONE CYPIONATE 150 MG: 200 INJECTION, SOLUTION INTRAMUSCULAR at 16:48

## 2022-03-24 NOTE — PROGRESS NOTES
"Clinic Administered Medication Documentation        Testosterone Documentation     Prior to injection, verified patient identity using patient's name and date of birth. Medication was administered. Please see MAR and medication order for additional information. Patient instructed to remain in clinic for 15 minutes, report any adverse reaction to staff immediately  and stay in clinic after the injection but patient declined.    Reminders     - Check vial for refills remaining and initiate refill request if no refills remain.      - Verify with patient that medication was paid for at pharmacy. If it was, check the \"patient supplied\" box on the MAR.     Was entire vial of medication used? No, The remainder 50MG of 200MG was discarded as unavoidable waste.  Vial/Syringe: Single dose vial   RT: RVG  Expiration Date:  09/30/2022  Was this medication supplied by the patient? Yes, Medication was received directly from patient in a tamper proof bag (follow site specific policies)      Name of provider who requested the medication administration: Rayne Alonso MD  Name of provider on site (faculty or community preceptor) at the time of performing the medication administration: Phani Lea MD    Date of next administration: 4/7/22s  Date of next office visit with provider to renew medication plan (must be seen annually): labs due 3-4 months, Jan 2023      Mariluz Salmon RN, BSN  [facility]    "

## 2022-03-24 NOTE — TELEPHONE ENCOUNTER
Can you put a one time order in for today's Testosterone shot.  Order  17.  T'd up.  They are coming for yearly visit prior to needing next injection so will update order for future at that visit.  Thanks, Brittaney Davis RN    
Mom calls, confused about testosterone, see orders 4/24/17 for testosterone from Dr Clark, due for f/u visit October 2017, mom not sure if wants to continue with them, informed due for visit early august with , will discuss new endo referral with Dr Tu Salmon, RN, BSN  Message handled by Nurse Triage.    
Reviewed order from Dr. Clark from April.  
Not applicable

## 2022-03-31 DIAGNOSIS — F98.8 ATTENTION DEFICIT DISORDER, UNSPECIFIED HYPERACTIVITY PRESENCE: ICD-10-CM

## 2022-03-31 NOTE — TELEPHONE ENCOUNTER
Routing refill request to provider for review/approval because:  Drug not on the [facility] refill protocol     Chong GROSS RN

## 2022-04-01 RX ORDER — DEXTROAMPHETAMINE SACCHARATE, AMPHETAMINE ASPARTATE MONOHYDRATE, DEXTROAMPHETAMINE SULFATE AND AMPHETAMINE SULFATE 7.5; 7.5; 7.5; 7.5 MG/1; MG/1; MG/1; MG/1
CAPSULE, EXTENDED RELEASE ORAL
Qty: 30 CAPSULE | Refills: 0 | Status: SHIPPED | OUTPATIENT
Start: 2022-04-01 | End: 2022-04-28

## 2022-04-03 NOTE — MR AVS SNAPSHOT
After Visit Summary   10/11/2018    Mamadou Reece    MRN: [id number]           Patient Information     Date Of Birth          1990        Visit Information        Provider Department      10/11/2018 8:30 AM CR RN [facility]        Today's Diagnoses     Testosterone deficiency    -  1       Follow-ups after your visit        Your next 10 appointments already scheduled     Oct 19, 2018  4:10 PM CDT   PHYSICAL with Mariluz Mae MD   [facility] ([facility]    [address] [address] [phone]              Who to contact     If you have questions or need follow up information about today's clinic visit or your schedule please contact [facility] directly at [phone].  Normal or non-critical lab and imaging results will be communicated to you by MyChart, letter or phone within 4 business days after the clinic has received the results. If you do not hear from us within 7 days, please contact the clinic through MyChart or phone. If you have a critical or abnormal lab result, we will notify you by phone as soon as possible.  Submit refill requests through [employer] or call your pharmacy and they will forward the refill request to us. Please allow 3 business days for your refill to be completed.          Additional Information About Your Visit        Care EveryWhere ID     This is your Care EveryWhere ID. This could be used by other organizations to access your [place] medical records  [id number]         Blood Pressure from Last 3 Encounters:   09/06/18 112/82   08/21/18 131/89   06/20/18 108/80    Weight from Last 3 Encounters:   10/09/18 189 lb (85.7 kg)   09/06/18 189 lb 14.4 oz (86.1 kg)   08/21/18 186 lb 6.4 oz (84.6 kg)              We Performed the Following     C TESTOSTERONE CYPIONATE INJECTION, 1 MG     INJECTION INTRAMUSCULAR OR SUB-Q        Primary Care Provider Office Phone # Fax #    Mariluz BOWENS  Refill Routing Note   Medication(s) are not appropriate for processing by [facility] for the following reason(s):      - Unclear if patient follows with you     [facility] action(s):  Defer       Medication Therapy Plan: Unclear if patient follows with you; defer to you  --->Care Gap information included in message below if applicable.   Medication reconciliation completed: No   Automatic Epic Generated Protocol Data:        Requested Prescriptions   Pending Prescriptions Disp Refills    amLODIPine (NORVASC) 5 MG tablet [Pharmacy Med Name: AMLODIPINE BESYLATE 5MG TABLETS] 90 tablet 0     Sig: TAKE 1 TABLET(5 MG) BY MOUTH EVERY DAY       Cardiovascular:  Calcium Channel Blockers Failed - 4/2/2022  8:04 AM        Failed - Valid encounter within last 15 months     Recent Visits  No visits were found meeting these conditions.  Showing recent visits within past 720 days and meeting all other requirements  Future Appointments  No visits were found meeting these conditions.  Showing future appointments within next 150 days and meeting all other requirements      Future Appointments              In 6 days [facility] [place] [facility] [place]    In 1 week Dara Enriquez PA-C [place] [facility] [place]    In 2 weeks Juliette L. Sandifer Kum-Nji, MD [place] [facility] [place]                Failed - No ED/Hospital visits since last PCP visit               Passed - Patient is at least 18 years old        Passed - Negative Pregnancy Status Check        Passed - Last BP in normal range within 360 days     BP Readings from Last 3 Encounters:   03/29/22 126/72   03/23/22 136/80   08/25/21 (!) 156/102               Passed - Matches previous order       Previous Authorizing Provider: Chriss Tucker MD (amLODIPine (NORVASC) 5 MG tablet)  Previous Pharmacy: [id number] [address] [facility] [address]                   Appointments  past 12m  MD Tu [phone]       [address]  [facility] [address]        Equal Access to Services     ANUJECHO SULMA : Helena ryan palomo chucky Rodney, asiada lukealexandreha, anup fauzialg benson, kylah reynain hayaan moshejack fuentesdagoberto nasir. So [facility] [phone].    ATENCIÓN: Si habla español, tiene a henderson disposición servicios gratuitos de asistencia lingüística. Llame al [phone].    We comply with applicable federal civil rights laws and [place] laws. We do not discriminate on the basis of race, color, national origin, age, disability, sex, sexual orientation, or gender identity.            Thank you!     Thank you for choosing [facility]  for your care. Our goal is always to provide you with excellent care. Hearing back from our patients is one way we can continue to improve our services. Please take a few minutes to complete the written survey that you may receive in the mail after your visit with us. Thank you!             Your Updated Medication List - Protect others around you: Learn how to safely use, store and throw away your medicines at www.disposemymeds.org.          This list is accurate as of 10/11/18  8:55 AM.  Always use your most recent med list.                   Brand Name Dispense Instructions for use Diagnosis    amphetamine-dextroamphetamine 30 MG per 24 hr capsule   Start taking on:  11/5/2018    ADDERALL XR    30 capsule    Take 1 capsule (30 mg) by mouth daily    Attention deficit disorder, unspecified hyperactivity presence       B-12 1000 MCG Tbcr     100 tablet    Take 2,000 mcg by mouth daily    Routine general medical examination at a health care facility       CALCIUM + D 315-200 MG-UNIT Tabs per tablet   Generic drug:  calcium citrate-vitamin D      Take by mouth 2 times daily        desmopressin 0.01 % Soln spray    DDAVP    10 mL    3 sprays once daily    Diabetes insipidus (H)       levothyroxine 137 MCG tablet    SYNTHROID/LEVOTHROID    30 tablet     or future 3m with PCP    Date Provider   Last Visit   Visit date not found Chriss Tucker MD   Next Visit   Visit date not found Chriss Tucker MD   ED visits in past 90 days: 0        Note composed:9:45 PM 04/02/2022         TAKE ONE TABLET BY MOUTH EVERY MORNING BEFORE BREAKFAST    Hypothyroidism, unspecified type       predniSONE 1 MG tablet    DELTASONE    150 tablet    Take 3 mg in AM and 2 mg at night    Panhypopituitarism (H)       somatropin 10 MG/1.5ML Soln PEN injection    NORDITROPIN FLEXPRO    10 mL    Inject 0.7 mg Subcutaneous daily    Panhypopituitarism (H)       testosterone cypionate 200 MG/ML injection    DEPOTESTOSTERONE    1 mL    Inject 1 mL (200 mg) into the muscle every 14 days    Testosterone deficiency, Panhypopituitarism (H)       vitamin D 1000 units capsule      Take 1 capsule by mouth daily

## 2022-04-06 ENCOUNTER — TRANSFERRED RECORDS (OUTPATIENT)
Dept: HEALTH INFORMATION MANAGEMENT | Facility: CLINIC | Age: 32
End: 2022-04-06
Payer: MEDICARE

## 2022-04-07 ENCOUNTER — ALLIED HEALTH/NURSE VISIT (OUTPATIENT)
Dept: FAMILY MEDICINE | Facility: CLINIC | Age: 32
End: 2022-04-07
Payer: MEDICARE

## 2022-04-07 DIAGNOSIS — E34.9 TESTOSTERONE DEFICIENCY: Primary | ICD-10-CM

## 2022-04-07 PROCEDURE — 99207 PR NO CHARGE NURSE ONLY: CPT

## 2022-04-07 PROCEDURE — 96372 THER/PROPH/DIAG INJ SC/IM: CPT | Performed by: INTERNAL MEDICINE

## 2022-04-07 RX ADMIN — TESTOSTERONE CYPIONATE 150 MG: 200 INJECTION, SOLUTION INTRAMUSCULAR at 16:53

## 2022-04-07 NOTE — PROGRESS NOTES
"Clinic Administered Medication Documentation    Administrations This Visit     testosterone cypionate (DEPOTESTOSTERONE) injection 150 mg     Admin Date  04/07/2022 Action  Given Dose  150 mg Route  Intramuscular Site  Left Ventrogluteal Administered By  Chong Ceja RN    Ordering Provider: Sumaya Alonso MD    Patient Supplied?: No                  Testosterone Documentation     Prior to injection, verified patient identity using patient's name and date of birth. Medication was administered. Please see MAR and medication order for additional information. Patient instructed to remain in clinic for 15 minutes, report any adverse reaction to staff immediately  and stay in clinic after the injection but patient declined.    Reminders     - Check vial for refills remaining and initiate refill request if no refills remain.      - Verify with patient that medication was paid for at pharmacy. If it was, check the \"patient supplied\" box on the MAR.     Was entire vial of medication used? No, The remainder 50MG of 200MG was discarded as unavoidable waste.  Vial/Syringe: Single dose vial  Expiration Date:  09/2023  Site: [facility]  Was this medication supplied by the patient? Yes, Medication was received directly from patient in a tamper proof bag (follow site specific policies)     Chong GROSS RN        "

## 2022-04-21 ENCOUNTER — ALLIED HEALTH/NURSE VISIT (OUTPATIENT)
Dept: FAMILY MEDICINE | Facility: CLINIC | Age: 32
End: 2022-04-21
Payer: MEDICARE

## 2022-04-21 DIAGNOSIS — E34.9 TESTOSTERONE DEFICIENCY: Primary | ICD-10-CM

## 2022-04-21 DIAGNOSIS — E23.2 DIABETES INSIPIDUS (H): ICD-10-CM

## 2022-04-21 PROCEDURE — 99207 PR NO CHARGE NURSE ONLY: CPT

## 2022-04-21 PROCEDURE — 96372 THER/PROPH/DIAG INJ SC/IM: CPT | Performed by: INTERNAL MEDICINE

## 2022-04-21 RX ORDER — DESMOPRESSIN ACETATE 0.1 MG/ML
SOLUTION NASAL
Qty: 10 ML | Refills: 2 | Status: SHIPPED | OUTPATIENT
Start: 2022-04-21 | End: 2022-07-06

## 2022-04-21 RX ADMIN — TESTOSTERONE CYPIONATE 150 MG: 200 INJECTION, SOLUTION INTRAMUSCULAR at 16:41

## 2022-04-21 NOTE — PROGRESS NOTES
"Clinic Administered Medication Documentation    Administrations This Visit     testosterone cypionate (DEPOTESTOSTERONE) injection 150 mg     Admin Date  04/21/2022 Action  Given Dose  150 mg Route  Intramuscular Site  Right Ventrogluteal Administered By  Chong Ceja RN    Ordering Provider: Sumaya Alonso MD    Patient Supplied?: No    Comments: 50MG OF 200MG DOSE DISCARDED IN DESTROYER                  Testosterone Documentation     Prior to injection, verified patient identity using patient's name and date of birth. Medication was administered. Please see MAR and medication order for additional information. Patient instructed to remain in clinic for 15 minutes, report any adverse reaction to staff immediately  and stay in clinic after the injection but patient declined.    Reminders     - Check vial for refills remaining and initiate refill request if no refills remain.      - Verify with patient that medication was paid for at pharmacy. If it was, check the \"patient supplied\" box on the MAR.     Was entire vial of medication used? No, The remainder 50MG of 200MG was discarded as unavoidable waste. Patient given total of 150mg dose.   Vial/Syringe: Single dose vial  Expiration Date:  11/2023  Site: [facility]  Was this medication supplied by the patient? Yes, Medication was received directly from patient in a tamper proof bag (follow site specific policies)     Chong GROSS RN        "

## 2022-04-27 DIAGNOSIS — F98.8 ATTENTION DEFICIT DISORDER, UNSPECIFIED HYPERACTIVITY PRESENCE: ICD-10-CM

## 2022-04-28 RX ORDER — DEXTROAMPHETAMINE SACCHARATE, AMPHETAMINE ASPARTATE MONOHYDRATE, DEXTROAMPHETAMINE SULFATE AND AMPHETAMINE SULFATE 7.5; 7.5; 7.5; 7.5 MG/1; MG/1; MG/1; MG/1
CAPSULE, EXTENDED RELEASE ORAL
Qty: 30 CAPSULE | Refills: 0 | Status: SHIPPED | OUTPATIENT
Start: 2022-05-04 | End: 2024-04-05

## 2022-05-05 ENCOUNTER — ALLIED HEALTH/NURSE VISIT (OUTPATIENT)
Dept: FAMILY MEDICINE | Facility: CLINIC | Age: 32
End: 2022-05-05
Payer: COMMERCIAL

## 2022-05-05 DIAGNOSIS — E34.9 TESTOSTERONE DEFICIENCY: Primary | ICD-10-CM

## 2022-05-05 PROCEDURE — 99207 PR NO CHARGE NURSE ONLY: CPT

## 2022-05-05 PROCEDURE — 96372 THER/PROPH/DIAG INJ SC/IM: CPT | Performed by: INTERNAL MEDICINE

## 2022-05-05 RX ADMIN — TESTOSTERONE CYPIONATE 150 MG: 200 INJECTION, SOLUTION INTRAMUSCULAR at 16:59

## 2022-05-05 NOTE — PROGRESS NOTES
"Clinic Administered Medication Documentation    Administrations This Visit     testosterone cypionate (DEPOTESTOSTERONE) injection 150 mg     Admin Date  05/05/2022 Action  Given Dose  150 mg Route  Intramuscular Site  Left Ventrogluteal Administered By  Madelin Norton RN    Ordering Provider: Sumaya Alonso MD    Patient Supplied?: Yes    Comments: 50mg of 200mg wasted in destroyer, total dose given 150mg                  Testosterone Documentation     Prior to injection, verified patient identity using patient's name and date of birth. Medication was administered. Please see MAR and medication order for additional information. Patient instructed to remain in clinic for 15 minutes, report any adverse reaction to staff immediately  and stay in clinic after the injection but patient declined.    Reminders     - Check vial for refills remaining and initiate refill request if no refills remain.      - Verify with patient that medication was paid for at pharmacy. If it was, check the \"patient supplied\" box on the MAR.     Was entire vial of medication used? No, The remainder 50MG of 200MG was discarded as unavoidable waste.  Vial/Syringe: Single dose vial  Expiration Date:  11/30/2023    Was this medication supplied by the patient? Yes, Medication was received directly from patient in a tamper proof bag (follow site specific policies)     Madelin Norton RN        "

## 2022-05-13 ENCOUNTER — OFFICE VISIT (OUTPATIENT)
Dept: FAMILY MEDICINE | Facility: CLINIC | Age: 32
End: 2022-05-13
Payer: MEDICARE

## 2022-05-13 VITALS
RESPIRATION RATE: 16 BRPM | OXYGEN SATURATION: 100 % | HEIGHT: 72 IN | BODY MASS INDEX: 28.04 KG/M2 | HEART RATE: 66 BPM | TEMPERATURE: 98 F | WEIGHT: 207 LBS | SYSTOLIC BLOOD PRESSURE: 108 MMHG | DIASTOLIC BLOOD PRESSURE: 72 MMHG

## 2022-05-13 DIAGNOSIS — F98.8 ATTENTION DEFICIT DISORDER, UNSPECIFIED HYPERACTIVITY PRESENCE: Primary | ICD-10-CM

## 2022-05-13 PROCEDURE — 99213 OFFICE O/P EST LOW 20 MIN: CPT | Performed by: NURSE PRACTITIONER

## 2022-05-13 RX ORDER — DEXTROAMPHETAMINE SACCHARATE, AMPHETAMINE ASPARTATE MONOHYDRATE, DEXTROAMPHETAMINE SULFATE AND AMPHETAMINE SULFATE 7.5; 7.5; 7.5; 7.5 MG/1; MG/1; MG/1; MG/1
30 CAPSULE, EXTENDED RELEASE ORAL DAILY
Qty: 30 CAPSULE | Refills: 0 | Status: SHIPPED | OUTPATIENT
Start: 2022-07-03 | End: 2022-08-02

## 2022-05-13 RX ORDER — DEXTROAMPHETAMINE SACCHARATE, AMPHETAMINE ASPARTATE MONOHYDRATE, DEXTROAMPHETAMINE SULFATE AND AMPHETAMINE SULFATE 7.5; 7.5; 7.5; 7.5 MG/1; MG/1; MG/1; MG/1
30 CAPSULE, EXTENDED RELEASE ORAL DAILY
Qty: 30 CAPSULE | Refills: 0 | Status: SHIPPED | OUTPATIENT
Start: 2022-06-03 | End: 2022-07-03

## 2022-05-13 RX ORDER — DEXTROAMPHETAMINE SACCHARATE, AMPHETAMINE ASPARTATE MONOHYDRATE, DEXTROAMPHETAMINE SULFATE AND AMPHETAMINE SULFATE 7.5; 7.5; 7.5; 7.5 MG/1; MG/1; MG/1; MG/1
30 CAPSULE, EXTENDED RELEASE ORAL DAILY
Qty: 30 CAPSULE | Refills: 0 | Status: SHIPPED | OUTPATIENT
Start: 2022-08-03 | End: 2022-09-02

## 2022-05-13 ASSESSMENT — PAIN SCALES - GENERAL: PAINLEVEL: NO PAIN (0)

## 2022-05-13 NOTE — PROGRESS NOTES
Assessment & Plan     Attention deficit disorder, unspecified hyperactivity presence  Request an additional 3 months when needed.  Visit in 6 months.    - amphetamine-dextroamphetamine (ADDERALL XR) 30 MG 24 hr capsule; Take 1 capsule (30 mg) by mouth daily  - amphetamine-dextroamphetamine (ADDERALL XR) 30 MG 24 hr capsule; Take 1 capsule (30 mg) by mouth daily  - amphetamine-dextroamphetamine (ADDERALL XR) 30 MG 24 hr capsule; Take 1 capsule (30 mg) by mouth daily       BMI:   Estimated body mass index is 28.07 kg/m  as calculated from the following:    Height as of this encounter: 1.829 m (6').    Weight as of this encounter: 93.9 kg (207 lb).           No follow-ups on file.    ADITHYA Aguirre Ra, CNP [facility] KARLI Cedillo is a 31 year old who presents for the following health issues  accompanied by his mother.    A.D.H.D    History of Present Illness       Reason for visit:  Med check    He eats 2-3 servings of fruits and vegetables daily.He consumes 1 sweetened beverage(s) daily.He exercises with enough effort to increase his heart rate 10 to 19 minutes per day.  He exercises with enough effort to increase his heart rate 5 days per week.   He is taking medications regularly.       Medication Followup of Adderall XR    Taking Medication as prescribed: yes    Side Effects:  None    Medication Helping Symptoms:  yes     Tolerating medication well.  Finds it helpful.    Review of Systems   Constitutional, HEENT, cardiovascular, pulmonary, gi and gu systems are negative, except as otherwise noted.      Objective    /72 (BP Location: Right arm, Patient Position: Sitting, Cuff Size: Adult Large)   Pulse 66   Temp 98  F (36.7  C) (Oral)   Resp 16   Ht 1.829 m (6')   Wt 93.9 kg (207 lb)   SpO2 100%   BMI 28.07 kg/m    Body mass index is 28.07 kg/m .  Physical Exam   GENERAL: healthy, alert and no distress  RESP: lungs clear to auscultation - no rales, rhonchi or  wheezes  CV: regular rate and rhythm, normal S1 S2, no S3 or S4, no murmur, click or rub, no peripheral edema and peripheral pulses strong  PSYCH: mentation appears normal, affect normal/bright

## 2022-05-19 ENCOUNTER — ALLIED HEALTH/NURSE VISIT (OUTPATIENT)
Dept: FAMILY MEDICINE | Facility: CLINIC | Age: 32
End: 2022-05-19
Payer: COMMERCIAL

## 2022-05-19 DIAGNOSIS — E34.9 TESTOSTERONE DEFICIENCY: Primary | ICD-10-CM

## 2022-05-19 PROCEDURE — 99207 PR NO CHARGE NURSE ONLY: CPT

## 2022-05-19 PROCEDURE — 96372 THER/PROPH/DIAG INJ SC/IM: CPT | Performed by: INTERNAL MEDICINE

## 2022-05-19 RX ADMIN — TESTOSTERONE CYPIONATE 150 MG: 200 INJECTION, SOLUTION INTRAMUSCULAR at 16:51

## 2022-05-28 ENCOUNTER — TELEPHONE (OUTPATIENT)
Dept: ENDOCRINOLOGY | Facility: CLINIC | Age: 32
End: 2022-05-28
Payer: MEDICARE

## 2022-05-28 DIAGNOSIS — E23.0 PANHYPOPITUITARISM (H): ICD-10-CM

## 2022-05-31 RX ORDER — SOMATROPIN 10 MG/1.5ML
INJECTION, SOLUTION SUBCUTANEOUS
Qty: 3 ML | Refills: 5 | Status: SHIPPED | OUTPATIENT
Start: 2022-05-31 | End: 2022-11-08

## 2022-06-02 ENCOUNTER — ALLIED HEALTH/NURSE VISIT (OUTPATIENT)
Dept: FAMILY MEDICINE | Facility: CLINIC | Age: 32
End: 2022-06-02
Payer: COMMERCIAL

## 2022-06-02 DIAGNOSIS — E34.9 TESTOSTERONE DEFICIENCY: Primary | ICD-10-CM

## 2022-06-02 PROCEDURE — 96372 THER/PROPH/DIAG INJ SC/IM: CPT | Performed by: INTERNAL MEDICINE

## 2022-06-02 PROCEDURE — 99207 PR NO CHARGE NURSE ONLY: CPT

## 2022-06-02 RX ADMIN — TESTOSTERONE CYPIONATE 150 MG: 200 INJECTION, SOLUTION INTRAMUSCULAR at 16:58

## 2022-06-02 NOTE — PROGRESS NOTES
"Clinic Administered Medication Documentation    Administrations This Visit     testosterone cypionate (DEPOTESTOSTERONE) injection 150 mg     Admin Date  06/02/2022 Action  Given Dose  150 mg Route  Intramuscular Site  Left Ventrogluteal Administered By  Madelin Norton RN    Ordering Provider: Sumaya Alonso MD    Patient Supplied?: Yes    Comments: left ventrogluteal                  Testosterone Documentation     Prior to injection, verified patient identity using patient's name and date of birth. Medication was administered. Please see MAR and medication order for additional information. Patient instructed to remain in clinic for 15 minutes, report any adverse reaction to staff immediately  and stay in clinic after the injection but patient declined.    Reminders     - Check vial for refills remaining and initiate refill request if no refills remain.      - Verify with patient that medication was paid for at pharmacy. If it was, check the \"patient supplied\" box on the MAR.     Was entire vial of medication used? No, The remainder 50MG of 200MG was discarded as unavoidable waste.  Vial/Syringe: Single dose vial  Expiration Date:  12/30/2023  Was this medication supplied by the patient? Yes, Medication was received directly from patient in a tamper proof bag (follow site specific policies)    Given left ventrogluteal    Madelin Norton RN    "

## 2022-06-10 ENCOUNTER — HOSPITAL ENCOUNTER (EMERGENCY)
Facility: CLINIC | Age: 32
Discharge: HOME | End: 2022-06-10
Attending: PHYSICIAN ASSISTANT | Admitting: PHYSICIAN ASSISTANT
Payer: MEDICARE

## 2022-06-10 ENCOUNTER — APPOINTMENT (OUTPATIENT)
Dept: ULTRASOUND IMAGING | Facility: CLINIC | Age: 32
End: 2022-06-10
Attending: PHYSICIAN ASSISTANT
Payer: MEDICARE

## 2022-06-10 ENCOUNTER — OFFICE VISIT (OUTPATIENT)
Dept: URGENT CARE | Facility: URGENT CARE | Age: 32
End: 2022-06-10
Payer: MEDICARE

## 2022-06-10 VITALS
RESPIRATION RATE: 20 BRPM | TEMPERATURE: 98 F | OXYGEN SATURATION: 98 % | HEART RATE: 78 BPM | SYSTOLIC BLOOD PRESSURE: 118 MMHG | DIASTOLIC BLOOD PRESSURE: 79 MMHG

## 2022-06-10 VITALS
TEMPERATURE: 97 F | OXYGEN SATURATION: 100 % | RESPIRATION RATE: 18 BRPM | HEART RATE: 63 BPM | DIASTOLIC BLOOD PRESSURE: 76 MMHG | SYSTOLIC BLOOD PRESSURE: 124 MMHG

## 2022-06-10 DIAGNOSIS — M79.89 LEG SWELLING: ICD-10-CM

## 2022-06-10 DIAGNOSIS — E83.51 HYPOCALCEMIA: ICD-10-CM

## 2022-06-10 DIAGNOSIS — R60.0 BILATERAL LOWER EXTREMITY EDEMA: Primary | ICD-10-CM

## 2022-06-10 DIAGNOSIS — R21 RASH: ICD-10-CM

## 2022-06-10 DIAGNOSIS — R79.89 ELEVATED SERUM CREATININE: ICD-10-CM

## 2022-06-10 LAB
ANION GAP SERPL CALCULATED.3IONS-SCNC: 4 MMOL/L (ref 3–14)
BUN SERPL-MCNC: 10 MG/DL (ref 7–30)
CALCIUM SERPL-MCNC: 8 MG/DL (ref 8.5–10.1)
CHLORIDE BLD-SCNC: 106 MMOL/L (ref 94–109)
CO2 SERPL-SCNC: 28 MMOL/L (ref 20–32)
CREAT SERPL-MCNC: 1.51 MG/DL (ref 0.66–1.25)
D DIMER PPP FEU-MCNC: 0.77 UG/ML FEU (ref 0–0.5)
GFR SERPL CREATININE-BSD FRML MDRD: 63 ML/MIN/1.73M2
GLUCOSE BLD-MCNC: 79 MG/DL (ref 70–99)
HOLD SPECIMEN: NORMAL
NT-PROBNP SERPL-MCNC: 39 PG/ML (ref 0–450)
POTASSIUM BLD-SCNC: 4 MMOL/L (ref 3.4–5.3)
SODIUM SERPL-SCNC: 138 MMOL/L (ref 133–144)

## 2022-06-10 PROCEDURE — 99213 OFFICE O/P EST LOW 20 MIN: CPT | Performed by: FAMILY MEDICINE

## 2022-06-10 PROCEDURE — 99284 EMERGENCY DEPT VISIT MOD MDM: CPT | Mod: 25

## 2022-06-10 PROCEDURE — 93970 EXTREMITY STUDY: CPT

## 2022-06-10 PROCEDURE — 36415 COLL VENOUS BLD VENIPUNCTURE: CPT | Performed by: PHYSICIAN ASSISTANT

## 2022-06-10 PROCEDURE — 83880 ASSAY OF NATRIURETIC PEPTIDE: CPT | Performed by: PHYSICIAN ASSISTANT

## 2022-06-10 PROCEDURE — 85379 FIBRIN DEGRADATION QUANT: CPT | Performed by: PHYSICIAN ASSISTANT

## 2022-06-10 PROCEDURE — 82310 ASSAY OF CALCIUM: CPT | Performed by: PHYSICIAN ASSISTANT

## 2022-06-10 ASSESSMENT — ENCOUNTER SYMPTOMS
COUGH: 0
SHORTNESS OF BREATH: 0

## 2022-06-10 NOTE — PROGRESS NOTES
"SUBJECTIVE:  Chief Complaint   Patient presents with     Urgent Care     L foot pain and ankles      Mamadou Reece is a 31 year old visually-impaired male presents with a chief complaint of atraumatic pain, redness, swelling at the dorsal left foot, and toes for the past month.  Today, patient's mom noticed bilateral swollen ankles and swelling at the dorsal right foot and toes. Patient is visually impaired and cannot see these changes.  He has had troubles fitting into shoes recently.      Patient has been less active since having his hours cut at work recently.  No recent leg injuries/surgeries.  No new medications.  No history of kidney problems.      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, [facility]     Legal blindness     Craniopharyngioma     Mood disorder (H)     Craniopharyngioma     Radiation age 11    Craniopharyngioma     Current Outpatient Medications   Medication Sig Dispense Refill     amphetamine-dextroamphetamine (ADDERALL XR) 30 MG 24 hr capsule Take 1 capsule (30 mg) by mouth daily 30 capsule 0     [START ON 7/3/2022] amphetamine-dextroamphetamine (ADDERALL XR) 30 MG 24 hr capsule Take 1 capsule (30 mg) by mouth daily 30 capsule 0     [START ON 8/3/2022] amphetamine-dextroamphetamine (ADDERALL XR) 30 MG 24 hr capsule Take 1 capsule (30 mg) by mouth daily 30 capsule 0     amphetamine-dextroamphetamine (ADDERALL XR) 30 MG 24 hr capsule TAKE ONE CAPSULE BY MOUTH ONCE DAILY 30 capsule 0     Calcium Citrate-Vitamin D (CALCIUM + D) 315-200 MG-UNIT TABS Take by mouth 2 times daily        Cholecalciferol (VITAMIN D) 1000 UNIT capsule Take 1 capsule by mouth daily        Cyanocobalamin (B-12) 1000 MCG TBCR Take 1,000 mcg by mouth daily  100 tablet 1     desmopressin (DDAVP) 0.01 % SOLN spray USE THREE SPRAYS INTO NOSTRIL ONCE DAILY AND USE ONE SPRAY INTO NOSTRIL AT BEDTIME 10 mL 2     Insulin Pen Needle (PEN NEEDLES 5/16\") 30G X 8 MM MISC USE dailyDIRECTED " WITH NORDITROPIN 90 each 3     levothyroxine (SYNTHROID/LEVOTHROID) 137 MCG tablet TAKE ONE TABLET BY MOUTH EVERY MORNING BEFORE BREAKFAST 31 tablet 11     NORDITROPIN FLEXPRO 10 MG/1.5ML SOPN PEN injection Inject 0.7 mg under the skin daily. Discard 28 days after initial use. 3 mL 5     predniSONE (DELTASONE) 1 MG tablet TAKE THREE TABLETS (3 MG) BY MOUTH IN THE MORNING AND TWO TABLETS (2 MG) AT NIGHT 200 tablet 11     testosterone cypionate (DEPOTESTOSTERONE) 200 MG/ML injection INJECT 0.75 MLS (150 MG) INTO THE MUSCLE EVERY 14 DAYS 1 mL 5     meclizine (ANTIVERT) 25 MG tablet Take 1 tablet (25 mg) by mouth 3 times daily as needed for dizziness (Patient not taking: No sig reported) 20 tablet 0     Social History     Tobacco Use     Smoking status: Never Smoker     Smokeless tobacco: Never Used   Substance Use Topics     Alcohol use: Yes     Alcohol/week: 0.0 standard drinks     Comment: sip at holidays       ROS:  CONSTITUTIONAL:negative for fevers.   RESP: negative for shortness of breath  CV:  Negative for chest pain  MUSCULOSKELETAL:  Positive for swelling in the ankles, feet, toes.    EXAM:   /79   Pulse 78   Temp 98  F (36.7  C)   Resp 20   SpO2 98%   Gen: healthy,alert,no distress  Extremity: Both dorsal feet and toes and bilateral ankles have edema.  The left foot has a lot of confluent erythema compared to the right side.    CALVES:  No palpable cords.  No obvious edema.      X-RAY was not done.    ASSESSMENT:   Bilateral foot and ankle edema.  Differential diagnosis is vast and includes nephrotic syndrome, DVT, renal disease, liver disease, dependent edema, medications.      PLAN:  Unfortunately, the urgent care does not have venous ultrasound capability to rule out possible DVT.  Patient will go to the [facility] emergency room for further evaluation.      Tin Mcgill MD

## 2022-06-11 NOTE — ED TRIAGE NOTES
Pt here with bilateral feet swelling that was first noticed today. Pt was sent here by  to rule out blood clots. Denies heart hx, blood thinners.      Triage Assessment     Row Name 06/10/22 1908       Triage Assessment (Adult)    Airway WDL WDL       Respiratory WDL    Respiratory WDL WDL       Skin Circulation/Temperature WDL    Skin Circulation/Temperature WDL WDL       Cardiac WDL    Cardiac WDL WDL       Peripheral/Neurovascular WDL    Peripheral Neurovascular WDL WDL       Cognitive/Neuro/Behavioral WDL    Cognitive/Neuro/Behavioral WDL WDL

## 2022-06-11 NOTE — ED PROVIDER NOTES
"  History     Chief Complaint:  Leg Swelling       HPI   Mamadou Reece is a 31 year old male with a hx of blindness, ADHD, pituitary dysfunction, presents emergency room today for evaluation of bilateral lower extremity edema that has mother noticed today.  His hours were cut at [facility] and he has been less mobile.  He denies any pain, chest pain, shortness of breath, cough, fevers, or other.  His mom did notice a little bit of redness to the top of the left foot.  No history of DVT or PE, recent surgeries, travel, history of cancer.    ROS:  Review of Systems   Respiratory: Negative for cough and shortness of breath.    Cardiovascular: Positive for leg swelling.   Skin: Positive for rash.      All other systems reviewed and are negative.    Allergies:  No Known Allergies     Medications:    amphetamine-dextroamphetamine (ADDERALL XR) 30 MG 24 hr capsule  [START ON 7/3/2022] amphetamine-dextroamphetamine (ADDERALL XR) 30 MG 24 hr capsule  [START ON 8/3/2022] amphetamine-dextroamphetamine (ADDERALL XR) 30 MG 24 hr capsule  amphetamine-dextroamphetamine (ADDERALL XR) 30 MG 24 hr capsule  Calcium Citrate-Vitamin D (CALCIUM + D) 315-200 MG-UNIT TABS  Cholecalciferol (VITAMIN D) 1000 UNIT capsule  Cyanocobalamin (B-12) 1000 MCG TBCR  desmopressin (DDAVP) 0.01 % SOLN spray  Insulin Pen Needle (PEN NEEDLES 5/16\") 30G X 8 MM MISC  levothyroxine (SYNTHROID/LEVOTHROID) 137 MCG tablet  meclizine (ANTIVERT) 25 MG tablet  NORDITROPIN FLEXPRO 10 MG/1.5ML SOPN PEN injection  predniSONE (DELTASONE) 1 MG tablet  testosterone cypionate (DEPOTESTOSTERONE) 200 MG/ML injection        Past Medical History:    Past Medical History:   Diagnosis Date     Craniopharyngioma age 6     Legal blindness      Mood disorder (H)      Radiation age 11     Patient Active Problem List   Diagnosis     ADD (attention deficit disorder)     Craniopharyngioma (H)     CARDIOVASCULAR SCREENING; LDL GOAL LESS THAN 160     Mood change     Legal blindness     " Hypothyroidism     Panhypopituitarism (H)     History of craniopharyngioma     Testosterone deficiency     BCC (basal cell carcinoma), face     Urinary frequency     Adrenal insufficiency (H)     Diabetes insipidus (H)     Growth hormone deficiency (H)        Past Surgical History:    Past Surgical History:   Procedure Laterality Date     CRANIOTOMY, EXCISE TUMOR COMPLEX, COMBINED  age 6    craniopharyngioma     MOHS MICROGRAPHIC PROCEDURE Left 05/31/2018    left cheek nodular BCC        Family History:    family history includes Cancer in his father; Cerebrovascular Disease in his paternal grandfather and paternal grandmother; Cerebrovascular Disease (age of onset: 91) in his maternal grandmother; Diabetes in his maternal grandmother and mother.    Social History:   reports that he has never smoked. He has never used smokeless tobacco. He reports current alcohol use. He reports that he does not use drugs.  PCP: No Ref-Primary, Physician     Physical Exam     Patient Vitals for the past 24 hrs:   BP Temp Temp src Pulse Resp SpO2   06/10/22 2322 -- -- -- 63 -- --   06/10/22 1909 124/76 97  F (36.1  C) Temporal -- 18 100 %        Physical Exam  General: Well appearing, pleasant male, resting on exam bed  HEENT: No evidence of trauma.  Conjunctive are clear. Neck range of motion intact.  Nose and throat clear.  Respiratory: Good effort  Cardiovascular: Good distal perfusion  Gastrointestinal: Nondistended  Musculoskeletal: Atraumatic  Skin: Exposed skin clear.  He has mild lower extremity edema bilaterally.  Range of motion of his ankles are intact.  Brisk DP pulses bilaterally.  Good sensation.  He has a little bit of erythema to the top of the left foot.  No significant wound.  Neurologic: Alert.  Psych:  Patient is cooperative, with normal affect.      Emergency Department Course   ECG:  none    Imaging:  US Lower Extremity Venous Duplex Bilateral   Final Result   IMPRESSION:   1.  No deep venous thrombosis in the  bilateral lower extremities.           Laboratory:  Labs Ordered and Resulted from Time of ED Arrival to Time of ED Departure   BASIC METABOLIC PANEL - Abnormal       Result Value    Sodium 138      Potassium 4.0      Chloride 106      Carbon Dioxide (CO2) 28      Anion Gap 4      Urea Nitrogen 10      Creatinine 1.51 (*)     Calcium 8.0 (*)     Glucose 79      GFR Estimate 63     D DIMER QUANTITATIVE - Abnormal    D-Dimer Quantitative 0.77 (*)    NT PROBNP INPATIENT - Normal    N terminal Pro BNP Inpatient 39          Interventions:  Medications - No data to display     Impression & Plan      Medical Decision Making:  Mamadou Reece is a 31 year old male with a hx of blindness, ADHD, pituitary dysfunction, presents emergency room today for evaluation of bilateral lower extremity edema that has mother noticed today.  His hours were cut at [facility] and he has been less mobile.  See HPI.  His vitals are unremarkable.  He has mild lower extremity edema.  There is a little bit of redness on his left foot.  His dimer was elevated so he went for an ultrasound that reveals no DVT.  His creatinine was a little bit elevated and calcium a little low which was noted and communicated with the patient and family.  His BNP was normal.  Likely dependent edema.  He will follow-up regarding his creatinine.  He is to return with new or worsening symptoms.  No further questions agrees with plan.  We will monitor the redness of his left foot and follow-up next week. Elevation, more walking, and compression stockings.    Diagnosis:    ICD-10-CM    1. Leg swelling  M79.89    2. Elevated serum creatinine  R79.89    3. Rash  R21    4. Hypocalcemia  E83.51         Discharge Medications:  New Prescriptions    No medications on file        6/10/2022   Chevy Hugo PA-C Cyr, Matthew R, PA-C  06/10/22 8861

## 2022-06-16 ENCOUNTER — ALLIED HEALTH/NURSE VISIT (OUTPATIENT)
Dept: FAMILY MEDICINE | Facility: CLINIC | Age: 32
End: 2022-06-16
Payer: MEDICARE

## 2022-06-16 ENCOUNTER — PATIENT OUTREACH (OUTPATIENT)
Dept: FAMILY MEDICINE | Facility: CLINIC | Age: 32
End: 2022-06-16

## 2022-06-16 DIAGNOSIS — E34.9 TESTOSTERONE DEFICIENCY: Primary | ICD-10-CM

## 2022-06-16 DIAGNOSIS — E34.9 TESTOSTERONE DEFICIENCY: ICD-10-CM

## 2022-06-16 PROCEDURE — 99207 PR NO CHARGE NURSE ONLY: CPT

## 2022-06-16 PROCEDURE — 96372 THER/PROPH/DIAG INJ SC/IM: CPT | Performed by: FAMILY MEDICINE

## 2022-06-16 RX ORDER — TESTOSTERONE CYPIONATE 200 MG/ML
150 INJECTION, SOLUTION INTRAMUSCULAR
Status: COMPLETED | OUTPATIENT
Start: 2022-06-16 | End: 2022-06-16

## 2022-06-16 RX ADMIN — TESTOSTERONE CYPIONATE 150 MG: 200 INJECTION, SOLUTION INTRAMUSCULAR at 17:05

## 2022-06-16 NOTE — PROGRESS NOTES
"Clinic Administered Medication Documentation    Administrations This Visit     testosterone cypionate (DEPOTESTOSTERONE) injection 150 mg     Admin Date  06/16/2022 Action  Given Dose  150 mg Route  Intramuscular Site  Right Ventrogluteal Administered By  Madelin Norton RN    Ordering Provider: Phani Lea MD    Patient Supplied?: No                  Testosterone Documentation     Prior to injection, verified patient identity using patient's name and date of birth. Medication was administered. Please see MAR and medication order for additional information. Patient instructed to remain in clinic for 15 minutes, report any adverse reaction to staff immediately  and stay in clinic after the injection but patient declined.    Reminders     - Check vial for refills remaining and initiate refill request if no refills remain.      - Verify with patient that medication was paid for at pharmacy. If it was, check the \"patient supplied\" box on the MAR.     Was entire vial of medication used? No, The remainder 50MG of 200MG was discarded as unavoidable waste.  Vial/Syringe: Single dose vial  Expiration Date:  11/30/23  Was this medication supplied by the patient? Yes, Medication was received directly from patient in a tamper proof bag (follow site specific policies)     Given right ventrogluteal    Madelin Norton RN        "

## 2022-06-16 NOTE — TELEPHONE ENCOUNTER
Dr. Alonso  Pt does not have active CAM order for testosterone.   -Onsite provider placed a one time CAM order, need ongoing orders.  -Per chart review Dr. Alonso wants to see pt back in January 2023.   -Please sign order for CAM administration.     Sylvie Louis, RN, BSN, PHN  [facility]

## 2022-06-20 RX ORDER — TESTOSTERONE CYPIONATE 200 MG/ML
150 INJECTION, SOLUTION INTRAMUSCULAR
Status: ACTIVE | OUTPATIENT
Start: 2022-06-30 | End: 2022-09-21

## 2022-06-24 ENCOUNTER — TELEPHONE (OUTPATIENT)
Dept: ENDOCRINOLOGY | Facility: CLINIC | Age: 32
End: 2022-06-24

## 2022-06-24 ENCOUNTER — OFFICE VISIT (OUTPATIENT)
Dept: FAMILY MEDICINE | Facility: CLINIC | Age: 32
End: 2022-06-24
Payer: MEDICARE

## 2022-06-24 VITALS
DIASTOLIC BLOOD PRESSURE: 70 MMHG | HEIGHT: 73 IN | SYSTOLIC BLOOD PRESSURE: 110 MMHG | OXYGEN SATURATION: 100 % | TEMPERATURE: 97.7 F | WEIGHT: 198.2 LBS | RESPIRATION RATE: 14 BRPM | HEART RATE: 62 BPM | BODY MASS INDEX: 26.27 KG/M2

## 2022-06-24 DIAGNOSIS — R79.89 ELEVATED SERUM CREATININE: Primary | ICD-10-CM

## 2022-06-24 DIAGNOSIS — R60.0 LOWER EXTREMITY EDEMA: ICD-10-CM

## 2022-06-24 PROCEDURE — 36415 COLL VENOUS BLD VENIPUNCTURE: CPT | Performed by: NURSE PRACTITIONER

## 2022-06-24 PROCEDURE — 80048 BASIC METABOLIC PNL TOTAL CA: CPT | Performed by: NURSE PRACTITIONER

## 2022-06-24 PROCEDURE — 99213 OFFICE O/P EST LOW 20 MIN: CPT | Performed by: NURSE PRACTITIONER

## 2022-06-24 ASSESSMENT — PAIN SCALES - GENERAL: PAINLEVEL: NO PAIN (0)

## 2022-06-24 NOTE — TELEPHONE ENCOUNTER
Prior Authorization Specialty Medication Request    Medication/Dose: NORDITROPIN FLEXPRO 10 MG/1.5ML SOPN PEN injection  ICD code (if different than what is on RX): Panhypopituitarism (H) [E23.0]    Previously Tried and Failed:      Important Lab Values:   ationale:     Insurance Name:   Insurance ID:   Insurance Phone Number:     Pharmacy Information (if different than what is on RX)  Name:    Phone:

## 2022-06-24 NOTE — PROGRESS NOTES
"  Assessment & Plan     Elevated serum creatinine  Repeat today.  - Basic metabolic panel  (Ca, Cl, CO2, Creat, Gluc, K, Na, BUN); Future  - Basic metabolic panel  (Ca, Cl, CO2, Creat, Gluc, K, Na, BUN)    Lower extremity edema  Improved with increased activity.  Monitor.       BMI:   Estimated body mass index is 26.15 kg/m  as calculated from the following:    Height as of this encounter: 1.854 m (6' 1\").    Weight as of this encounter: 89.9 kg (198 lb 3.2 oz).           No follow-ups on file.    Sabine Pedroza, ADITHYA CNP  [facility] KARLI Cedillo is a 31 year old, presenting for the following health issues:  ER F/U      HPI     ED/UC Followup:    Facility:  [facility]  Date of visit: 6/10/22  Reason for visit: Leg swelling, Elevated serum creatinine, Rash, Hypocalcemia    Current Status: Ankles are better but still swollen. Doing better overall    More active.  No additional symptoms.  Labs normal aside from mildly elevated creatinine.  Normal intake.  Has not needed compression stockings.    Review of Systems   Constitutional, HEENT, cardiovascular, pulmonary, gi and gu systems are negative, except as otherwise noted.      Objective    /70 (BP Location: Right arm, Patient Position: Sitting, Cuff Size: Adult Regular)   Pulse 62   Temp 97.7  F (36.5  C) (Oral)   Resp 14   Ht 1.854 m (6' 1\")   Wt 89.9 kg (198 lb 3.2 oz)   SpO2 100%   BMI 26.15 kg/m    Body mass index is 26.15 kg/m .  Physical Exam   GENERAL: healthy, alert and no distress  RESP: lungs clear to auscultation - no rales, rhonchi or wheezes  CV: regular rate and rhythm, normal S1 S2, no S3 or S4, no murmur, click or rub, no peripheral edema and peripheral pulses strong  PSYCH: mentation appears normal, affect normal/bright                    .  ..  "

## 2022-06-26 LAB
ANION GAP SERPL CALCULATED.3IONS-SCNC: 5 MMOL/L (ref 3–14)
BUN SERPL-MCNC: 12 MG/DL (ref 7–30)
CALCIUM SERPL-MCNC: 8.9 MG/DL (ref 8.5–10.1)
CHLORIDE BLD-SCNC: 105 MMOL/L (ref 94–109)
CO2 SERPL-SCNC: 28 MMOL/L (ref 20–32)
CREAT SERPL-MCNC: 1.74 MG/DL (ref 0.66–1.25)
GFR SERPL CREATININE-BSD FRML MDRD: 53 ML/MIN/1.73M2
GLUCOSE BLD-MCNC: 78 MG/DL (ref 70–99)
POTASSIUM BLD-SCNC: 4.1 MMOL/L (ref 3.4–5.3)
SODIUM SERPL-SCNC: 138 MMOL/L (ref 133–144)

## 2022-06-27 DIAGNOSIS — R79.89 ELEVATED SERUM CREATININE: Primary | ICD-10-CM

## 2022-06-28 NOTE — TELEPHONE ENCOUNTER
PA Initiation    Medication: NORDITROPIN FLEXPRO 10 MG/1.5ML SOPN PEN injection  Insurance Company: [employer] - Phone [phone] Fax [phone]  Pharmacy Filling the Rx: [facility] [address]  Filling Pharmacy Phone: [phone]  Filling Pharmacy Fax: [phone]  Start Date: 6/28/2022

## 2022-06-28 NOTE — TELEPHONE ENCOUNTER
Prior Authorization Approval    Authorization Effective Date: 1/1/2022  Authorization Expiration Date: 6/28/2023  Medication: NORDITROPIN FLEXPRO 10 MG/1.5ML SOPN PEN injection  Approved Dose/Quantity:   Reference #: [id number]   Insurance Company: [employer] - Phone [phone] Fax [phone]  Which Pharmacy is filling the prescription (Not needed for infusion/clinic administered): [facility] [address]  Pharmacy Notified: Yes  Patient Notified: Yes

## 2022-06-30 ENCOUNTER — ALLIED HEALTH/NURSE VISIT (OUTPATIENT)
Dept: FAMILY MEDICINE | Facility: CLINIC | Age: 32
End: 2022-06-30
Payer: MEDICARE

## 2022-06-30 DIAGNOSIS — E34.9 TESTOSTERONE DEFICIENCY: Primary | ICD-10-CM

## 2022-06-30 PROCEDURE — 96372 THER/PROPH/DIAG INJ SC/IM: CPT | Performed by: INTERNAL MEDICINE

## 2022-06-30 PROCEDURE — 99207 PR NO CHARGE NURSE ONLY: CPT

## 2022-06-30 RX ADMIN — TESTOSTERONE CYPIONATE 150 MG: 200 INJECTION, SOLUTION INTRAMUSCULAR at 16:57

## 2022-07-05 DIAGNOSIS — E23.2 DIABETES INSIPIDUS (H): ICD-10-CM

## 2022-07-06 ENCOUNTER — MYC MEDICAL ADVICE (OUTPATIENT)
Dept: FAMILY MEDICINE | Facility: CLINIC | Age: 32
End: 2022-07-06

## 2022-07-06 DIAGNOSIS — F98.8 ATTENTION DEFICIT DISORDER, UNSPECIFIED HYPERACTIVITY PRESENCE: Primary | ICD-10-CM

## 2022-07-06 RX ORDER — DESMOPRESSIN ACETATE 0.1 MG/ML
SOLUTION NASAL
Qty: 10 ML | Refills: 1 | Status: SHIPPED | OUTPATIENT
Start: 2022-07-06 | End: 2022-10-03

## 2022-07-06 RX ORDER — DEXTROAMPHETAMINE SACCHARATE, AMPHETAMINE ASPARTATE MONOHYDRATE, DEXTROAMPHETAMINE SULFATE AND AMPHETAMINE SULFATE 7.5; 7.5; 7.5; 7.5 MG/1; MG/1; MG/1; MG/1
30 CAPSULE, EXTENDED RELEASE ORAL DAILY
Qty: 30 CAPSULE | Refills: 0 | Status: SHIPPED | OUTPATIENT
Start: 2022-07-12 | End: 2024-04-05

## 2022-07-06 NOTE — TELEPHONE ENCOUNTER
06/22/2022  2   05/13/2022  Dextroamp-Amphet Er 30 MG Cap    30.00  30 Je Krystian   [id number]   Messi ([id number])   0/0   [place]   [place]     PDMP Review       Value Time User    State PDMP site checked  Yes 7/6/2022  6:00 PM Mariluz Hess MD      5/13/22 LOV- f/u 6 mos.   Sent over for early refill.

## 2022-07-06 NOTE — TELEPHONE ENCOUNTER
Disp Refills Start End RADHA   amphetamine-dextroamphetamine (ADDERALL XR) 30 MG 24 hr capsule 30 capsule 0 7/3/2022 8/2/2022 --   Sig - Route: Take 1 capsule (30 mg) by mouth daily - Oral   Patient not taking: Reported on 6/24/2022        Sent to pharmacy as: Amphetamine-Dextroamphet ER 30 MG Oral Capsule Extended Release 24 Hour (Adderall XR)   Class: E-Prescribe   Earliest Fill Date: 6/30/2022   Order: [id number]   E-Prescribing Status: Receipt confirmed by pharmacy (5/13/2022  9:57 AM CDT)     I am not sure when they last picked up his prescription.  May have to run .    Keturah Suh RN

## 2022-07-14 ENCOUNTER — ALLIED HEALTH/NURSE VISIT (OUTPATIENT)
Dept: FAMILY MEDICINE | Facility: CLINIC | Age: 32
End: 2022-07-14
Payer: COMMERCIAL

## 2022-07-14 DIAGNOSIS — E34.9 TESTOSTERONE DEFICIENCY: Primary | ICD-10-CM

## 2022-07-14 PROCEDURE — 99207 PR NO CHARGE NURSE ONLY: CPT

## 2022-07-14 PROCEDURE — 96372 THER/PROPH/DIAG INJ SC/IM: CPT | Performed by: INTERNAL MEDICINE

## 2022-07-14 RX ADMIN — TESTOSTERONE CYPIONATE 150 MG: 200 INJECTION, SOLUTION INTRAMUSCULAR at 17:03

## 2022-07-14 NOTE — PROGRESS NOTES
"Clinic Administered Medication Documentation    Administrations This Visit     testosterone cypionate (DEPOTESTOSTERONE) injection 150 mg     Admin Date  07/14/2022 Action  Given Dose  150 mg Route  Intramuscular Site  Right Ventrogluteal Administered By  Madelin Norton RN    Ordering Provider: Sumaya Alonso MD    Patient Supplied?: Yes                  Testosterone Documentation     Prior to injection, verified patient identity using patient's name and date of birth. Medication was administered. Please see MAR and medication order for additional information. Patient instructed to remain in clinic for 15 minutes, report any adverse reaction to staff immediately  and stay in clinic after the injection but patient declined.    Reminders     - Check vial for refills remaining and initiate refill request if no refills remain.      - Verify with patient that medication was paid for at pharmacy. If it was, check the \"patient supplied\" box on the MAR.     Was entire vial of medication used? No, The remainder 50MG of 200MG was discarded as unavoidable waste.  Vial/Syringe: Single dose vial  Expiration Date:  08/30/2023  Was this medication supplied by the patient? Yes, Medication was received directly from patient in a tamper proof bag (follow site specific policies)    Madelin Norton RN    "

## 2022-08-08 DIAGNOSIS — E34.9 TESTOSTERONE DEFICIENCY: ICD-10-CM

## 2022-08-08 RX ORDER — TESTOSTERONE CYPIONATE 200 MG/ML
150 INJECTION, SOLUTION INTRAMUSCULAR
Qty: 1 ML | Refills: 5 | Status: SHIPPED | OUTPATIENT
Start: 2022-08-08 | End: 2022-11-08

## 2022-08-11 ENCOUNTER — ALLIED HEALTH/NURSE VISIT (OUTPATIENT)
Dept: FAMILY MEDICINE | Facility: CLINIC | Age: 32
End: 2022-08-11
Payer: COMMERCIAL

## 2022-08-11 DIAGNOSIS — E34.9 TESTOSTERONE DEFICIENCY: Primary | ICD-10-CM

## 2022-08-11 PROCEDURE — 99207 PR NO CHARGE NURSE ONLY: CPT

## 2022-08-11 PROCEDURE — 96372 THER/PROPH/DIAG INJ SC/IM: CPT | Performed by: INTERNAL MEDICINE

## 2022-08-11 RX ADMIN — TESTOSTERONE CYPIONATE 150 MG: 200 INJECTION, SOLUTION INTRAMUSCULAR at 15:04

## 2022-08-11 NOTE — PROGRESS NOTES
"Clinic Administered Medication Documentation    Administrations This Visit     testosterone cypionate (DEPOTESTOSTERONE) injection 150 mg     Admin Date  08/11/2022 Action  Given Dose  150 mg Route  Intramuscular Site  Left Ventrogluteal Administered By  Madelin Norton RN    Ordering Provider: Sumaya Alonso MD    Patient Supplied?: No                  Testosterone Documentation     Prior to injection, verified patient identity using patient's name and date of birth. Medication was administered. Please see MAR and medication order for additional information. Patient instructed to remain in clinic for 15 minutes, report any adverse reaction to staff immediately  and stay in clinic after the injection but patient declined.    Reminders     - Check vial for refills remaining and initiate refill request if no refills remain.      - Verify with patient that medication was paid for at pharmacy. If it was, check the \"patient supplied\" box on the MAR.     Was entire vial of medication used? No, The remainder 50MG of 200MG was discarded as unavoidable waste.  Vial/Syringe: Single dose vial  Expiration Date:  08/30/2023  Was this medication supplied by the patient? Yes, Medication was received directly from patient in a tamper proof bag (follow site specific policies)  Madelin Norton RN    "

## 2022-08-25 ENCOUNTER — ALLIED HEALTH/NURSE VISIT (OUTPATIENT)
Dept: FAMILY MEDICINE | Facility: CLINIC | Age: 32
End: 2022-08-25
Payer: MEDICARE

## 2022-08-25 DIAGNOSIS — E34.9 TESTOSTERONE DEFICIENCY: Primary | ICD-10-CM

## 2022-08-25 PROCEDURE — 96372 THER/PROPH/DIAG INJ SC/IM: CPT | Performed by: INTERNAL MEDICINE

## 2022-08-25 PROCEDURE — 99207 PR NO CHARGE NURSE ONLY: CPT

## 2022-08-25 RX ADMIN — TESTOSTERONE CYPIONATE 150 MG: 200 INJECTION, SOLUTION INTRAMUSCULAR at 16:40

## 2022-08-25 NOTE — PROGRESS NOTES
"Clinic Administered Medication Documentation    Administrations This Visit     testosterone cypionate (DEPOTESTOSTERONE) injection 150 mg     Admin Date  08/25/2022 Action  Given Dose  150 mg Route  Intramuscular Site  Right Ventrogluteal Administered By  Madelin Norton RN    Ordering Provider: Sumaya Alonso MD    Patient Supplied?: Yes                  Testosterone Documentation     Prior to injection, verified patient identity using patient's name and date of birth. Medication was administered. Please see MAR and medication order for additional information. Patient instructed to remain in clinic for 15 minutes, report any adverse reaction to staff immediately  and stay in clinic after the injection but patient declined.    Reminders     - Check vial for refills remaining and initiate refill request if no refills remain.      - Verify with patient that medication was paid for at pharmacy. If it was, check the \"patient supplied\" box on the MAR.     Was entire vial of medication used? No, The remainder 50MG of 200MG was discarded as unavoidable waste.  Vial/Syringe: Single dose vial  Expiration Date:  08/30/2023  Was this medication supplied by the patient? Yes, Medication was received directly from patient in a tamper proof bag (follow site specific policies)     Madelin Norton RN      "

## 2022-09-06 ENCOUNTER — TELEPHONE (OUTPATIENT)
Dept: ENDOCRINOLOGY | Facility: CLINIC | Age: 32
End: 2022-09-06

## 2022-09-06 DIAGNOSIS — E23.0 PANHYPOPITUITARISM (H): ICD-10-CM

## 2022-09-06 DIAGNOSIS — E03.9 HYPOTHYROIDISM, UNSPECIFIED TYPE: ICD-10-CM

## 2022-09-06 DIAGNOSIS — E34.9 TESTOSTERONE DEFICIENCY: Primary | ICD-10-CM

## 2022-09-06 RX ORDER — LEVOTHYROXINE SODIUM 137 UG/1
TABLET ORAL
Qty: 90 TABLET | Refills: 0 | Status: SHIPPED | OUTPATIENT
Start: 2022-09-06 | End: 2022-11-08

## 2022-09-06 NOTE — TELEPHONE ENCOUNTER
PA Initiation    Medication: testosterone cypionate (DEPOTESTOSTERONE) 200 MG/ML injection  Insurance Company: [employer] - Phone [phone] Fax [phone]  Pharmacy Filling the Rx: [place], [place] - [address]  Filling Pharmacy Phone: [phone]  Filling Pharmacy Fax: [phone]  Start Date: 9/6/2022

## 2022-09-06 NOTE — TELEPHONE ENCOUNTER
Father calling and patient needs this by this Thursday otherwise he will to wait for another two weeks

## 2022-09-06 NOTE — TELEPHONE ENCOUNTER
Please do not close this encounter until this has been addressed.  (prior auth approved/denied, prescriber refusal to complete prior auth or medication changed/discontinued)    Prior Authorization needed on: testosterone solution  Drug NDC: [address]-71     Insurance: [employer] part D  Bin:  [id number]  Pcn:  [id number]  Member ID: [id number]   Insurance phone #: [phone]    Pharmacy NPI: [id number]  Pharmacy Phone #: [phone]  Pharmacy Fax #: [phone]    Please let us know if the PA gets approved or denied or if medication is changed    Thanks,  Ilana Nicole CPhT  [facility]  [phone]

## 2022-09-07 NOTE — TELEPHONE ENCOUNTER
Spoke to mom Dana, appts are scheduled for Jan. Please make sure all labs that are needed will be ordered, last time there was an issue with not getting all the labs ordered.     Also, can you check on the Testosterone PA. He needs to get the shot tomorrow.     Please call back at [phone].

## 2022-09-07 NOTE — TELEPHONE ENCOUNTER
Prior Authorization Approval    Authorization Effective Date: 1/1/2022  Authorization Expiration Date: 9/7/2023  Medication: testosterone cypionate (DEPOTESTOSTERONE) 200 MG/ML injection  Approved Dose/Quantity:   Reference #: [id number]   Insurance Company: [employer] - Phone [phone] Fax [phone]  Which Pharmacy is filling the prescription (Not needed for infusion/clinic administered): [facility] [address] [facility]  Pharmacy Notified: Yes  Patient Notified: Yes

## 2022-09-08 ENCOUNTER — ALLIED HEALTH/NURSE VISIT (OUTPATIENT)
Dept: FAMILY MEDICINE | Facility: CLINIC | Age: 32
End: 2022-09-08
Payer: MEDICARE

## 2022-09-08 DIAGNOSIS — E34.9 TESTOSTERONE DEFICIENCY: Primary | ICD-10-CM

## 2022-09-08 PROCEDURE — 96372 THER/PROPH/DIAG INJ SC/IM: CPT | Performed by: INTERNAL MEDICINE

## 2022-09-08 PROCEDURE — 99207 PR NO CHARGE NURSE ONLY: CPT

## 2022-09-08 RX ADMIN — TESTOSTERONE CYPIONATE 150 MG: 200 INJECTION, SOLUTION INTRAMUSCULAR at 16:56

## 2022-09-08 NOTE — PROGRESS NOTES
"Clinic Administered Medication Documentation    Administrations This Visit     testosterone cypionate (DEPOTESTOSTERONE) injection 150 mg     Admin Date  09/08/2022 Action  Given Dose  150 mg Route  Intramuscular Site  Left Ventrogluteal Administered By  Madelin Norton RN    Ordering Provider: Sumaya Alonso MD    Patient Supplied?: No                  Testosterone Documentation     Prior to injection, verified patient identity using patient's name and date of birth. Medication was administered. Please see MAR and medication order for additional information. Patient instructed to remain in clinic for 15 minutes, report any adverse reaction to staff immediately  and stay in clinic after the injection but patient declined.    Reminders     - Check vial for refills remaining and initiate refill request if no refills remain.      - Verify with patient that medication was paid for at pharmacy. If it was, check the \"patient supplied\" box on the MAR.     Was entire vial of medication used? No, The remainder 50MG of 200MG was discarded as unavoidable waste.  Vial/Syringe: Single dose vial  Expiration Date:  08/30/2023  Was this medication supplied by the patient? Yes, Medication was received directly from patient in a tamper proof bag (follow site specific policies)     Given left ventrogluteal    Madelin Norton RN        "

## 2022-09-22 ENCOUNTER — ALLIED HEALTH/NURSE VISIT (OUTPATIENT)
Dept: FAMILY MEDICINE | Facility: CLINIC | Age: 32
End: 2022-09-22
Payer: MEDICARE

## 2022-09-22 ENCOUNTER — TELEPHONE (OUTPATIENT)
Dept: FAMILY MEDICINE | Facility: CLINIC | Age: 32
End: 2022-09-22

## 2022-09-22 DIAGNOSIS — E34.9 TESTOSTERONE DEFICIENCY: Primary | ICD-10-CM

## 2022-09-22 PROCEDURE — 99207 PR NO CHARGE NURSE ONLY: CPT

## 2022-09-22 PROCEDURE — 96372 THER/PROPH/DIAG INJ SC/IM: CPT | Performed by: PHYSICIAN ASSISTANT

## 2022-09-22 RX ORDER — TESTOSTERONE CYPIONATE 200 MG/ML
150 INJECTION, SOLUTION INTRAMUSCULAR
Status: COMPLETED | OUTPATIENT
Start: 2022-09-22 | End: 2022-09-22

## 2022-09-22 RX ADMIN — TESTOSTERONE CYPIONATE 150 MG: 200 INJECTION, SOLUTION INTRAMUSCULAR at 16:49

## 2022-09-22 NOTE — PROGRESS NOTES
"Clinic Administered Medication Documentation    Administrations This Visit     testosterone cypionate (DEPOTESTOSTERONE) injection 150 mg     Admin Date  09/22/2022 Action  Given Dose  150 mg Route  Intramuscular Site  Right Ventrogluteal Administered By  Madelin Norton RN    Ordering Provider: Sourav Duran PA-C    Patient Supplied?: Yes                  Testosterone Documentation     Prior to injection, verified patient identity using patient's name and date of birth. Medication was administered. Please see MAR and medication order for additional information. Patient instructed to remain in clinic for 15 minutes, report any adverse reaction to staff immediately  and stay in clinic after the injection but patient declined.    Reminders     - Check vial for refills remaining and initiate refill request if no refills remain.      - Verify with patient that medication was paid for at pharmacy. If it was, check the \"patient supplied\" box on the MAR.     Was entire vial of medication used? No, The remainder 50 MG of 200MG was discarded as unavoidable waste.  Vial/Syringe: Single dose vial  Expiration Date:  08/31/2023  Was this medication supplied by the patient? Yes, Medication was received directly from patient in a tamper proof bag (follow site specific policies)     Madelin Norton RN          "

## 2022-09-22 NOTE — TELEPHONE ENCOUNTER
Dr. Alonso, pt needs new CAM order for testosterone (see last order below with end date 9/21/22)     Dose Frequency Start End RADHA   testosterone cypionate (DEPOTESTOSTERONE) injection 150 mg 150 mg EVERY 14 DAYS 6/30/2022 9/21/2022      T'd up med and routing refill request to provider for review/approval because:  Drug not on the [facility] refill protocol   Madelin Norton RN

## 2022-09-23 RX ORDER — TESTOSTERONE CYPIONATE 200 MG/ML
150 INJECTION, SOLUTION INTRAMUSCULAR
Status: ACTIVE | OUTPATIENT
Start: 2022-09-23 | End: 2022-12-16

## 2022-10-02 DIAGNOSIS — E23.2 DIABETES INSIPIDUS (H): ICD-10-CM

## 2022-10-03 ENCOUNTER — TELEPHONE (OUTPATIENT)
Dept: ENDOCRINOLOGY | Facility: CLINIC | Age: 32
End: 2022-10-03

## 2022-10-03 RX ORDER — DESMOPRESSIN ACETATE 0.1 MG/ML
SOLUTION NASAL
Qty: 10 ML | Refills: 1 | Status: SHIPPED | OUTPATIENT
Start: 2022-10-03 | End: 2022-12-20

## 2022-10-03 NOTE — TELEPHONE ENCOUNTER
[facility] Call Center    Phone Message    May a detailed message be left on voicemail: yes     Reason for Call: Other: Patient's mom is calling asking if this medication could please be refilled on a rush as the patient is completely out. The pharmacy faxed a request yesterday 10/02/22. Please follow up. Thank you.    Action Taken: Other: Endo    Travel Screening: Not Applicable

## 2022-10-05 ENCOUNTER — TELEPHONE (OUTPATIENT)
Dept: FAMILY MEDICINE | Facility: CLINIC | Age: 32
End: 2022-10-05

## 2022-10-05 ENCOUNTER — MEDICAL CORRESPONDENCE (OUTPATIENT)
Dept: HEALTH INFORMATION MANAGEMENT | Facility: CLINIC | Age: 32
End: 2022-10-05

## 2022-10-05 NOTE — TELEPHONE ENCOUNTER
Orders in pcp basket. Review allergies and contraindications (and cross out accordingly). Sign second page and fax.    Rosie FELIX  [facility]   [facility]

## 2022-10-06 ENCOUNTER — ALLIED HEALTH/NURSE VISIT (OUTPATIENT)
Dept: FAMILY MEDICINE | Facility: CLINIC | Age: 32
End: 2022-10-06
Payer: MEDICARE

## 2022-10-06 DIAGNOSIS — E34.9 TESTOSTERONE DEFICIENCY: Primary | ICD-10-CM

## 2022-10-06 PROCEDURE — 99207 PR NO CHARGE NURSE ONLY: CPT

## 2022-10-06 PROCEDURE — 96372 THER/PROPH/DIAG INJ SC/IM: CPT | Performed by: INTERNAL MEDICINE

## 2022-10-06 RX ADMIN — TESTOSTERONE CYPIONATE 150 MG: 200 INJECTION, SOLUTION INTRAMUSCULAR at 17:08

## 2022-10-06 NOTE — PROGRESS NOTES
"Clinic Administered Medication Documentation    Administrations This Visit     testosterone cypionate (DEPOTESTOSTERONE) injection 150 mg     Admin Date  10/06/2022 Action  Given Dose  150 mg Route  Intramuscular Site  Left Ventrogluteal Administered By  Chevy Benitez RN    Ordering Provider: Sumaya Alonso MD    NDC: 2483-2471-28    Lot#: [id number]    : HOSPIRA    Patient Supplied?: Yes                  Testosterone Documentation     Prior to injection, verified patient identity using patient's name and date of birth. Medication was administered. Please see MAR and medication order for additional information. Patient instructed to remain in clinic for 15 minutes, report any adverse reaction to staff immediately  and stay in clinic after the injection but patient declined.    Reminders     - Check vial for refills remaining and initiate refill request if no refills remain.      - Verify with patient that medication was paid for at pharmacy. If it was, check the \"patient supplied\" box on the MAR.     Was entire vial of medication used? No, The remainder 50MG of 200MG was discarded as unavoidable waste.  Vial/Syringe: Single dose vial  Expiration Date:  8/31/2023  Was this medication supplied by the patient? Yes, Medication was received directly from patient in a tamper proof bag (follow site specific policies)    Chevy Benitez RN on 10/6/2022 at 5:10 PM  "

## 2022-10-14 DIAGNOSIS — E03.9 HYPOTHYROIDISM, UNSPECIFIED TYPE: ICD-10-CM

## 2022-10-17 RX ORDER — LEVOTHYROXINE SODIUM 137 UG/1
TABLET ORAL
Qty: 90 TABLET | Refills: 0 | OUTPATIENT
Start: 2022-10-17

## 2022-10-20 ENCOUNTER — ALLIED HEALTH/NURSE VISIT (OUTPATIENT)
Dept: FAMILY MEDICINE | Facility: CLINIC | Age: 32
End: 2022-10-20
Payer: MEDICARE

## 2022-10-20 DIAGNOSIS — E34.9 TESTOSTERONE DEFICIENCY: ICD-10-CM

## 2022-10-20 DIAGNOSIS — Z23 NEED FOR PROPHYLACTIC VACCINATION AND INOCULATION AGAINST INFLUENZA: Primary | ICD-10-CM

## 2022-10-20 PROCEDURE — G0008 ADMIN INFLUENZA VIRUS VAC: HCPCS

## 2022-10-20 PROCEDURE — 96372 THER/PROPH/DIAG INJ SC/IM: CPT | Performed by: INTERNAL MEDICINE

## 2022-10-20 PROCEDURE — 90686 IIV4 VACC NO PRSV 0.5 ML IM: CPT

## 2022-10-20 PROCEDURE — 99207 PR NO CHARGE NURSE ONLY: CPT

## 2022-10-20 RX ADMIN — TESTOSTERONE CYPIONATE 150 MG: 200 INJECTION, SOLUTION INTRAMUSCULAR at 17:04

## 2022-10-20 NOTE — PROGRESS NOTES
"Clinic Administered Medication Documentation    Administrations This Visit     testosterone cypionate (DEPOTESTOSTERONE) injection 150 mg     Admin Date  10/20/2022 Action  Given Dose  150 mg Route  Intramuscular Site  Right Ventrogluteal Administered By  Madelin Norton RN    Ordering Provider: Sumaya Alonso MD    Patient Supplied?: Yes                  Testosterone Documentation     Prior to injection, verified patient identity using patient's name and date of birth. Medication was administered. Please see MAR and medication order for additional information. Patient instructed to remain in clinic for 15 minutes, report any adverse reaction to staff immediately  and stay in clinic after the injection but patient declined.    Reminders     - Check vial for refills remaining and initiate refill request if no refills remain.      - Verify with patient that medication was paid for at pharmacy. If it was, check the \"patient supplied\" box on the MAR.     Was entire vial of medication used? No, The remainder 50MG of 200MG was discarded as unavoidable waste.  Vial/Syringe: Single dose vial  Expiration Date:  08/31/2023  Was this medication supplied by the patient? Yes, Medication was received directly from patient in a tamper proof bag (follow site specific policies)    Madelin Norton RN    "

## 2022-10-28 NOTE — TELEPHONE ENCOUNTER
----- Message from Melissa Warner sent at 12/13/2018  9:25 AM CST -----  Regarding: change in medication   Hello,   PA was approved for genotropin. Per insurance will have to fill norditropin until the end of the year then will have to switch to genotropin due to starting 01/01/2019 genotropin will be the formulary product please send over new rx to pharmacy patient is not aware of change.  Thank you   Melissa   
Endo staff- can you please look into this?  Currently he is on Nordotropin.  Will need genoptropin starting 1/2019.    Please pend the orders with correct quantity, select pharmacy and then send for signature. Also associate with correct diagnosis.    Thank you.    Sumaya Alonso        
norditropin was already refilled today.  Annalee Nieto, CMA    
Attending Attestation (For Attendings USE Only)...

## 2022-11-02 DIAGNOSIS — E34.9 TESTOSTERONE DEFICIENCY: ICD-10-CM

## 2022-11-08 DIAGNOSIS — E03.9 HYPOTHYROIDISM, UNSPECIFIED TYPE: ICD-10-CM

## 2022-11-08 DIAGNOSIS — E23.0 PANHYPOPITUITARISM (H): ICD-10-CM

## 2022-11-08 RX ORDER — LEVOTHYROXINE SODIUM 137 UG/1
TABLET ORAL
Qty: 90 TABLET | Refills: 3 | Status: SHIPPED | OUTPATIENT
Start: 2022-11-08 | End: 2023-01-17

## 2022-11-08 RX ORDER — TESTOSTERONE CYPIONATE 200 MG/ML
150 INJECTION, SOLUTION INTRAMUSCULAR
Qty: 1 ML | Refills: 5 | Status: SHIPPED | OUTPATIENT
Start: 2022-11-08 | End: 2023-01-17

## 2022-11-08 RX ORDER — SOMATROPIN 10 MG/1.5ML
INJECTION, SOLUTION SUBCUTANEOUS
Qty: 3 ML | Refills: 2 | Status: SHIPPED | OUTPATIENT
Start: 2022-11-08 | End: 2023-01-04

## 2022-11-17 ENCOUNTER — ALLIED HEALTH/NURSE VISIT (OUTPATIENT)
Dept: FAMILY MEDICINE | Facility: CLINIC | Age: 32
End: 2022-11-17
Payer: MEDICARE

## 2022-11-17 DIAGNOSIS — E34.9 TESTOSTERONE DEFICIENCY: Primary | ICD-10-CM

## 2022-11-17 PROCEDURE — 96372 THER/PROPH/DIAG INJ SC/IM: CPT | Performed by: INTERNAL MEDICINE

## 2022-11-17 PROCEDURE — 99207 PR NO CHARGE NURSE ONLY: CPT

## 2022-11-17 RX ADMIN — TESTOSTERONE CYPIONATE 150 MG: 200 INJECTION, SOLUTION INTRAMUSCULAR at 15:51

## 2022-11-17 NOTE — PROGRESS NOTES
"Clinic Administered Medication Documentation    Administrations This Visit     testosterone cypionate (DEPOTESTOSTERONE) injection 150 mg     Admin Date  11/17/2022 Action  Given Dose  150 mg Route  Intramuscular Site  Left Ventrogluteal Administered By  Jael Mccauley RN    Ordering Provider: Sumaya Alonso MD    Patient Supplied?: Yes    Comments: Pt due 11/17/22                  Testosterone Documentation     Prior to injection, verified patient identity using patient's name and date of birth. Medication was administered. Please see MAR and medication order for additional information. Patient instructed to remain in clinic for 15 minutes, report any adverse reaction to staff immediately  and stay in clinic after the injection but patient declined.    Reminders     - Check vial for refills remaining and initiate refill request if no refills remain.      - Verify with patient that medication was paid for at pharmacy. If it was, check the \"patient supplied\" box on the MAR.     Was entire vial of medication used? No, The remainder 50MG of 200MG was discarded as unavoidable waste.  Vial/Syringe: Single dose vial  Expiration Date:  08/2023  Was this medication supplied by the patient? Yes, Medication was received directly from patient in a tamper proof bag (follow site specific policies)     Jael MONTERO RN  [facility]          "

## 2022-11-20 ENCOUNTER — HEALTH MAINTENANCE LETTER (OUTPATIENT)
Age: 32
End: 2022-11-20

## 2022-12-01 ENCOUNTER — ALLIED HEALTH/NURSE VISIT (OUTPATIENT)
Dept: FAMILY MEDICINE | Facility: CLINIC | Age: 32
End: 2022-12-01
Payer: MEDICARE

## 2022-12-01 ENCOUNTER — TELEPHONE (OUTPATIENT)
Dept: FAMILY MEDICINE | Facility: CLINIC | Age: 32
End: 2022-12-01

## 2022-12-01 DIAGNOSIS — E34.9 TESTOSTERONE DEFICIENCY: Primary | ICD-10-CM

## 2022-12-01 PROCEDURE — 99207 PR NO CHARGE NURSE ONLY: CPT

## 2022-12-01 PROCEDURE — 96372 THER/PROPH/DIAG INJ SC/IM: CPT | Performed by: INTERNAL MEDICINE

## 2022-12-01 RX ADMIN — TESTOSTERONE CYPIONATE 150 MG: 200 INJECTION, SOLUTION INTRAMUSCULAR at 15:46

## 2022-12-01 NOTE — PROGRESS NOTES
"Clinic Administered Medication Documentation    Administrations This Visit     testosterone cypionate (DEPOTESTOSTERONE) injection 150 mg     Admin Date  12/01/2022 Action  Given Dose  150 mg Route  Intramuscular Site  Right Ventrogluteal Administered By  Jael Mccauley RN    Ordering Provider: Sumaya Alonso MD    Patient Supplied?: Yes                  Testosterone Documentation     Prior to injection, verified patient identity using patient's name and date of birth. Medication was administered. Please see MAR and medication order for additional information. Patient instructed to remain in clinic for 15 minutes, report any adverse reaction to staff immediately  and stay in clinic after the injection but patient declined.    Reminders     - Check vial for refills remaining and initiate refill request if no refills remain.      - Verify with patient that medication was paid for at pharmacy. If it was, check the \"patient supplied\" box on the MAR.     Was entire vial of medication used? No, The remainder 50MG of 200MG was discarded as unavoidable waste.  Vial/Syringe: Single dose vial  Expiration Date:  05/2025  Was this medication supplied by the patient? Yes, Medication was received directly from patient in a tamper proof bag (follow site specific policies)     Jael MONTERO RN  Patient Advocate Viola REAL RN  [facility]  [phone]        "

## 2022-12-01 NOTE — TELEPHONE ENCOUNTER
Pt needs new CAM orders placed as the current order ends on 12/16/22  testosterone cypionate (DEPOTESTOSTERONE) injection 150 mg 150 mg EVERY 14 DAYS 9/23/2022 12/16/2022 --     Pt has the following upcoming testosterone injection appointments scheduled: 12/15/22 and 12/29/22    Pt has lab draw scheduled for 1/3/23 and appt with you, Dr. Alonso on 1/17/23    Please put in new CAM order so pt can continue to receive testosterone injections between now and his upcoming appt with you    Routing to Dr. Alonso to review and put in new CAM order    Jael MONTERO RN  [facility]

## 2022-12-01 NOTE — TELEPHONE ENCOUNTER
Pt's was seen today for testosterone injection. RN notes CAM order ends on 12/16/22. Please advise if pt needs future labs or appointment.     Routing to pt's Endocrinologist to review and advise    Jael MONTERO RN  [facility]

## 2022-12-05 DIAGNOSIS — E23.0 PANHYPOPITUITARISM (H): ICD-10-CM

## 2022-12-05 RX ORDER — TESTOSTERONE CYPIONATE 200 MG/ML
150 INJECTION, SOLUTION INTRAMUSCULAR
Status: DISCONTINUED | OUTPATIENT
Start: 2022-12-15 | End: 2023-01-26 | Stop reason: DRUGHIGH

## 2022-12-05 RX ORDER — SOMATROPIN 10 MG/1.5ML
INJECTION, SOLUTION SUBCUTANEOUS
Qty: 3 ML | Refills: 2 | OUTPATIENT
Start: 2022-12-05

## 2022-12-05 NOTE — TELEPHONE ENCOUNTER
Pending Prescriptions:                       Disp   Refills    somatropin (NORDITROPIN FLEXPRO) 10 MG/1.5*3 mL   2

## 2022-12-07 RX ORDER — SOMATROPIN 10 MG/1.5ML
INJECTION, SOLUTION SUBCUTANEOUS
Qty: 3 ML | Refills: 2 | OUTPATIENT
Start: 2022-12-07

## 2022-12-15 ENCOUNTER — ALLIED HEALTH/NURSE VISIT (OUTPATIENT)
Dept: FAMILY MEDICINE | Facility: CLINIC | Age: 32
End: 2022-12-15
Payer: MEDICARE

## 2022-12-15 DIAGNOSIS — E34.9 TESTOSTERONE DEFICIENCY: Primary | ICD-10-CM

## 2022-12-15 PROCEDURE — 99207 PR NO CHARGE NURSE ONLY: CPT

## 2022-12-15 PROCEDURE — 96372 THER/PROPH/DIAG INJ SC/IM: CPT | Performed by: INTERNAL MEDICINE

## 2022-12-15 RX ADMIN — TESTOSTERONE CYPIONATE 150 MG: 200 INJECTION, SOLUTION INTRAMUSCULAR at 15:44

## 2022-12-15 NOTE — PROGRESS NOTES
"Clinic Administered Medication Documentation    Administrations This Visit     testosterone cypionate (DEPOTESTOSTERONE) injection 150 mg     Admin Date  12/15/2022 Action  Given Dose  150 mg Route  Intramuscular Site  Left Ventrogluteal Administered By  Madelin Norton RN    Ordering Provider: Sumaya Alonso MD    Patient Supplied?: Yes                  Testosterone Documentation     Prior to injection, verified patient identity using patient's name and date of birth. Medication was administered. Please see MAR and medication order for additional information. Patient instructed to remain in clinic for 15 minutes, report any adverse reaction to staff immediately  and stay in clinic after the injection but patient declined.    Reminders     - Check vial for refills remaining and initiate refill request if no refills remain.      - Verify with patient that medication was paid for at pharmacy. If it was, check the \"patient supplied\" box on the MAR.     Was entire vial of medication used? No, The remainder 50MG of 200MG was discarded as unavoidable waste.  Vial/Syringe: Single dose vial  Expiration Date:  05/30/25  Was this medication supplied by the patient? Yes, Medication was received directly from patient in a tamper proof bag (follow site specific policies)     Madelin Norton RN        "

## 2022-12-20 DIAGNOSIS — E23.2 DIABETES INSIPIDUS (H): ICD-10-CM

## 2022-12-20 RX ORDER — DESMOPRESSIN ACETATE 0.1 MG/ML
SOLUTION NASAL
Qty: 10 ML | Refills: 1 | Status: SHIPPED | OUTPATIENT
Start: 2022-12-20 | End: 2023-03-13

## 2022-12-29 ENCOUNTER — ALLIED HEALTH/NURSE VISIT (OUTPATIENT)
Dept: FAMILY MEDICINE | Facility: CLINIC | Age: 32
End: 2022-12-29
Payer: MEDICARE

## 2022-12-29 DIAGNOSIS — E34.9 TESTOSTERONE DEFICIENCY: Primary | ICD-10-CM

## 2022-12-29 PROCEDURE — 99207 PR NO CHARGE NURSE ONLY: CPT

## 2022-12-29 PROCEDURE — 96372 THER/PROPH/DIAG INJ SC/IM: CPT | Performed by: INTERNAL MEDICINE

## 2022-12-29 RX ADMIN — TESTOSTERONE CYPIONATE 150 MG: 200 INJECTION, SOLUTION INTRAMUSCULAR at 15:59

## 2022-12-29 NOTE — NURSING NOTE
"Clinic Administered Medication Documentation    Administrations This Visit     testosterone cypionate (DEPOTESTOSTERONE) injection 150 mg     Admin Date  12/29/2022 Action  Given Dose  150 mg Route  Intramuscular Site  Right Ventrogluteal Administered By  Sylvie Quinn RN    Ordering Provider: Sumaya Alonso MD    Patient Supplied?: Yes                  Testosterone Documentation     Prior to injection, verified patient identity using patient's name and date of birth. Medication was administered. Please see MAR and medication order for additional information. Patient instructed to remain in clinic for 15 minutes, report any adverse reaction to staff immediately  and stay in clinic after the injection but patient declined.    Reminders     - Check vial for refills remaining and initiate refill request if no refills remain.      - Verify with patient that medication was paid for at pharmacy. If it was, check the \"patient supplied\" box on the MAR.     Was entire vial of medication used? No, The remainder 50 MG of 200MG was discarded as unavoidable waste.  Vial/Syringe: Single dose vial  Expiration Date:  05/2025  Was this medication supplied by the patient? Yes, Medication was received directly from patient in a tamper proof bag (follow site specific policies)     Sylvie Quinn RN  PAL (Patient Advocate Liason)  [facility]        "

## 2023-01-04 DIAGNOSIS — E23.0 PANHYPOPITUITARISM (H): ICD-10-CM

## 2023-01-04 RX ORDER — SOMATROPIN 10 MG/1.5ML
INJECTION, SOLUTION SUBCUTANEOUS
Qty: 3 ML | Refills: 1 | Status: SHIPPED | OUTPATIENT
Start: 2023-01-04 | End: 2023-01-17

## 2023-01-05 ENCOUNTER — LAB (OUTPATIENT)
Dept: LAB | Facility: CLINIC | Age: 33
End: 2023-01-05
Payer: MEDICARE

## 2023-01-05 DIAGNOSIS — E23.0 PANHYPOPITUITARISM (H): ICD-10-CM

## 2023-01-05 DIAGNOSIS — R79.89 ELEVATED SERUM CREATININE: ICD-10-CM

## 2023-01-05 DIAGNOSIS — F98.8 ATTENTION DEFICIT DISORDER, UNSPECIFIED HYPERACTIVITY PRESENCE: Primary | ICD-10-CM

## 2023-01-05 DIAGNOSIS — E03.9 HYPOTHYROIDISM, UNSPECIFIED TYPE: ICD-10-CM

## 2023-01-05 DIAGNOSIS — E34.9 TESTOSTERONE DEFICIENCY: ICD-10-CM

## 2023-01-05 LAB
ANION GAP SERPL CALCULATED.3IONS-SCNC: 12 MMOL/L (ref 7–15)
BUN SERPL-MCNC: 13.1 MG/DL (ref 6–20)
CALCIUM SERPL-MCNC: 9.3 MG/DL (ref 8.6–10)
CHLORIDE SERPL-SCNC: 102 MMOL/L (ref 98–107)
CREAT SERPL-MCNC: 1.49 MG/DL (ref 0.67–1.17)
DEPRECATED HCO3 PLAS-SCNC: 26 MMOL/L (ref 22–29)
ERYTHROCYTE [DISTWIDTH] IN BLOOD BY AUTOMATED COUNT: 14.1 % (ref 10–15)
GFR SERPL CREATININE-BSD FRML MDRD: 64 ML/MIN/1.73M2
GLUCOSE SERPL-MCNC: 85 MG/DL (ref 70–99)
HCT VFR BLD AUTO: 41.9 % (ref 40–53)
HGB BLD-MCNC: 13.7 G/DL (ref 13.3–17.7)
MCH RBC QN AUTO: 29.8 PG (ref 26.5–33)
MCHC RBC AUTO-ENTMCNC: 32.7 G/DL (ref 31.5–36.5)
MCV RBC AUTO: 91 FL (ref 78–100)
PLATELET # BLD AUTO: 213 10E3/UL (ref 150–450)
POTASSIUM SERPL-SCNC: 4 MMOL/L (ref 3.4–5.3)
PROLACTIN SERPL 3RD IS-MCNC: 4 NG/ML (ref 4–15)
RBC # BLD AUTO: 4.59 10E6/UL (ref 4.4–5.9)
SHBG SERPL-SCNC: 28 NMOL/L (ref 11–80)
SODIUM SERPL-SCNC: 140 MMOL/L (ref 136–145)
T4 FREE SERPL-MCNC: 0.28 NG/DL (ref 0.9–1.7)
TSH SERPL DL<=0.005 MIU/L-ACNC: 6.42 UIU/ML (ref 0.3–4.2)
WBC # BLD AUTO: 5.1 10E3/UL (ref 4–11)

## 2023-01-05 PROCEDURE — 84146 ASSAY OF PROLACTIN: CPT

## 2023-01-05 PROCEDURE — 84270 ASSAY OF SEX HORMONE GLOBUL: CPT

## 2023-01-05 PROCEDURE — 80048 BASIC METABOLIC PNL TOTAL CA: CPT

## 2023-01-05 PROCEDURE — 84403 ASSAY OF TOTAL TESTOSTERONE: CPT

## 2023-01-05 PROCEDURE — 84439 ASSAY OF FREE THYROXINE: CPT

## 2023-01-05 PROCEDURE — 85027 COMPLETE CBC AUTOMATED: CPT

## 2023-01-05 PROCEDURE — 36415 COLL VENOUS BLD VENIPUNCTURE: CPT

## 2023-01-05 PROCEDURE — 84443 ASSAY THYROID STIM HORMONE: CPT

## 2023-01-05 PROCEDURE — 84305 ASSAY OF SOMATOMEDIN: CPT

## 2023-01-05 RX ORDER — DEXTROAMPHETAMINE SACCHARATE, AMPHETAMINE ASPARTATE MONOHYDRATE, DEXTROAMPHETAMINE SULFATE AND AMPHETAMINE SULFATE 7.5; 7.5; 7.5; 7.5 MG/1; MG/1; MG/1; MG/1
CAPSULE, EXTENDED RELEASE ORAL
Qty: 30 CAPSULE | Refills: 0 | Status: SHIPPED | OUTPATIENT
Start: 2023-01-05 | End: 2024-04-05

## 2023-01-06 LAB
IGF-I BLD-MCNC: 63 NG/ML (ref 82–244)
TESTOST FREE SERPL-MCNC: 39.57 NG/DL
TESTOST SERPL-MCNC: 1394 NG/DL (ref 240–950)

## 2023-01-12 ENCOUNTER — ALLIED HEALTH/NURSE VISIT (OUTPATIENT)
Dept: FAMILY MEDICINE | Facility: CLINIC | Age: 33
End: 2023-01-12
Payer: MEDICARE

## 2023-01-12 DIAGNOSIS — E34.9 TESTOSTERONE DEFICIENCY: Primary | ICD-10-CM

## 2023-01-12 PROCEDURE — 96372 THER/PROPH/DIAG INJ SC/IM: CPT | Performed by: INTERNAL MEDICINE

## 2023-01-12 PROCEDURE — 99207 PR NO CHARGE NURSE ONLY: CPT

## 2023-01-12 RX ADMIN — TESTOSTERONE CYPIONATE 150 MG: 200 INJECTION, SOLUTION INTRAMUSCULAR at 15:44

## 2023-01-12 NOTE — PROGRESS NOTES
"Clinic Administered Medication Documentation    Administrations This Visit     testosterone cypionate (DEPOTESTOSTERONE) injection 150 mg     Admin Date  01/12/2023 Action  Given Dose  150 mg Route  Intramuscular Site  Left Ventrogluteal Administered By  Madelin Norton RN    Ordering Provider: Sumaya Alonso MD    Patient Supplied?: Yes                  Testosterone Documentation     Prior to injection, verified patient identity using patient's name and date of birth. Medication was administered. Please see MAR and medication order for additional information. Patient instructed to remain in clinic for 15 minutes, report any adverse reaction to staff immediately  and stay in clinic after the injection but patient declined.    Reminders     - Check vial for refills remaining and initiate refill request if no refills remain.      - Verify with patient that medication was paid for at pharmacy. If it was, check the \"patient supplied\" box on the MAR.     Was entire vial of medication used? No, The remainder 50MG of 200MG was discarded as unavoidable waste.  Vial/Syringe: Single dose vial  Expiration Date:  05/2025  Was this medication supplied by the patient? Yes, Medication was received directly from patient in a tamper proof bag (follow site specific policies)     Madelin Norton RN        "

## 2023-01-17 ENCOUNTER — VIRTUAL VISIT (OUTPATIENT)
Dept: ENDOCRINOLOGY | Facility: CLINIC | Age: 33
End: 2023-01-17
Payer: MEDICARE

## 2023-01-17 DIAGNOSIS — E27.40 ADRENAL INSUFFICIENCY (H): ICD-10-CM

## 2023-01-17 DIAGNOSIS — Z86.03 HISTORY OF CRANIOPHARYNGIOMA: ICD-10-CM

## 2023-01-17 DIAGNOSIS — E03.9 ACQUIRED HYPOTHYROIDISM: ICD-10-CM

## 2023-01-17 DIAGNOSIS — E23.2 DIABETES INSIPIDUS (H): ICD-10-CM

## 2023-01-17 DIAGNOSIS — E23.0 GROWTH HORMONE DEFICIENCY (H): ICD-10-CM

## 2023-01-17 DIAGNOSIS — E23.0 PANHYPOPITUITARISM (H): Primary | ICD-10-CM

## 2023-01-17 DIAGNOSIS — E34.9 TESTOSTERONE DEFICIENCY: ICD-10-CM

## 2023-01-17 PROCEDURE — 99215 OFFICE O/P EST HI 40 MIN: CPT | Mod: 95 | Performed by: INTERNAL MEDICINE

## 2023-01-17 RX ORDER — LEVOTHYROXINE SODIUM 150 UG/1
150 TABLET ORAL DAILY
Qty: 90 TABLET | Refills: 1 | Status: SHIPPED | OUTPATIENT
Start: 2023-01-17 | End: 2023-09-06

## 2023-01-17 RX ORDER — TESTOSTERONE CYPIONATE 200 MG/ML
100 INJECTION, SOLUTION INTRAMUSCULAR
Qty: 1 ML | Refills: 5
Start: 2023-01-17 | End: 2023-01-25

## 2023-01-17 RX ORDER — PREDNISONE 1 MG/1
TABLET ORAL
Qty: 200 TABLET | Refills: 11 | Status: SHIPPED | OUTPATIENT
Start: 2023-01-17 | End: 2024-04-15

## 2023-01-17 RX ORDER — SOMATROPIN 10 MG/1.5ML
INJECTION, SOLUTION SUBCUTANEOUS
Qty: 3 ML | Refills: 1 | Status: SHIPPED | OUTPATIENT
Start: 2023-01-17 | End: 2024-04-05

## 2023-01-17 NOTE — LETTER
"    1/17/2023         RE: Mamadou Reece  [address]  [address]  [place] [address]        Dear Colleague,    Thank you for referring your patient, Mamadou Reece, to the [facility]. Please see a copy of my visit note below.    Mamadou Reece  is being evaluated via a billable video visit.      How would you like to obtain your AVS? [employer]  For the video visit, send the invitation by: Text to cell phone: [phone]  Will anyone else be joining your video visit? No          THIS IS A VIDEO VISIT:    Phone call visit/virtual visit encounter:    Name of patient: Mamadou Reece    Date of encounter: 1/17/2023    Time of start of video visit: 9:00    Video started: 9:08    Video ended: 9:23    Provider location: working from [place]    Patient location: patients home.    Mode of transmission: video/ Doxim[employer]    Verbal consent: obtained before starting visit. Pt is agreeable.      The patient has been notified of following:      \"This VIDEO visit will be conducted via a call between you and your physician/provider. We have found that certain health care needs can be provided without the need for a physical exam.  This service lets us provide the care you need with a short phone conversation.  If a prescription is necessary we can send it directly to your pharmacy.  If lab work is needed we can place an order for that and you can then stop by our lab to have the test done at a later time.     With new updates with corona virus patient might be billed as clinic visit.     If during the course of the call the physician/provider feels a telephone visit is not appropriate, you will not be charged for this service.\"      Past medical history, social history, family history, allergy and medications were reviewed and updated as appropriate.  Reviewed pertinent labs, notes, imaging studies personally.  Total time spent in with the patient evaluation:  15 min    Additional time spent " reviewing pertinent lab tests and chart notes, and documentation:  25 min  Name: Mamadou Reece  Seen for f/u of panhypopituitarism.   Chief Complaint   Patient presents with     Video Visit      HPI:  Mamadou Reece is a 32 year old male who presents for the evaluation of  Above.  Was seen in [facility] before. Records reviewed. Last visit with Endo was 9/2016  [facility]  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 He had stereotactic  radiation therapy at age 11.   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  Followed by Pediatrics oncology; getting MRIs every 2 yrs now. Due for MRI and has upcoming appointment.  Has not had recurrence since his radiation therapy    He is currently on full hormonal treatment with medications listed below.  He is also on growth hormone replacement.  I have discussed role of growth hormone replacement in adults is controversial  Patient's mother reports that she was told by her pediatric endocrinologist that he should never be getting off of growth hormone  They would like to continue it.    Feeling OK.  No major concerns today.    1. HYpogonadism:  On testosterone cypionate 200 mg IM q 2 weeks.  Goes to clinic.   Last testosterone checked 12/2021 was in higher range 1259  Hemoglobin levels appears stable.  Energy level is low on some days.  Shaving is normal. Thinks that he is having javed beard.  Muscle strength is OK.  Weight is stable.  1/23 showing high testosterone levels.  Normal hemoglobin levels.    2. Diabetes Insipidus:  On desmopressiin 10 mcg- 3 sprays in evening and 1 at bedtime. (Nighttime dose was added in 2018 as he was waking up frequently at night)  Not waking up at night.  No major concerns. No excessive thirst or frequent urination.  Drinks to thirst.  Na levels are in  range.    3. Growth hormone deficiency:  On Norditropin 0.7 mg/day.   Reports that he is missing it few days a week.  Doing Ok.  Takes own injections.  Living in apartment.    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  Reports compliance.   No CP, palpitations, diarrhea,dysphagia, cold or warm intolerance or constipation.  + weight stable.  Has dry hands and feet: chronic condition.  1/2023 labs showing high TSH and low free T4 consistent with under replacement.  Wt Readings from Last 2 Encounters:   06/24/22 89.9 kg (198 lb 3.2 oz)   05/13/22 93.9 kg (207 lb)       5. Secondary adrenal insufficiency:  On prednsione 3 mg AM and 2 mg PM. On this dose X many years.  No nausea. No vomiting.no dizziness.  BP stable. Electrolytes are in range.  Wt Readings from Last 2 Encounters:   06/24/22 89.9 kg (198 lb 3.2 oz)   05/13/22 93.9 kg (207 lb)       PMH/PSH:  1. Diabetes insipidus   2. Panhypopituitarism   3. History of ADD.  4. Central hypothyroidism   5. Secondary adrenal insufficiency  6. Growth hormone deficiency   7. Hypogonadotropic hypogonadism     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, [facility]     Legal blindness     Craniopharyngioma     Mood disorder (H)     Craniopharyngioma     Radiation age 11    Craniopharyngioma     Past Surgical History:   Procedure Laterality Date     CRANIOTOMY, EXCISE TUMOR COMPLEX, COMBINED  age 6    craniopharyngioma     MOHS MICROGRAPHIC PROCEDURE Left 05/31/2018    left cheek nodular BCC     Family Hx:  Family History   Problem Relation Age of Onset     Diabetes Mother      Cancer Father         Melanoma     Cerebrovascular Disease Maternal Grandmother 91     Diabetes Maternal Grandmother      Cerebrovascular Disease Paternal Grandmother      Cerebrovascular Disease Paternal Grandfather      Social Hx:  Social History     Socioeconomic History     Marital status: Single     Spouse name: Not on file     Number of  "children: Not on file     Years of education: Not on file     Highest education level: Not on file   Occupational History     Not on file   Tobacco Use     Smoking status: Never     Smokeless tobacco: Never   Vaping Use     Vaping Use: Never used   Substance and Sexual Activity     Alcohol use: Yes     Alcohol/week: 0.0 standard drinks     Comment: sip at holidays     Drug use: No     Sexual activity: Never   Other Topics Concern     Parent/sibling w/ CABG, MI or angioplasty before 65F 55M? Not Asked   Social History Narrative     Not on file     Social Determinants of Health     Financial Resource Strain: Not on file   Food Insecurity: Not on file   Transportation Needs: Not on file   Physical Activity: Not on file   Stress: Not on file   Social Connections: Not on file   Intimate Partner Violence: Not on file   Housing Stability: Not on file          MEDICATIONS:  has a current medication list which includes the following prescription(s): amphetamine-dextroamphetamine, amphetamine-dextroamphetamine, amphetamine-dextroamphetamine, amphetamine-dextroamphetamine, calcium + d, vitamin d, b-12, desmopressin, pen needles 5/16\", levothyroxine, meclizine, prednisone, norditropin flexpro, and testosterone cypionate, and the following Facility-Administered Medications: testosterone cypionate.    ROS     ROS: 10 point ROS neg other than the symptoms noted above in the HPI.    Physical Exam   VS: There were no vitals taken for this visit.    LABS:  Component      Latest Ref Rng & Units 12/12/2019   Testosterone Total      240 - 950 ng/dL 712   Sex Hormone Binding Globulin      11 - 80 nmol/L 23   Free Testosterone Calculated      4.7 - 24.4 ng/dL 19.09   Ins Growth Factor 1      87 - 255 ng/ml 254   Hemoglobin      13.3 - 17.7 g/dL 16.2   T4 Free      0.76 - 1.46 ng/dL 1.45   Prolactin      2 - 18 ug/L <1 (L)       ENDO THYROID LABS-[facility] Latest Ref Rng & Units 12/12/2019   T4 FREE 0.76 - 1.46 ng/dL 1.45     ENDO THYROID " LABS-[facility] Latest Ref Rng & Units 1/3/2019 6/20/2018   T4 FREE 0.76 - 1.46 ng/dL 1.17 1.38     ENDO THYROID LABS-[facility] Latest Ref Rng & Units 10/5/2017   T4 FREE 0.76 - 1.46 ng/dL 1.18     ENDO PITUITARY LABS-[facility] Latest Ref Rng & Units 10/5/2017   PROLACTIN 2 - 18 ug/L 1 (L)   TESTOSTERONE TOTAL 240 - 950 ng/dL 647   PSA 0 - 4 ug/L 0.17    - 144 mmol/L 141   POTASSIUM 3.4 - 5.3 mmol/L 3.8   INS GROWTH FACTOR 1 94 - 271 ng/ml 298 (H)     ENDO PITUITARY LABS-[facility] Latest Ref Rng & Units 10/4/2018   TESTOSTERONE TOTAL 240 - 950 ng/dL 859     ENDO PITUITARY LABS-[facility] Latest Ref Rng & Units 1/3/2019    - 144 mmol/L 138   POTASSIUM 3.4 - 5.3 mmol/L    INS GROWTH FACTOR 1 90 - 262 ng/ml 320 (H)     All pertinent notes, labs, and images personally reviewed by me.     A/P  Mr.Jared Reece is a 89year old here for the evaluation of above:    1. Hypogonadism:  On testosterone cypionate 150 mg IM q 2 weeks.  Goes to clinic for njections.  Clinically looks okay.  No major concerns  1/2023 labs showing high testosterone levels.  Not able to order PSA.  Plan:  Discussed diagnosis, pathophysiology, management and treatment options of condition with pt.  Decrease testosterone to 100 mg every 2 weeks (1/17/2023)  Labs in 3 months.    2. Diabetes Insipidus:  On desmopressiin (0.01 % solution)10 mcg- 3 sprays in evening and 1 spray at bedtime.  No major concerns. No excessive thirst or frequent urination.    Electrolytes are in acceptable range.  -- continue current dose of DDAVP      3. Growth hormone deficiency:  On Norditropin 0.7 mg/day. ( 10 mg/1.5 ml solution)  Discussed GH replacement in adults.  They would like to continue it. Agree with it.  1/2023 labs showing no growth hormone levels in the setting of inconsistent use  Plan to continue current dose of growth hormone and be consistent with it.    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  Reports compliance. But taking it at night.  Clinically looks  euthyroid.   1/2023 labs showing high TSH and low free T4.    Plan:  Increase levothyroxine to 150 mcg/day (1/17/2023)  Labs in 3 months  Follow-up after that.    5. Secondary adrenal insufficiency:  On prednsione 3 mg AM and 2 mg PM. On this dose X many years.  No nausea. No vomiting.  Electrolytes are in range.  -- continue current dose  Discussed sick days.     More than 50% of face to face time spent with Mr. Reece on counseling / coordinating his care.      Follow-up:  1 year/based on labs.      Sumaya Alonso MD  Endocrinology   [facility]    CC: Mariluz Mae     Disclaimer: This note consists of symbols derived from keyboarding, dictation and/or voice recognition software. As a result, there may be errors in the script that have gone undetected. Please consider this when interpreting information found in this chart.    Addendum to above note and clinic visit:    Labs reviewed.    See result note/telephone encounter.              Again, thank you for allowing me to participate in the care of your patient.        Sincerely,        Sumaya Alonso MD

## 2023-01-17 NOTE — PROGRESS NOTES
Mamadou Reece  is being evaluated via a billable video visit.      How would you like to obtain your AVS? [email or web address]  For the video visit, send the invitation by: Text to cell phone: [phone]  Will anyone else be joining your video visit? No

## 2023-01-17 NOTE — PROGRESS NOTES
"THIS IS A VIDEO VISIT:    Phone call visit/virtual visit encounter:    Name of patient: Mamadou Reece    Date of encounter: 1/17/2023    Time of start of video visit: 9:00    Video started: 9:08    Video ended: 9:23    Provider location: working from [place]    Patient location: patients home.    Mode of transmission: video/ Doximity    Verbal consent: obtained before starting visit. Pt is agreeable.      The patient has been notified of following:      \"This VIDEO visit will be conducted via a call between you and your physician/provider. We have found that certain health care needs can be provided without the need for a physical exam.  This service lets us provide the care you need with a short phone conversation.  If a prescription is necessary we can send it directly to your pharmacy.  If lab work is needed we can place an order for that and you can then stop by our lab to have the test done at a later time.     With new updates with corona virus patient might be billed as clinic visit.     If during the course of the call the physician/provider feels a telephone visit is not appropriate, you will not be charged for this service.\"      Past medical history, social history, family history, allergy and medications were reviewed and updated as appropriate.  Reviewed pertinent labs, notes, imaging studies personally.  Total time spent in with the patient evaluation:  15 min    Additional time spent reviewing pertinent lab tests and chart notes, and documentation:  25 min  Name: Mamadou Reece  Seen for f/u of panhypopituitarism.   Chief Complaint   Patient presents with     Video Visit      HPI:  Mamdaou Reece is a 32 year old male who presents for the evaluation of  Above.  Was seen in [facility] before. Records reviewed. Last visit with Endo was 9/2016  Before that he was followed by Dr. Rendon at [facility].  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 He " had stereotactic  radiation therapy at age 11.   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  Followed by Pediatrics oncology; getting MRIs every 2 yrs now. Due for MRI and has upcoming appointment.  Has not had recurrence since his radiation therapy    He is currently on full hormonal treatment with medications listed below.  He is also on growth hormone replacement.  I have discussed role of growth hormone replacement in adults is controversial  Patient's mother reports that she was told by her pediatric endocrinologist that he should never be getting off of growth hormone  They would like to continue it.    Feeling OK.  No major concerns today.    1. HYpogonadism:  On testosterone cypionate 200 mg IM q 2 weeks.  Goes to clinic.   Last testosterone checked 12/2021 was in higher range 1259  Hemoglobin levels appears stable.  Energy level is low on some days.  Shaving is normal. Thinks that he is having javed beard.  Muscle strength is OK.  Weight is stable.  1/23 showing high testosterone levels.  Normal hemoglobin levels.    2. Diabetes Insipidus:  On desmopressiin 10 mcg- 3 sprays in evening and 1 at bedtime. (Nighttime dose was added in 2018 as he was waking up frequently at night)  Not waking up at night.  No major concerns. No excessive thirst or frequent urination.  Drinks to thirst.  Na levels are in range.    3. Growth hormone deficiency:  On Norditropin 0.7 mg/day.   Reports that he is missing it few days a week.  Doing Ok.  Takes own injections.  Living in apartment.    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  Reports compliance.   No CP, palpitations, diarrhea,dysphagia, cold or warm intolerance or constipation.  + weight stable.  Has dry hands and feet: chronic condition.  1/2023 labs showing high TSH and low free T4 consistent with under replacement.  Wt Readings from Last 2  Encounters:   06/24/22 89.9 kg (198 lb 3.2 oz)   05/13/22 93.9 kg (207 lb)       5. Secondary adrenal insufficiency:  On prednsione 3 mg AM and 2 mg PM. On this dose X many years.  No nausea. No vomiting.no dizziness.  BP stable. Electrolytes are in range.  Wt Readings from Last 2 Encounters:   06/24/22 89.9 kg (198 lb 3.2 oz)   05/13/22 93.9 kg (207 lb)       PMH/PSH:  1. Diabetes insipidus   2. Panhypopituitarism   3. History of ADD.  4. Central hypothyroidism   5. Secondary adrenal insufficiency  6. Growth hormone deficiency   7. Hypogonadotropic hypogonadism     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, [facility]     Legal blindness     Craniopharyngioma     Mood disorder (H)     Craniopharyngioma     Radiation age 11    Craniopharyngioma     Past Surgical History:   Procedure Laterality Date     CRANIOTOMY, EXCISE TUMOR COMPLEX, COMBINED  age 6    craniopharyngioma     MOHS MICROGRAPHIC PROCEDURE Left 05/31/2018    left cheek nodular BCC     Family Hx:  Family History   Problem Relation Age of Onset     Diabetes Mother      Cancer Father         Melanoma     Cerebrovascular Disease Maternal Grandmother 91     Diabetes Maternal Grandmother      Cerebrovascular Disease Paternal Grandmother      Cerebrovascular Disease Paternal Grandfather      Social Hx:  Social History     Socioeconomic History     Marital status: Single     Spouse name: Not on file     Number of children: Not on file     Years of education: Not on file     Highest education level: Not on file   Occupational History     Not on file   Tobacco Use     Smoking status: Never     Smokeless tobacco: Never   Vaping Use     Vaping Use: Never used   Substance and Sexual Activity     Alcohol use: Yes     Alcohol/week: 0.0 standard drinks     Comment: sip at holidays     Drug use: No     Sexual activity: Never   Other Topics Concern     Parent/sibling w/ CABG, MI or angioplasty before 65F 55M? Not Asked   Social  "History Narrative     Not on file     Social Determinants of Health     Financial Resource Strain: Not on file   Food Insecurity: Not on file   Transportation Needs: Not on file   Physical Activity: Not on file   Stress: Not on file   Social Connections: Not on file   Intimate Partner Violence: Not on file   Housing Stability: Not on file          MEDICATIONS:  has a current medication list which includes the following prescription(s): amphetamine-dextroamphetamine, amphetamine-dextroamphetamine, amphetamine-dextroamphetamine, amphetamine-dextroamphetamine, calcium + d, vitamin d, b-12, desmopressin, pen needles 5/16\", levothyroxine, meclizine, prednisone, norditropin flexpro, and testosterone cypionate, and the following Facility-Administered Medications: testosterone cypionate.    ROS     ROS: 10 point ROS neg other than the symptoms noted above in the HPI.    Physical Exam   VS: There were no vitals taken for this visit.    LABS:  Component      Latest Ref Rng & Units 12/12/2019   Testosterone Total      240 - 950 ng/dL 712   Sex Hormone Binding Globulin      11 - 80 nmol/L 23   Free Testosterone Calculated      4.7 - 24.4 ng/dL 19.09   Ins Growth Factor 1      87 - 255 ng/ml 254   Hemoglobin      13.3 - 17.7 g/dL 16.2   T4 Free      0.76 - 1.46 ng/dL 1.45   Prolactin      2 - 18 ug/L <1 (L)       ENDO THYROID LABS-[facility] Latest Ref Rng & Units 12/12/2019   T4 FREE 0.76 - 1.46 ng/dL 1.45     ENDO THYROID LABS-[facility] Latest Ref Rng & Units 1/3/2019 6/20/2018   T4 FREE 0.76 - 1.46 ng/dL 1.17 1.38     ENDO THYROID LABS-[facility] Latest Ref Rng & Units 10/5/2017   T4 FREE 0.76 - 1.46 ng/dL 1.18     ENDO PITUITARY LABS-[facility] Latest Ref Rng & Units 10/5/2017   PROLACTIN 2 - 18 ug/L 1 (L)   TESTOSTERONE TOTAL 240 - 950 ng/dL 647   PSA 0 - 4 ug/L 0.17    - 144 mmol/L 141   POTASSIUM 3.4 - 5.3 mmol/L 3.8   INS GROWTH FACTOR 1 94 - 271 ng/ml 298 (H)     ENDO PITUITARY LABS-[facility] Latest Ref Rng & Units 10/4/2018   TESTOSTERONE TOTAL " 240 - 950 ng/dL 859     ENDO PITUITARY LABS-[facility] Latest Ref Rng & Units 1/3/2019    - 144 mmol/L 138   POTASSIUM 3.4 - 5.3 mmol/L    INS GROWTH FACTOR 1 90 - 262 ng/ml 320 (H)     All pertinent notes, labs, and images personally reviewed by me.     A/P  Mr.Jared Reece is a 89year old here for the evaluation of above:    1. Hypogonadism:  On testosterone cypionate 150 mg IM q 2 weeks.  Goes to clinic for njections.  Clinically looks okay.  No major concerns  1/2023 labs showing high testosterone levels.  Not able to order PSA.  Plan:  Discussed diagnosis, pathophysiology, management and treatment options of condition with pt.  Decrease testosterone to 100 mg every 2 weeks (1/17/2023)  Labs in 3 months.    2. Diabetes Insipidus:  On desmopressiin (0.01 % solution)10 mcg- 3 sprays in evening and 1 spray at bedtime.  No major concerns. No excessive thirst or frequent urination.    Electrolytes are in acceptable range.  -- continue current dose of DDAVP      3. Growth hormone deficiency:  On Norditropin 0.7 mg/day. ( 10 mg/1.5 ml solution)  Discussed GH replacement in adults.  They would like to continue it. Agree with it.  1/2023 labs showing no growth hormone levels in the setting of inconsistent use  Plan to continue current dose of growth hormone and be consistent with it.    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  Reports compliance. But taking it at night.  Clinically looks euthyroid.   1/2023 labs showing high TSH and low free T4.    Plan:  Increase levothyroxine to 150 mcg/day (1/17/2023)  Labs in 3 months  Follow-up after that.    5. Secondary adrenal insufficiency:  On prednsione 3 mg AM and 2 mg PM. On this dose X many years.  No nausea. No vomiting.  Electrolytes are in range.  -- continue current dose  Discussed sick days.     More than 50% of face to face time spent with Mr. Reece on counseling / coordinating his care.      Follow-up:  1 year/based on labs.      Sumaya  MD Gavin  Endocrinology   [facility]/[place]    CC: Mariluz Mae     Disclaimer: This note consists of symbols derived from keyboarding, dictation and/or voice recognition software. As a result, there may be errors in the script that have gone undetected. Please consider this when interpreting information found in this chart.    Addendum to above note and clinic visit:    Labs reviewed.    See result note/telephone encounter.

## 2023-01-17 NOTE — PATIENT INSTRUCTIONS
[facility]  Dr Alonso, Endocrinology Department    [place] [address] [facility]  [place] [address]  Appointment [phone]  Fax: [phone]  [place]: Monday - Thursday      Thyroid:  Increase levothyroxine to 150 mcg/day  Labs in 3 months  Please make a lab appointment for blood work and follow up clinic appointment in 1 week after that to discuss results.    Testosterone:  Decrease dose 200 mg every 14 days  Labs in 3 months  Labs in 3 months- it should be fasting, morning, midcycle sample ( about 8 after last injection if you are taking injection every 14 days)  Please make a lab appointment for blood work and follow up clinic appointment in 1 week after that to discuss results.    Continue prednisone, growth hormone, desmopressin at current dose.  Increase prednisone to 2-3 times for few days in case of sickness or illness and resume regular dose after that.  Be consistent with growth hormone injections.  It is a daily injection.

## 2023-01-25 ENCOUNTER — TELEPHONE (OUTPATIENT)
Dept: ENDOCRINOLOGY | Facility: CLINIC | Age: 33
End: 2023-01-25
Payer: MEDICARE

## 2023-01-25 DIAGNOSIS — E34.9 TESTOSTERONE DEFICIENCY: ICD-10-CM

## 2023-01-25 RX ORDER — TESTOSTERONE CYPIONATE 200 MG/ML
100 INJECTION, SOLUTION INTRAMUSCULAR
Qty: 1 ML | Refills: 5 | Status: SHIPPED | OUTPATIENT
Start: 2023-01-25 | End: 2023-04-24

## 2023-01-25 NOTE — TELEPHONE ENCOUNTER
Per notes 1/17/23:  Decrease testosterone to 100 mg every 2 weeks (1/17/2023)  Labs in 3 months.      Rx pended.

## 2023-01-25 NOTE — TELEPHONE ENCOUNTER
Health Call Center    Phone Message    May a detailed message be left on voicemail: yes     Reason for Call: Medication Question or concern regarding medication   Prescription Clarification  Name of Medication: testosterone cypionate (DEPOTESTOSTERONE) 200 MG/ML injection      Prescribing Provider: Dr. Alonso        Pharmacy: [facility] [address]     What on the order needs clarification? Per caller mom they pharmacy does not have the orders for the testosterone injection they need this resent urgently as he has an appointment 1/26/2023 to have it given to him . Please follow up with mom . Thank you .           Action Taken: Other: ENDO    Travel Screening: Not Applicable

## 2023-01-26 ENCOUNTER — ALLIED HEALTH/NURSE VISIT (OUTPATIENT)
Dept: FAMILY MEDICINE | Facility: CLINIC | Age: 33
End: 2023-01-26
Payer: MEDICARE

## 2023-01-26 ENCOUNTER — TELEPHONE (OUTPATIENT)
Dept: FAMILY MEDICINE | Facility: CLINIC | Age: 33
End: 2023-01-26

## 2023-01-26 ENCOUNTER — TELEPHONE (OUTPATIENT)
Dept: ENDOCRINOLOGY | Facility: CLINIC | Age: 33
End: 2023-01-26

## 2023-01-26 DIAGNOSIS — F98.8 ATTENTION DEFICIT DISORDER, UNSPECIFIED HYPERACTIVITY PRESENCE: Primary | ICD-10-CM

## 2023-01-26 DIAGNOSIS — E34.9 TESTOSTERONE DEFICIENCY: Primary | ICD-10-CM

## 2023-01-26 DIAGNOSIS — E34.9 TESTOSTERONE DEFICIENCY: ICD-10-CM

## 2023-01-26 PROCEDURE — 99207 PR NO CHARGE NURSE ONLY: CPT

## 2023-01-26 PROCEDURE — 96372 THER/PROPH/DIAG INJ SC/IM: CPT | Performed by: INTERNAL MEDICINE

## 2023-01-26 RX ORDER — DEXTROAMPHETAMINE SACCHARATE, AMPHETAMINE ASPARTATE MONOHYDRATE, DEXTROAMPHETAMINE SULFATE AND AMPHETAMINE SULFATE 7.5; 7.5; 7.5; 7.5 MG/1; MG/1; MG/1; MG/1
CAPSULE, EXTENDED RELEASE ORAL
Qty: 30 CAPSULE | Refills: 0 | Status: SHIPPED | OUTPATIENT
Start: 2023-02-05 | End: 2024-06-24

## 2023-01-26 RX ORDER — TESTOSTERONE CYPIONATE 200 MG/ML
100 INJECTION, SOLUTION INTRAMUSCULAR
Status: CANCELLED | OUTPATIENT
Start: 2023-01-26 | End: 2023-04-20

## 2023-01-26 RX ORDER — TESTOSTERONE CYPIONATE 200 MG/ML
100 INJECTION, SOLUTION INTRAMUSCULAR
Status: DISCONTINUED | OUTPATIENT
Start: 2023-01-26 | End: 2023-08-10

## 2023-01-26 RX ADMIN — TESTOSTERONE CYPIONATE 100 MG: 200 INJECTION, SOLUTION INTRAMUSCULAR at 16:13

## 2023-01-26 NOTE — PROGRESS NOTES
"Clinic Administered Medication Documentation    Administrations This Visit     testosterone cypionate (DEPOTESTOSTERONE) injection 100 mg     Admin Date  01/26/2023 Action  Given Dose  100 mg Route  Intramuscular Site  Right Ventrogluteal Administered By  Madelin Norton RN    Ordering Provider: Sumaya Alonso MD    Patient Supplied?: Yes                  Testosterone Documentation     Prior to injection, verified patient identity using patient's name and date of birth. Medication was administered. Please see MAR and medication order for additional information. Patient instructed to remain in clinic for 15 minutes, report any adverse reaction to staff immediately  and stay in clinic after the injection but patient declined.    Reminders     - Check vial for refills remaining and initiate refill request if no refills remain.      - Verify with patient that medication was paid for at pharmacy. If it was, check the \"patient supplied\" box on the MAR.     Was entire vial of medication used? No, The remainder 100MG of 200MG was discarded as unavoidable waste.  Vial/Syringe: Single dose vial  Expiration Date:  05/30/2025  Was this medication supplied by the patient? Yes, Medication was received directly from patient in a tamper proof bag (follow site specific policies)    Madelin Norton RN      "

## 2023-02-02 NOTE — TELEPHONE ENCOUNTER
Rx faxed.  Annalee Nieto, CMA     This was a shared visit with the MONALISA. I reviewed and verified the documentation and independently performed the documented:

## 2023-02-16 ENCOUNTER — TELEPHONE (OUTPATIENT)
Dept: ENDOCRINOLOGY | Facility: CLINIC | Age: 33
End: 2023-02-16
Payer: MEDICARE

## 2023-02-16 DIAGNOSIS — E23.0 PANHYPOPITUITARISM (H): Primary | ICD-10-CM

## 2023-02-16 NOTE — TELEPHONE ENCOUNTER
The dose is-- Norditropin 0.7 mg/day.   Ok to send the 15mg but make sure that he takes correct dose.  Please pend 3 months with correct dose and quantity.

## 2023-02-16 NOTE — TELEPHONE ENCOUNTER
[facility] Call Center    Phone Message    May a detailed message be left on voicemail: yes     Reason for Call: Medication Question or concern regarding medication   Prescription Clarification  Name of Medication: somatropin (NORDITROPIN FLEXPRO) 10 MG/1.5ML SOPN PEN injection  Prescribing Provider: Dr Alonso   Pharmacy: [address] [phone]     What on the order needs clarification? somatropin (NORDITROPIN FLEXPRO) 10 MG/1.5ML SOPN PEN injection  pharmacy is currently out of stock - they have 15 MG in stock  May need new prior authorization, per pharmacy.  Please call to discuss.            Action Taken: Other: endo    Travel Screening: Not Applicable

## 2023-02-21 ENCOUNTER — ALLIED HEALTH/NURSE VISIT (OUTPATIENT)
Dept: FAMILY MEDICINE | Facility: CLINIC | Age: 33
End: 2023-02-21
Payer: MEDICARE

## 2023-02-21 ENCOUNTER — TELEPHONE (OUTPATIENT)
Dept: FAMILY MEDICINE | Facility: CLINIC | Age: 33
End: 2023-02-21

## 2023-02-21 DIAGNOSIS — E34.9 TESTOSTERONE DEFICIENCY: Primary | ICD-10-CM

## 2023-02-21 DIAGNOSIS — F98.8 ATTENTION DEFICIT DISORDER, UNSPECIFIED HYPERACTIVITY PRESENCE: Primary | ICD-10-CM

## 2023-02-21 PROCEDURE — 96372 THER/PROPH/DIAG INJ SC/IM: CPT | Performed by: INTERNAL MEDICINE

## 2023-02-21 PROCEDURE — 99207 PR NO CHARGE NURSE ONLY: CPT

## 2023-02-21 RX ORDER — DEXTROAMPHETAMINE SACCHARATE, AMPHETAMINE ASPARTATE MONOHYDRATE, DEXTROAMPHETAMINE SULFATE AND AMPHETAMINE SULFATE 3.75; 3.75; 3.75; 3.75 MG/1; MG/1; MG/1; MG/1
30 CAPSULE, EXTENDED RELEASE ORAL DAILY
Qty: 60 CAPSULE | Refills: 0 | Status: SHIPPED | OUTPATIENT
Start: 2023-02-21 | End: 2023-03-13

## 2023-02-21 RX ADMIN — TESTOSTERONE CYPIONATE 100 MG: 200 INJECTION, SOLUTION INTRAMUSCULAR at 15:50

## 2023-02-21 NOTE — TELEPHONE ENCOUNTER
Patient's mother called. The [facility] pharmacy is out of the 30 mg ADDERALL XR tablets, but they have enough 15 mg tablets. They are requesting that we write a new prescription for 2 15 mg tablets instead of 1 30 mg tablet. Routing to POD as PCP is out of clinic.    Hina Arroyo

## 2023-02-21 NOTE — PROGRESS NOTES
"Clinic Administered Medication Documentation    Administrations This Visit     testosterone cypionate (DEPOTESTOSTERONE) injection 100 mg     Admin Date  02/21/2023 Action  Given Dose  100 mg Route  Intramuscular Site  Left Ventrogluteal Administered By  Madelin Norton RN    Ordering Provider: Sumaya Alonso MD    Patient Supplied?: Yes              Huddled with provider of the day Phani Lea MD who informs it is okay to give testosterone 2 days early (pt rescheduled injection from 2/23/23 to today 2/21/23)      Testosterone Documentation     Prior to injection, verified patient identity using patient's name and date of birth. Medication was administered. Please see MAR and medication order for additional information. Patient instructed to remain in clinic for 15 minutes, report any adverse reaction to staff immediately  and stay in clinic after the injection but patient declined.    Reminders     - Check vial for refills remaining and initiate refill request if no refills remain.      - Verify with patient that medication was paid for at pharmacy. If it was, check the \"patient supplied\" box on the MAR.     Was entire vial of medication used? No, The remainder 100MG of 200MG was discarded as unavoidable waste.  Vial/Syringe: Single dose vial  Expiration Date:  05/30/2025  Was this medication supplied by the patient? Yes, Medication was received directly from patient in a tamper proof bag (follow site specific policies)    Madelin Norton RN    "

## 2023-02-22 ENCOUNTER — TELEPHONE (OUTPATIENT)
Dept: ENDOCRINOLOGY | Facility: CLINIC | Age: 33
End: 2023-02-22
Payer: MEDICARE

## 2023-02-22 NOTE — TELEPHONE ENCOUNTER
Norditropin supplies are limited until further notice.        Please send another medication from the list:

## 2023-02-22 NOTE — TELEPHONE ENCOUNTER
Fax received from [facility] - RX Form for review and signature.  Put in Dr. Alonso's in basket.

## 2023-02-23 NOTE — TELEPHONE ENCOUNTER
I am not sure about 1:1 conversion for options given.  Can you please check with PharmD?  Needs same dose- please pend appropriate quantity.

## 2023-03-06 ENCOUNTER — TELEPHONE (OUTPATIENT)
Dept: ENDOCRINOLOGY | Facility: CLINIC | Age: 33
End: 2023-03-06
Payer: MEDICARE

## 2023-03-06 NOTE — TELEPHONE ENCOUNTER
[facility] Call Center    Phone Message    May a detailed message be left on voicemail: yes     Reason for Call: Medication Question or concern regarding medication   Prescription Clarification  Name of Medication: Somatropin 15 MG/1.5ML SOPN  Prescribing Provider:    Pharmacy:  [address]   What on the order needs clarification?     Per pharmacy would like to know which generic growth hormone to dispense. There is many and would like to know specifically which one. Please call pharmacy back/. Thank you!      Action Taken: Message routed to:  [facility] ENDO    Travel Screening: Not Applicable

## 2023-03-07 NOTE — TELEPHONE ENCOUNTER
somatropin (NORDITROPIN FLEXPRO) 10 MG/1.5ML SOPN PEN injection 6 mL 1 1/17/2023  No   Sig: Inject 0.7 mg under the skin daily. Discard 28 days after initial use. Strength: 10 MG/1.5ML     I called the above RX in.

## 2023-03-09 ENCOUNTER — ALLIED HEALTH/NURSE VISIT (OUTPATIENT)
Dept: FAMILY MEDICINE | Facility: CLINIC | Age: 33
End: 2023-03-09
Payer: MEDICARE

## 2023-03-09 DIAGNOSIS — E34.9 TESTOSTERONE DEFICIENCY: Primary | ICD-10-CM

## 2023-03-09 PROCEDURE — 96372 THER/PROPH/DIAG INJ SC/IM: CPT | Performed by: INTERNAL MEDICINE

## 2023-03-09 PROCEDURE — 99207 PR NO CHARGE NURSE ONLY: CPT

## 2023-03-09 RX ADMIN — TESTOSTERONE CYPIONATE 100 MG: 200 INJECTION, SOLUTION INTRAMUSCULAR at 15:42

## 2023-03-09 NOTE — PROGRESS NOTES
"Clinic Administered Medication Documentation    Administrations This Visit     testosterone cypionate (DEPOTESTOSTERONE) injection 100 mg     Admin Date  03/09/2023 Action  $Given Dose  100 mg Route  Intramuscular Site  Right Ventrogluteal Administered By  Madelin Norton RN    Ordering Provider: Sumaya Alonso MD    Patient Supplied?: Yes                  Testosterone Documentation     Prior to injection, verified patient identity using patient's name and date of birth. Medication was administered. Please see MAR and medication order for additional information. Patient instructed to remain in clinic for 15 minutes, report any adverse reaction to staff immediately  and stay in clinic after the injection but patient declined.    Reminders     - Check vial for refills remaining and initiate refill request if no refills remain.      - Verify with patient that medication was paid for at pharmacy. If it was, check the \"patient supplied\" box on the MAR.     Was entire vial of medication used? No, The remainder 100MG of 200MG was discarded as unavoidable waste.  Vial/Syringe: Single dose vial  Expiration Date:  06/30/2025  Was this medication supplied by the patient? Yes, Medication was received directly from patient in a tamper proof bag (follow site specific policies)     Madelin Norton RN        "

## 2023-03-13 ENCOUNTER — VIRTUAL VISIT (OUTPATIENT)
Dept: FAMILY MEDICINE | Facility: CLINIC | Age: 33
End: 2023-03-13
Payer: MEDICARE

## 2023-03-13 DIAGNOSIS — F90.9 ATTENTION DEFICIT HYPERACTIVITY DISORDER (ADHD), UNSPECIFIED ADHD TYPE: Primary | ICD-10-CM

## 2023-03-13 DIAGNOSIS — D44.4 CRANIOPHARYNGIOMA (H): ICD-10-CM

## 2023-03-13 DIAGNOSIS — E23.2 DIABETES INSIPIDUS (H): ICD-10-CM

## 2023-03-13 DIAGNOSIS — F98.8 ATTENTION DEFICIT DISORDER, UNSPECIFIED HYPERACTIVITY PRESENCE: ICD-10-CM

## 2023-03-13 PROCEDURE — 99213 OFFICE O/P EST LOW 20 MIN: CPT | Mod: VID | Performed by: NURSE PRACTITIONER

## 2023-03-13 RX ORDER — DESMOPRESSIN ACETATE 0.1 MG/ML
SOLUTION NASAL
Qty: 10 ML | Refills: 1 | Status: SHIPPED | OUTPATIENT
Start: 2023-03-13 | End: 2023-07-06

## 2023-03-13 RX ORDER — DEXTROAMPHETAMINE SACCHARATE, AMPHETAMINE ASPARTATE MONOHYDRATE, DEXTROAMPHETAMINE SULFATE AND AMPHETAMINE SULFATE 3.75; 3.75; 3.75; 3.75 MG/1; MG/1; MG/1; MG/1
30 CAPSULE, EXTENDED RELEASE ORAL DAILY
Qty: 60 CAPSULE | Refills: 0 | Status: SHIPPED | OUTPATIENT
Start: 2023-03-13 | End: 2024-04-05

## 2023-03-13 NOTE — PROGRESS NOTES
"Mamadou is a 32 year old who is being evaluated via a billable video visit.      How would you like to obtain your AVS? MyChart  If the video visit is dropped, the invitation should be resent by: Text to cell phone: [phone]  Will anyone else be joining your video visit? 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    Attention deficit hyperactivity disorder (ADHD), unspecified ADHD type  Stable.  Request an additional 3 months when needed.  Visit in 6 months.                 BMI:   Estimated body mass index is 26.15 kg/m  as calculated from the following:    Height as of 6/24/22: 1.854 m (6' 1\").    Weight as of 6/24/22: 89.9 kg (198 lb 3.2 oz).           No follow-ups on file.    ADITHYA Aguirre Ra CNP  [facility] KARLI Cedillo is a 32 year old, presenting for the following health issues:  No chief complaint on file.      History of Present Illness       Reason for visit:  Med check for Adderall prescription    He eats 0-1 servings of fruits and vegetables daily.He consumes 1 sweetened beverage(s) daily.He exercises with enough effort to increase his heart rate 9 or less minutes per day.  He exercises with enough effort to increase his heart rate 3 or less days per week.   He is taking medications regularly.       Medication Followup of Adderall 30 MG     Taking Medication as prescribed: yes    Side Effects:  None    Medication Helping Symptoms:  yes    No concerns, other than he has had difficulty finding the medication due to stock issues.  No sleep change.  Normal appetite.  Medication helpful.    Review of Systems   Constitutional, HEENT, cardiovascular, pulmonary, gi and gu systems are negative, except as otherwise noted.      Objective           Vitals:  No vitals were obtained today due to virtual visit.    Physical Exam   GENERAL: Healthy, alert and no distress  EYES: Eyes grossly normal to inspection.  No discharge or " erythema, or obvious scleral/conjunctival abnormalities.  RESP: No audible wheeze, cough, or visible cyanosis.  No visible retractions or increased work of breathing.    SKIN: Visible skin clear. No significant rash, abnormal pigmentation or lesions.  NEURO: Cranial nerves grossly intact.  Mentation and speech appropriate for age.  PSYCH: Mentation appears normal, affect normal/bright, judgement and insight intact, normal speech and appearance well-groomed.                Video-Visit Details    Type of service:  Video Visit   Video Start Time: 2:14 PM  Video End Time:2:19 PM    Originating Location (pt. Location): Home  Distant Location (provider location):  On-site  Platform used for Video Visit: [employer]

## 2023-03-14 ENCOUNTER — TELEPHONE (OUTPATIENT)
Dept: FAMILY MEDICINE | Facility: CLINIC | Age: 33
End: 2023-03-14
Payer: MEDICARE

## 2023-03-14 NOTE — TELEPHONE ENCOUNTER
Prior Authorization Retail Medication Request    Medication/Dose: amphetamine-dextroamphetamine (ADDERALL XR) 15 MG 24 hr capsule   ICD code (if different than what is on RX):    Previously Tried and Failed:    Rationale:      Insurance Name:  Medicare  Insurance ID:  [id number]    Insurance Name: [employer] [place]  Insurance ID: [id number]    Pharmacy Information (if different than what is on RX)  Name:     [facility] [address]  Phone:  [phone]

## 2023-03-16 NOTE — TELEPHONE ENCOUNTER
Prior Authorization Not Needed per Insurance    Medication: amphetamine-dextroamphetamine (ADDERALL XR) 15 MG 24 hr capsule - PA not needed  Insurance Company: [employer] Part D - Phone [phone] Fax [phone]  Expected CoPay:      Pharmacy Filling the Rx: [facility] [place], [place] - [address] [facility]  Pharmacy Notified: Yes  Patient Notified: Yes    Spoke with pharmacy to confirm which plan PA was needed through, pharmacy advised PA was no longer required as patient was switched to the 30mg capsules.

## 2023-03-23 ENCOUNTER — ALLIED HEALTH/NURSE VISIT (OUTPATIENT)
Dept: FAMILY MEDICINE | Facility: CLINIC | Age: 33
End: 2023-03-23
Payer: MEDICARE

## 2023-03-23 DIAGNOSIS — E34.9 TESTOSTERONE DEFICIENCY: Primary | ICD-10-CM

## 2023-03-23 PROCEDURE — 96372 THER/PROPH/DIAG INJ SC/IM: CPT | Performed by: INTERNAL MEDICINE

## 2023-03-23 PROCEDURE — 99207 PR NO CHARGE NURSE ONLY: CPT

## 2023-03-23 RX ADMIN — TESTOSTERONE CYPIONATE 100 MG: 200 INJECTION, SOLUTION INTRAMUSCULAR at 15:49

## 2023-03-23 NOTE — PROGRESS NOTES
Clinic Administered Medication Documentation    Given left ventrogluteal  Testosterone Documentation     Was this medication supplied by the patient? Yes, medication was received directly from a [place] pharmacy in a staff to staff handoff or via  delivery and the patient HAS already been billed for the medication (follow site specific policies)   .    Prior to injection, verified patient identity using patient's name and date of birth. Medication was administered. Please see MAR and medication order for additional information. Patient instructed to remain in clinic for 15 minutes, report any adverse reaction to staff immediately and remain in clinic for 15 minutes and report any adverse reaction to staff immediately but patient declined.  Vial/Syringe: Single dose vial. Was entire vial of medication used? No, The remainder 100MG of 200MG was discarded as unavoidable waste.   Verified that the patient has refills remaining in their prescription.      Madelin Norton RN

## 2023-04-06 ENCOUNTER — ALLIED HEALTH/NURSE VISIT (OUTPATIENT)
Dept: FAMILY MEDICINE | Facility: CLINIC | Age: 33
End: 2023-04-06
Payer: MEDICARE

## 2023-04-06 DIAGNOSIS — E34.9 TESTOSTERONE DEFICIENCY: Primary | ICD-10-CM

## 2023-04-06 PROCEDURE — 96372 THER/PROPH/DIAG INJ SC/IM: CPT | Performed by: INTERNAL MEDICINE

## 2023-04-06 PROCEDURE — 99207 PR NO CHARGE NURSE ONLY: CPT

## 2023-04-06 RX ADMIN — TESTOSTERONE CYPIONATE 100 MG: 200 INJECTION, SOLUTION INTRAMUSCULAR at 15:45

## 2023-04-06 NOTE — PROGRESS NOTES
Clinic Administered Medication Documentation      Testosterone Documentation     Was this medication supplied by the patient? Yes, medication was received directly from patient in a tamper proof bag (follow site specific policies)   .    Prior to injection, verified patient identity using patient's name and date of birth. Medication was administered. Please see MAR and medication order for additional information. Patient instructed to remain in clinic for 15 minutes and report any adverse reaction to staff immediately but patient declined.  Vial/Syringe: Single dose vial. Was entire vial of medication used? No, The remainder 100MG of 200MG was discarded as unavoidable waste.  Verified that the patient has refills remaining in their prescription.    Madelin Notron RN

## 2023-04-20 ENCOUNTER — ALLIED HEALTH/NURSE VISIT (OUTPATIENT)
Dept: FAMILY MEDICINE | Facility: CLINIC | Age: 33
End: 2023-04-20
Payer: MEDICARE

## 2023-04-20 DIAGNOSIS — E34.9 TESTOSTERONE DEFICIENCY: ICD-10-CM

## 2023-04-20 DIAGNOSIS — E34.9 TESTOSTERONE DEFICIENCY: Primary | ICD-10-CM

## 2023-04-20 PROCEDURE — 96372 THER/PROPH/DIAG INJ SC/IM: CPT | Performed by: INTERNAL MEDICINE

## 2023-04-20 PROCEDURE — 99207 PR NO CHARGE NURSE ONLY: CPT

## 2023-04-20 RX ADMIN — TESTOSTERONE CYPIONATE 100 MG: 200 INJECTION, SOLUTION INTRAMUSCULAR at 15:53

## 2023-04-20 NOTE — PROGRESS NOTES
Clinic Administered Medication Documentation      Testosterone Documentation     Was this medication supplied by the patient? Yes, medication was received directly from patient in a tamper proof bag (follow site specific policies)   .    Prior to injection, verified patient identity using patient's name and date of birth. Medication was administered. Please see MAR and medication order for additional information. Patient instructed to remain in clinic for 15 minutes and report any adverse reaction to staff immediately but patient declined.  Vial/Syringe: Single dose vial. Was entire vial of medication used? No, The remainder 100MG of 200MG was discarded as unavoidable waste.  Patient has no refills remaining. Refill encounter opened, order pended and Routed to the provider     Madelin Norton RN

## 2023-04-20 NOTE — TELEPHONE ENCOUNTER
Mamadou Smith was here for his testosterone injection today (please see [facility] nurse visit with today's date 4/20/23). He has no more refills on his ambulatory order for testosterone. I scheduled his lab appt for next Thursday 4/27/23.    Please let patient know if he needs to schedule an office visit with you prior to receiving refills of his testosterone.    Routing to prescribing provider to review and advise.    Madelin Norton RN

## 2023-04-24 RX ORDER — TESTOSTERONE CYPIONATE 200 MG/ML
100 INJECTION, SOLUTION INTRAMUSCULAR
Qty: 1 ML | Refills: 5 | Status: SHIPPED | OUTPATIENT
Start: 2023-04-24 | End: 2023-06-29

## 2023-04-24 NOTE — TELEPHONE ENCOUNTER
Called pt's father Pat (CTC on file) and informed that lab appt needs to be scheduled in the morning for testing testosterone levels due to levels being the highest at this time. Cancelled lab appt at 1:15pm on Thursday 4/27/23 and rescheduled pt for 9:30am on Thursday 4/27/23    Rx pended.    Madelin Norton RN

## 2023-04-27 ENCOUNTER — LAB (OUTPATIENT)
Dept: LAB | Facility: CLINIC | Age: 33
End: 2023-04-27
Payer: MEDICARE

## 2023-04-27 DIAGNOSIS — E03.9 ACQUIRED HYPOTHYROIDISM: ICD-10-CM

## 2023-04-27 DIAGNOSIS — E34.9 TESTOSTERONE DEFICIENCY: ICD-10-CM

## 2023-04-27 LAB
HGB BLD-MCNC: 13.7 G/DL (ref 13.3–17.7)
SHBG SERPL-SCNC: 34 NMOL/L (ref 11–80)
T4 FREE SERPL-MCNC: 0.89 NG/DL (ref 0.9–1.7)
TSH SERPL DL<=0.005 MIU/L-ACNC: 0.87 UIU/ML (ref 0.3–4.2)

## 2023-04-27 PROCEDURE — 84443 ASSAY THYROID STIM HORMONE: CPT

## 2023-04-27 PROCEDURE — 84439 ASSAY OF FREE THYROXINE: CPT

## 2023-04-27 PROCEDURE — 36415 COLL VENOUS BLD VENIPUNCTURE: CPT

## 2023-04-27 PROCEDURE — 84403 ASSAY OF TOTAL TESTOSTERONE: CPT

## 2023-04-27 PROCEDURE — 84270 ASSAY OF SEX HORMONE GLOBUL: CPT

## 2023-04-27 PROCEDURE — 85018 HEMOGLOBIN: CPT

## 2023-05-01 LAB
TESTOST FREE SERPL-MCNC: 21.9 NG/DL
TESTOST SERPL-MCNC: 936 NG/DL (ref 240–950)

## 2023-05-02 NOTE — RESULT ENCOUNTER NOTE
Mamadou    Recently done endocrinology lab test/ imaging test showed:  Labs in acceptable range.  Continue current regimen.    Follow up as discussed in last clinic visit.    Please call endocrinology clinic ( [phone]) if questions.    Sumaya Alonso MD  Endocrinology   [facility]/[place]  May 2, 2023

## 2023-05-04 ENCOUNTER — ALLIED HEALTH/NURSE VISIT (OUTPATIENT)
Dept: FAMILY MEDICINE | Facility: CLINIC | Age: 33
End: 2023-05-04
Payer: MEDICARE

## 2023-05-04 DIAGNOSIS — E34.9 TESTOSTERONE DEFICIENCY: Primary | ICD-10-CM

## 2023-05-04 PROCEDURE — 96372 THER/PROPH/DIAG INJ SC/IM: CPT | Performed by: INTERNAL MEDICINE

## 2023-05-04 PROCEDURE — 99207 PR NO CHARGE NURSE ONLY: CPT

## 2023-05-04 RX ADMIN — TESTOSTERONE CYPIONATE 100 MG: 200 INJECTION, SOLUTION INTRAMUSCULAR at 15:41

## 2023-05-04 NOTE — PROGRESS NOTES
Clinic Administered Medication Documentation      Testosterone Documentation     Was this medication supplied by the patient? Yes, medication was received directly from patient in a tamper proof bag (follow site specific policies)   .    Prior to injection, verified patient identity using patient's name and date of birth. Medication was administered. Please see MAR and medication order for additional information. Patient instructed to remain in clinic for 15 minutes and report any adverse reaction to staff immediately but patient declined.  Vial/Syringe: Single dose vial. Was entire vial of medication used? No, The remainder 100MG of 200MG was discarded as unavoidable waste.  Verified that the patient has refills remaining in their prescription.      Madelin Norton RN

## 2023-05-11 ENCOUNTER — TELEPHONE (OUTPATIENT)
Dept: ENDOCRINOLOGY | Facility: CLINIC | Age: 33
End: 2023-05-11
Payer: MEDICARE

## 2023-05-11 DIAGNOSIS — E23.0 PANHYPOPITUITARISM (H): ICD-10-CM

## 2023-05-11 NOTE — TELEPHONE ENCOUNTER
I called LM informing that the last RX that was sent was for the 15 mg dose and she should contact the pharmacy.

## 2023-05-11 NOTE — TELEPHONE ENCOUNTER
Per caller she did contact pharmacy and they said they still need a new order for the Norditropin 15ML they are unable to use the order from February or March. Please call with any questions.

## 2023-05-11 NOTE — TELEPHONE ENCOUNTER
[facility] Call Center    Phone Message    May a detailed message be left on voicemail: yes     Reason for Call: Medication Question or concern regarding medication   Prescription Clarification    Name of Medication:   * somatropin (NORDITROPIN FLEXPRO) 15 MG/1.5ML SOPN PEN injection    Prescribing Provider: Gavin     Pharmacy: [address]     What on the order needs clarification? Per Patients Mother, aDna states the pharmacy is out of the 10mg and wondering if a new prescription for 15mg can be put in for the pharmacy to fill the prescription. Dana states patient is running low on the medication and needing this to be done ASAP for the pharmacy to send out the prescription via mail. Dana would like to get a call back when this has been done to be aware of. Please advise.       Action Taken: Message routed to:  [facility] Endo    Travel Screening: Not Applicable

## 2023-05-11 NOTE — TELEPHONE ENCOUNTER
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 ( 10 mg/1.5 ml solution)  Discussed GH replacement in adults.  They would like to continue it. Agree with it.  1/2023 labs showing no growth hormone levels in the setting of inconsistent use  Plan to continue current dose of growth hormone and be consistent with it.    RX pended.

## 2023-05-18 ENCOUNTER — ALLIED HEALTH/NURSE VISIT (OUTPATIENT)
Dept: FAMILY MEDICINE | Facility: CLINIC | Age: 33
End: 2023-05-18
Payer: MEDICARE

## 2023-05-18 DIAGNOSIS — E34.9 TESTOSTERONE DEFICIENCY: Primary | ICD-10-CM

## 2023-05-18 PROCEDURE — 99207 PR NO CHARGE NURSE ONLY: CPT

## 2023-05-18 PROCEDURE — 96372 THER/PROPH/DIAG INJ SC/IM: CPT | Performed by: INTERNAL MEDICINE

## 2023-05-18 RX ADMIN — TESTOSTERONE CYPIONATE 100 MG: 200 INJECTION, SOLUTION INTRAMUSCULAR at 15:45

## 2023-05-18 NOTE — PROGRESS NOTES
Clinic Administered Medication Documentation    Administrations This Visit     testosterone cypionate (DEPOTESTOSTERONE) injection 100 mg     Admin Date  05/18/2023 Action  $Given Dose  100 mg Route  Intramuscular Site  Right Ventrogluteal Administered By  Jael Mccauley RN    Ordering Provider: Sumaya Alonso MD    Patient Supplied?: Yes              Testosterone Documentation     Was this medication supplied by the patient? Yes, medication was received directly from patient in a tamper proof bag (follow site specific policies).    Prior to injection, verified patient identity using patient's name and date of birth. Medication was administered. Please see MAR and medication order for additional information. Patient instructed to remain in clinic for 15 minutes and report any adverse reaction to staff immediately but patient declined.  Vial/Syringe: Single dose vial. Was entire vial of medication used? No, The remainder 100MG of 200MG was discarded as unavoidable waste.  Verified that the patient has refills remaining in their prescription.    Jael MONTERO RN  Patient Advocate Liaison - PAL RN  [facility]  [phone]

## 2023-06-01 ENCOUNTER — ALLIED HEALTH/NURSE VISIT (OUTPATIENT)
Dept: FAMILY MEDICINE | Facility: CLINIC | Age: 33
End: 2023-06-01
Payer: MEDICARE

## 2023-06-01 DIAGNOSIS — E34.9 TESTOSTERONE DEFICIENCY: Primary | ICD-10-CM

## 2023-06-01 PROCEDURE — 96372 THER/PROPH/DIAG INJ SC/IM: CPT | Performed by: INTERNAL MEDICINE

## 2023-06-01 PROCEDURE — 99207 PR NO CHARGE NURSE ONLY: CPT

## 2023-06-01 RX ADMIN — TESTOSTERONE CYPIONATE 100 MG: 200 INJECTION, SOLUTION INTRAMUSCULAR at 15:44

## 2023-06-01 NOTE — PROGRESS NOTES
Clinic Administered Medication Documentation      Testosterone Documentation     Was this medication supplied by the patient? Yes, medication was received directly from patient in a tamper proof bag (follow site specific policies)   .    Prior to injection, verified patient identity using patient's name and date of birth. Medication was administered. Please see MAR and medication order for additional information. Patient instructed to remain in clinic for 15 minutes, remain in clinic for 15 minutes and report any adverse reaction to staff immediately but patient declined and report any adverse reaction to staff immediately .  Vial/Syringe: Single dose vial. Was entire vial of medication used? No, The remainder 100MG of 200MG was discarded as unavoidable waste.     Verified that the patient has refills remaining in their prescription.      Madelin GÓMEZ RN

## 2023-06-02 ENCOUNTER — TELEPHONE (OUTPATIENT)
Dept: FAMILY MEDICINE | Facility: CLINIC | Age: 33
End: 2023-06-02
Payer: MEDICARE

## 2023-06-02 DIAGNOSIS — F98.8 ATTENTION DEFICIT DISORDER, UNSPECIFIED HYPERACTIVITY PRESENCE: Primary | ICD-10-CM

## 2023-06-02 RX ORDER — DEXTROAMPHETAMINE SACCHARATE, AMPHETAMINE ASPARTATE MONOHYDRATE, DEXTROAMPHETAMINE SULFATE AND AMPHETAMINE SULFATE 6.25; 6.25; 6.25; 6.25 MG/1; MG/1; MG/1; MG/1
25 CAPSULE, EXTENDED RELEASE ORAL EVERY MORNING
Qty: 30 CAPSULE | Refills: 0 | Status: SHIPPED | OUTPATIENT
Start: 2023-06-02 | End: 2024-05-20

## 2023-06-02 NOTE — TELEPHONE ENCOUNTER
Patient's mom called and asked for a new ADDERALL prescription to be written for 25 mg capsules instead of 30 mg as the pharmacy only has the 25 mg. They ask for this to be sent to the [facility].    Hina Arroyo

## 2023-06-12 ENCOUNTER — TELEPHONE (OUTPATIENT)
Dept: ENDOCRINOLOGY | Facility: CLINIC | Age: 33
End: 2023-06-12
Payer: MEDICARE

## 2023-06-12 NOTE — TELEPHONE ENCOUNTER
Current pa expires 06/28/2023    PA Initiation    Medication: NORDITROPIN FLEXPRO 15 MG/1.5ML SC SOPN  Insurance Company: [employer] Part D - Phone [phone] Fax [phone]  Pharmacy Filling the Rx:    Filling Pharmacy Phone:    Filling Pharmacy Fax:    Start Date: 6/12/2023    Change to Genotropin per pa?

## 2023-06-13 NOTE — TELEPHONE ENCOUNTER
Prior Authorization Approval    Medication: NORDITROPIN FLEXPRO 15 MG/1.5ML SC SOPN  Authorization Effective Date: 1/1/2023  Authorization Expiration Date: 6/12/2024  Approved Dose/Quantity: 6ml per 86 day  Reference #: [id number]   Insurance Company: [employer] Part D - Phone [phone] Fax [phone]  Expected CoPay: $0     CoPay Card Available:      Financial Assistance Needed:   Which Pharmacy is filling the prescription:    Pharmacy Notified:    [facility]

## 2023-06-19 ENCOUNTER — ALLIED HEALTH/NURSE VISIT (OUTPATIENT)
Dept: FAMILY MEDICINE | Facility: CLINIC | Age: 33
End: 2023-06-19
Payer: MEDICARE

## 2023-06-19 DIAGNOSIS — E23.0 PANHYPOPITUITARISM (H): ICD-10-CM

## 2023-06-19 DIAGNOSIS — E34.9 TESTOSTERONE DEFICIENCY: Primary | ICD-10-CM

## 2023-06-19 PROCEDURE — 99207 PR NON-BILLABLE SERV PER CHARTING: CPT

## 2023-06-19 PROCEDURE — 96372 THER/PROPH/DIAG INJ SC/IM: CPT | Performed by: INTERNAL MEDICINE

## 2023-06-19 RX ADMIN — TESTOSTERONE CYPIONATE 100 MG: 200 INJECTION, SOLUTION INTRAMUSCULAR at 16:01

## 2023-06-19 NOTE — PROGRESS NOTES
Clinic Administered Medication Documentation      Testosterone Documentation     Was this medication supplied by the patient? Yes, medication was received directly from a [place] pharmacy in a staff to staff handoff or via  delivery and the patient HAS already been billed for the medication (follow site specific policies)    and directly from patient in a tamper proof bag (follow site specific policies)   .    Prior to injection, verified patient identity using patient's name and date of birth. Medication was administered. Please see MAR and medication order for additional information. Patient instructed to remain in clinic for 15 minutes, report any adverse reaction to staff immediately, remain in clinic for 15 minutes and report any adverse reaction to staff immediately but patient declined and report any adverse reaction to staff immediately .  Vial/Syringe: Single dose vial. Was entire vial of medication used? No, The remainder 100 mgMG of 200MG was discarded as unavoidable waste.  Patient has no refills remaining. Refill encounter opened, order pended and Routed to the provider     Sylvie SMITH RN   PAL (Patient Advocate Liaison)  [facility]  [phone]

## 2023-06-29 RX ORDER — TESTOSTERONE CYPIONATE 200 MG/ML
100 INJECTION, SOLUTION INTRAMUSCULAR
Qty: 1 ML | Refills: 5
Start: 2023-06-29 | End: 2023-08-10

## 2023-06-29 NOTE — PROGRESS NOTES
Rx signed but not sent to pharmacy.  Please check if its CAM- I am not sure how to order it in Epic.  If not-- please pend correct orders.

## 2023-07-06 ENCOUNTER — ALLIED HEALTH/NURSE VISIT (OUTPATIENT)
Dept: FAMILY MEDICINE | Facility: CLINIC | Age: 33
End: 2023-07-06
Payer: MEDICARE

## 2023-07-06 DIAGNOSIS — E34.9 TESTOSTERONE DEFICIENCY: Primary | ICD-10-CM

## 2023-07-06 PROCEDURE — 99207 PR NO CHARGE NURSE ONLY: CPT

## 2023-07-06 PROCEDURE — 96372 THER/PROPH/DIAG INJ SC/IM: CPT | Performed by: INTERNAL MEDICINE

## 2023-07-06 RX ADMIN — TESTOSTERONE CYPIONATE 100 MG: 200 INJECTION, SOLUTION INTRAMUSCULAR at 15:56

## 2023-07-06 NOTE — NURSING NOTE
Clinic Administered Medication Documentation    Testosterone Documentation     Was this medication supplied by the patient? Yes, medication was received directly from patient in a tamper proof bag (follow site specific policies). Prior to injection, verified patient identity using patient's name and date of birth. Medication was administered. Please see MAR and medication order for additional information.     Administrations This Visit     testosterone cypionate (DEPOTESTOSTERONE) injection 100 mg     Admin Date  07/06/2023 Action  $Given Dose  100 mg Route  Intramuscular Site  Left Ventrogluteal Administered By  Chevy Benitez RN    Ordering Provider: Sumaya Alonso MD    Patient Supplied?: Yes              Patient instructed to remain in clinic for 15 minutes and report any adverse reaction to staff immediately but patient declined.  Vial/Syringe: Single dose vial. Was entire vial of medication used? No, The remainder 100MG of 200MG was discarded as unavoidable waste.  Verified that the patient has refills remaining in their prescription. Patient reports that he will be out of town until August, and request that August 10th be his next appointment for Testosterone administration. Already scheduled.    Chevy Benitez RN on 7/6/2023 at 3:59 PM

## 2023-07-07 ENCOUNTER — TRANSFERRED RECORDS (OUTPATIENT)
Dept: HEALTH INFORMATION MANAGEMENT | Facility: CLINIC | Age: 33
End: 2023-07-07

## 2023-07-20 ENCOUNTER — TELEPHONE (OUTPATIENT)
Dept: FAMILY MEDICINE | Facility: CLINIC | Age: 33
End: 2023-07-20
Payer: MEDICARE

## 2023-07-20 NOTE — TELEPHONE ENCOUNTER
Rec'd Standing Order for OTC Medications from [facility]. Placed in PCP basket for review and signature. Fax [phone]    Hina Arroyo

## 2023-07-28 ENCOUNTER — TELEPHONE (OUTPATIENT)
Dept: ENDOCRINOLOGY | Facility: CLINIC | Age: 33
End: 2023-07-28
Payer: MEDICARE

## 2023-07-28 DIAGNOSIS — E23.0 GROWTH HORMONE DEFICIENCY (H): Primary | ICD-10-CM

## 2023-07-28 NOTE — TELEPHONE ENCOUNTER
[facility] Call Center    Phone Message    May a detailed message be left on voicemail: yes     Reason for Call: Other: Per dad would like pt to get his refill for medication Somatropin 15 MG/1.5ML SOPN but he called the pharmacy and they told Dad and mom that they are currently out of the medication and is not sure when they will be getting more. Per dad ask what would possibly be a alternative and the pharmacy mention genotropin. Per dad then ask if the pharmacy have the genotropin  in stock which they do but very limited. The pharmacy is asking the pt to have the team call and discuss about this alternative with them and to determine the pen size for the RX. Per dad would like to ask what is the option are if the medications is not available at all? Per dad would also like to ask if this is another option ,is it possible to take of reduce amount of medication to extend the medication ? Pharmacy gave this number for the team to call to discuss. PH# [phone] . Please and thank you!    Action Taken: Message routed to:  [facility] ENDO    Travel Screening: Not Applicable

## 2023-07-31 RX ORDER — SOMATROPIN 5 MG/2ML
0.7 INJECTION, SOLUTION SUBCUTANEOUS DAILY
Qty: 26 ML | Refills: 0 | Status: SHIPPED | OUTPATIENT
Start: 2023-07-31 | End: 2023-10-25

## 2023-07-31 NOTE — TELEPHONE ENCOUNTER
I called the pharmacy nutropin is available, as well as humatrope. I have pended the RX for the MD to review and advise.   The pt also needs an appt with labs prior. Please advise what labs you want checked prior to the next visit.

## 2023-08-01 NOTE — TELEPHONE ENCOUNTER
Smn completed to best of liaison ability for training and new start services. Sent message to nurse to see how the provider wants to complete the form    Sent mychart to parent to make aware of change, possible convert to [facility] and to relay they will stay on nutropin due to unstable supply of norditropin until plan no longer allows.

## 2023-08-03 ENCOUNTER — TELEPHONE (OUTPATIENT)
Dept: ENDOCRINOLOGY | Facility: CLINIC | Age: 33
End: 2023-08-03
Payer: MEDICARE

## 2023-08-03 NOTE — TELEPHONE ENCOUNTER
[facility] Prescriber Service Form for NuSpin 5    LAST OFFICE/VIRTUAL VISIT:  01/17/23    FUTURE OFFICE/VIRTUAL VISIT:  none    Annalee Nieto [facility] [place]  [phone]

## 2023-08-03 NOTE — TELEPHONE ENCOUNTER
Form was faxed and sent to scanning.      Annalee Nieto, [facility]  [place]

## 2023-08-03 NOTE — TELEPHONE ENCOUNTER
Forms/paperwork reviewed, completed and signed.  Please fax or send the papers as requested, document in chart and close the encounter.    Thank you.    Sumaya Alonso MD

## 2023-08-10 ENCOUNTER — ALLIED HEALTH/NURSE VISIT (OUTPATIENT)
Dept: FAMILY MEDICINE | Facility: CLINIC | Age: 33
End: 2023-08-10
Payer: MEDICARE

## 2023-08-10 DIAGNOSIS — E34.9 TESTOSTERONE DEFICIENCY: ICD-10-CM

## 2023-08-10 DIAGNOSIS — E34.9 TESTOSTERONE DEFICIENCY: Primary | ICD-10-CM

## 2023-08-10 PROCEDURE — 96372 THER/PROPH/DIAG INJ SC/IM: CPT | Performed by: INTERNAL MEDICINE

## 2023-08-10 PROCEDURE — 99207 PR NO CHARGE NURSE ONLY: CPT

## 2023-08-10 RX ORDER — TESTOSTERONE CYPIONATE 200 MG/ML
100 INJECTION, SOLUTION INTRAMUSCULAR
Qty: 1 ML | Refills: 5 | Status: SHIPPED | OUTPATIENT
Start: 2023-08-10 | End: 2023-11-02

## 2023-08-10 RX ORDER — TESTOSTERONE CYPIONATE 200 MG/ML
100 INJECTION, SOLUTION INTRAMUSCULAR
Status: DISCONTINUED | OUTPATIENT
Start: 2023-08-24 | End: 2024-04-05

## 2023-08-10 RX ADMIN — TESTOSTERONE CYPIONATE 100 MG: 200 INJECTION, SOLUTION INTRAMUSCULAR at 15:48

## 2023-08-10 NOTE — TELEPHONE ENCOUNTER
Pt was in clinic today to receive his testosterone injection (see [facility] Nurse Visit with today's date 8/10/23) and pt is out of refills on his testosterone. Pt will also need a new CAM order for his testosterone as well.     Pt is scheduled for his next dose on 8/24/23. Please advise if you would like to see pt or have labs done.    T'd up med and pharmacy and CAM order and routing to provider to review and advise.    Madelin GÓMEZ RN

## 2023-08-10 NOTE — PROGRESS NOTES
Clinic Administered Medication Documentation      Testosterone Documentation     Was this medication supplied by the patient? Yes, medication was received directly from patient in a tamper proof bag (follow site specific policies)     Prior to injection, verified patient identity using patient's name and date of birth. Medication was administered. Please see MAR and medication order for additional information. Patient instructed to remain in clinic for 15 minutes, report any adverse reaction to staff immediately, remain in clinic for 15 minutes and report any adverse reaction to staff immediately but patient declined, and report any adverse reaction to staff immediately .  Vial/Syringe: Single dose vial. Was entire vial of medication used? No, The remainder 100 MG of 200 MG was discarded as unavoidable waste.  Patient has no refills remaining. Refill encounter opened, order pended and Routed to the provider      Given right ventrogluteal    Madelin GÓMEZ RN

## 2023-08-24 ENCOUNTER — ALLIED HEALTH/NURSE VISIT (OUTPATIENT)
Dept: FAMILY MEDICINE | Facility: CLINIC | Age: 33
End: 2023-08-24
Payer: MEDICARE

## 2023-08-24 DIAGNOSIS — E34.9 TESTOSTERONE DEFICIENCY: Primary | ICD-10-CM

## 2023-08-24 PROCEDURE — 99207 PR NO CHARGE NURSE ONLY: CPT

## 2023-08-24 PROCEDURE — 96372 THER/PROPH/DIAG INJ SC/IM: CPT | Performed by: INTERNAL MEDICINE

## 2023-08-24 RX ADMIN — TESTOSTERONE CYPIONATE 100 MG: 200 INJECTION, SOLUTION INTRAMUSCULAR at 15:30

## 2023-08-24 NOTE — NURSING NOTE
Clinic Administered Medication Documentation      Testosterone Documentation     Was this medication supplied by the patient? Yes, medication was received directly from a [place] pharmacy in a staff to staff handoff or via  delivery and the patient HAS already been billed for the medication (follow site specific policies)     Administrations This Visit       testosterone cypionate (DEPOTESTOSTERONE) injection 100 mg       Admin Date  08/24/2023 Action  $Given Dose  100 mg Route  Intramuscular Site  Left Ventrogluteal Administered By  Chevy Benitez RN    Ordering Provider: Sumaya Alonso MD    NDC: 2042-8125-37    Lot#: [id number]    : HOSPIRA    Patient Supplied?: Yes                  Prior to injection, verified patient identity using patient's name and date of birth. Medication was administered. Please see MAR and medication order for additional information. Patient instructed to remain in clinic for 15 minutes and report any adverse reaction to staff immediately but patient declined.  Vial/Syringe: Single dose vial. Was entire vial of medication used? No, The remainder 100 MG of 200 MG was discarded as unavoidable waste.  Verified that the patient has refills remaining in their prescription.     Chevy Benitez RN on 8/24/2023 at 4:15 PM

## 2023-09-06 DIAGNOSIS — E03.9 ACQUIRED HYPOTHYROIDISM: ICD-10-CM

## 2023-09-06 DIAGNOSIS — E23.2 DIABETES INSIPIDUS (H): ICD-10-CM

## 2023-09-06 RX ORDER — LEVOTHYROXINE SODIUM 150 UG/1
150 TABLET ORAL DAILY
Qty: 90 TABLET | Refills: 1 | Status: SHIPPED | OUTPATIENT
Start: 2023-09-06 | End: 2024-04-15

## 2023-09-06 RX ORDER — DESMOPRESSIN ACETATE 0.1 MG/ML
SOLUTION NASAL
Qty: 10 ML | Refills: 1 | Status: SHIPPED | OUTPATIENT
Start: 2023-09-06 | End: 2023-12-22

## 2023-09-06 NOTE — TELEPHONE ENCOUNTER
"Requested Prescriptions   Pending Prescriptions Disp Refills    levothyroxine (SYNTHROID/LEVOTHROID) 150 MCG tablet [Pharmacy Med Name: LEVOTHYROXINE SODIUM 150MCG TABS] 90 tablet 1     Sig: TAKE 1 TABLET (150 MCG) BY MOUTH DAILY       Thyroid Protocol Passed - 9/6/2023 10:37 AM        Passed - Patient is 12 years or older        Passed - Recent (12 mo) or future (30 days) visit within the authorizing provider's specialty     Patient has had an office visit with the authorizing provider or a provider within the authorizing providers department within the previous 12 mos or has a future within next 30 days. See \"Patient Info\" tab in inbasket, or \"Choose Columns\" in Meds & Orders section of the refill encounter.              Passed - Medication is active on med list        Passed - Normal TSH on file in past 12 months     Recent Labs   Lab Test 04/27/23  0930   TSH 0.87                desmopressin (DDAVP) 0.01 % SOLN spray [Pharmacy Med Name: DESMOPRESSIN ACE SPRAY RE 0.01 SOLN] 10 mL 1     Sig: USE THREE SPRAYS INTO NOSTRIL ONCE DAILY AND USE ONE SPRAY INTO NOSTRIL AT BEDTIME       There is no refill protocol information for this order          "

## 2023-09-07 ENCOUNTER — ALLIED HEALTH/NURSE VISIT (OUTPATIENT)
Dept: FAMILY MEDICINE | Facility: CLINIC | Age: 33
End: 2023-09-07
Payer: MEDICARE

## 2023-09-07 DIAGNOSIS — E34.9 TESTOSTERONE DEFICIENCY: Primary | ICD-10-CM

## 2023-09-07 PROCEDURE — 99207 PR NO CHARGE NURSE ONLY: CPT

## 2023-09-07 PROCEDURE — 96372 THER/PROPH/DIAG INJ SC/IM: CPT | Performed by: INTERNAL MEDICINE

## 2023-09-07 RX ADMIN — TESTOSTERONE CYPIONATE 100 MG: 200 INJECTION, SOLUTION INTRAMUSCULAR at 15:47

## 2023-09-07 NOTE — PROGRESS NOTES
Clinic Administered Medication Documentation      Testosterone Documentation     Was this medication supplied by the patient? Yes, medication was received directly from patient in a tamper proof bag (follow site specific policies)   .    Prior to injection, verified patient identity using patient's name and date of birth. Medication was administered. Please see MAR and medication order for additional information. Patient instructed to remain in clinic for 15 minutes, remain in clinic for 15 minutes and report any adverse reaction to staff immediately but patient declined, and report any adverse reaction to staff immediately .  Vial/Syringe: Single dose vial. Was entire vial of medication used? No, The remainder 100 MG of 200 MG was discarded as unavoidable waste.  Verified that the patient has refills remaining in their prescription.    Given right ventrogluteal    Madelin GÓMEZ RN

## 2023-09-21 ENCOUNTER — ALLIED HEALTH/NURSE VISIT (OUTPATIENT)
Dept: FAMILY MEDICINE | Facility: CLINIC | Age: 33
End: 2023-09-21
Payer: MEDICARE

## 2023-09-21 DIAGNOSIS — E34.9 TESTOSTERONE DEFICIENCY: Primary | ICD-10-CM

## 2023-09-21 PROCEDURE — 99207 PR NO CHARGE NURSE ONLY: CPT

## 2023-09-21 PROCEDURE — 96372 THER/PROPH/DIAG INJ SC/IM: CPT | Performed by: INTERNAL MEDICINE

## 2023-09-21 RX ADMIN — TESTOSTERONE CYPIONATE 100 MG: 200 INJECTION, SOLUTION INTRAMUSCULAR at 15:49

## 2023-09-21 NOTE — PROGRESS NOTES
Clinic Administered Medication Documentation      Testosterone Documentation     Was this medication supplied by the patient? Yes, medication was received directly from patient in a tamper proof bag (follow site specific policies)   .    Prior to injection, verified patient identity using patient's name and date of birth. Medication was administered. Please see MAR and medication order for additional information. Patient instructed to remain in clinic for 15 minutes, remain in clinic for 15 minutes and report any adverse reaction to staff immediately but patient declined, and report any adverse reaction to staff immediately .  Vial/Syringe: Single dose vial. Was entire vial of medication used? No, The remainder 100 MG of 200 MG was discarded as unavoidable waste.  Verified that the patient has refills remaining in their prescription.    Given left ventrogluteal    Madelin GÓMEZ RN

## 2023-10-05 ENCOUNTER — ALLIED HEALTH/NURSE VISIT (OUTPATIENT)
Dept: FAMILY MEDICINE | Facility: CLINIC | Age: 33
End: 2023-10-05
Payer: MEDICARE

## 2023-10-05 DIAGNOSIS — E34.9 TESTOSTERONE DEFICIENCY: Primary | ICD-10-CM

## 2023-10-05 PROCEDURE — 99207 PR NO CHARGE NURSE ONLY: CPT

## 2023-10-05 PROCEDURE — 96372 THER/PROPH/DIAG INJ SC/IM: CPT | Performed by: INTERNAL MEDICINE

## 2023-10-05 RX ADMIN — TESTOSTERONE CYPIONATE 100 MG: 200 INJECTION, SOLUTION INTRAMUSCULAR at 15:43

## 2023-10-05 NOTE — PROGRESS NOTES
Clinic Administered Medication Documentation      Testosterone Documentation     Was this medication supplied by the patient? Yes, medication was received directly from patient in a tamper proof bag (follow site specific policies)       Prior to injection, verified patient identity using patient's name and date of birth. Medication was administered. Please see MAR and medication order for additional information. Patient instructed to remain in clinic for 15 minutes, remain in clinic for 15 minutes and report any adverse reaction to staff immediately but patient declined, and report any adverse reaction to staff immediately .  Vial/Syringe: Single dose vial. Was entire vial of medication used? No, The remainder 100 MG of 200 MG was discarded as unavoidable waste.  Verified that the patient has refills remaining in their prescription.    Given right ventrogluteal    Madelin GÓMEZ RN

## 2023-10-19 ENCOUNTER — ALLIED HEALTH/NURSE VISIT (OUTPATIENT)
Dept: FAMILY MEDICINE | Facility: CLINIC | Age: 33
End: 2023-10-19
Payer: MEDICARE

## 2023-10-19 DIAGNOSIS — E34.9 TESTOSTERONE DEFICIENCY: Primary | ICD-10-CM

## 2023-10-19 PROCEDURE — 99207 PR NO CHARGE NURSE ONLY: CPT

## 2023-10-19 PROCEDURE — 96372 THER/PROPH/DIAG INJ SC/IM: CPT | Performed by: INTERNAL MEDICINE

## 2023-10-19 RX ADMIN — TESTOSTERONE CYPIONATE 100 MG: 200 INJECTION, SOLUTION INTRAMUSCULAR at 15:46

## 2023-10-25 ENCOUNTER — TELEPHONE (OUTPATIENT)
Dept: ENDOCRINOLOGY | Facility: CLINIC | Age: 33
End: 2023-10-25
Payer: MEDICARE

## 2023-10-25 DIAGNOSIS — E23.0 GROWTH HORMONE DEFICIENCY (H): ICD-10-CM

## 2023-10-25 RX ORDER — SOMATROPIN 5 MG/2ML
INJECTION, SOLUTION SUBCUTANEOUS
Qty: 26 ML | Refills: 1 | Status: SHIPPED | OUTPATIENT
Start: 2023-10-25 | End: 2024-05-15

## 2023-10-25 NOTE — TELEPHONE ENCOUNTER
Please schedule follow up with labs prior. First available.    RX pended.    Requested Prescriptions   Pending Prescriptions Disp Refills    NUTROPIN AQ NUSPIN 5 5 MG/2ML SOPN PEN injection [Pharmacy Med Name: NUTROPIN AQ NUSPIN 5 5MG/2ML SOPN] 26 mL 0     Sig: INJECT 0.7 MG UNDER THE SKIN ONCE DAILY.       There is no refill protocol information for this order

## 2023-11-02 ENCOUNTER — TELEPHONE (OUTPATIENT)
Dept: FAMILY MEDICINE | Facility: CLINIC | Age: 33
End: 2023-11-02

## 2023-11-02 ENCOUNTER — ALLIED HEALTH/NURSE VISIT (OUTPATIENT)
Dept: FAMILY MEDICINE | Facility: CLINIC | Age: 33
End: 2023-11-02
Payer: MEDICARE

## 2023-11-02 DIAGNOSIS — E34.9 TESTOSTERONE DEFICIENCY: ICD-10-CM

## 2023-11-02 DIAGNOSIS — E34.9 TESTOSTERONE DEFICIENCY: Primary | ICD-10-CM

## 2023-11-02 PROCEDURE — 99207 PR NO CHARGE NURSE ONLY: CPT

## 2023-11-02 PROCEDURE — 96372 THER/PROPH/DIAG INJ SC/IM: CPT | Performed by: INTERNAL MEDICINE

## 2023-11-02 RX ORDER — TESTOSTERONE CYPIONATE 200 MG/ML
100 INJECTION, SOLUTION INTRAMUSCULAR
Qty: 1 ML | Refills: 5 | Status: SHIPPED | OUTPATIENT
Start: 2023-11-02 | End: 2024-01-25

## 2023-11-02 RX ADMIN — TESTOSTERONE CYPIONATE 100 MG: 200 INJECTION, SOLUTION INTRAMUSCULAR at 15:40

## 2023-11-02 NOTE — PROGRESS NOTES
Administrations This Visit       testosterone cypionate (DEPOTESTOSTERONE) injection 100 mg       Admin Date  11/02/2023 Action  $Given Dose  100 mg Route  Intramuscular Site  Right Ventrogluteal Documented By  Sylvie Quinn RN    Ordering Provider: Sumaya Alonso MD    NDC: 0883-9141-85    Lot#: [id number]    : HOSPIRA    Patient Supplied?: Yes                  Clinic Administered Medication Documentation      Testosterone Documentation     Was this medication supplied by the patient? Yes, medication was received directly from patient in a tamper proof bag (follow site specific policies)   .    Prior to injection, verified patient identity using patient's name and date of birth. Medication was administered. Please see MAR and medication order for additional information. Patient instructed to remain in clinic for 15 minutes and report any adverse reaction to staff immediately but patient declined.  Vial/Syringe: Single dose vial. Was entire vial of medication used? No, The remainder 100 MG of 200 MG was discarded as unavoidable waste.  Patient has no refills remaining. Refill encounter opened, order pended and Routed to the provider      Sylvie SMITH RN   PAL (Patient Advocate Liaison)  [facility]  [phone]

## 2023-11-02 NOTE — TELEPHONE ENCOUNTER
Dr. Alonso- Sending refill request for testosterone. Please review and order, if appropriate.    -Pended medication below.     Routed to Dr. Alonso.     MICKIE Joiner (Patient Advocate Liaison)  [facility]

## 2023-11-16 ENCOUNTER — ALLIED HEALTH/NURSE VISIT (OUTPATIENT)
Dept: FAMILY MEDICINE | Facility: CLINIC | Age: 33
End: 2023-11-16
Payer: MEDICARE

## 2023-11-16 DIAGNOSIS — E34.9 TESTOSTERONE DEFICIENCY: Primary | ICD-10-CM

## 2023-11-16 PROCEDURE — 96372 THER/PROPH/DIAG INJ SC/IM: CPT | Performed by: INTERNAL MEDICINE

## 2023-11-16 PROCEDURE — 99207 PR NO CHARGE NURSE ONLY: CPT

## 2023-11-16 RX ADMIN — TESTOSTERONE CYPIONATE 100 MG: 200 INJECTION, SOLUTION INTRAMUSCULAR at 15:48

## 2023-11-16 NOTE — PROGRESS NOTES
Clinic Administered Medication Documentation    Testosterone Documentation     Was this medication supplied by the patient? Yes, medication was received directly from patient in a tamper proof bag (follow site specific policies)   .    Administrations This Visit       testosterone cypionate (DEPOTESTOSTERONE) injection 100 mg       Admin Date  11/16/2023 Action  $Given Dose  100 mg Route  Intramuscular Site  Left Ventrogluteal Documented By  Chevy Benitez RN    Ordering Provider: Sumaya Alonso MD    NDC: 4439-0883-44    Lot#: [id number]    : [facility]A    Patient Supplied?: Yes                Prior to injection, verified patient identity using patient's name and date of birth. Medication was administered. Please see MAR and medication order for additional information. Patient instructed to remain in clinic for 15 minutes and report any adverse reaction to staff immediately but patient declined.  Vial/Syringe: Single dose vial. Was entire vial of medication used? No, The remainder 100 MG of 200 MG was discarded as unavoidable waste.  Verified that the patient has refills remaining in their prescription.     Chevy Benitez RN on 11/16/2023 at 3:54 PM

## 2023-11-25 ENCOUNTER — HEALTH MAINTENANCE LETTER (OUTPATIENT)
Age: 33
End: 2023-11-25

## 2023-11-28 ENCOUNTER — TELEPHONE (OUTPATIENT)
Dept: FAMILY MEDICINE | Facility: CLINIC | Age: 33
End: 2023-11-28

## 2023-11-28 NOTE — TELEPHONE ENCOUNTER
MA/TC:    Pt's father calling to request pt receive flu shot in addition to scheduled testosterone inj 11/30 3:30 PM. No openings on nurse only schedule. Can this be squeezed in at that time? Pt gets his testosterone injections at the [facility].     Thanks!  Baldomero Aguilera RN on 11/28/2023 at 11:31 AM

## 2023-11-30 ENCOUNTER — ALLIED HEALTH/NURSE VISIT (OUTPATIENT)
Dept: FAMILY MEDICINE | Facility: CLINIC | Age: 33
End: 2023-11-30
Payer: MEDICARE

## 2023-11-30 DIAGNOSIS — E34.9 TESTOSTERONE DEFICIENCY: Primary | ICD-10-CM

## 2023-11-30 DIAGNOSIS — Z23 NEEDS FLU SHOT: Primary | ICD-10-CM

## 2023-11-30 PROCEDURE — 99207 PR NO CHARGE NURSE ONLY: CPT

## 2023-11-30 PROCEDURE — 96372 THER/PROPH/DIAG INJ SC/IM: CPT | Performed by: INTERNAL MEDICINE

## 2023-11-30 PROCEDURE — 90686 IIV4 VACC NO PRSV 0.5 ML IM: CPT

## 2023-11-30 PROCEDURE — G0008 ADMIN INFLUENZA VIRUS VAC: HCPCS

## 2023-11-30 RX ADMIN — TESTOSTERONE CYPIONATE 100 MG: 200 INJECTION, SOLUTION INTRAMUSCULAR at 15:46

## 2023-11-30 NOTE — PROGRESS NOTES
Prior to immunization administration, verified patients identity using patient s name and date of birth. Please see Immunization Activity for additional information.     Screening Questionnaire for Adult Immunization    Are you sick today?   No   Do you have allergies to medications, food, a vaccine component or latex?   No   Have you ever had a serious reaction after receiving a vaccination?   No   Do you have a long-term health problem with heart, lung, kidney, or metabolic disease (e.g., diabetes), asthma, a blood disorder, no spleen, complement component deficiency, a cochlear implant, or a spinal fluid leak?  Are you on long-term aspirin therapy?   No   Do you have cancer, leukemia, HIV/AIDS, or any other immune system problem?   No   Do you have a parent, brother, or sister with an immune system problem?   No   In the past 3 months, have you taken medications that affect  your immune system, such as prednisone, other steroids, or anticancer drugs; drugs for the treatment of rheumatoid arthritis, Crohn s disease, or psoriasis; or have you had radiation treatments?   No   Have you had a seizure, or a brain or other nervous system problem?   No   During the past year, have you received a transfusion of blood or blood    products, or been given immune (gamma) globulin or antiviral drug?   No   For women: Are you pregnant or is there a chance you could become       pregnant during the next month?   No   Have you received any vaccinations in the past 4 weeks?   No     Immunization questionnaire answers were all negative.    I have reviewed the following standing orders:   This patient is due and qualifies for the Influenza vaccine.    Click here for Influenza Vaccine Standing Order    I have reviewed the vaccines inclusion and exclusion criteria; No concerns regarding eligibility.     Patient instructed to remain in clinic for 15 minutes afterwards, and to report any adverse reactions.     Screening performed by  Wesley Huynh, HITESH on 11/30/2023 at 4:00 PM.

## 2023-12-06 ENCOUNTER — TRANSFERRED RECORDS (OUTPATIENT)
Dept: HEALTH INFORMATION MANAGEMENT | Facility: CLINIC | Age: 33
End: 2023-12-06
Payer: MEDICARE

## 2023-12-08 ENCOUNTER — TRANSCRIBE ORDERS (OUTPATIENT)
Dept: OTHER | Age: 33
End: 2023-12-08

## 2023-12-08 ENCOUNTER — PATIENT OUTREACH (OUTPATIENT)
Dept: ONCOLOGY | Facility: CLINIC | Age: 33
End: 2023-12-08
Payer: MEDICARE

## 2023-12-08 DIAGNOSIS — D44.4 CRANIOPHARYNGIOMA (H): Primary | ICD-10-CM

## 2023-12-08 NOTE — PROGRESS NOTES
Referral received from [facility] to Dr Richey for LTFU, transfer of care for Survivorship. Records included records and appt not needed until March 2024.

## 2023-12-14 ENCOUNTER — ALLIED HEALTH/NURSE VISIT (OUTPATIENT)
Dept: FAMILY MEDICINE | Facility: CLINIC | Age: 33
End: 2023-12-14
Payer: MEDICARE

## 2023-12-14 DIAGNOSIS — E34.9 TESTOSTERONE DEFICIENCY: Primary | ICD-10-CM

## 2023-12-14 PROCEDURE — 99207 PR NO CHARGE NURSE ONLY: CPT

## 2023-12-14 PROCEDURE — 96372 THER/PROPH/DIAG INJ SC/IM: CPT | Performed by: INTERNAL MEDICINE

## 2023-12-14 RX ADMIN — TESTOSTERONE CYPIONATE 100 MG: 200 INJECTION, SOLUTION INTRAMUSCULAR at 15:43

## 2023-12-14 NOTE — PROGRESS NOTES
Clinic Administered Medication Documentation      Testosterone Documentation     Was this medication supplied by the patient? Yes, medication was received directly from patient in a tamper proof bag (follow site specific policies)   .    Prior to injection, verified patient identity using patient's name and date of birth. Medication was administered. Please see MAR and medication order for additional information. Patient instructed to remain in clinic for 15 minutes, remain in clinic for 15 minutes and report any adverse reaction to staff immediately but patient declined, and report any adverse reaction to staff immediately .  Vial/Syringe: Single dose vial. Was entire vial of medication used? No, The remainder 100 MG of 200 MG was discarded as unavoidable waste.  Verified that the patient has refills remaining in their prescription.    Given left ventrogluteal.    Madelin GÓMEZ RN

## 2023-12-19 DIAGNOSIS — Z92.3 HISTORY OF RADIATION THERAPY: ICD-10-CM

## 2023-12-19 DIAGNOSIS — H54.8 LEGAL BLINDNESS: ICD-10-CM

## 2023-12-19 DIAGNOSIS — Z53.9 ERRONEOUS ENCOUNTER--DISREGARD: Primary | ICD-10-CM

## 2023-12-19 DIAGNOSIS — Z86.03 HISTORY OF CRANIOPHARYNGIOMA: ICD-10-CM

## 2023-12-19 DIAGNOSIS — D44.4 CRANIOPHARYNGIOMA (H): Primary | ICD-10-CM

## 2023-12-22 DIAGNOSIS — E23.2 DIABETES INSIPIDUS (H): ICD-10-CM

## 2023-12-22 RX ORDER — DESMOPRESSIN ACETATE 0.1 MG/ML
SOLUTION NASAL
Qty: 10 ML | Refills: 0 | Status: SHIPPED | OUTPATIENT
Start: 2023-12-22 | End: 2024-01-23

## 2023-12-22 NOTE — TELEPHONE ENCOUNTER
Requested Prescriptions   Pending Prescriptions Disp Refills    desmopressin (DDAVP) 0.01 % SOLN spray [Pharmacy Med Name: DESMOPRESSIN ACE SPRAY RE 0.01 SOLN] 10 mL 1     Sig: USE THREE SPRAYS INTO NOSTRIL ONCE DAILY AND USE ONE SPRAY INTO NOSTRIL AT BEDTIME       There is no refill protocol information for this order

## 2023-12-28 ENCOUNTER — ALLIED HEALTH/NURSE VISIT (OUTPATIENT)
Dept: FAMILY MEDICINE | Facility: CLINIC | Age: 33
End: 2023-12-28
Payer: MEDICARE

## 2023-12-28 DIAGNOSIS — E34.9 TESTOSTERONE DEFICIENCY: Primary | ICD-10-CM

## 2023-12-28 PROCEDURE — 96372 THER/PROPH/DIAG INJ SC/IM: CPT | Performed by: INTERNAL MEDICINE

## 2023-12-28 PROCEDURE — 99207 PR NO CHARGE NURSE ONLY: CPT

## 2023-12-28 RX ADMIN — TESTOSTERONE CYPIONATE 100 MG: 200 INJECTION, SOLUTION INTRAMUSCULAR at 15:37

## 2024-01-11 ENCOUNTER — ALLIED HEALTH/NURSE VISIT (OUTPATIENT)
Dept: FAMILY MEDICINE | Facility: CLINIC | Age: 34
End: 2024-01-11
Payer: MEDICARE

## 2024-01-11 DIAGNOSIS — E34.9 TESTOSTERONE DEFICIENCY: Primary | ICD-10-CM

## 2024-01-11 PROCEDURE — 99207 PR NO CHARGE NURSE ONLY: CPT

## 2024-01-11 PROCEDURE — 96372 THER/PROPH/DIAG INJ SC/IM: CPT | Performed by: INTERNAL MEDICINE

## 2024-01-11 RX ADMIN — TESTOSTERONE CYPIONATE 100 MG: 200 INJECTION, SOLUTION INTRAMUSCULAR at 15:38

## 2024-01-11 NOTE — PROGRESS NOTES
Clinic Administered Medication Documentation  Administrations This Visit       testosterone cypionate (DEPOTESTOSTERONE) injection 100 mg       Admin Date  01/11/2024 Action  $Given Dose  100 mg Route  Intramuscular Site  Left Ventrogluteal Documented By  ParisaJael RN    Ordering Provider: Sumaya Alonso MD    NDC: 8008-1368-70    Lot#: [id number]    : HOSPIRA    Patient Supplied?: Yes                Documentation     Was this medication supplied by the patient? Yes, medication was received directly from patient in a tamper proof bag (follow site specific policies).    Prior to injection, verified patient identity using patient's name and date of birth. Medication was administered. Please see MAR and medication order for additional information. Patient instructed to remain in clinic for 15 minutes and report any adverse reaction to staff immediately but patient declined.  Vial/Syringe: Single dose vial. Was entire vial of medication used? No, The remainder 100 MG of 200 MG was discarded as unavoidable waste.  Verified that the patient has refills remaining in their prescription.    Jael MONTERO RN  Patient Advocate Liaison - PAL RN  [facility]  [phone]

## 2024-01-22 DIAGNOSIS — E23.2 DIABETES INSIPIDUS (H): ICD-10-CM

## 2024-01-23 RX ORDER — DESMOPRESSIN ACETATE 0.1 MG/ML
SOLUTION NASAL
Qty: 10 ML | Refills: 0 | Status: SHIPPED | OUTPATIENT
Start: 2024-01-23 | End: 2024-03-22

## 2024-01-23 NOTE — TELEPHONE ENCOUNTER
Requested Prescriptions   Pending Prescriptions Disp Refills    desmopressin (DDAVP) 0.01 % SOLN spray [Pharmacy Med Name: DESMOPRESSIN ACE SPRAY RE 0.01 SOLN] 10 mL 0     Sig: USE THREE SPRAYS INTO NOSTRIL ONCE DAILY AND USE ONE SPRAY INTO NOSTRIL AT BEDTIME       There is no refill protocol information for this order

## 2024-01-25 ENCOUNTER — ALLIED HEALTH/NURSE VISIT (OUTPATIENT)
Dept: FAMILY MEDICINE | Facility: CLINIC | Age: 34
End: 2024-01-25
Payer: MEDICARE

## 2024-01-25 DIAGNOSIS — E34.9 TESTOSTERONE DEFICIENCY: Primary | ICD-10-CM

## 2024-01-25 DIAGNOSIS — E34.9 TESTOSTERONE DEFICIENCY: ICD-10-CM

## 2024-01-25 PROCEDURE — 96372 THER/PROPH/DIAG INJ SC/IM: CPT | Performed by: INTERNAL MEDICINE

## 2024-01-25 PROCEDURE — 99207 PR NO CHARGE NURSE ONLY: CPT

## 2024-01-25 RX ADMIN — TESTOSTERONE CYPIONATE 100 MG: 200 INJECTION, SOLUTION INTRAMUSCULAR at 15:51

## 2024-01-25 NOTE — TELEPHONE ENCOUNTER
Dr. Alonso, pt was in clinic today for testosterone administration.     He is out of refills on testosterone.    Please send new rx for testosterone if appropriate.    T'd up med and pharmacy and routing refill request to provider for review/approval because:  Drug not on the [facility] refill protocol   Madelin GÓMEZ RN

## 2024-01-25 NOTE — PROGRESS NOTES
Clinic Administered Medication Documentation      Testosterone Documentation     Was this medication supplied by the patient? Yes, medication was received directly from patient in a tamper proof bag (follow site specific policies)   .    Prior to injection, verified patient identity using patient's name and date of birth. Medication was administered. Please see MAR and medication order for additional information. Patient instructed to remain in clinic for 15 minutes, remain in clinic for 15 minutes and report any adverse reaction to staff immediately but patient declined, and report any adverse reaction to staff immediately .  Vial/Syringe: Single dose vial. Was entire vial of medication used? No, The remainder 100 MG of 200 MG was discarded as unavoidable waste.  Patient has no refills remaining. Refill encounter opened, order pended and Routed to the provider    Given right ventrogluteal    Madelin GÓMEZ RN

## 2024-01-30 RX ORDER — TESTOSTERONE CYPIONATE 200 MG/ML
100 INJECTION, SOLUTION INTRAMUSCULAR
Qty: 1 ML | Refills: 5 | Status: SHIPPED | OUTPATIENT
Start: 2024-01-30 | End: 2024-04-15

## 2024-02-08 ENCOUNTER — ALLIED HEALTH/NURSE VISIT (OUTPATIENT)
Dept: FAMILY MEDICINE | Facility: CLINIC | Age: 34
End: 2024-02-08
Payer: MEDICARE

## 2024-02-08 DIAGNOSIS — E34.9 TESTOSTERONE DEFICIENCY: Primary | ICD-10-CM

## 2024-02-08 PROCEDURE — 99207 PR NO CHARGE NURSE ONLY: CPT

## 2024-02-08 PROCEDURE — 96372 THER/PROPH/DIAG INJ SC/IM: CPT | Performed by: INTERNAL MEDICINE

## 2024-02-08 RX ADMIN — TESTOSTERONE CYPIONATE 100 MG: 200 INJECTION, SOLUTION INTRAMUSCULAR at 15:50

## 2024-02-08 NOTE — PROGRESS NOTES
Clinic Administered Medication Documentation      Testosterone Documentation     Was this medication supplied by the patient? Yes, medication was received directly from patient in a tamper proof bag (follow site specific policies)   .    Prior to injection, verified patient identity using patient's name and date of birth. Medication was administered. Please see MAR and medication order for additional information. Patient instructed to remain in clinic for 15 minutes, remain in clinic for 15 minutes and report any adverse reaction to staff immediately but patient declined, and report any adverse reaction to staff immediately .  Vial/Syringe: Single dose vial. Was entire vial of medication used? No, The remainder 100 MG of 200 MG was discarded as unavoidable waste.  Verified that the patient has refills remaining in their prescription.  Mariluz Salmon RN, BSN  [employer] [facility]

## 2024-02-09 ENCOUNTER — TELEPHONE (OUTPATIENT)
Dept: FAMILY MEDICINE | Facility: CLINIC | Age: 34
End: 2024-02-09
Payer: MEDICARE

## 2024-02-09 NOTE — TELEPHONE ENCOUNTER
Pat calling to schedule further nurse only appointments for Testosterone injections and lab appt for Dr. Alonso.  Appointments scheduled per father's request.  Brittaney Davis RN

## 2024-02-22 ENCOUNTER — ALLIED HEALTH/NURSE VISIT (OUTPATIENT)
Dept: FAMILY MEDICINE | Facility: CLINIC | Age: 34
End: 2024-02-22
Payer: MEDICARE

## 2024-02-22 DIAGNOSIS — E34.9 TESTOSTERONE DEFICIENCY: Primary | ICD-10-CM

## 2024-02-22 PROCEDURE — 96372 THER/PROPH/DIAG INJ SC/IM: CPT | Performed by: INTERNAL MEDICINE

## 2024-02-22 PROCEDURE — 99207 PR NO CHARGE NURSE ONLY: CPT

## 2024-02-22 RX ADMIN — TESTOSTERONE CYPIONATE 100 MG: 200 INJECTION, SOLUTION INTRAMUSCULAR at 15:55

## 2024-02-22 NOTE — PROGRESS NOTES
Administrations This Visit       testosterone cypionate (DEPOTESTOSTERONE) injection 100 mg       Admin Date  02/22/2024 Action  $Given Dose  100 mg Route  Intramuscular Site  Right Ventrogluteal Documented By  Sylvie Quinn RN    Ordering Provider: Sumaya Alonso MD    NDC: 9474-5876-54    Lot#: [id number]    : HOSPIRA    Patient Supplied?: No                  Clinic Administered Medication Documentation      Testosterone Documentation     Was this medication supplied by the patient? Yes, medication was received directly from patient in a tamper proof bag (follow site specific policies)   .    Prior to injection, verified patient identity using patient's name and date of birth. Medication was administered. Please see MAR and medication order for additional information. Patient instructed to remain in clinic for 15 minutes and report any adverse reaction to staff immediately but patient declined.  Vial/Syringe: Single dose vial. Was entire vial of medication used? No, The remainder 100 MG of 200 MG was discarded as unavoidable waste.  Verified that the patient has refills remaining in their prescription.    Sylvie SMITH RN   PAL (Patient Advocate Liaison)  [facility]  [phone]

## 2024-03-06 ENCOUNTER — HOSPITAL ENCOUNTER (OUTPATIENT)
Dept: MRI IMAGING | Facility: CLINIC | Age: 34
Discharge: HOME | End: 2024-03-06
Attending: INTERNAL MEDICINE | Admitting: INTERNAL MEDICINE
Payer: MEDICARE

## 2024-03-06 DIAGNOSIS — Z92.3 HISTORY OF RADIATION THERAPY: ICD-10-CM

## 2024-03-06 DIAGNOSIS — H54.8 LEGAL BLINDNESS: ICD-10-CM

## 2024-03-06 DIAGNOSIS — Z86.03 HISTORY OF CRANIOPHARYNGIOMA: ICD-10-CM

## 2024-03-06 PROCEDURE — 255N000002 HC RX 255 OP 636: Performed by: INTERNAL MEDICINE

## 2024-03-06 PROCEDURE — A9585 GADOBUTROL INJECTION: HCPCS | Performed by: INTERNAL MEDICINE

## 2024-03-06 PROCEDURE — 70553 MRI BRAIN STEM W/O & W/DYE: CPT | Mod: MG

## 2024-03-06 RX ORDER — GADOBUTROL 604.72 MG/ML
9 INJECTION INTRAVENOUS ONCE
Status: COMPLETED | OUTPATIENT
Start: 2024-03-06 | End: 2024-03-06

## 2024-03-06 RX ADMIN — GADOBUTROL 9 ML: 604.72 INJECTION INTRAVENOUS at 10:22

## 2024-03-07 ENCOUNTER — ALLIED HEALTH/NURSE VISIT (OUTPATIENT)
Dept: FAMILY MEDICINE | Facility: CLINIC | Age: 34
End: 2024-03-07
Payer: MEDICARE

## 2024-03-07 DIAGNOSIS — E34.9 TESTOSTERONE DEFICIENCY: Primary | ICD-10-CM

## 2024-03-07 PROCEDURE — 99207 PR NO CHARGE NURSE ONLY: CPT

## 2024-03-07 PROCEDURE — 96372 THER/PROPH/DIAG INJ SC/IM: CPT | Performed by: INTERNAL MEDICINE

## 2024-03-07 RX ADMIN — TESTOSTERONE CYPIONATE 100 MG: 200 INJECTION, SOLUTION INTRAMUSCULAR at 15:47

## 2024-03-07 NOTE — PROGRESS NOTES
Clinic Administered Medication Documentation      Testosterone Documentation     Was this medication supplied by the patient? Yes, medication was received directly from patient in a tamper proof bag (follow site specific policies)     Prior to injection, verified patient identity using patient's name and date of birth. Medication was administered. Please see MAR and medication order for additional information. Patient instructed to remain in clinic for 15 minutes, report any adverse reaction to staff immediately, and remain in clinic for 15 minutes and report any adverse reaction to staff immediately but patient declined.  Vial/Syringe: Single dose vial. Was entire vial of medication used? No, The remainder 100 MG of 200 MG was discarded as unavoidable waste.  Verified that the patient has refills remaining in their prescription.  ROUTE: BEATRICE Salmon RN, BSN  [facility]

## 2024-03-21 ENCOUNTER — TELEPHONE (OUTPATIENT)
Dept: FAMILY MEDICINE | Facility: CLINIC | Age: 34
End: 2024-03-21

## 2024-03-21 ENCOUNTER — ALLIED HEALTH/NURSE VISIT (OUTPATIENT)
Dept: FAMILY MEDICINE | Facility: CLINIC | Age: 34
End: 2024-03-21
Payer: MEDICARE

## 2024-03-21 DIAGNOSIS — E23.2 DIABETES INSIPIDUS (H): ICD-10-CM

## 2024-03-21 DIAGNOSIS — E34.9 TESTOSTERONE DEFICIENCY: Primary | ICD-10-CM

## 2024-03-21 PROCEDURE — 96372 THER/PROPH/DIAG INJ SC/IM: CPT | Performed by: INTERNAL MEDICINE

## 2024-03-21 PROCEDURE — 99207 PR NO CHARGE NURSE ONLY: CPT

## 2024-03-21 RX ORDER — TESTOSTERONE CYPIONATE 200 MG/ML
100 INJECTION, SOLUTION INTRAMUSCULAR
Status: DISCONTINUED | OUTPATIENT
Start: 2024-04-04 | End: 2024-04-05

## 2024-03-21 RX ADMIN — TESTOSTERONE CYPIONATE 100 MG: 200 INJECTION, SOLUTION INTRAMUSCULAR at 15:39

## 2024-03-21 NOTE — PROGRESS NOTES
Clinic Administered Medication Documentation  Administrations This Visit       testosterone cypionate (DEPOTESTOSTERONE) injection 100 mg       Admin Date  03/21/2024 Action  $Given Dose  100 mg Route  Intramuscular Site  Right Ventrogluteal Documented By  ParisaJael RN    Ordering Provider: Sumaya Alonso MD    NDC: 9812-9345-44    Lot#: [id number]    : HOSPIRA    Patient Supplied?: Yes                Testosterone Documentation     Was this medication supplied by the patient? Yes, medication was received directly from patient in a tamper proof bag (follow site specific policies).    Prior to injection, verified patient identity using patient's name and date of birth. Medication was administered. Please see MAR and medication order for additional information. Patient instructed to remain in clinic for 15 minutes and report any adverse reaction to staff immediately but patient declined.  Vial/Syringe: Single dose vial. Was entire vial of medication used? No, The remainder 100 MG of 200 MG was discarded as unavoidable waste.  Verified that the patient has refills remaining in their prescription.      Jael TRINIDAD RN  Patient Advocate Liaison - PAL RN  [facility]  [phone]

## 2024-03-21 NOTE — TELEPHONE ENCOUNTER
New CAM order due for pt's testosterone injections, expires q 6 months     Next testosterone appt scheduled 4/4/24    Order pended, routing to Dr. Alonso to review an sign     Jael TRINIDAD RN  Patient Advocate Liaison - PAL RN  [facility]  [phone]

## 2024-03-22 RX ORDER — DESMOPRESSIN ACETATE 0.1 MG/ML
SOLUTION NASAL
Qty: 10 ML | Refills: 0 | Status: SHIPPED | OUTPATIENT
Start: 2024-03-22 | End: 2024-04-15

## 2024-04-04 ENCOUNTER — ALLIED HEALTH/NURSE VISIT (OUTPATIENT)
Dept: FAMILY MEDICINE | Facility: CLINIC | Age: 34
End: 2024-04-04
Payer: MEDICARE

## 2024-04-04 DIAGNOSIS — E34.9 TESTOSTERONE DEFICIENCY: Primary | ICD-10-CM

## 2024-04-04 PROCEDURE — 96372 THER/PROPH/DIAG INJ SC/IM: CPT | Performed by: INTERNAL MEDICINE

## 2024-04-04 PROCEDURE — 99207 PR NO CHARGE NURSE ONLY: CPT

## 2024-04-04 RX ADMIN — TESTOSTERONE CYPIONATE 100 MG: 200 INJECTION, SOLUTION INTRAMUSCULAR at 15:42

## 2024-04-04 NOTE — PROGRESS NOTES
Administrations This Visit       testosterone cypionate (DEPOTESTOSTERONE) injection 100 mg       Admin Date  04/04/2024 Action  $Given Dose  100 mg Route  Intramuscular Site  Left Ventrogluteal Documented By  Claudia Faustin RN    Ordering Provider: Sumaya Alonso MD    NDC: 6646-1615-61    Lot#: [id number]    : HOSPIRA    Patient Supplied?: Yes                  Clinic Administered Medication Documentation      Testosterone Documentation     Was this medication supplied by the patient? Yes, medication was received directly from patient in a tamper proof bag (follow site specific policies)   .    Prior to injection, verified patient identity using patient's name and date of birth. Medication was administered. Please see MAR and medication order for additional information. Patient instructed to remain in clinic for 15 minutes and report any adverse reaction to staff immediately but patient declined.  Vial/Syringe: Single dose vial. Was entire vial of medication used? No, The remainder 100 MG of 200 MG was discarded as unavoidable waste.  Verified that the patient has refills (1) remaining in their prescription.    Claudia Faustin RN on 4/4/2024 at 3:44 PM   yes

## 2024-04-05 ENCOUNTER — ONCOLOGY VISIT (OUTPATIENT)
Dept: ONCOLOGY | Facility: CLINIC | Age: 34
End: 2024-04-05
Attending: NURSE PRACTITIONER
Payer: MEDICARE

## 2024-04-05 VITALS
HEART RATE: 71 BPM | WEIGHT: 188.4 LBS | RESPIRATION RATE: 16 BRPM | TEMPERATURE: 97.9 F | HEIGHT: 72 IN | BODY MASS INDEX: 25.52 KG/M2 | SYSTOLIC BLOOD PRESSURE: 93 MMHG | OXYGEN SATURATION: 98 % | DIASTOLIC BLOOD PRESSURE: 59 MMHG

## 2024-04-05 DIAGNOSIS — D44.4 CRANIOPHARYNGIOMA (H): Primary | ICD-10-CM

## 2024-04-05 DIAGNOSIS — H54.8 LEGAL BLINDNESS: ICD-10-CM

## 2024-04-05 DIAGNOSIS — E23.0 PANHYPOPITUITARISM (H): ICD-10-CM

## 2024-04-05 DIAGNOSIS — Z92.3 HISTORY OF EXTERNAL BEAM RADIATION THERAPY: ICD-10-CM

## 2024-04-05 PROCEDURE — 99205 OFFICE O/P NEW HI 60 MIN: CPT | Performed by: NURSE PRACTITIONER

## 2024-04-05 PROCEDURE — 99417 PROLNG OP E/M EACH 15 MIN: CPT | Performed by: NURSE PRACTITIONER

## 2024-04-05 PROCEDURE — G0463 HOSPITAL OUTPT CLINIC VISIT: HCPCS | Performed by: NURSE PRACTITIONER

## 2024-04-05 ASSESSMENT — PAIN SCALES - GENERAL: PAINLEVEL: NO PAIN (0)

## 2024-04-05 NOTE — LETTER
4/5/2024         RE: Mamadou Reece  [address]  [address]  [facility] [address]        Dear Colleague,    Thank you for referring your patient, Mamadou Reece, to the [facility]. Please see a copy of my visit note below.    Childhood Cancer/BMT Survivor Program (cCSP) Progress Note     I had the pleasure of seeing Mamadou Reece for routine follow up in the [facility].  He is here with his parents (his legal guardians) to transition his survivorship care for the [facility].   He was referred by Mariluz Burt CNP.    HISTORY OF PRESENT ILLNESS   Mamadou is here with his guardians (his parents).  He has been doing well.  He lives in an assisted living apartment complex.  He has assistants that check in on his 1-2 times per day.  Mamadou is legally blind.  He uses a cane for assistance.  He also has magnifiers for reading larger print.  He has 20/200 vision in the right eye and only a sliver of light by his nose of the left eye (significant visual field cuts).  He takes DDAVP for his DI.  No issues with excessive thirst or urination.  He drinks mostly water but does have 1 Pepsi per day.  No longer taking meds for ADHD because he didn't feel like they were helpful.  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  Hasn't been working since for 2021 and looking for another job.  Has vocational rehab services.  Involved with state services for the blind.  Mood has been good.  No issues with sleep.  Sees endocrine yearly.  Disclosed that they don't have an emergency action plan for his adrenal insufficiency.  He is supposed to have his wisdom teeth removed at a local surgery center in May 2024.  Family is wondering if they need to adjust his steroids for the surgery.  They haven't talked with endocrine about this yet.  They would like a new ophthalmologist that works for more with optic neuropathy and low vision.  Last had  neuropsych testing in 2009.  Occasional dizziness with position changes.  Had an MRI at [place] in March that was stable.  Had a basal cell carcinoma removed on his left cheek 5-6 years ago.  Saw derm last a few years ago.  Has a PCP.      TREATMENT HISTORY   Diagnosis: Craniopharyngioma  Date of Diagnosis: 10/30/1996 (primary) and 2002 (relapse)  Age at Diagnosis: 6 years  Date Treatment Completed: 1996 surgery only; 7/25/2002 with relapse  Chemotherapy History: None  Radiation Therapy History: Tumor bed; brain with relapse that started on 6/13/2002 and completed on 7/25/2002 in 28 fractions at 180 cGy per fraction for a total dose of 5040 cGy (fractionated steriotactic radiation)  Bone Marrow Transplant/Cellular Treatment History: none    LATE EFFECTS   Late Effects to Date:   ADHD  Panhypopituitarism (DI, hypothyroid, GH deficiency, central adrenal insufficiency, hypogonadism)  Vision loss, legally blind  Cognitive impairment post XRT  Depression  Cerebral cavernoma  Vitamin D deficiency  Left cheek basal cell carcinoma 5/31/2018  Dizziness    New Late Effects:  none    PAST MEDICAL HISTORY   PMH: PAST MEDICAL HISTORY:   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, [facility]    Legal blindness     Craniopharyngioma    Mood disorder (H24)     Craniopharyngioma    Radiation age 11    Craniopharyngioma     PFMH:FAMILY HISTORY:   Family History   Problem Relation Age of Onset    Diabetes Mother     Cancer Father         Melanoma    Melanoma Father     Basal cell carcinoma Father     Cerebrovascular Disease Maternal Grandmother 91    Diabetes Maternal Grandmother     Cerebrovascular Disease Paternal Grandmother     Cerebrovascular Disease Paternal Grandfather     Prostate Cancer Paternal Grandfather     Alcoholism Paternal Grandfather     Uterine Cancer Paternal Aunt 60        leiomyosarcoma    Alzheimer Disease Paternal Uncle       Social Hx: Graduated from high school in 2010  "from 21+ transitions program.  Lives in assisted living complex. Looking for a new job and working with vocational rehab.  Interested in meeting with a therapist to discuss his mood.  Surgical Hx: PAST SURGICAL HISTORY:   Past Surgical History:   Procedure Laterality Date    CRANIOTOMY, EXCISE TUMOR COMPLEX, COMBINED  age 6    craniopharyngioma    MOHS MICROGRAPHIC PROCEDURE Left 05/31/2018    left cheek nodular BCC     Allergies:  No Known Allergies   Immunizations: up to date and documented    REVIEW OF SYSTEMS   A comprehensive review of systems was performed and was negative unless noted in the HPI.     PHYSICAL EXAM   VITALS: All vitals have been reviewed  BP 93/59 (BP Location: Right arm, Patient Position: Sitting, Cuff Size: Adult Regular)   Pulse 71   Temp 97.9  F (36.6  C) (Tympanic)   Resp 16   Ht 1.829 m (6')   Wt 85.5 kg (188 lb 6.4 oz)   SpO2 98%   BMI 25.55 kg/m     Wt Readings from Last 4 Encounters:   04/05/24 85.5 kg (188 lb 6.4 oz)   06/24/22 89.9 kg (198 lb 3.2 oz)   05/13/22 93.9 kg (207 lb)   09/09/21 92.4 kg (203 lb 12.8 oz)      Ht Readings from Last 2 Encounters:   04/05/24 1.829 m (6')   06/24/22 1.854 m (6' 1\")        Exam:  Constitutional: healthy, alert, and no distress  Head: Normocephalic. No masses, lesions, tenderness or abnormalities  Neck: Neck supple. No adenopathy. Thyroid symmetric, normal size  Eyes:  difficulty with upward gaze.  PERRL. No icterus or injection  ENT: ENT exam normal, no neck nodes or sinus tenderness  Cardiovascular: negative. No lifts, heaves, or thrills. RRR. No murmurs, clicks gallops or rub  Respiratory: Lungs CTAB. No increased WOB.  Gastrointestinal: Abdomen soft, non-tender. BS normal. No masses, organomegaly  : Deferred  Musculoskeletal: extremities normal- no gross deformities noted, gait normal, and normal muscle tone  Skin: no suspicious lesions or rashes  Neurologic: Gait wide based. Reflexes normal and symmetric. Sensation grossly " WNL.  Psychiatric: mentation appears normal and affect normal/bright  Hematologic/Lymphatic/Immunologic: Normal cervical lymph nodes     LABS/IMAGING   MRI OF THE BRAIN/PITUITARY WITHOUT AND WITH CONTRAST  3/6/2024 11:12  AM      COMPARISON: Brain MR 9/22/2020      HISTORY: History of craniopharyngioma diagnosed in '96 and treated  with near total resection initially. In '02 craniopharyngioma grew  back and treated here by [facility] with XRT. No chemotherapy.  [facility] has been doing brain MRIs every 2-3 years - MRI last in  2020; History of craniopharyngioma; Legal blindness; History of  radiation therapy.       TECHNIQUE: Sagittal T1-weighted, coronal T2-weighted, coronal  T1-weighted images with focus on the sella were obtained without  intravenous contrast. Dynamic thin-section coronal T1-weighted images  were acquired during intravenous administration of gadolinium (9 mL  Gadavist). Delayed post-gadolinium coronal, sagittal and axial  T1-weighted images were also obtained .      FINDINGS: The ventricles and basal cisterns are within normal limits  in configuration.  There is no midline shift.  There are no  extra-axial fluid collections. Gray-white differentiation is well  maintained.  There is no evidence for stroke or acute intracranial  hemorrhage.  There is no abnormal contrast enhancement in the brain or  its coverings. Small presumed cavernoma in the inferior aspect of the  basal ganglia on the left again noted.     Postoperative change consisting of a left pterional craniotomy  presumably for resection of the pituitary lesion again noted. There is  minimal residual mixed-signal intensity, heterogeneously enhancing  tissue at the left lateral aspect of the sella that is unchanged in  size, shape, signal intensity pattern or enhancement pattern since the  comparison study. No evidence for progression of pituitary expansion  of the sella again noted consistent with prior tumoral expansion.               "                                                        IMPRESSION:  1. Postoperative change consisting of a left pterional craniotomy  presumably for resection of the pituitary lesion again noted without  change.  2. Minimal mixed signal intensity, heterogeneously enhancing tissue at  the lateral aspect of the sella again noted without change. No  evidence for tumor progression or recurrence.  3. Small cavernoma in the inferior aspect of the basal ganglia on the  left again noted.  4. Otherwise, normal brain MRI.     ANCA CONNOLLY MD     MEDICATIONS     Current Outpatient Medications   Medication Sig Dispense Refill    Calcium Citrate-Vitamin D (CALCIUM + D) 315-200 MG-UNIT TABS Take by mouth 2 times daily       Cholecalciferol (VITAMIN D) 1000 UNIT capsule Take 1 capsule by mouth daily       Cyanocobalamin (B-12) 1000 MCG TBCR Take 1,000 mcg by mouth daily  100 tablet 1    desmopressin (DDAVP) 0.01 % SOLN spray USE THREE SPRAYS INTO NOSTRIL ONCE DAILY AND USE ONE SPRAY INTO NOSTRIL AT BEDTIME 10 mL 0    levothyroxine (SYNTHROID/LEVOTHROID) 150 MCG tablet TAKE 1 TABLET (150 MCG) BY MOUTH DAILY 90 tablet 1    NUTROPIN AQ NUSPIN 5 5 MG/2ML SOPN PEN injection INJECT 0.7 MG UNDER THE SKIN ONCE DAILY. 26 mL 1    predniSONE (DELTASONE) 1 MG tablet TAKE THREE TABLETS (3 MG) BY MOUTH IN THE MORNING AND TWO TABLETS (2 MG) AT NIGHT 200 tablet 11    Somatropin 15 MG/1.5ML SOPN Inject 0.7 mg Subcutaneous daily 6 mL 1    testosterone cypionate (DEPOTESTOSTERONE) 200 MG/ML injection Inject 0.5 mLs (100 mg) into the muscle every 14 days 1 mL 5    amphetamine-dextroamphetamine (ADDERALL XR) 25 MG 24 hr capsule Take 1 capsule (25 mg) by mouth every morning 30 capsule 0    amphetamine-dextroamphetamine (ADDERALL XR) 30 MG 24 hr capsule TAKE ONE CAPSULE BY MOUTH DAILY 30 capsule 0    Insulin Pen Needle (PEN NEEDLES 5/16\") 30G X 8 MM MISC USE dailyDIRECTED WITH NORDITROPIN 90 each 3    meclizine (ANTIVERT) 25 MG tablet Take 1 tablet " (25 mg) by mouth 3 times daily as needed for dizziness (Patient not taking: Reported on 4/5/2024) 20 tablet 0     No current facility-administered medications for this visit.       SURVIVOR CARE PLAN   Surveillance Plan:    RISK OF RECURRENCE: Mamadou Reece is 22 years out from the completion of therapy for relapsed craniopharyngioma. Given the time from the diagnosis of his/her/their: his primary condition, the likelihood of recurrence is low.      PSYCHOSOCIAL EFFECTS/MENTAL HEALTH:  Educational and emotional issues are common for childhood cancer/BMT survivors. Emotional distress from cancer/BMT treatment and recovery affects the patient, their caregivers and family members. Depending on diagnosis, age at treatment, and type of treatments, difficulties with memory, learning, behavior, or emotional health may occur. In addition to [place] public laws that protect the rights of students with educational problems related to cancer/BMT treatment, childhood cancer/BMT survivors can also seek additional support through our Social Work team, community mental health professionals, and school support services    ACCESS TO HEALTH CARE AND INSURANCE: This can be an issue for survivors over time. Our team has personnel with expertise in this area (e.g. social workers) who can assist with related barriers.    DIET AND PHYSICAL ACTIVITY: The American Cancer Society recommends at least 150 minutes of moderate physical activity or 75 minutes of vigorous physical activity, or a combination of these, each week for adults. Children and adolescents should engage in at least 60 minutes each day of moderate to vigorous physical activity with vigorous activity at least 3 days each week. Survivors who have special needs can take part in most activities, but the help of a PT or OT may be needed to adapt the activity for success. *Always consult with a Healthcare Professional before beginning any new exercise routines to ensure this is safe  for you.    NEUROCOGNITIVE EFFECTS:  Many childhood cancer survivors are at risk for neurocognitive deficits as a result of the disruption to their early schooling for their necessary oncology treatments.  If any changes are noted regarding neurocognitive function (memory issues, information processing, comprehension, etc) or psychological health please let us and your primary care provider know immediately.    EYE HEALTH:  Exposures to certain therapies (steroids, methotrexate, radiation including the head, etc.) can increase risk for cataracts. Routine eye exams should be performed as part of long-term follow-up care, along with annual optometry or ophthalmology exams.    METABOLIC HEALTH: Survivors are at increased risk for obesity, high cholesterol, and high blood pressure. A lipid panel for cholesterol and triglyceride screening should take place regularly so that interventions can be initiated promptly.     HORMONAL HEALTH: Survivors are at an increased risk for hormone changes including infertility.  Blood or bodily fluids may be tested if concerns are present.     SKIN HEALTH:  After radiation exposure, or for those who have experienced skin problems (like GVHD) after BMT, there is a risk of non-melanoma skin cancers.  Close attention to sun protection and annual follow-up with Dermatology is strongly recommended.    RISK OF SECONDARY CANCERS: Survivors of childhood cancer/BMT and genetic disorders are at increased risk for developing subsequent cancers compared to the general population. This is primarily due to exposure to radiation and certain chemotherapies, and for some individuals is secondary to an underlying genetic susceptibility. Regular physical examination is important and changes or concerns (new lumps, bumps, changes in medical status) should be reported to a primary care provider or to the Childhood Cancer/BMT Survivor Program.  In addition, screening or monitoring for certain cancers is  recommended for the following.    Skin Cancer: There is a risk of second cancers to the skin, soft tissues and bones within the field of prior radiation. Close attention to sun protection and annual follow-up with Dermatology is strongly recommended. , Brain Tumor: Neurologic changes including sudden or dramatic change in vision, hearing, taste or smell, or new onset persistent headaches, should be reported immediately to a health care provider who may evaluate for a second cancer of the brain.     ASSESSMENT AND PLAN   Mamadou Reece is a now 33 year old survivor of relapsed craniopharyngioma who is now transitioning care from [facility].  He is here with his parents to establish care.  He has ongoing care with ophthalmology, endocrine, dermatology and primary care.  He had an MRI brain in March that was stable with radiation induced cavernomas seen.  He is having his wisdom teeth removed in May and would like help with finding out the regimen for steroids with surgery.  I have reached out to his endocrinologist.  I asked that the family discuss his extensive medical history with the oral surgery office to ensure they are comfortable sedating him in the office with that history.  We will see him back for follow up next year.    -MRI brain last month and repeat in 2 years due to cerebral cavernoma and history of radiation  -Follow up with dermatology yearly due to BCC; wear sunscreen regularly with SPF 30 or higher  -Neuropsych testing done in 2009; repeat as needed based on cognitive concerns  -Will need life long endocrine follow up due to panhypopituitarism.  Reached out to endocrine to discuss upcoming surgery and need for action plan for emergencies and surgeries with his adrenal insufficiency.    -Dental follow up every 6 months  -Referral for neuro-ophthalmology for follow up  -Will help Mamadou with getting a therapist locally; our team will contact social work for assistance.      Eunice Davies MSN, APRN, CPNP-AC,  CPON  Department of Pediatrics  Division of Hematology/Oncology          Total time spent on the following services on the date of the encounter: Preparing to see patient, chart review, review of outside records, Ordering medications, test, procedures, chemotherapy, Referring or communicating with other healthcare professionals, Interpretation of labs, imaging and other tests, Performing a medically appropriate examination , Counseling and educating the patient/family/caregiver , Documenting clinical information in the electronic or other health record , Communicating results to the patient/family/caregiver, Care coordination.  Total amount of time performed on this outpatient visit: 120 minutes.

## 2024-04-05 NOTE — LETTER
4/5/2024         RE: Mamadou Reece  [address]  [address]  [facility] [address]      Childhood Cancer/BMT Survivor Program (cCSP) Progress Note     I had the pleasure of seeing Mamadou Reece for routine follow up in the [facility].  He is here with his parents (his legal guardians) to transition his survivorship care for the [facility].   He was referred by Mariluz Burt CNP.    HISTORY OF PRESENT ILLNESS   Mamadou is here with his guardians (his parents).  He has been doing well.  He lives in an assisted living apartment complex.  He has assistants that check in on his 1-2 times per day.  Mamadou is legally blind.  He uses a cane for assistance.  He also has magnifiers for reading larger print.  He has 20/200 vision in the right eye and only a sliver of light by his nose of the left eye (significant visual field cuts).  He takes DDAVP for his DI.  No issues with excessive thirst or urination.  He drinks mostly water but does have 1 Pepsi per day.  No longer taking meds for ADHD because he didn't feel like they were helpful.  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  Hasn't been working since for 2021 and looking for another job.  Has vocational rehab services.  Involved with state services for the blind.  Mood has been good.  No issues with sleep.  Sees endocrine yearly.  Disclosed that they don't have an emergency action plan for his adrenal insufficiency.  He is supposed to have his wisdom teeth removed at a local surgery center in May 2024.  Family is wondering if they need to adjust his steroids for the surgery.  They haven't talked with endocrine about this yet.  They would like a new ophthalmologist that works for more with optic neuropathy and low vision.  Last had neuropsych testing in 2009.  Occasional dizziness with position changes.  Had an MRI at [place] in March that was stable.  Had a basal cell carcinoma removed on his left  cheek 5-6 years ago.  Saw derm last a few years ago.  Has a PCP.      TREATMENT HISTORY   Diagnosis: Craniopharyngioma  Date of Diagnosis: 10/30/1996 (primary) and 2002 (relapse)  Age at Diagnosis: 6 years  Date Treatment Completed: 1996 surgery only; 7/25/2002 with relapse  Chemotherapy History: None  Radiation Therapy History: Tumor bed; brain with relapse that started on 6/13/2002 and completed on 7/25/2002 in 28 fractions at 180 cGy per fraction for a total dose of 5040 cGy (fractionated steriotactic radiation)  Bone Marrow Transplant/Cellular Treatment History: none    LATE EFFECTS   Late Effects to Date:   ADHD  Panhypopituitarism (DI, hypothyroid, GH deficiency, central adrenal insufficiency, hypogonadism)  Vision loss, legally blind  Cognitive impairment post XRT  Depression  Cerebral cavernoma  Vitamin D deficiency  Left cheek basal cell carcinoma 5/31/2018  Dizziness    New Late Effects:  none    PAST MEDICAL HISTORY   PMH: PAST MEDICAL HISTORY:   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, [facility]    Legal blindness     Craniopharyngioma    Mood disorder (H24)     Craniopharyngioma    Radiation age 11    Craniopharyngioma     PFMH:FAMILY HISTORY:   Family History   Problem Relation Age of Onset    Diabetes Mother     Cancer Father         Melanoma    Melanoma Father     Basal cell carcinoma Father     Cerebrovascular Disease Maternal Grandmother 91    Diabetes Maternal Grandmother     Cerebrovascular Disease Paternal Grandmother     Cerebrovascular Disease Paternal Grandfather     Prostate Cancer Paternal Grandfather     Alcoholism Paternal Grandfather     Uterine Cancer Paternal Aunt 60        leiomyosarcoma    Alzheimer Disease Paternal Uncle       Social Hx: Graduated from high school in 2010 from [employer] transitions program.  Lives in assisted living complex. Looking for a new job and working with vocational rehab.  Interested in meeting with a therapist to  "discuss his mood.  Surgical Hx: PAST SURGICAL HISTORY:   Past Surgical History:   Procedure Laterality Date    CRANIOTOMY, EXCISE TUMOR COMPLEX, COMBINED  age 6    craniopharyngioma    MOHS MICROGRAPHIC PROCEDURE Left 05/31/2018    left cheek nodular BCC     Allergies:  No Known Allergies   Immunizations: up to date and documented    REVIEW OF SYSTEMS   A comprehensive review of systems was performed and was negative unless noted in the HPI.     PHYSICAL EXAM   VITALS: All vitals have been reviewed  BP 93/59 (BP Location: Right arm, Patient Position: Sitting, Cuff Size: Adult Regular)   Pulse 71   Temp 97.9  F (36.6  C) (Tympanic)   Resp 16   Ht 1.829 m (6')   Wt 85.5 kg (188 lb 6.4 oz)   SpO2 98%   BMI 25.55 kg/m     Wt Readings from Last 4 Encounters:   04/05/24 85.5 kg (188 lb 6.4 oz)   06/24/22 89.9 kg (198 lb 3.2 oz)   05/13/22 93.9 kg (207 lb)   09/09/21 92.4 kg (203 lb 12.8 oz)      Ht Readings from Last 2 Encounters:   04/05/24 1.829 m (6')   06/24/22 1.854 m (6' 1\")        Exam:  Constitutional: healthy, alert, and no distress  Head: Normocephalic. No masses, lesions, tenderness or abnormalities  Neck: Neck supple. No adenopathy. Thyroid symmetric, normal size  Eyes:  difficulty with upward gaze.  PERRL. No icterus or injection  ENT: ENT exam normal, no neck nodes or sinus tenderness  Cardiovascular: negative. No lifts, heaves, or thrills. RRR. No murmurs, clicks gallops or rub  Respiratory: Lungs CTAB. No increased WOB.  Gastrointestinal: Abdomen soft, non-tender. BS normal. No masses, organomegaly  : Deferred  Musculoskeletal: extremities normal- no gross deformities noted, gait normal, and normal muscle tone  Skin: no suspicious lesions or rashes  Neurologic: Gait wide based. Reflexes normal and symmetric. Sensation grossly WNL.  Psychiatric: mentation appears normal and affect normal/bright  Hematologic/Lymphatic/Immunologic: Normal cervical lymph nodes     LABS/IMAGING   MRI OF THE " BRAIN/PITUITARY WITHOUT AND WITH CONTRAST  3/6/2024 11:12  AM      COMPARISON: Brain MR 9/22/2020      HISTORY: History of craniopharyngioma diagnosed in '96 and treated  with near total resection initially. In '02 craniopharyngioma grew  back and treated here by [facility] with XRT. No chemotherapy.  [facility] has been doing brain MRIs every 2-3 years - MRI last in  2020; History of craniopharyngioma; Legal blindness; History of  radiation therapy.       TECHNIQUE: Sagittal T1-weighted, coronal T2-weighted, coronal  T1-weighted images with focus on the sella were obtained without  intravenous contrast. Dynamic thin-section coronal T1-weighted images  were acquired during intravenous administration of gadolinium (9 mL  Gadavist). Delayed post-gadolinium coronal, sagittal and axial  T1-weighted images were also obtained .      FINDINGS: The ventricles and basal cisterns are within normal limits  in configuration.  There is no midline shift.  There are no  extra-axial fluid collections. Gray-white differentiation is well  maintained.  There is no evidence for stroke or acute intracranial  hemorrhage.  There is no abnormal contrast enhancement in the brain or  its coverings. Small presumed cavernoma in the inferior aspect of the  basal ganglia on the left again noted.     Postoperative change consisting of a left pterional craniotomy  presumably for resection of the pituitary lesion again noted. There is  minimal residual mixed-signal intensity, heterogeneously enhancing  tissue at the left lateral aspect of the sella that is unchanged in  size, shape, signal intensity pattern or enhancement pattern since the  comparison study. No evidence for progression of pituitary expansion  of the sella again noted consistent with prior tumoral expansion.                                                                      IMPRESSION:  1. Postoperative change consisting of a left pterional craniotomy  presumably for resection  "of the pituitary lesion again noted without  change.  2. Minimal mixed signal intensity, heterogeneously enhancing tissue at  the lateral aspect of the sella again noted without change. No  evidence for tumor progression or recurrence.  3. Small cavernoma in the inferior aspect of the basal ganglia on the  left again noted.  4. Otherwise, normal brain MRI.     ANCA CONNOLLY MD     MEDICATIONS     Current Outpatient Medications   Medication Sig Dispense Refill    Calcium Citrate-Vitamin D (CALCIUM + D) 315-200 MG-UNIT TABS Take by mouth 2 times daily       Cholecalciferol (VITAMIN D) 1000 UNIT capsule Take 1 capsule by mouth daily       Cyanocobalamin (B-12) 1000 MCG TBCR Take 1,000 mcg by mouth daily  100 tablet 1    desmopressin (DDAVP) 0.01 % SOLN spray USE THREE SPRAYS INTO NOSTRIL ONCE DAILY AND USE ONE SPRAY INTO NOSTRIL AT BEDTIME 10 mL 0    levothyroxine (SYNTHROID/LEVOTHROID) 150 MCG tablet TAKE 1 TABLET (150 MCG) BY MOUTH DAILY 90 tablet 1    NUTROPIN AQ NUSPIN 5 5 MG/2ML SOPN PEN injection INJECT 0.7 MG UNDER THE SKIN ONCE DAILY. 26 mL 1    predniSONE (DELTASONE) 1 MG tablet TAKE THREE TABLETS (3 MG) BY MOUTH IN THE MORNING AND TWO TABLETS (2 MG) AT NIGHT 200 tablet 11    Somatropin 15 MG/1.5ML SOPN Inject 0.7 mg Subcutaneous daily 6 mL 1    testosterone cypionate (DEPOTESTOSTERONE) 200 MG/ML injection Inject 0.5 mLs (100 mg) into the muscle every 14 days 1 mL 5    amphetamine-dextroamphetamine (ADDERALL XR) 25 MG 24 hr capsule Take 1 capsule (25 mg) by mouth every morning 30 capsule 0    amphetamine-dextroamphetamine (ADDERALL XR) 30 MG 24 hr capsule TAKE ONE CAPSULE BY MOUTH DAILY 30 capsule 0    Insulin Pen Needle (PEN NEEDLES 5/16\") 30G X 8 MM MISC USE dailyDIRECTED WITH NORDITROPIN 90 each 3    meclizine (ANTIVERT) 25 MG tablet Take 1 tablet (25 mg) by mouth 3 times daily as needed for dizziness (Patient not taking: Reported on 4/5/2024) 20 tablet 0     No current facility-administered medications " for this visit.       SURVIVOR CARE PLAN   Surveillance Plan:    RISK OF RECURRENCE: Mamadou Reece is 22 years out from the completion of therapy for relapsed craniopharyngioma. Given the time from the diagnosis of his/her/their: his primary condition, the likelihood of recurrence is low.      PSYCHOSOCIAL EFFECTS/MENTAL HEALTH:  Educational and emotional issues are common for childhood cancer/BMT survivors. Emotional distress from cancer/BMT treatment and recovery affects the patient, their caregivers and family members. Depending on diagnosis, age at treatment, and type of treatments, difficulties with memory, learning, behavior, or emotional health may occur. In addition to [place] public laws that protect the rights of students with educational problems related to cancer/BMT treatment, childhood cancer/BMT survivors can also seek additional support through our Social Work team, community mental health professionals, and school support services    ACCESS TO HEALTH CARE AND INSURANCE: This can be an issue for survivors over time. Our team has personnel with expertise in this area (e.g. social workers) who can assist with related barriers.    DIET AND PHYSICAL ACTIVITY: The American Cancer Society recommends at least 150 minutes of moderate physical activity or 75 minutes of vigorous physical activity, or a combination of these, each week for adults. Children and adolescents should engage in at least 60 minutes each day of moderate to vigorous physical activity with vigorous activity at least 3 days each week. Survivors who have special needs can take part in most activities, but the help of a PT or OT may be needed to adapt the activity for success. *Always consult with a Healthcare Professional before beginning any new exercise routines to ensure this is safe for you.    NEUROCOGNITIVE EFFECTS:  Many childhood cancer survivors are at risk for neurocognitive deficits as a result of the disruption to their early  schooling for their necessary oncology treatments.  If any changes are noted regarding neurocognitive function (memory issues, information processing, comprehension, etc) or psychological health please let us and your primary care provider know immediately.    EYE HEALTH:  Exposures to certain therapies (steroids, methotrexate, radiation including the head, etc.) can increase risk for cataracts. Routine eye exams should be performed as part of long-term follow-up care, along with annual optometry or ophthalmology exams.    METABOLIC HEALTH: Survivors are at increased risk for obesity, high cholesterol, and high blood pressure. A lipid panel for cholesterol and triglyceride screening should take place regularly so that interventions can be initiated promptly.     HORMONAL HEALTH: Survivors are at an increased risk for hormone changes including infertility.  Blood or bodily fluids may be tested if concerns are present.     SKIN HEALTH:  After radiation exposure, or for those who have experienced skin problems (like GVHD) after BMT, there is a risk of non-melanoma skin cancers.  Close attention to sun protection and annual follow-up with Dermatology is strongly recommended.    RISK OF SECONDARY CANCERS: Survivors of childhood cancer/BMT and genetic disorders are at increased risk for developing subsequent cancers compared to the general population. This is primarily due to exposure to radiation and certain chemotherapies, and for some individuals is secondary to an underlying genetic susceptibility. Regular physical examination is important and changes or concerns (new lumps, bumps, changes in medical status) should be reported to a primary care provider or to the Childhood Cancer/BMT Survivor Program.  In addition, screening or monitoring for certain cancers is recommended for the following.    Skin Cancer: There is a risk of second cancers to the skin, soft tissues and bones within the field of prior radiation. Close  attention to sun protection and annual follow-up with Dermatology is strongly recommended. , Brain Tumor: Neurologic changes including sudden or dramatic change in vision, hearing, taste or smell, or new onset persistent headaches, should be reported immediately to a health care provider who may evaluate for a second cancer of the brain.     ASSESSMENT AND PLAN   Mamadou Reece is a now 33 year old survivor of relapsed craniopharyngioma who is now transitioning care from [facility].  He is here with his parents to establish care.  He has ongoing care with ophthalmology, endocrine, dermatology and primary care.  He had an MRI brain in March that was stable with radiation induced cavernomas seen.  He is having his wisdom teeth removed in May and would like help with finding out the regimen for steroids with surgery.  I have reached out to his endocrinologist.  I asked that the family discuss his extensive medical history with the oral surgery office to ensure they are comfortable sedating him in the office with that history.  We will see him back for follow up next year.    -MRI brain last month and repeat in 2 years due to cerebral cavernoma and history of radiation  -Follow up with dermatology yearly due to BCC; wear sunscreen regularly with SPF 30 or higher  -Neuropsych testing done in 2009; repeat as needed based on cognitive concerns  -Will need life long endocrine follow up due to panhypopituitarism.  Reached out to endocrine to discuss upcoming surgery and need for action plan for emergencies and surgeries with his adrenal insufficiency.    -Dental follow up every 6 months  -Referral for neuro-ophthalmology for follow up  -Will help Mamadou with getting a therapist locally; our team will contact social work for assistance.      Eunice Davies MSN, APRN, CPNP-AC, CPON  Department of Pediatrics  Division of Hematology/Oncology          Total time spent on the following services on the date of the encounter: Preparing to see  patient, chart review, review of outside records, Ordering medications, test, procedures, chemotherapy, Referring or communicating with other healthcare professionals, Interpretation of labs, imaging and other tests, Performing a medically appropriate examination , Counseling and educating the patient/family/caregiver , Documenting clinical information in the electronic or other health record , Communicating results to the patient/family/caregiver, Care coordination.  Total amount of time performed on this outpatient visit: 120 minutes.             ADITHYA Jesus CNP

## 2024-04-05 NOTE — NURSING NOTE
Oncology Rooming Note    April 5, 2024 9:32 AM   Mamadou Reece is a 33 year old male who presents for:    Chief Complaint   Patient presents with    Oncology Clinic Visit     Craniopharyngioma     Initial Vitals: BP 93/59 (BP Location: Right arm, Patient Position: Sitting, Cuff Size: Adult Regular)   Pulse 71   Temp 97.9  F (36.6  C) (Tympanic)   Resp 16   Ht 1.829 m (6')   Wt 85.5 kg (188 lb 6.4 oz)   SpO2 98%   BMI 25.55 kg/m   Estimated body mass index is 25.55 kg/m  as calculated from the following:    Height as of this encounter: 1.829 m (6').    Weight as of this encounter: 85.5 kg (188 lb 6.4 oz). Body surface area is 2.08 meters squared.  No Pain (0) Comment: Data Unavailable   No LMP for male patient.  Allergies reviewed: Yes  Medications reviewed: Yes    Medications: Medication refills not needed today.  Pharmacy name entered into [employer]:    [facility] [place], [place] - [address]  [facility] [place] [place] [facility] [address]  [facility], [place] - [address] [place], [place] - [address] [place] [address] PHARMACY [place], [place] - [address] [facility]ARMAC[facility] [place], [place] - [address] [address]    Frailty Screening:   Is the patient here for a new oncology consult visit in cancer care? 2. No      Clinical concerns: None       Neida Geronimo LPN  4/5/2024

## 2024-04-11 ENCOUNTER — LAB (OUTPATIENT)
Dept: LAB | Facility: CLINIC | Age: 34
End: 2024-04-11
Payer: MEDICARE

## 2024-04-11 DIAGNOSIS — E23.0 PANHYPOPITUITARISM (H): ICD-10-CM

## 2024-04-11 LAB
ANION GAP SERPL CALCULATED.3IONS-SCNC: 13 MMOL/L (ref 7–15)
BASOPHILS # BLD AUTO: 0.1 10E3/UL (ref 0–0.2)
BASOPHILS NFR BLD AUTO: 1 %
BUN SERPL-MCNC: 12.2 MG/DL (ref 6–20)
CALCIUM SERPL-MCNC: 9.6 MG/DL (ref 8.6–10)
CHLORIDE SERPL-SCNC: 101 MMOL/L (ref 98–107)
CREAT SERPL-MCNC: 1.5 MG/DL (ref 0.67–1.17)
DEPRECATED HCO3 PLAS-SCNC: 25 MMOL/L (ref 22–29)
EGFRCR SERPLBLD CKD-EPI 2021: 63 ML/MIN/1.73M2
EOSINOPHIL # BLD AUTO: 0.1 10E3/UL (ref 0–0.7)
EOSINOPHIL NFR BLD AUTO: 2 %
ERYTHROCYTE [DISTWIDTH] IN BLOOD BY AUTOMATED COUNT: 13.5 % (ref 10–15)
GLUCOSE SERPL-MCNC: 72 MG/DL (ref 70–99)
HCT VFR BLD AUTO: 41.5 % (ref 40–53)
HGB BLD-MCNC: 14.1 G/DL (ref 13.3–17.7)
IMM GRANULOCYTES # BLD: 0 10E3/UL
IMM GRANULOCYTES NFR BLD: 0 %
LYMPHOCYTES # BLD AUTO: 2.9 10E3/UL (ref 0.8–5.3)
LYMPHOCYTES NFR BLD AUTO: 62 %
MCH RBC QN AUTO: 30.9 PG (ref 26.5–33)
MCHC RBC AUTO-ENTMCNC: 34 G/DL (ref 31.5–36.5)
MCV RBC AUTO: 91 FL (ref 78–100)
MONOCYTES # BLD AUTO: 0.3 10E3/UL (ref 0–1.3)
MONOCYTES NFR BLD AUTO: 6 %
NEUTROPHILS # BLD AUTO: 1.3 10E3/UL (ref 1.6–8.3)
NEUTROPHILS NFR BLD AUTO: 29 %
NRBC # BLD AUTO: 0 10E3/UL
NRBC BLD AUTO-RTO: 0 /100
PLATELET # BLD AUTO: 142 10E3/UL (ref 150–450)
POTASSIUM SERPL-SCNC: 4.2 MMOL/L (ref 3.4–5.3)
PROLACTIN SERPL 3RD IS-MCNC: 2 NG/ML (ref 4–15)
RBC # BLD AUTO: 4.56 10E6/UL (ref 4.4–5.9)
SHBG SERPL-SCNC: 45 NMOL/L (ref 11–80)
SODIUM SERPL-SCNC: 139 MMOL/L (ref 135–145)
T4 FREE SERPL-MCNC: 0.16 NG/DL (ref 0.9–1.7)
TSH SERPL DL<=0.005 MIU/L-ACNC: 8.72 UIU/ML (ref 0.3–4.2)
WBC # BLD AUTO: 4.6 10E3/UL (ref 4–11)

## 2024-04-11 PROCEDURE — 85025 COMPLETE CBC W/AUTO DIFF WBC: CPT

## 2024-04-11 PROCEDURE — 80048 BASIC METABOLIC PNL TOTAL CA: CPT

## 2024-04-11 PROCEDURE — 84403 ASSAY OF TOTAL TESTOSTERONE: CPT

## 2024-04-11 PROCEDURE — 84439 ASSAY OF FREE THYROXINE: CPT

## 2024-04-11 PROCEDURE — 84270 ASSAY OF SEX HORMONE GLOBUL: CPT

## 2024-04-11 PROCEDURE — 84443 ASSAY THYROID STIM HORMONE: CPT

## 2024-04-11 PROCEDURE — 36415 COLL VENOUS BLD VENIPUNCTURE: CPT

## 2024-04-11 PROCEDURE — 84305 ASSAY OF SOMATOMEDIN: CPT

## 2024-04-11 PROCEDURE — 84146 ASSAY OF PROLACTIN: CPT

## 2024-04-12 NOTE — PROGRESS NOTES
Childhood Cancer/BMT Survivor Program (cCSP) Progress Note     I had the pleasure of seeing Mamadou Reece for routine follow up in the [facility].  He is here with his parents (his legal guardians) to transition his survivorship care for the [facility].   He was referred by Mariluz Burt CNP.    HISTORY OF PRESENT ILLNESS   Mamadou is here with his guardians (his parents).  He has been doing well.  He lives in an assisted living apartment complex.  He has assistants that check in on his 1-2 times per day.  Mamadou is legally blind.  He uses a cane for assistance.  He also has magnifiers for reading larger print.  He has 20/200 vision in the right eye and only a sliver of light by his nose of the left eye (significant visual field cuts).  He takes DDAVP for his DI.  No issues with excessive thirst or urination.  He drinks mostly water but does have 1 Pepsi per day.  No longer taking meds for ADHD because he didn't feel like they were helpful.  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  Hasn't been working since for 2021 and looking for another job.  Has vocational rehab services.  Involved with state services for the blind.  Mood has been good.  No issues with sleep.  Sees endocrine yearly.  Disclosed that they don't have an emergency action plan for his adrenal insufficiency.  He is supposed to have his wisdom teeth removed at a local surgery center in May 2024.  Family is wondering if they need to adjust his steroids for the surgery.  They haven't talked with endocrine about this yet.  They would like a new ophthalmologist that works for more with optic neuropathy and low vision.  Last had neuropsych testing in 2009.  Occasional dizziness with position changes.  Had an MRI at [facility] in March that was stable.  Had a basal cell carcinoma removed on his left cheek 5-6 years ago.  Saw derm last a few years ago.  Has a PCP.      TREATMENT HISTORY    Diagnosis: Craniopharyngioma  Date of Diagnosis: 10/30/1996 (primary) and 2002 (relapse)  Age at Diagnosis: 6 years  Date Treatment Completed: 1996 surgery only; 7/25/2002 with relapse  Chemotherapy History: None  Radiation Therapy History: Tumor bed; brain with relapse that started on 6/13/2002 and completed on 7/25/2002 in 28 fractions at 180 cGy per fraction for a total dose of 5040 cGy (fractionated steriotactic radiation)  Bone Marrow Transplant/Cellular Treatment History: none    LATE EFFECTS   Late Effects to Date:   ADHD  Panhypopituitarism (DI, hypothyroid, GH deficiency, central adrenal insufficiency, hypogonadism)  Vision loss, legally blind  Cognitive impairment post XRT  Depression  Cerebral cavernoma  Vitamin D deficiency  Left cheek basal cell carcinoma 5/31/2018  Dizziness    New Late Effects:  none    PAST MEDICAL HISTORY   PMH: PAST MEDICAL HISTORY:   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, [facility]    Legal blindness     Craniopharyngioma    Mood disorder (H24)     Craniopharyngioma    Radiation age 11    Craniopharyngioma     [facility]:FAMILY HISTORY:   Family History   Problem Relation Age of Onset    Diabetes Mother     Cancer Father         Melanoma    Melanoma Father     Basal cell carcinoma Father     Cerebrovascular Disease Maternal Grandmother 91    Diabetes Maternal Grandmother     Cerebrovascular Disease Paternal Grandmother     Cerebrovascular Disease Paternal Grandfather     Prostate Cancer Paternal Grandfather     Alcoholism Paternal Grandfather     Uterine Cancer Paternal Aunt 60        leiomyosarcoma    Alzheimer Disease Paternal Uncle       Social Hx: Graduated from high school in 2010 from [employer]+ transitions program.  Lives in assisted living complex. Looking for a new job and working with vocational rehab.  Interested in meeting with a therapist to discuss his mood.  Surgical Hx: PAST SURGICAL HISTORY:   Past Surgical History:   Procedure  "Laterality Date    CRANIOTOMY, EXCISE TUMOR COMPLEX, COMBINED  age 6    craniopharyngioma    MOHS MICROGRAPHIC PROCEDURE Left 05/31/2018    left cheek nodular BCC     Allergies:  No Known Allergies   Immunizations: up to date and documented    REVIEW OF SYSTEMS   A comprehensive review of systems was performed and was negative unless noted in the HPI.     PHYSICAL EXAM   VITALS: All vitals have been reviewed  BP 93/59 (BP Location: Right arm, Patient Position: Sitting, Cuff Size: Adult Regular)   Pulse 71   Temp 97.9  F (36.6  C) (Tympanic)   Resp 16   Ht 1.829 m (6')   Wt 85.5 kg (188 lb 6.4 oz)   SpO2 98%   BMI 25.55 kg/m     Wt Readings from Last 4 Encounters:   04/05/24 85.5 kg (188 lb 6.4 oz)   06/24/22 89.9 kg (198 lb 3.2 oz)   05/13/22 93.9 kg (207 lb)   09/09/21 92.4 kg (203 lb 12.8 oz)      Ht Readings from Last 2 Encounters:   04/05/24 1.829 m (6')   06/24/22 1.854 m (6' 1\")        Exam:  Constitutional: healthy, alert, and no distress  Head: Normocephalic. No masses, lesions, tenderness or abnormalities  Neck: Neck supple. No adenopathy. Thyroid symmetric, normal size  Eyes:  difficulty with upward gaze.  PERRL. No icterus or injection  ENT: ENT exam normal, no neck nodes or sinus tenderness  Cardiovascular: negative. No lifts, heaves, or thrills. RRR. No murmurs, clicks gallops or rub  Respiratory: Lungs CTAB. No increased WOB.  Gastrointestinal: Abdomen soft, non-tender. BS normal. No masses, organomegaly  : Deferred  Musculoskeletal: extremities normal- no gross deformities noted, gait normal, and normal muscle tone  Skin: no suspicious lesions or rashes  Neurologic: Gait wide based. Reflexes normal and symmetric. Sensation grossly WNL.  Psychiatric: mentation appears normal and affect normal/bright  Hematologic/Lymphatic/Immunologic: Normal cervical lymph nodes     LABS/IMAGING   MRI OF THE BRAIN/PITUITARY WITHOUT AND WITH CONTRAST  3/6/2024 11:12  AM      COMPARISON: Brain MR 9/22/2020    "   HISTORY: History of craniopharyngioma diagnosed in '96 and treated  with near total resection initially. In '02 craniopharyngioma grew  back and treated here by [facility] with XRT. No chemotherapy.  [facility] has been doing brain MRIs every 2-3 years - MRI last in  2020; History of craniopharyngioma; Legal blindness; History of  radiation therapy.       TECHNIQUE: Sagittal T1-weighted, coronal T2-weighted, coronal  T1-weighted images with focus on the sella were obtained without  intravenous contrast. Dynamic thin-section coronal T1-weighted images  were acquired during intravenous administration of gadolinium (9 mL  Gadavist). Delayed post-gadolinium coronal, sagittal and axial  T1-weighted images were also obtained .      FINDINGS: The ventricles and basal cisterns are within normal limits  in configuration.  There is no midline shift.  There are no  extra-axial fluid collections. Gray-white differentiation is well  maintained.  There is no evidence for stroke or acute intracranial  hemorrhage.  There is no abnormal contrast enhancement in the brain or  its coverings. Small presumed cavernoma in the inferior aspect of the  basal ganglia on the left again noted.     Postoperative change consisting of a left pterional craniotomy  presumably for resection of the pituitary lesion again noted. There is  minimal residual mixed-signal intensity, heterogeneously enhancing  tissue at the left lateral aspect of the sella that is unchanged in  size, shape, signal intensity pattern or enhancement pattern since the  comparison study. No evidence for progression of pituitary expansion  of the sella again noted consistent with prior tumoral expansion.                                                                      IMPRESSION:  1. Postoperative change consisting of a left pterional craniotomy  presumably for resection of the pituitary lesion again noted without  change.  2. Minimal mixed signal intensity,  "heterogeneously enhancing tissue at  the lateral aspect of the sella again noted without change. No  evidence for tumor progression or recurrence.  3. Small cavernoma in the inferior aspect of the basal ganglia on the  left again noted.  4. Otherwise, normal brain MRI.     ANCA CONNOLLY MD     MEDICATIONS     Current Outpatient Medications   Medication Sig Dispense Refill    Calcium Citrate-Vitamin D (CALCIUM + D) 315-200 MG-UNIT TABS Take by mouth 2 times daily       Cholecalciferol (VITAMIN D) 1000 UNIT capsule Take 1 capsule by mouth daily       Cyanocobalamin (B-12) 1000 MCG TBCR Take 1,000 mcg by mouth daily  100 tablet 1    desmopressin (DDAVP) 0.01 % SOLN spray USE THREE SPRAYS INTO NOSTRIL ONCE DAILY AND USE ONE SPRAY INTO NOSTRIL AT BEDTIME 10 mL 0    levothyroxine (SYNTHROID/LEVOTHROID) 150 MCG tablet TAKE 1 TABLET (150 MCG) BY MOUTH DAILY 90 tablet 1    NUTROPIN AQ NUSPIN 5 5 MG/2ML SOPN PEN injection INJECT 0.7 MG UNDER THE SKIN ONCE DAILY. 26 mL 1    predniSONE (DELTASONE) 1 MG tablet TAKE THREE TABLETS (3 MG) BY MOUTH IN THE MORNING AND TWO TABLETS (2 MG) AT NIGHT 200 tablet 11    Somatropin 15 MG/1.5ML SOPN Inject 0.7 mg Subcutaneous daily 6 mL 1    testosterone cypionate (DEPOTESTOSTERONE) 200 MG/ML injection Inject 0.5 mLs (100 mg) into the muscle every 14 days 1 mL 5    amphetamine-dextroamphetamine (ADDERALL XR) 25 MG 24 hr capsule Take 1 capsule (25 mg) by mouth every morning 30 capsule 0    amphetamine-dextroamphetamine (ADDERALL XR) 30 MG 24 hr capsule TAKE ONE CAPSULE BY MOUTH DAILY 30 capsule 0    Insulin Pen Needle (PEN NEEDLES 5/16\") 30G X 8 MM MISC USE dailyDIRECTED WITH NORDITROPIN 90 each 3    meclizine (ANTIVERT) 25 MG tablet Take 1 tablet (25 mg) by mouth 3 times daily as needed for dizziness (Patient not taking: Reported on 4/5/2024) 20 tablet 0     No current facility-administered medications for this visit.       SURVIVOR CARE PLAN   Surveillance Plan:    RISK OF RECURRENCE: Mamadou " AME Reece is 22 years out from the completion of therapy for relapsed craniopharyngioma. Given the time from the diagnosis of his/her/their: his primary condition, the likelihood of recurrence is low.      PSYCHOSOCIAL EFFECTS/MENTAL HEALTH:  Educational and emotional issues are common for childhood cancer/BMT survivors. Emotional distress from cancer/BMT treatment and recovery affects the patient, their caregivers and family members. Depending on diagnosis, age at treatment, and type of treatments, difficulties with memory, learning, behavior, or emotional health may occur. In addition to [place] public laws that protect the rights of students with educational problems related to cancer/BMT treatment, childhood cancer/BMT survivors can also seek additional support through our Social Work team, community mental health professionals, and school support services    ACCESS TO HEALTH CARE AND INSURANCE: This can be an issue for survivors over time. Our team has personnel with expertise in this area (e.g. social workers) who can assist with related barriers.    DIET AND PHYSICAL ACTIVITY: The American Cancer Society recommends at least 150 minutes of moderate physical activity or 75 minutes of vigorous physical activity, or a combination of these, each week for adults. Children and adolescents should engage in at least 60 minutes each day of moderate to vigorous physical activity with vigorous activity at least 3 days each week. Survivors who have special needs can take part in most activities, but the help of a PT or OT may be needed to adapt the activity for success. *Always consult with a Healthcare Professional before beginning any new exercise routines to ensure this is safe for you.    NEUROCOGNITIVE EFFECTS:  Many childhood cancer survivors are at risk for neurocognitive deficits as a result of the disruption to their early schooling for their necessary oncology treatments.  If any changes are noted regarding  neurocognitive function (memory issues, information processing, comprehension, etc) or psychological health please let us and your primary care provider know immediately.    EYE HEALTH:  Exposures to certain therapies (steroids, methotrexate, radiation including the head, etc.) can increase risk for cataracts. Routine eye exams should be performed as part of long-term follow-up care, along with annual optometry or ophthalmology exams.    METABOLIC HEALTH: Survivors are at increased risk for obesity, high cholesterol, and high blood pressure. A lipid panel for cholesterol and triglyceride screening should take place regularly so that interventions can be initiated promptly.     HORMONAL HEALTH: Survivors are at an increased risk for hormone changes including infertility.  Blood or bodily fluids may be tested if concerns are present.     SKIN HEALTH:  After radiation exposure, or for those who have experienced skin problems (like GVHD) after BMT, there is a risk of non-melanoma skin cancers.  Close attention to sun protection and annual follow-up with Dermatology is strongly recommended.    RISK OF SECONDARY CANCERS: Survivors of childhood cancer/BMT and genetic disorders are at increased risk for developing subsequent cancers compared to the general population. This is primarily due to exposure to radiation and certain chemotherapies, and for some individuals is secondary to an underlying genetic susceptibility. Regular physical examination is important and changes or concerns (new lumps, bumps, changes in medical status) should be reported to a primary care provider or to the Childhood Cancer/BMT Survivor Program.  In addition, screening or monitoring for certain cancers is recommended for the following.    Skin Cancer: There is a risk of second cancers to the skin, soft tissues and bones within the field of prior radiation. Close attention to sun protection and annual follow-up with Dermatology is strongly  recommended. , Brain Tumor: Neurologic changes including sudden or dramatic change in vision, hearing, taste or smell, or new onset persistent headaches, should be reported immediately to a health care provider who may evaluate for a second cancer of the brain.     ASSESSMENT AND PLAN   Mamadou Reece is a now 33 year old survivor of relapsed craniopharyngioma who is now transitioning care from [facility].  He is here with his parents to establish care.  He has ongoing care with ophthalmology, endocrine, dermatology and primary care.  He had an MRI brain in March that was stable with radiation induced cavernomas seen.  He is having his wisdom teeth removed in May and would like help with finding out the regimen for steroids with surgery.  I have reached out to his endocrinologist.  I asked that the family discuss his extensive medical history with the oral surgery office to ensure they are comfortable sedating him in the office with that history.  We will see him back for follow up next year.    -MRI brain last month and repeat in 2 years due to cerebral cavernoma and history of radiation  -Follow up with dermatology yearly due to BCC; wear sunscreen regularly with SPF 30 or higher  -Neuropsych testing done in 2009; repeat as needed based on cognitive concerns  -Will need life long endocrine follow up due to panhypopituitarism.  Reached out to endocrine to discuss upcoming surgery and need for action plan for emergencies and surgeries with his adrenal insufficiency.    -Dental follow up every 6 months  -Referral for neuro-ophthalmology for follow up  -Will help Mamadou with getting a therapist locally; our team will contact social work for assistance.      Eunice Davies MSN, APRN, CPNP-AC, CPON  Department of Pediatrics  Division of Hematology/Oncology          Total time spent on the following services on the date of the encounter: Preparing to see patient, chart review, review of outside records, Ordering medications, test,  procedures, chemotherapy, Referring or communicating with other healthcare professionals, Interpretation of labs, imaging and other tests, Performing a medically appropriate examination , Counseling and educating the patient/family/caregiver , Documenting clinical information in the electronic or other health record , Communicating results to the patient/family/caregiver, Care coordination.  Total amount of time performed on this outpatient visit: 120 minutes.

## 2024-04-14 LAB
TESTOST FREE SERPL-MCNC: 12.08 NG/DL
TESTOST SERPL-MCNC: 666 NG/DL (ref 240–950)

## 2024-04-15 ENCOUNTER — VIRTUAL VISIT (OUTPATIENT)
Dept: ENDOCRINOLOGY | Facility: CLINIC | Age: 34
End: 2024-04-15
Payer: MEDICARE

## 2024-04-15 DIAGNOSIS — E27.40 ADRENAL INSUFFICIENCY (H): ICD-10-CM

## 2024-04-15 DIAGNOSIS — E23.0 PANHYPOPITUITARISM (H): ICD-10-CM

## 2024-04-15 DIAGNOSIS — Z86.03 HISTORY OF CRANIOPHARYNGIOMA: ICD-10-CM

## 2024-04-15 DIAGNOSIS — E23.0 GROWTH HORMONE DEFICIENCY (H): ICD-10-CM

## 2024-04-15 DIAGNOSIS — E23.2 DIABETES INSIPIDUS (H): ICD-10-CM

## 2024-04-15 DIAGNOSIS — E03.9 ACQUIRED HYPOTHYROIDISM: ICD-10-CM

## 2024-04-15 DIAGNOSIS — E34.9 TESTOSTERONE DEFICIENCY: Primary | ICD-10-CM

## 2024-04-15 PROCEDURE — G2211 COMPLEX E/M VISIT ADD ON: HCPCS | Mod: 95 | Performed by: INTERNAL MEDICINE

## 2024-04-15 PROCEDURE — 99214 OFFICE O/P EST MOD 30 MIN: CPT | Mod: 95 | Performed by: INTERNAL MEDICINE

## 2024-04-15 RX ORDER — DESMOPRESSIN ACETATE 0.1 MG/ML
SOLUTION NASAL
Qty: 10 ML | Refills: 1 | Status: SHIPPED | OUTPATIENT
Start: 2024-04-15

## 2024-04-15 RX ORDER — TESTOSTERONE CYPIONATE 200 MG/ML
100 INJECTION, SOLUTION INTRAMUSCULAR
Qty: 1 ML | Refills: 5 | Status: SHIPPED | OUTPATIENT
Start: 2024-04-15 | End: 2024-07-11

## 2024-04-15 RX ORDER — LEVOTHYROXINE SODIUM 150 UG/1
150 TABLET ORAL DAILY
Qty: 90 TABLET | Refills: 3 | Status: SHIPPED | OUTPATIENT
Start: 2024-04-15

## 2024-04-15 RX ORDER — PREDNISONE 1 MG/1
TABLET ORAL
Qty: 200 TABLET | Refills: 11 | Status: SHIPPED | OUTPATIENT
Start: 2024-04-15

## 2024-04-15 ASSESSMENT — PAIN SCALES - GENERAL: PAINLEVEL: NO PAIN (0)

## 2024-04-15 NOTE — NURSING NOTE
Is the patient currently in the state of [place]? YES    Visit mode:VIDEO    If the visit is dropped, the patient can be reconnected by: VIDEO VISIT: Text to cell phone:   Telephone Information:   Mobile [phone]       Will anyone else be joining the visit? NO  (If patient encounters technical issues they should call [phone]    How would you like to obtain your AVS? MyChart    Are changes needed to the allergy or medication list? No    Are refills needed on medications prescribed by this physician? NO    Reason for visit: RECHECK Shelby Kocher VVF

## 2024-04-15 NOTE — PATIENT INSTRUCTIONS
[facility]  Dr Alonso, Endocrinology Department    [facility]   [address] [id number]  [place] [address]  Appointment [phone]  Fax: [phone]  [place]: Monday - Thursday      Be consistent with thyroid medication.  Labs in 3 months. Need to make lab only appointment.    Continue rest of the medications.  Labs and follow up in 1 year.  Please make a lab appointment for blood work and follow up clinic appointment in 1 week after that to discuss results.  ( In person)    Take Levothyroxine on an empty stomach. Take it with a full glass of water at least 30 minutes to 1 hour before eating breakfast.   This medicine should be taken at least 4 hours before or 4 hours after these medicines: antacids (Maalox , Mylanta , Tums ), calcium supplements, cholestyramine (Prevalite , Questran ), colestipol (Colestid ), iron supplements, orlistat (Karthik , Xenical ), simethicone (Gas-X , Mylicon ), and sucralfate (Carafate ).   Swallow the capsule whole. Do not cut or crush it.

## 2024-04-15 NOTE — PROGRESS NOTES
"THIS IS A VIDEO VISIT:    Phone call visit/virtual visit encounter:    Name of patient: Mamadou Reece    Date of encounter: 4/15/2024    Time of start of video visit: 1:05    Video started: 1:14    Video ended: 1:32    Provider location: working from home/ [facility]    Patient location: patients home.    Mode of transmission: video/ Doximity    Verbal consent: obtained before starting visit. Pt is agreeable.      The patient has been notified of following:      \"This VIDEO visit will be conducted via a call between you and your physician/provider. We have found that certain health care needs can be provided without the need for a physical exam.  This service lets us provide the care you need with a short phone conversation.  If a prescription is necessary we can send it directly to your pharmacy.  If lab work is needed we can place an order for that and you can then stop by our lab to have the test done at a later time.     With new updates with corona virus patient might be billed as clinic visit.     If during the course of the call the physician/provider feels a telephone visit is not appropriate, you will not be charged for this service.\"      Past medical history, social history, family history, allergy and medications were reviewed and updated as appropriate.  Reviewed pertinent labs, notes, imaging studies personally.    Name: Mamadou Reece  Seen for f/u of panhypopituitarism.   Chief Complaint   Patient presents with    RECHECK      HPI:  Mamadou Reece is a 33 year old male who presents for the evaluation of  Above.  Was seen in [facility] before. Records reviewed. Last visit with Endo was 9/2016  [facility]  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 He had stereotactic  radiation therapy at age 11.   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  Followed by Pediatrics oncology; getting MRIs every 2 yrs now. Due for MRI and has upcoming appointment.  Has not had recurrence since his radiation therapy    He is currently on full hormonal treatment with medications listed below.  He is also on growth hormone replacement.  I have discussed role of growth hormone replacement in adults is controversial  Patient's mother reports that she was told by her pediatric endocrinologist that he should never be getting off of growth hormone  They would like to continue it.    Feeling OK.  No major concerns today.    1. HYpogonadism:  On testosterone cypionate 100 mg IM q 2 weeks. (X 1/2023)  Goes to clinic.   4/2024 testosterone labs in range  Hemoglobin levels appears stable.  Energy level is low on some days.  Shaving is normal. Thinks that he is having javed beard.  Muscle strength is OK.  Weight is stable.  Normal hemoglobin levels.    2. Diabetes Insipidus:  On desmopressiin 10 mcg- 3 sprays in evening and 0 at bedtime. (Was using 1 spray at night) (Nighttime dose was added in 2018 as he was waking up frequently at night)  Not waking up at night.  No major concerns. No excessive thirst or frequent urination.  Drinks to thirst.  Na levels are in range.    3. Growth hormone deficiency:  He using Nutropin 0.7 mg/day  He was on Norditropin 0.7 mg/day.   He injects himself at home.  Doing Ok.  Takes own injections.  Living in apartment.  Weight is stable.    4. Central hypothryoidism:  On levothyroxine 150 mcg/day. (X 1/2023)  ( 4/2024 labs showing high TSH and low Ft4 but reports that he was not consistent with medication)  Reports compliance.   No CP, palpitations, diarrhea,dysphagia, cold or warm intolerance or constipation.  + weight stable.  Has dry hands and feet: chronic condition. Better.    Wt Readings from Last 2 Encounters:   04/05/24 85.5 kg (188 lb 6.4 oz)   06/24/22 89.9 kg (198 lb 3.2 oz)       5. Secondary adrenal  insufficiency:  On prednsione 3 mg AM and 2 mg PM. On this dose X many years.  No nausea. No vomiting.no dizziness.  BP stable. Electrolytes are in range.  Wt Readings from Last 2 Encounters:   04/05/24 85.5 kg (188 lb 6.4 oz)   06/24/22 89.9 kg (198 lb 3.2 oz)       PMH/PSH:  1. Diabetes insipidus   2. Panhypopituitarism   3. History of ADD.  4. Central hypothyroidism   5. Secondary adrenal insufficiency  6. Growth hormone deficiency   7. Hypogonadotropic hypogonadism     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, [facility]    Legal blindness     Craniopharyngioma    Mood disorder (H24)     Craniopharyngioma    Radiation age 11    Craniopharyngioma     Past Surgical History:   Procedure Laterality Date    CRANIOTOMY, EXCISE TUMOR COMPLEX, COMBINED  age 6    craniopharyngioma    MOHS MICROGRAPHIC PROCEDURE Left 05/31/2018    left cheek nodular BCC     Family Hx:  Family History   Problem Relation Age of Onset    Diabetes Mother     Cancer Father         Melanoma    Melanoma Father     Basal cell carcinoma Father     Cerebrovascular Disease Maternal Grandmother 91    Diabetes Maternal Grandmother     Cerebrovascular Disease Paternal Grandmother     Cerebrovascular Disease Paternal Grandfather     Prostate Cancer Paternal Grandfather     Alcoholism Paternal Grandfather     Uterine Cancer Paternal Aunt 60        leiomyosarcoma    Alzheimer Disease Paternal Uncle      Social Hx:  Social History     Socioeconomic History    Marital status: Single     Spouse name: Not on file    Number of children: Not on file    Years of education: Not on file    Highest education level: Not on file   Occupational History    Not on file   Tobacco Use    Smoking status: Never    Smokeless tobacco: Never   Vaping Use    Vaping status: Never Used   Substance and Sexual Activity    Alcohol use: Yes     Alcohol/week: 0.0 standard drinks of alcohol     Comment: sip at holidays    Drug use: No     "Sexual activity: Never   Other Topics Concern    Parent/sibling w/ CABG, MI or angioplasty before 65F 55M? Not Asked   Social History Narrative    Not on file     Social Determinants of Health     Financial Resource Strain: Not on file   Food Insecurity: Not on file   Transportation Needs: Not on file   Physical Activity: Not on file   Stress: Not on file   Social Connections: Not on file   Interpersonal Safety: Not on file   Housing Stability: Not on file          MEDICATIONS:  has a current medication list which includes the following prescription(s): amphetamine-dextroamphetamine, amphetamine-dextroamphetamine, calcium + d, vitamin d, b-12, desmopressin, pen needles 5/16\", levothyroxine, meclizine, nutropin aq nuspin 5, prednisone, somatropin, and testosterone cypionate.    ROS     ROS: 10 point ROS neg other than the symptoms noted above in the HPI.    Physical Exam   VS: There were no vitals taken for this visit.    LABS:  Component      Latest Ref Rng & Units 12/12/2019   Testosterone Total      240 - 950 ng/dL 712   Sex Hormone Binding Globulin      11 - 80 nmol/L 23   Free Testosterone Calculated      4.7 - 24.4 ng/dL 19.09   Ins Growth Factor 1      87 - 255 ng/ml 254   Hemoglobin      13.3 - 17.7 g/dL 16.2   T4 Free      0.76 - 1.46 ng/dL 1.45   Prolactin      2 - 18 ug/L <1 (L)       ENDO THYROID LABS-[facility] Latest Ref Rng & Units 12/12/2019   T4 FREE 0.76 - 1.46 ng/dL 1.45     ENDO THYROID LABS-[facility] Latest Ref Rng & Units 1/3/2019 6/20/2018   T4 FREE 0.76 - 1.46 ng/dL 1.17 1.38     ENDO THYROID LABS-[facility] Latest Ref Rng & Units 10/5/2017   T4 FREE 0.76 - 1.46 ng/dL 1.18     ENDO PITUITARY LABS-[facility] Latest Ref Rng & Units 10/5/2017   PROLACTIN 2 - 18 ug/L 1 (L)   TESTOSTERONE TOTAL 240 - 950 ng/dL 647   PSA 0 - 4 ug/L 0.17    - 144 mmol/L 141   POTASSIUM 3.4 - 5.3 mmol/L 3.8   INS GROWTH FACTOR 1 94 - 271 ng/ml 298 (H)     ENDO PITUITARY LABS-[facility] Latest Ref Rng & Units 10/4/2018   TESTOSTERONE TOTAL 240 - " 950 ng/dL 859     ENDO PITUITARY LABS-[facility] Latest Ref Rng & Units 1/3/2019    - 144 mmol/L 138   POTASSIUM 3.4 - 5.3 mmol/L    INS GROWTH FACTOR 1 90 - 262 ng/ml 320 (H)     All pertinent notes, labs, and images personally reviewed by me.     A/P  Mr.Jared Reece is a 33 year old here for the evaluation of above:    1. Hypogonadism:  On testosterone cypionate 100 mg IM q 2 weeks.  Goes to clinic for njections.  Clinically looks okay.  No major concerns  4/2024 labs are in range  Not able to order PSA.  Plan:  Discussed diagnosis, pathophysiology, management and treatment options of condition with pt.  Continue testosterone to 100 mg every 2 weeks X (1/17/2023)  Labs in 1 year.    2. Diabetes Insipidus:  On desmopressiin (0.01 % solution)10 mcg- 3 sprays in evenin.  No major concerns. No excessive thirst or frequent urination.    Electrolytes are in acceptable range.  -- continue current dose of DDAVP  Labs and follow up in 1 year.    3. Growth hormone deficiency:  On Nutropin 0.7 mg/day.   Discussed GH replacement in adults.  They would like to continue it. Agree with it.  4/2024 labs are pending.    4. Central hypothryoidism:  On levothyroxine 150 mcg/day.  (X 1/2024)  Reports that he is missing sometimes.  Clinically looks euthyroid.   Plan:  Discussed diagnosis, pathophysiology, management and treatment options of condition with pt.  Be consistent with thyroid medication.  Labs in 3 months. Need to make lab only appointment.    5. Secondary adrenal insufficiency:  On prednsione 3 mg AM and 2 mg PM. On this dose X many years.  No nausea. No vomiting.  Electrolytes are in range.  -- continue current dose  Discussed sick days.     He has upcoming dental procedure--wisdom tooth extraction.  I asked patient and his mother to discuss the procedure with the dentist again and to clarify if it needs to be inpatient under close monitoring in the setting of adrenal insufficiency.  If it is deemed to be elective and not  major procedure then he can take double doseof prednisone 1 day before and continue for few days after tooth removal if doing okaythen regular dose.  If it is inpatient procedure then he will be followed by anesthetist and will likely need IV hydrocortisone.  I have also sent prescription for emergency use IM hydrocortisone syringe.  Called his mother 4/17/2024 and explained.    Discussed indications, risks and benefits of all medications prescribed, and answered questions to patient's satisfaction.  The longitudinal plan of care for the diagnosis(es)/condition(s) as documented were addressed during this visit. Due to the added complexity in care, I will continue to support Mamadou in the subsequent management and with ongoing continuity of care.  All questions were answered.  The patient indicates understanding of the above issues and agrees with the plan set forth.     Follow-up:  As noted in AVS    Sumaya Alonso MD  Endocrinology   [facility]/[place]    CC: Mariluz Mae     Disclaimer: This note consists of symbols derived from keyboarding, dictation and/or voice recognition software. As a result, there may be errors in the script that have gone undetected. Please consider this when interpreting information found in this chart.    Addendum to above note and clinic visit:    Labs reviewed.    See result note/telephone encounter.

## 2024-04-15 NOTE — LETTER
"    4/15/2024         RE: Mamadou Reece  [address]  [address]  [place] [address]        Dear Colleague,    Thank you for referring your patient, Mamadou Reece, to the [facility]. Please see a copy of my visit note below.    THIS IS A VIDEO VISIT:    Phone call visit/virtual visit encounter:    Name of patient: Mamadou Reece    Date of encounter: 4/15/2024    Time of start of video visit: 1:05    Video started: 1:14    Video ended: 1:32    Provider location: working from home/ [facility]    Patient location: patients home.    Mode of transmission: video/ Doximity    Verbal consent: obtained before starting visit. Pt is agreeable.      The patient has been notified of following:      \"This VIDEO visit will be conducted via a call between you and your physician/provider. We have found that certain health care needs can be provided without the need for a physical exam.  This service lets us provide the care you need with a short phone conversation.  If a prescription is necessary we can send it directly to your pharmacy.  If lab work is needed we can place an order for that and you can then stop by our lab to have the test done at a later time.     With new updates with corona virus patient might be billed as clinic visit.     If during the course of the call the physician/provider feels a telephone visit is not appropriate, you will not be charged for this service.\"      Past medical history, social history, family history, allergy and medications were reviewed and updated as appropriate.  Reviewed pertinent labs, notes, imaging studies personally.    Name: Mamadou Reece  Seen for f/u of panhypopituitarism.   Chief Complaint   Patient presents with     RECHECK      HPI:  Mamadou Reece is a 33 year old male who presents for the evaluation of  Above.  Was seen in [facility] before. Records reviewed. Last visit with Endo was 9/2016  Before that [facility]  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 He had stereotactic  radiation therapy at age 11.   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  Followed by Pediatrics oncology; getting MRIs every 2 yrs now. Due for MRI and has upcoming appointment.  Has not had recurrence since his radiation therapy    He is currently on full hormonal treatment with medications listed below.  He is also on growth hormone replacement.  I have discussed role of growth hormone replacement in adults is controversial  Patient's mother reports that she was told by her pediatric endocrinologist that he should never be getting off of growth hormone  They would like to continue it.    Feeling OK.  No major concerns today.    1. HYpogonadism:  On testosterone cypionate 100 mg IM q 2 weeks. (X 1/2023)  Goes to clinic.   4/2024 testosterone labs in range  Hemoglobin levels appears stable.  Energy level is low on some days.  Shaving is normal. Thinks that he is having javed beard.  Muscle strength is OK.  Weight is stable.  Normal hemoglobin levels.    2. Diabetes Insipidus:  On desmopressiin 10 mcg- 3 sprays in evening and 0 at bedtime. (Was using 1 spray at night) (Nighttime dose was added in 2018 as he was waking up frequently at night)  Not waking up at night.  No major concerns. No excessive thirst or frequent urination.  Drinks to thirst.  Na levels are in range.    3. Growth hormone deficiency:  He using Nutropin 0.7 mg/day  He was on Norditropin 0.7 mg/day.   He injects himself at home.  Doing Ok.  Takes own injections.  Living in apartment.  Weight is stable.    4. Central hypothryoidism:  On levothyroxine 150 mcg/day. (X 1/2023)  ( 4/2024 labs showing high TSH and low Ft4 but reports that he was not consistent with medication)  Reports compliance.   No CP, palpitations,  diarrhea,dysphagia, cold or warm intolerance or constipation.  + weight stable.  Has dry hands and feet: chronic condition. Better.    Wt Readings from Last 2 Encounters:   04/05/24 85.5 kg (188 lb 6.4 oz)   06/24/22 89.9 kg (198 lb 3.2 oz)       5. Secondary adrenal insufficiency:  On prednsione 3 mg AM and 2 mg PM. On this dose X many years.  No nausea. No vomiting.no dizziness.  BP stable. Electrolytes are in range.  Wt Readings from Last 2 Encounters:   04/05/24 85.5 kg (188 lb 6.4 oz)   06/24/22 89.9 kg (198 lb 3.2 oz)       PMH/PSH:  1. Diabetes insipidus   2. Panhypopituitarism   3. History of ADD.  4. Central hypothyroidism   5. Secondary adrenal insufficiency  6. Growth hormone deficiency   7. Hypogonadotropic hypogonadism     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, [facility]     Legal blindness     Craniopharyngioma     Mood disorder (H24)     Craniopharyngioma     Radiation age 11    Craniopharyngioma     Past Surgical History:   Procedure Laterality Date     CRANIOTOMY, EXCISE TUMOR COMPLEX, COMBINED  age 6    craniopharyngioma     MOHS MICROGRAPHIC PROCEDURE Left 05/31/2018    left cheek nodular BCC     Family Hx:  Family History   Problem Relation Age of Onset     Diabetes Mother      Cancer Father         Melanoma     Melanoma Father      Basal cell carcinoma Father      Cerebrovascular Disease Maternal Grandmother 91     Diabetes Maternal Grandmother      Cerebrovascular Disease Paternal Grandmother      Cerebrovascular Disease Paternal Grandfather      Prostate Cancer Paternal Grandfather      Alcoholism Paternal Grandfather      Uterine Cancer Paternal Aunt 60        leiomyosarcoma     Alzheimer Disease Paternal Uncle      Social Hx:  Social History     Socioeconomic History     Marital status: Single     Spouse name: Not on file     Number of children: Not on file     Years of education: Not on file     Highest education level: Not on file  "  Occupational History     Not on file   Tobacco Use     Smoking status: Never     Smokeless tobacco: Never   Vaping Use     Vaping status: Never Used   Substance and Sexual Activity     Alcohol use: Yes     Alcohol/week: 0.0 standard drinks of alcohol     Comment: sip at holidays     Drug use: No     Sexual activity: Never   Other Topics Concern     Parent/sibling w/ CABG, MI or angioplasty before 65F 55M? Not Asked   Social History Narrative     Not on file     Social Determinants of Health     Financial Resource Strain: Not on file   Food Insecurity: Not on file   Transportation Needs: Not on file   Physical Activity: Not on file   Stress: Not on file   Social Connections: Not on file   Interpersonal Safety: Not on file   Housing Stability: Not on file          MEDICATIONS:  has a current medication list which includes the following prescription(s): amphetamine-dextroamphetamine, amphetamine-dextroamphetamine, calcium + d, vitamin d, b-12, desmopressin, pen needles 5/16\", levothyroxine, meclizine, nutropin aq nuspin 5, prednisone, somatropin, and testosterone cypionate.    ROS     ROS: 10 point ROS neg other than the symptoms noted above in the HPI.    Physical Exam   VS: There were no vitals taken for this visit.    LABS:  Component      Latest Ref Rng & Units 12/12/2019   Testosterone Total      240 - 950 ng/dL 712   Sex Hormone Binding Globulin      11 - 80 nmol/L 23   Free Testosterone Calculated      4.7 - 24.4 ng/dL 19.09   Ins Growth Factor 1      87 - 255 ng/ml 254   Hemoglobin      13.3 - 17.7 g/dL 16.2   T4 Free      0.76 - 1.46 ng/dL 1.45   Prolactin      2 - 18 ug/L <1 (L)       ENDO THYROID LABS-[facility] Latest Ref Rng & Units 12/12/2019   T4 FREE 0.76 - 1.46 ng/dL 1.45     ENDO THYROID LABS-[facility] Latest Ref Rng & Units 1/3/2019 6/20/2018   T4 FREE 0.76 - 1.46 ng/dL 1.17 1.38     ENDO THYROID LABS-[facility] Latest Ref Rng & Units 10/5/2017   T4 FREE 0.76 - 1.46 ng/dL 1.18     ENDO PITUITARY LABS-[facility] Latest Ref " Rng & Units 10/5/2017   PROLACTIN 2 - 18 ug/L 1 (L)   TESTOSTERONE TOTAL 240 - 950 ng/dL 647   PSA 0 - 4 ug/L 0.17    - 144 mmol/L 141   POTASSIUM 3.4 - 5.3 mmol/L 3.8   INS GROWTH FACTOR 1 94 - 271 ng/ml 298 (H)     ENDO PITUITARY LABS-[facility] Latest Ref Rng & Units 10/4/2018   TESTOSTERONE TOTAL 240 - 950 ng/dL 859     ENDO PITUITARY LABS-[facility] Latest Ref Rng & Units 1/3/2019    - 144 mmol/L 138   POTASSIUM 3.4 - 5.3 mmol/L    INS GROWTH FACTOR 1 90 - 262 ng/ml 320 (H)     All pertinent notes, labs, and images personally reviewed by me.     A/P  Mr.Jared Reece is a 33 year old here for the evaluation of above:    1. Hypogonadism:  On testosterone cypionate 100 mg IM q 2 weeks.  Goes to clinic for njections.  Clinically looks okay.  No major concerns  4/2024 labs are in range  Not able to order PSA.  Plan:  Discussed diagnosis, pathophysiology, management and treatment options of condition with pt.  Continue testosterone to 100 mg every 2 weeks X (1/17/2023)  Labs in 1 year.    2. Diabetes Insipidus:  On desmopressiin (0.01 % solution)10 mcg- 3 sprays in evenin.  No major concerns. No excessive thirst or frequent urination.    Electrolytes are in acceptable range.  -- continue current dose of DDAVP  Labs and follow up in 1 year.    3. Growth hormone deficiency:  On Nutropin 0.7 mg/day.   Discussed GH replacement in adults.  They would like to continue it. Agree with it.  4/2024 labs are pending.    4. Central hypothryoidism:  On levothyroxine 150 mcg/day.  (X 1/2024)  Reports that he is missing sometimes.  Clinically looks euthyroid.   Plan:  Discussed diagnosis, pathophysiology, management and treatment options of condition with pt.  Be consistent with thyroid medication.  Labs in 3 months. Need to make lab only appointment.    5. Secondary adrenal insufficiency:  On prednsione 3 mg AM and 2 mg PM. On this dose X many years.  No nausea. No vomiting.  Electrolytes are in range.  -- continue current  dose  Discussed sick days.     Discussed indications, risks and benefits of all medications prescribed, and answered questions to patient's satisfaction.  The longitudinal plan of care for the diagnosis(es)/condition(s) as documented were addressed during this visit. Due to the added complexity in care, I will continue to support Mamadou in the subsequent management and with ongoing continuity of care.  All questions were answered.  The patient indicates understanding of the above issues and agrees with the plan set forth.     Follow-up:  As noted in AVS    Sumaya Alonso MD  Endocrinology   [facility]/[place]    CC: Mariluz Mae     Disclaimer: This note consists of symbols derived from keyboarding, dictation and/or voice recognition software. As a result, there may be errors in the script that have gone undetected. Please consider this when interpreting information found in this chart.    Addendum to above note and clinic visit:    Labs reviewed.    See result note/telephone encounter.            Again, thank you for allowing me to participate in the care of your patient.        Sincerely,        Sumaya Alonso MD

## 2024-04-16 DIAGNOSIS — Z86.03 HISTORY OF CRANIOPHARYNGIOMA: Primary | ICD-10-CM

## 2024-04-16 LAB — IGF-I BLD-MCNC: 13 NG/ML (ref 82–243)

## 2024-04-18 ENCOUNTER — ALLIED HEALTH/NURSE VISIT (OUTPATIENT)
Dept: FAMILY MEDICINE | Facility: CLINIC | Age: 34
End: 2024-04-18
Payer: MEDICARE

## 2024-04-18 ENCOUNTER — TELEPHONE (OUTPATIENT)
Dept: FAMILY MEDICINE | Facility: CLINIC | Age: 34
End: 2024-04-18

## 2024-04-18 DIAGNOSIS — E34.9 TESTOSTERONE DEFICIENCY: Primary | ICD-10-CM

## 2024-04-18 PROCEDURE — 99207 PR NO CHARGE NURSE ONLY: CPT

## 2024-04-18 PROCEDURE — 96372 THER/PROPH/DIAG INJ SC/IM: CPT | Performed by: INTERNAL MEDICINE

## 2024-04-18 RX ORDER — TESTOSTERONE CYPIONATE 200 MG/ML
100 INJECTION, SOLUTION INTRAMUSCULAR
Status: COMPLETED | OUTPATIENT
Start: 2024-04-18 | End: 2024-07-11

## 2024-04-18 RX ADMIN — TESTOSTERONE CYPIONATE 100 MG: 200 INJECTION, SOLUTION INTRAMUSCULAR at 15:47

## 2024-04-18 NOTE — TELEPHONE ENCOUNTER
Dr. Nesbitt     Patient's CAM order for Testosterone is not currently active     If you would like patient to continue with testosterone please sign pended CAM order     Patient has an appt today and we are not able to give this until the CAM order signed     Thank you   Elvira Vázquez, Registered Nurse  [facility]

## 2024-04-18 NOTE — PROGRESS NOTES
Administrations This Visit       testosterone cypionate (DEPOTESTOSTERONE) injection 100 mg       Admin Date  04/18/2024 Action  $Given Dose  100 mg Route  Intramuscular Site  Right Ventrogluteal Documented By  Claudia Faustin RN    NDC: 0080-3539-63    Lot#: [id number]    : HOSPIRA    Patient Supplied?: Yes                 Clinic Administered Medication Documentation      Testosterone Documentation     Was this medication supplied by the patient? Yes, medication was received directly from patient in a tamper proof bag (follow site specific policies)   .    Prior to injection, verified patient identity using patient's name and date of birth. Medication was administered. Please see MAR and medication order for additional information. Patient instructed to remain in clinic for 15 minutes and report any adverse reaction to staff immediately but patient declined.  Vial/Syringe: Single dose vial. Was entire vial of medication used? No, The remainder 100 MG of 200 MG was discarded as unavoidable waste.  Verified that the patient has refills remaining in their prescription.    Claudia Faustin RN

## 2024-04-29 ENCOUNTER — PATIENT OUTREACH (OUTPATIENT)
Dept: CARE COORDINATION | Facility: CLINIC | Age: 34
End: 2024-04-29
Payer: MEDICARE

## 2024-04-29 NOTE — PROGRESS NOTES
Social Work - Telephone/MyChart message  [facility]  Data:   Patient Name: Mamadou Reece  Goes By: Mamadou    /Age: 1990 (33 year old)      Referral Source: care team    Reason for Referral: mental health resources    Intervention: Left voice message for patient on 2024 .   Plan:  will await patient's return phone call/message and provide assistance at that time.      An ARCHER, [facility]  - Oncology  Phone : [phone]  Pager: [phone]

## 2024-04-30 ENCOUNTER — CARE COORDINATION (OUTPATIENT)
Dept: PEDIATRIC HEMATOLOGY/ONCOLOGY | Facility: CLINIC | Age: 34
End: 2024-04-30

## 2024-04-30 NOTE — PROGRESS NOTES
Referral faxed to [facility] for patient to have wisdom teeth extracted.  Included most recent Endocrinology note which includes guidance for steroid use during procedures in reference to his adrenal insufficiency precautions.  Mom is aware referral was placed and was provided the phone number and instructed to call and schedule an appointment with them.    Rosie Castellanos RN, BSN  Childhood Cancer Survivorship Program Nurse  [phone]

## 2024-05-02 ENCOUNTER — ALLIED HEALTH/NURSE VISIT (OUTPATIENT)
Dept: FAMILY MEDICINE | Facility: CLINIC | Age: 34
End: 2024-05-02
Payer: MEDICARE

## 2024-05-02 DIAGNOSIS — E34.9 TESTOSTERONE DEFICIENCY: Primary | ICD-10-CM

## 2024-05-02 PROCEDURE — 96372 THER/PROPH/DIAG INJ SC/IM: CPT

## 2024-05-02 PROCEDURE — 99207 PR NO CHARGE NURSE ONLY: CPT

## 2024-05-02 RX ADMIN — TESTOSTERONE CYPIONATE 100 MG: 200 INJECTION, SOLUTION INTRAMUSCULAR at 15:50

## 2024-05-02 NOTE — PROGRESS NOTES
Administrations This Visit       testosterone cypionate (DEPOTESTOSTERONE) injection 100 mg       Admin Date  05/02/2024 Action  $Given Dose  100 mg Route  Intramuscular Site  Left Ventrogluteal Documented By  Sylvie Quinn RN    NDC: 5613-7312-68    Lot#: [id number]    : HOSPIRA    Patient Supplied?: No                  Clinic Administered Medication Documentation      Testosterone Documentation     Was this medication supplied by the patient? Yes, medication was received directly from patient in a tamper proof bag (follow site specific policies)   .    Prior to injection, verified patient identity using patient's name and date of birth. Medication was administered. Please see MAR and medication order for additional information. Patient instructed to remain in clinic for 15 minutes and report any adverse reaction to staff immediately but patient declined and report any adverse reaction to staff immediately .  Vial/Syringe: Single dose vial. Was entire vial of medication used? No, The remainder 100 MG of 200 MG was discarded as unavoidable waste.  Verified that the patient has refills remaining in their prescription.    Sylvie SMITH RN   PAL (Patient Advocate Liaison)  [facility]

## 2024-05-06 ENCOUNTER — TELEPHONE (OUTPATIENT)
Dept: ENDOCRINOLOGY | Facility: CLINIC | Age: 34
End: 2024-05-06
Payer: MEDICARE

## 2024-05-06 NOTE — TELEPHONE ENCOUNTER
Left Voicemail (1st Attempt) for the patient to call back and schedule the following:    Appointment type: New Endocrine   Provider: Any providers that see for DX's below   1. Diabetes insipidus   2. Panhypopituitarism   3. Central hypothyroidism   4. Secondary adrenal insufficiency  5. Growth hormone deficiency   6. Hypogonadotropic hypogonadism   Return date: 1 yr follow-up on or near 4/16/25   Specialty phone number: [phone]  Additional appointment(s) needed: NA   Additonal Notes: LVM, MyC x1   From: Rosie Castellanos RN   Sent: 5/3/2024   2:17 PM CDT   To: [facility]   Subject: endocrinology follow up                          Hello,   This patient recently saw Dr Alvarado in Endocrinology and is set up to return to her in 1 year.  However, family expressed interested in seeing a different Endocrinologist that has more experience in matters of adrenal insufficiency.  Are you able to connect with the Endocrine team and see who that would be and switch his return appt in one year to that person instead of Jenny?     Ching Hamilton on 5/6/2024 at 11:52 AM

## 2024-05-08 NOTE — TELEPHONE ENCOUNTER
Left Voicemail (2nd Attempt) for the patient to call back and schedule the following:    Appointment type: New Endocrine   Provider: Any providers that see for DX's below   1. Diabetes insipidus   2. Panhypopituitarism   3. Central hypothyroidism   4. Secondary adrenal insufficiency  5. Growth hormone deficiency   6. Hypogonadotropic hypogonadism   Return date: 1 yr follow-up on or near 4/16/25   Specialty phone number: [phone]  Additional appointment(s) needed: NA   Additonal Notes: LVM x2, MyC x1   From: Rosie Castellanos RN   Sent: 5/3/2024   2:17 PM CDT   To: [facility]   Subject: endocrinology follow up                          Hello,   This patient recently saw Dr Alvarado in Endocrinology and is set up to return to her in 1 year.  However, family expressed interested in seeing a different Endocrinologist that has more experience in matters of adrenal insufficiency.  Are you able to connect with the Endocrine team and see who that would be and switch his return appt in one year to that person instead of Jenny?     Ching Hamilton on 5/8/2024 at 8:55 AM

## 2024-05-13 DIAGNOSIS — E23.0 GROWTH HORMONE DEFICIENCY (H): ICD-10-CM

## 2024-05-13 NOTE — TELEPHONE ENCOUNTER
RX pended.    Requested Prescriptions   Pending Prescriptions Disp Refills    NUTROPIN AQ NUSPIN 10 10 MG/2ML SOPN PEN injection [Pharmacy Med Name: NUTROPIN AQ NUSPIN 10 10 SOPN] 12 mL 1     Sig: INJECT 0.7MG UNDER THE SKIN ONCE DAILY.       There is no refill protocol information for this order

## 2024-05-15 RX ORDER — SOMATROPIN 10 MG/2ML
INJECTION, SOLUTION SUBCUTANEOUS
Qty: 12 ML | Refills: 1 | Status: SHIPPED | OUTPATIENT
Start: 2024-05-15 | End: 2024-08-16

## 2024-05-16 ENCOUNTER — ALLIED HEALTH/NURSE VISIT (OUTPATIENT)
Dept: FAMILY MEDICINE | Facility: CLINIC | Age: 34
End: 2024-05-16
Payer: MEDICARE

## 2024-05-16 ENCOUNTER — PATIENT OUTREACH (OUTPATIENT)
Dept: CARE COORDINATION | Facility: CLINIC | Age: 34
End: 2024-05-16

## 2024-05-16 DIAGNOSIS — E34.9 TESTOSTERONE DEFICIENCY: Primary | ICD-10-CM

## 2024-05-16 PROCEDURE — 99207 PR NO CHARGE NURSE ONLY: CPT

## 2024-05-16 PROCEDURE — 96372 THER/PROPH/DIAG INJ SC/IM: CPT | Performed by: INTERNAL MEDICINE

## 2024-05-16 RX ADMIN — TESTOSTERONE CYPIONATE 100 MG: 200 INJECTION, SOLUTION INTRAMUSCULAR at 15:43

## 2024-05-16 NOTE — PROGRESS NOTES
Social Work - Follow-Up  [facility]    Data/Intervention:    Patient Name: Mamadou Reece Goes By: Mamadou    /Age: 1990 (33 year old)    Reason for Follow-Up:  Mental health resources    Collaborated With:    -patient's mother  -    Intervention/Education/Resources Provided:  Patient's mother reports patient would benefit from some support with coping with health conditions, life adjustments/transitions and general life stressors.     Patient's mother reports patient is living on their own and is adjusting to changes in living environment, adulthood and changes in their daily schedule.     Patient's mother states patient could benefit from establishing a new routine, help with how to establish a new routine and help with socialization.     Patient's mother reports patient is shy and has vision impairments that make picking up visual ques difficult. Patient's mother believes patient could benefit from some social supports as well.     SW discussed WEI, for peer supports, support groups and to connect with local supportive resources    SW discussed psychology today to search for mental health therapists.     Assessment/Plan:  Patient's mother will review resources with patient and follow up with SW as needed for any on going supports.     Previously provided patient/family with writer's contact information and availability.      SUZI Bourgeois, [facility] HospitalSW    [facility]  [address] [place] [address]  Office: [phone]  Fax: [phone]  Gender Pronouns: She/Her  Employed by [employer]  Support Groups at [facility]: Social Work Services for Cancer Patients (ealthfaview.org)  Employed by [employer]

## 2024-05-16 NOTE — PROGRESS NOTES
Clinic Administered Medication Documentation  Administrations This Visit       testosterone cypionate (DEPOTESTOSTERONE) injection 100 mg       Admin Date  05/16/2024 Action  $Given Dose  100 mg Route  Intramuscular Site  Right Ventrogluteal Documented By  ParisaJael RN    NDC: 2969-9096-07    Lot#: [id number]    : HOSPIRA    Patient Supplied?: Yes                Testosterone Documentation     Was this medication supplied by the patient? Yes, medication was received directly from patient in a tamper proof bag (follow site specific policies).    Prior to injection, verified patient identity using patient's name and date of birth. Medication was administered. Please see MAR and medication order for additional information. Patient instructed to remain in clinic for 15 minutes and report any adverse reaction to staff immediately but patient declined.  Vial/Syringe: Single dose vial. Was entire vial of medication used? No, The remainder 100 MG of 200 MG was discarded as unavoidable waste.  Verified that the patient has refills remaining in their prescription.      Jael TRINIDAD RN  Patient Advocate Liaison - PAL RN  [facility]

## 2024-05-20 ENCOUNTER — OFFICE VISIT (OUTPATIENT)
Dept: URGENT CARE | Facility: URGENT CARE | Age: 34
End: 2024-05-20
Payer: MEDICARE

## 2024-05-20 VITALS
BODY MASS INDEX: 25.96 KG/M2 | TEMPERATURE: 98.5 F | DIASTOLIC BLOOD PRESSURE: 65 MMHG | WEIGHT: 191.4 LBS | SYSTOLIC BLOOD PRESSURE: 98 MMHG | HEART RATE: 54 BPM | OXYGEN SATURATION: 99 %

## 2024-05-20 DIAGNOSIS — H61.21 IMPACTED CERUMEN OF RIGHT EAR: Primary | ICD-10-CM

## 2024-05-20 PROCEDURE — 99213 OFFICE O/P EST LOW 20 MIN: CPT | Performed by: PHYSICIAN ASSISTANT

## 2024-05-20 NOTE — PROGRESS NOTES
"SUBJECTIVE:  Mamadou Reece is a 33 year old male who comes in with a 2-week history of right ear pain.  Symptoms seem to be on and off.  No significant cough or cold symptoms.  Denies any drainage from the ear.  Has taken some Tylenol for symptomatic relief.  Denies any trauma.  No other associated symptoms.    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, [facility]    Legal blindness     Craniopharyngioma    Mood disorder (H24)     Craniopharyngioma    Radiation age 11    Craniopharyngioma     Current Outpatient Medications   Medication Sig Dispense Refill    amphetamine-dextroamphetamine (ADDERALL XR) 30 MG 24 hr capsule TAKE ONE CAPSULE BY MOUTH DAILY 30 capsule 0    Calcium Citrate-Vitamin D (CALCIUM + D) 315-200 MG-UNIT TABS Take by mouth 2 times daily       Cholecalciferol (VITAMIN D) 1000 UNIT capsule Take 1 capsule by mouth daily       Cyanocobalamin (B-12) 1000 MCG TBCR Take 1,000 mcg by mouth daily  100 tablet 1    desmopressin (DDAVP) 0.01 % SOLN spray USE THREE SPRAYS INTO NOSTRIL ONCE DAILY 10 mL 1    Insulin Pen Needle (PEN NEEDLES 5/16\") 30G X 8 MM MISC USE dailyDIRECTED WITH NORDITROPIN 90 each 3    levothyroxine (SYNTHROID/LEVOTHROID) 150 MCG tablet Take 1 tablet (150 mcg) by mouth daily 90 tablet 3    NUTROPIN AQ NUSPIN 10 10 MG/2ML SOPN PEN injection INJECT 0.7MG UNDER THE SKIN ONCE DAILY. 12 mL 1    predniSONE (DELTASONE) 1 MG tablet TAKE THREE TABLETS (3 MG) BY MOUTH IN THE MORNING AND TWO TABLETS (2 MG) AT NIGHT 200 tablet 11    testosterone cypionate (DEPOTESTOSTERONE) 200 MG/ML injection Inject 0.5 mLs (100 mg) into the muscle every 14 days 1 mL 5    amphetamine-dextroamphetamine (ADDERALL XR) 25 MG 24 hr capsule Take 1 capsule (25 mg) by mouth every morning (Patient not taking: Reported on 5/20/2024) 30 capsule 0    meclizine (ANTIVERT) 25 MG tablet Take 1 tablet (25 mg) by mouth 3 times daily as needed for dizziness (Patient not taking: Reported on " 4/5/2024) 20 tablet 0    Somatropin 15 MG/1.5ML SOPN Inject 0.7 mg Subcutaneous daily (Patient not taking: Reported on 5/20/2024) 6 mL 1    study - hydrocortisone sodium succinate PF, IDS# [id number], 50 mg/mL injection Inject into the muscle as needed (for emergency adreanl crisis) Use as directed. (Patient not taking: Reported on 5/20/2024) 1 each 0     Current Facility-Administered Medications   Medication Dose Route Frequency Provider Last Rate Last Admin    testosterone cypionate (DEPOTESTOSTERONE) injection 100 mg  100 mg Intramuscular Q14 Days    100 mg at 05/16/24 1543     Social History     Socioeconomic History    Marital status: Single     Spouse name: Not on file    Number of children: Not on file    Years of education: Not on file    Highest education level: Not on file   Occupational History    Not on file   Tobacco Use    Smoking status: Never    Smokeless tobacco: Never   Vaping Use    Vaping status: Never Used   Substance and Sexual Activity    Alcohol use: Yes     Alcohol/week: 0.0 standard drinks of alcohol     Comment: sip at holidays    Drug use: No    Sexual activity: Never   Other Topics Concern    Parent/sibling w/ CABG, MI or angioplasty before 65F 55M? Not Asked   Social History Narrative    Not on file     Social Determinants of Health     Financial Resource Strain: Not on file   Food Insecurity: Not on file   Transportation Needs: Not on file   Physical Activity: Not on file   Stress: Not on file   Social Connections: Not on file   Interpersonal Safety: Not on file   Housing Stability: Not on file     ROS  negative other than stated above    Exam:  GENERAL APPEARANCE: healthy, alert and no distress  EYES: EOMI,  PERRL  HENT: Canal is clear.  Right TM obstructed with cerumen impacted in the canal.  Oral mucosa moist with no erythema noted.  No significant nasal congestion noted  NECK: no adenopathy, no asymmetry, masses, or scars and thyroid normal to palpation  RESP: lungs clear to auscultation  - no rales, rhonchi or wheezes  CV: regular rates and rhythm, normal S1 S2, no S3 or S4 and no murmur, click or rub -  SKIN: no suspicious lesions or rashes    assessment/plan:  (H61.21) Impacted cerumen of right ear  (primary encounter diagnosis)  Comment:   Plan:   Patient's impacted cerumen of the right ear in setting of ear pain on and off for 2 weeks.  Ear was flushed with complete removal.  Reexam shows no evidence for infection or perforation.  Over-the-counter med if needed.  Follow-up as needed

## 2024-05-29 ENCOUNTER — TELEPHONE (OUTPATIENT)
Dept: ENDOCRINOLOGY | Facility: CLINIC | Age: 34
End: 2024-05-29
Payer: MEDICARE

## 2024-05-29 DIAGNOSIS — E23.0 GROWTH HORMONE DEFICIENCY (H): Primary | ICD-10-CM

## 2024-05-29 RX ORDER — SOMATROPIN 10 MG/1.5ML
0.7 INJECTION, SOLUTION SUBCUTANEOUS DAILY
Qty: 3 ML | Refills: 3 | Status: SHIPPED | OUTPATIENT
Start: 2024-05-29

## 2024-05-29 NOTE — TELEPHONE ENCOUNTER
PA Initiation    Medication: NORDITROPIN FLEXPRO 10 MG/1.5ML SC SOPN  Insurance Company: [employer] Part D - Phone [phone] Fax [phone]  Pharmacy Filling the Rx: [facility] - [place], [place] - [address]  Filling Pharmacy Phone:    Filling Pharmacy Fax:    Start Date: 5/29/2024

## 2024-05-29 NOTE — TELEPHONE ENCOUNTER
Please send new prescription to [facility] for Norditropin flexpro 10mg/1.5ml, qty 3ml, daily dose 0.7mg. indication of Growth hormone deficiency (H24) [E23.0]   Due to discontinuation of Nutropin.

## 2024-05-29 NOTE — TELEPHONE ENCOUNTER
Current pa on nutropin expires 06/12/2024. Nutropin is being discontinued by  12/31/2024.     Will change to other formulary product on [employer] part D plan. Norditropin.   Based on daily dose 0.7mg, 10mg pen, qty 3ml for 28 day supply.     Will request new rx once pa is approved. Pt has been on Norditropin in past.

## 2024-05-30 ENCOUNTER — ALLIED HEALTH/NURSE VISIT (OUTPATIENT)
Dept: FAMILY MEDICINE | Facility: CLINIC | Age: 34
End: 2024-05-30
Payer: MEDICARE

## 2024-05-30 DIAGNOSIS — E34.9 TESTOSTERONE DEFICIENCY: Primary | ICD-10-CM

## 2024-05-30 PROCEDURE — 96372 THER/PROPH/DIAG INJ SC/IM: CPT | Performed by: INTERNAL MEDICINE

## 2024-05-30 PROCEDURE — 99207 PR NO CHARGE NURSE ONLY: CPT

## 2024-05-30 RX ADMIN — TESTOSTERONE CYPIONATE 100 MG: 200 INJECTION, SOLUTION INTRAMUSCULAR at 15:46

## 2024-05-30 NOTE — PROGRESS NOTES
Clinic Administered Medication Documentation  Administrations This Visit       testosterone cypionate (DEPOTESTOSTERONE) injection 100 mg       Admin Date  05/30/2024 Action  $Given Dose  100 mg Route  Intramuscular Site  Left Ventrogluteal Documented By  ParisaJael RN    NDC: 1300-4202-57    Lot#: [id number]    : HOSPIRA    Patient Supplied?: Yes                Testosterone Documentation     Was this medication supplied by the patient? Yes, medication was received directly from patient in a tamper proof bag (follow site specific policies).    Prior to injection, verified patient identity using patient's name and date of birth. Medication was administered. Please see MAR and medication order for additional information. Patient instructed to remain in clinic for 15 minutes and report any adverse reaction to staff immediately but patient declined.  Vial/Syringe: Single dose vial. Was entire vial of medication used? No, The remainder 100 MG of 200 MG was discarded as unavoidable waste.  Verified that the patient has refills remaining in their prescription.    Jael TRINIDAD RN  Patient Advocate Liaison - PAL RN  [facility]  [phone]

## 2024-06-10 ENCOUNTER — TELEPHONE (OUTPATIENT)
Dept: ENDOCRINOLOGY | Facility: CLINIC | Age: 34
End: 2024-06-10
Payer: MEDICARE

## 2024-06-12 NOTE — TELEPHONE ENCOUNTER
Ran test claim to verify status of pa. Test claim came back clean, with pa expires 12/31/2024 on details.         Will renew at that time.   Encounter closed at this time.

## 2024-06-13 ENCOUNTER — ALLIED HEALTH/NURSE VISIT (OUTPATIENT)
Dept: FAMILY MEDICINE | Facility: CLINIC | Age: 34
End: 2024-06-13
Payer: MEDICARE

## 2024-06-13 DIAGNOSIS — E34.9 TESTOSTERONE DEFICIENCY: Primary | ICD-10-CM

## 2024-06-13 PROCEDURE — 99207 PR NO CHARGE NURSE ONLY: CPT

## 2024-06-13 PROCEDURE — 96372 THER/PROPH/DIAG INJ SC/IM: CPT | Performed by: INTERNAL MEDICINE

## 2024-06-13 RX ADMIN — TESTOSTERONE CYPIONATE 100 MG: 200 INJECTION, SOLUTION INTRAMUSCULAR at 15:40

## 2024-06-13 NOTE — PROGRESS NOTES
Clinic Administered Medication Documentation  Administrations This Visit       testosterone cypionate (DEPOTESTOSTERONE) injection 100 mg       Admin Date  06/13/2024 Action  $Given Dose  100 mg Route  Intramuscular Site  Right Ventrogluteal Documented By  ParisaJael RN    NDC: 1155-4733-37    Lot#: [id number]    : HOSPIRA    Patient Supplied?: Yes                Testosterone Documentation     Was this medication supplied by the patient? Yes, medication was received directly from patient in a tamper proof bag (follow site specific policies).    Prior to injection, verified patient identity using patient's name and date of birth. Medication was administered. Please see MAR and medication order for additional information. Patient instructed to remain in clinic for 15 minutes and report any adverse reaction to staff immediately but patient declined.  Vial/Syringe: Single dose vial. Was entire vial of medication used? No, The remainder 100 MG of 200 MG was discarded as unavoidable waste.  Verified that the patient has refills remaining in their prescription.    Jael TRINIDAD RN  Patient Advocate Liaison - PAL RN  [facility]  [phone]

## 2024-06-19 ENCOUNTER — TELEPHONE (OUTPATIENT)
Dept: ENDOCRINOLOGY | Facility: CLINIC | Age: 34
End: 2024-06-19
Payer: MEDICARE

## 2024-06-19 DIAGNOSIS — E23.0 PANHYPOPITUITARISM (H): ICD-10-CM

## 2024-06-19 DIAGNOSIS — E23.0 GROWTH HORMONE DEFICIENCY (H): Primary | ICD-10-CM

## 2024-06-19 NOTE — TELEPHONE ENCOUNTER
[facility] Call Center    Phone Message    May a detailed message be left on voicemail: yes     Reason for Call: Other: patient needs orders for lab appt due for July, please notify mother if these are fasting labs, thank you     Action Taken: Other: endo    Travel Screening: Not Applicable     Date of Service:

## 2024-06-24 ENCOUNTER — OFFICE VISIT (OUTPATIENT)
Dept: INTERNAL MEDICINE | Facility: CLINIC | Age: 34
End: 2024-06-24
Payer: MEDICARE

## 2024-06-24 VITALS
TEMPERATURE: 97.4 F | BODY MASS INDEX: 26.66 KG/M2 | WEIGHT: 196.8 LBS | SYSTOLIC BLOOD PRESSURE: 100 MMHG | OXYGEN SATURATION: 99 % | HEIGHT: 72 IN | HEART RATE: 64 BPM | RESPIRATION RATE: 14 BRPM | DIASTOLIC BLOOD PRESSURE: 65 MMHG

## 2024-06-24 DIAGNOSIS — Z01.818 PREOP GENERAL PHYSICAL EXAM: Primary | ICD-10-CM

## 2024-06-24 DIAGNOSIS — E23.0 PANHYPOPITUITARISM (H): ICD-10-CM

## 2024-06-24 DIAGNOSIS — K02.9 COMPLEX DENTAL CAVITY: ICD-10-CM

## 2024-06-24 DIAGNOSIS — E03.9 ACQUIRED HYPOTHYROIDISM: ICD-10-CM

## 2024-06-24 LAB
ALBUMIN SERPL BCG-MCNC: 4.3 G/DL (ref 3.5–5.2)
ALP SERPL-CCNC: 50 U/L (ref 40–150)
ALT SERPL W P-5'-P-CCNC: 28 U/L (ref 0–70)
ANION GAP SERPL CALCULATED.3IONS-SCNC: 8 MMOL/L (ref 7–15)
AST SERPL W P-5'-P-CCNC: 34 U/L (ref 0–45)
BILIRUB SERPL-MCNC: 1.1 MG/DL
BUN SERPL-MCNC: 10.9 MG/DL (ref 6–20)
CALCIUM SERPL-MCNC: 8.8 MG/DL (ref 8.6–10)
CHLORIDE SERPL-SCNC: 106 MMOL/L (ref 98–107)
CREAT SERPL-MCNC: 1.33 MG/DL (ref 0.67–1.17)
DEPRECATED HCO3 PLAS-SCNC: 29 MMOL/L (ref 22–29)
EGFRCR SERPLBLD CKD-EPI 2021: 73 ML/MIN/1.73M2
ERYTHROCYTE [DISTWIDTH] IN BLOOD BY AUTOMATED COUNT: 13.6 % (ref 10–15)
GLUCOSE SERPL-MCNC: 57 MG/DL (ref 70–99)
HCT VFR BLD AUTO: 38 % (ref 40–53)
HGB BLD-MCNC: 12.9 G/DL (ref 13.3–17.7)
MCH RBC QN AUTO: 31 PG (ref 26.5–33)
MCHC RBC AUTO-ENTMCNC: 33.9 G/DL (ref 31.5–36.5)
MCV RBC AUTO: 91 FL (ref 78–100)
PLATELET # BLD AUTO: 164 10E3/UL (ref 150–450)
POTASSIUM SERPL-SCNC: 3.9 MMOL/L (ref 3.4–5.3)
PROT SERPL-MCNC: 6.7 G/DL (ref 6.4–8.3)
RBC # BLD AUTO: 4.16 10E6/UL (ref 4.4–5.9)
SODIUM SERPL-SCNC: 143 MMOL/L (ref 135–145)
TSH SERPL DL<=0.005 MIU/L-ACNC: 3.8 UIU/ML (ref 0.3–4.2)
WBC # BLD AUTO: 6.4 10E3/UL (ref 4–11)

## 2024-06-24 PROCEDURE — 36415 COLL VENOUS BLD VENIPUNCTURE: CPT | Performed by: INTERNAL MEDICINE

## 2024-06-24 PROCEDURE — 85027 COMPLETE CBC AUTOMATED: CPT | Performed by: INTERNAL MEDICINE

## 2024-06-24 PROCEDURE — 80053 COMPREHEN METABOLIC PANEL: CPT | Performed by: INTERNAL MEDICINE

## 2024-06-24 PROCEDURE — G2211 COMPLEX E/M VISIT ADD ON: HCPCS | Performed by: INTERNAL MEDICINE

## 2024-06-24 PROCEDURE — 84443 ASSAY THYROID STIM HORMONE: CPT | Performed by: INTERNAL MEDICINE

## 2024-06-24 PROCEDURE — 99214 OFFICE O/P EST MOD 30 MIN: CPT | Performed by: INTERNAL MEDICINE

## 2024-06-24 ASSESSMENT — PAIN SCALES - GENERAL: PAINLEVEL: SEVERE PAIN (6)

## 2024-06-24 NOTE — PROGRESS NOTES
Preoperative Evaluation  [place] [address]  [address]  [facility] [address]  Phone: [phone]  Primary Provider: ADITHYA Aguirre Ra, CNP  Pre-op Performing Provider: Cesario Weber MD  Jun 24, 2024 6/24/2024   Surgical Information   What procedure is being done? [place] teeth removal   Facility or Hospital where procedure/surgery will be performed: [facility]   Who is doing the procedure / surgery? Unknown   Date of surgery / procedure: 7/3/2024   Time of surgery / procedure: 8:00 am   Where do you plan to recover after surgery? at home with family        Fax number for surgical facility: [phone]    Assessment & Plan     The proposed surgical procedure is considered INTERMEDIATE risk.    Preop general physical exam  Assess lab work   Continue treatment   - TSH with free T4 reflex  - CBC with platelets  - Comprehensive metabolic panel (BMP + Alb, Alk Phos, ALT, AST, Total. Bili, TP)    Complex dental cavity  [place] teeth extractions planned     Acquired hypothyroidism  Assess thyroid function   - TSH with free T4 reflex    Panhypopituitarism (H24)  Continue Prednisone, Testosterone, Synthroid            Risks and Recommendations  The patient has the following additional risks and recommendations for perioperative complications:   - Recurrent use of steroids    Antiplatelet or Anticoagulation Medication Instructions   - Patient is on no antiplatelet or anticoagulation medications.    Additional Medication Instructions   - ibuprofen (Advil, Motrin): DO NOT TAKE 1 day before surgery.     Recommendation  Approval given to proceed with proposed procedure, without further diagnostic evaluation.    Katarina Cedillo is a 33 year old, presenting for the following:  Pre-Op Exam          6/24/2024     2:27 PM   Additional Questions   Roomed by Vera MONTERO   Accompanied by mother     HPI related to upcoming procedure: scheduled for wisdom teeth surgery for  history of cavities, pain.   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   On replacement treatment with Prednisone, Synthroid, Testosterone  No recent acute infections.         6/24/2024   Pre-Op Questionnaire   Have you ever had a heart attack or stroke? No   Have you ever had surgery on your heart or blood vessels, such as a stent placement, a coronary artery bypass, or surgery on an artery in your head, neck, heart, or legs? No   Do you have chest pain with activity? No   Do you have a history of heart failure? No   Do you currently have a cold, bronchitis or symptoms of other infection? No   Do you have a cough, shortness of breath, or wheezing? No   Do you or anyone in your family have previous history of blood clots? No   Do you or does anyone in your family have a serious bleeding problem such as prolonged bleeding following surgeries or cuts? No   Have you ever had problems with anemia or been told to take iron pills? No   Have you had any abnormal blood loss such as black, tarry or bloody stools? No   Have you ever had a blood transfusion? (!) UNKNOWN   Are you willing to have a blood transfusion if it is medically needed before, during, or after your surgery? Yes   Have you or any of your relatives ever had problems with anesthesia? No   Do you have sleep apnea, excessive snoring or daytime drowsiness? No   Do you have any artifical heart valves or other implanted medical devices like a pacemaker, defibrillator, or continuous glucose monitor? No   Do you have artificial joints? No   Are you allergic to latex? No        Health Care Directive  Patient has a Health Care Directive on file      Preoperative Review of    reviewed - no record of controlled substances prescribed.      Status of Chronic Conditions:  See problem list for active medical problems.  Problems all longstanding and stable, except as noted/documented.  See ROS for  pertinent symptoms related to these conditions.    Patient Active Problem List    Diagnosis Date Noted    Adrenal insufficiency (H24) 01/18/2022     Priority: Medium    Diabetes insipidus (H24) 01/18/2022     Priority: Medium    Growth hormone deficiency (H24) 01/18/2022     Priority: Medium    Urinary frequency 10/12/2018     Priority: Medium    BCC (basal cell carcinoma), face 05/15/2018     Priority: Medium     Anticipating Mohs      Testosterone deficiency 08/08/2016     Priority: Medium    History of craniopharyngioma 02/24/2016     Priority: Medium    Panhypopituitarism (H24) 03/02/2014     Priority: Medium     Followed by Dr. Tello  Receives testosterone inj for this diagnosis      Hypothyroidism 09/20/2012     Priority: Medium    Craniopharyngioma (H) 04/28/2011     Priority: Medium     Age 6      CARDIOVASCULAR SCREENING; LDL GOAL LESS THAN 160 04/28/2011     Priority: Medium    Mood change 04/28/2011     Priority: Medium     Followed by [facility].       Legal blindness 04/28/2011     Priority: Medium     Following craniopharyngioma        ADD (attention deficit disorder) 04/26/2011     Priority: Medium     Diagnosed through pediatrician.    Patient is followed by ONOFRE JIMENEZ for ongoing prescription of stimulants.  All refills should be approved by this provider, or covering partner.    Medication(s): Adderall XR 30 mg.   Maximum quantity per month: 30  Clinic visit frequency required: Q 6  months     Controlled substance agreement on file: Yes       Date(s): 3/12/19  Neuropsych evaluation for ADD completed:  diagnosed through Pediatrician    Last [facility] website verification:  done on ?  [email or web address]            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, [facility]    Legal blindness     Craniopharyngioma    Mood disorder (H24)     Craniopharyngioma    Radiation age 11    Craniopharyngioma     Past Surgical History:  "  Procedure Laterality Date    CRANIOTOMY, EXCISE TUMOR COMPLEX, COMBINED  age 6    craniopharyngioma    MOHS MICROGRAPHIC PROCEDURE Left 05/31/2018    left cheek nodular BCC     Current Outpatient Medications   Medication Sig Dispense Refill    amphetamine-dextroamphetamine (ADDERALL XR) 30 MG 24 hr capsule TAKE ONE CAPSULE BY MOUTH DAILY 30 capsule 0    Calcium Citrate-Vitamin D (CALCIUM + D) 315-200 MG-UNIT TABS Take by mouth 2 times daily       Cholecalciferol (VITAMIN D) 1000 UNIT capsule Take 1 capsule by mouth daily       Cyanocobalamin (B-12) 1000 MCG TBCR Take 1,000 mcg by mouth daily  100 tablet 1    desmopressin (DDAVP) 0.01 % SOLN spray USE THREE SPRAYS INTO NOSTRIL ONCE DAILY 10 mL 1    Insulin Pen Needle (PEN NEEDLES 5/16\") 30G X 8 MM MISC USE dailyDIRECTED WITH NORDITROPIN 90 each 3    levothyroxine (SYNTHROID/LEVOTHROID) 150 MCG tablet Take 1 tablet (150 mcg) by mouth daily 90 tablet 3    NUTROPIN AQ NUSPIN 10 10 MG/2ML SOPN PEN injection INJECT 0.7MG UNDER THE SKIN ONCE DAILY. 12 mL 1    predniSONE (DELTASONE) 1 MG tablet TAKE THREE TABLETS (3 MG) BY MOUTH IN THE MORNING AND TWO TABLETS (2 MG) AT NIGHT 200 tablet 11    somatropin (NORDITROPIN FLEXPRO) 10 MG/1.5ML SOPN PEN injection Inject 0.7 mg Subcutaneous daily 3 mL 3    testosterone cypionate (DEPOTESTOSTERONE) 200 MG/ML injection Inject 0.5 mLs (100 mg) into the muscle every 14 days 1 mL 5       No Known Allergies     Social History     Tobacco Use    Smoking status: Never    Smokeless tobacco: Never   Substance Use Topics    Alcohol use: Yes     Alcohol/week: 0.0 standard drinks of alcohol     Comment: sip at holidays     Family History   Problem Relation Age of Onset    Diabetes Mother     Cancer Father         Melanoma    Melanoma Father     Basal cell carcinoma Father     Cerebrovascular Disease Maternal Grandmother 91        Age 91    Diabetes Maternal Grandmother     Cerebrovascular Disease Paternal Grandmother         Age 96    " Cerebrovascular Disease Paternal Grandfather     Prostate Cancer Paternal Grandfather     Alcoholism Paternal Grandfather     Uterine Cancer Paternal Aunt 60        leiomyosarcoma    Alzheimer Disease Paternal Uncle     Prostate Cancer Maternal Grandfather      History   Drug Use No             Review of Systems  Constitutional, HEENT, cardiovascular, pulmonary, GI, , musculoskeletal, neuro, skin, endocrine and psych systems are negative, except as otherwise noted.    Objective    /65 (BP Location: Left arm, Cuff Size: Adult Regular)   Pulse 64   Temp 97.4  F (36.3  C) (Tympanic)   Resp 14   Ht 1.829 m (6')   Wt 89.3 kg (196 lb 12.8 oz)   SpO2 99%   BMI 26.69 kg/m     Estimated body mass index is 26.69 kg/m  as calculated from the following:    Height as of this encounter: 1.829 m (6').    Weight as of this encounter: 89.3 kg (196 lb 12.8 oz).  Physical Exam  GENERAL: alert and no distress  EYES: Eyes grossly normal to inspection, PERRL and conjunctivae and sclerae normal  HENT: ear canals and TM's normal, nose and mouth without ulcers or lesions  NECK: no adenopathy, no asymmetry, masses, or scars  RESP: lungs clear to auscultation - no rales, rhonchi or wheezes  CV: regular rate and rhythm, normal S1 S2, no S3 or S4, no murmur, click or rub, no peripheral edema  ABDOMEN: soft, nontender, no hepatosplenomegaly, no masses and bowel sounds normal  MS: no gross musculoskeletal defects noted, no edema  SKIN: no suspicious lesions or rashes  NEURO: Normal strength and tone, mentation intact and speech normal  PSYCH: mentation appears normal, affect normal/bright    Recent Labs   Lab Test 04/11/24  1045   HGB 14.1   *      POTASSIUM 4.2   CR 1.50*        Diagnostics  Labs pending at this time.  Results will be reviewed when available.   No EKG required, no history of coronary heart disease, significant arrhythmia, peripheral arterial disease or other structural heart disease.    Revised  Cardiac Risk Index (RCRI)  The patient has the following serious cardiovascular risks for perioperative complications:   - No serious cardiac risks = 0 points     RCRI Interpretation: 0 points: Class I (very low risk - 0.4% complication rate)         Signed Electronically by: Cesario Weber MD  Copy of this evaluation report is provided to requesting physician.

## 2024-07-05 ENCOUNTER — TELEPHONE (OUTPATIENT)
Dept: FAMILY MEDICINE | Facility: CLINIC | Age: 34
End: 2024-07-05
Payer: MEDICARE

## 2024-07-05 NOTE — TELEPHONE ENCOUNTER
Pt's dad called to make pt's testosterone appts  Scheduled appts pre Dad's request    Thank you  Obinna Hazel RN on 7/5/2024 at 9:45 AM

## 2024-07-08 ENCOUNTER — LAB (OUTPATIENT)
Dept: LAB | Facility: CLINIC | Age: 34
End: 2024-07-08
Payer: MEDICARE

## 2024-07-08 ENCOUNTER — TELEPHONE (OUTPATIENT)
Dept: FAMILY MEDICINE | Facility: CLINIC | Age: 34
End: 2024-07-08

## 2024-07-08 DIAGNOSIS — E23.0 PANHYPOPITUITARISM (H): ICD-10-CM

## 2024-07-08 DIAGNOSIS — E23.0 GROWTH HORMONE DEFICIENCY (H): ICD-10-CM

## 2024-07-08 LAB
ANION GAP SERPL CALCULATED.3IONS-SCNC: 8 MMOL/L (ref 7–15)
BUN SERPL-MCNC: 18.6 MG/DL (ref 6–20)
CALCIUM SERPL-MCNC: 9.3 MG/DL (ref 8.6–10)
CHLORIDE SERPL-SCNC: 103 MMOL/L (ref 98–107)
CREAT SERPL-MCNC: 0.97 MG/DL (ref 0.67–1.17)
DEPRECATED HCO3 PLAS-SCNC: 30 MMOL/L (ref 22–29)
EGFRCR SERPLBLD CKD-EPI 2021: >90 ML/MIN/1.73M2
GLUCOSE SERPL-MCNC: 87 MG/DL (ref 70–99)
POTASSIUM SERPL-SCNC: 4.2 MMOL/L (ref 3.4–5.3)
PROLACTIN SERPL 3RD IS-MCNC: 1 NG/ML (ref 4–15)
SODIUM SERPL-SCNC: 141 MMOL/L (ref 135–145)
T4 FREE SERPL-MCNC: 1.09 NG/DL (ref 0.9–1.7)
TSH SERPL DL<=0.005 MIU/L-ACNC: 0.34 UIU/ML (ref 0.3–4.2)

## 2024-07-08 PROCEDURE — 84146 ASSAY OF PROLACTIN: CPT

## 2024-07-08 PROCEDURE — 84443 ASSAY THYROID STIM HORMONE: CPT

## 2024-07-08 PROCEDURE — 84439 ASSAY OF FREE THYROXINE: CPT

## 2024-07-08 PROCEDURE — 36415 COLL VENOUS BLD VENIPUNCTURE: CPT

## 2024-07-08 PROCEDURE — 99000 SPECIMEN HANDLING OFFICE-LAB: CPT

## 2024-07-08 PROCEDURE — 84305 ASSAY OF SOMATOMEDIN: CPT | Mod: 90

## 2024-07-08 PROCEDURE — 80048 BASIC METABOLIC PNL TOTAL CA: CPT

## 2024-07-08 NOTE — TELEPHONE ENCOUNTER
Forms/Letter Request    Type of form/letter: OTHER: Standing medication order       Do we have the form/letter: Yes:     Who is the form from? [employer]    Where did/will the form come from? form was faxed in    How would you like the form/letter returned: Fax : [phone]

## 2024-07-09 ENCOUNTER — MEDICAL CORRESPONDENCE (OUTPATIENT)
Dept: HEALTH INFORMATION MANAGEMENT | Facility: CLINIC | Age: 34
End: 2024-07-09

## 2024-07-11 ENCOUNTER — ALLIED HEALTH/NURSE VISIT (OUTPATIENT)
Dept: FAMILY MEDICINE | Facility: CLINIC | Age: 34
End: 2024-07-11
Payer: MEDICARE

## 2024-07-11 DIAGNOSIS — E34.9 TESTOSTERONE DEFICIENCY: ICD-10-CM

## 2024-07-11 DIAGNOSIS — E34.9 TESTOSTERONE DEFICIENCY: Primary | ICD-10-CM

## 2024-07-11 LAB
INSULIN GROWTH FACTOR 1 (EXTERNAL): 124 NG/ML (ref 53–331)
INSULIN GROWTH FACTOR I SD SCORE (EXTERNAL): -0.4 SD

## 2024-07-11 PROCEDURE — 99207 PR NO CHARGE NURSE ONLY: CPT

## 2024-07-11 PROCEDURE — 96372 THER/PROPH/DIAG INJ SC/IM: CPT | Performed by: INTERNAL MEDICINE

## 2024-07-11 RX ADMIN — TESTOSTERONE CYPIONATE 100 MG: 200 INJECTION, SOLUTION INTRAMUSCULAR at 15:51

## 2024-07-11 NOTE — PROGRESS NOTES
Clinic Administered Medication Documentation  Administrations This Visit       testosterone cypionate (DEPOTESTOSTERONE) injection 100 mg       Admin Date  07/11/2024 Action  $Given Dose  100 mg Route  Intramuscular Site  Left Ventrogluteal Documented By  ParisaJael RN    NDC: 1911-2965-77    Lot#: [id number]    : HOSPIRA    Patient Supplied?: Yes                Testosterone Documentation     Was this medication supplied by the patient? Yes, medication was received directly from patient in a tamper proof bag (follow site specific policies).    Prior to injection, verified patient identity using patient's name and date of birth. Medication was administered. Please see MAR and medication order for additional information. Patient instructed to remain in clinic for 15 minutes and report any adverse reaction to staff immediately but patient declined.  Vial/Syringe: Single dose vial. Was entire vial of medication used? No, The remainder 100 MG of 200 MG was discarded as unavoidable waste.  Patient has no refills remaining. Refill encounter opened, order pended and Routed to the provider    Jael TRINIDAD RN  Patient Advocate Liaison - PAL RN  [facility]

## 2024-07-11 NOTE — TELEPHONE ENCOUNTER
RN notes pt has no further refills of Testosterone on file, CAM order has also    -Next injection due 24    Orders pended, routing to Dr. Alonso to review and sign     Jael TRINIDAD RN  Patient Advocate Liaison - PAL RN  [facility]  [phone]

## 2024-07-16 RX ORDER — TESTOSTERONE CYPIONATE 200 MG/ML
100 INJECTION, SOLUTION INTRAMUSCULAR
Status: ACTIVE | OUTPATIENT
Start: 2024-07-25 | End: 2024-10-16

## 2024-07-16 RX ORDER — TESTOSTERONE CYPIONATE 200 MG/ML
100 INJECTION, SOLUTION INTRAMUSCULAR
Qty: 1 ML | Refills: 5 | Status: SHIPPED | OUTPATIENT
Start: 2024-07-16

## 2024-08-08 ENCOUNTER — ALLIED HEALTH/NURSE VISIT (OUTPATIENT)
Dept: FAMILY MEDICINE | Facility: CLINIC | Age: 34
End: 2024-08-08
Payer: MEDICARE

## 2024-08-08 DIAGNOSIS — E34.9 TESTOSTERONE DEFICIENCY: Primary | ICD-10-CM

## 2024-08-08 PROCEDURE — 96372 THER/PROPH/DIAG INJ SC/IM: CPT | Performed by: INTERNAL MEDICINE

## 2024-08-08 PROCEDURE — 99207 PR NO CHARGE NURSE ONLY: CPT

## 2024-08-08 RX ADMIN — TESTOSTERONE CYPIONATE 100 MG: 200 INJECTION, SOLUTION INTRAMUSCULAR at 15:43

## 2024-08-08 NOTE — PROGRESS NOTES
Clinic Administered Medication Documentation  Administrations This Visit       testosterone cypionate (DEPOTESTOSTERONE) injection 100 mg       Admin Date  08/08/2024 Action  $Given Dose  100 mg Route  Intramuscular Site  Right Ventrogluteal Documented By  ParisaJael RN    NDC: 9558-9378-12    Lot#: [id number]    : HOSPIRA    Patient Supplied?: Yes                Testosterone Documentation     Was this medication supplied by the patient? Yes, medication was received directly from patient in a tamper proof bag (follow site specific policies).    Prior to injection, verified patient identity using patient's name and date of birth. Medication was administered. Please see MAR and medication order for additional information. Patient instructed to remain in clinic for 15 minutes and report any adverse reaction to staff immediately but patient declined.  Vial/Syringe: Single dose vial. Was entire vial of medication used? No, The remainder 100 MG of 200 MG was discarded as unavoidable waste.  Verified that the patient has refills remaining in their prescription.    Jael TRINIDAD RN  Patient Advocate Liaison - PAL RN  [facility]

## 2024-08-14 SDOH — HEALTH STABILITY: PHYSICAL HEALTH
ON AVERAGE, HOW MANY DAYS PER WEEK DO YOU ENGAGE IN MODERATE TO STRENUOUS EXERCISE (LIKE A BRISK WALK)?: PATIENT DECLINED

## 2024-08-14 ASSESSMENT — SOCIAL DETERMINANTS OF HEALTH (SDOH): HOW OFTEN DO YOU GET TOGETHER WITH FRIENDS OR RELATIVES?: ONCE A WEEK

## 2024-08-15 ENCOUNTER — OFFICE VISIT (OUTPATIENT)
Dept: FAMILY MEDICINE | Facility: CLINIC | Age: 34
End: 2024-08-15
Payer: MEDICARE

## 2024-08-15 VITALS
WEIGHT: 193.8 LBS | TEMPERATURE: 97.9 F | RESPIRATION RATE: 14 BRPM | HEART RATE: 66 BPM | DIASTOLIC BLOOD PRESSURE: 60 MMHG | SYSTOLIC BLOOD PRESSURE: 91 MMHG | BODY MASS INDEX: 26.25 KG/M2 | OXYGEN SATURATION: 98 % | HEIGHT: 72 IN

## 2024-08-15 DIAGNOSIS — Z00.00 ENCOUNTER FOR MEDICARE ANNUAL WELLNESS EXAM: Primary | ICD-10-CM

## 2024-08-15 PROCEDURE — G0439 PPPS, SUBSEQ VISIT: HCPCS | Performed by: NURSE PRACTITIONER

## 2024-08-15 ASSESSMENT — PAIN SCALES - GENERAL: PAINLEVEL: NO PAIN (0)

## 2024-08-15 NOTE — PATIENT INSTRUCTIONS
Patient Education   Preventive Care Advice   This is general advice given by our system to help you stay healthy. However, your care team may have specific advice just for you. Please talk to your care team about your preventive care needs.  Nutrition  Eat 5 or more servings of fruits and vegetables each day.  Try wheat bread, brown rice and whole grain pasta (instead of white bread, rice, and pasta).  Get enough calcium and vitamin D. Check the label on foods and aim for 100% of the RDA (recommended daily allowance).  Lifestyle  Exercise at least 150 minutes each week  (30 minutes a day, 5 days a week).  Do muscle strengthening activities 2 days a week. These help control your weight and prevent disease.  No smoking.  Wear sunscreen to prevent skin cancer.  Have a dental exam and cleaning every 6 months.  Yearly exams  See your health care team every year to talk about:  Any changes in your health.  Any medicines your care team has prescribed.  Preventive care, family planning, and ways to prevent chronic diseases.  Shots (vaccines)   HPV shots (up to age 26), if you've never had them before.  Hepatitis B shots (up to age 59), if you've never had them before.  COVID-19 shot: Get this shot when it's due.  Flu shot: Get a flu shot every year.  Tetanus shot: Get a tetanus shot every 10 years.  Pneumococcal, hepatitis A, and RSV shots: Ask your care team if you need these based on your risk.  Shingles shot (for age 50 and up)  General health tests  Diabetes screening:  Starting at age 35, Get screened for diabetes at least every 3 years.  If you are younger than age 35, ask your care team if you should be screened for diabetes.  Cholesterol test: At age 39, start having a cholesterol test every 5 years, or more often if advised.  Bone density scan (DEXA): At age 50, ask your care team if you should have this scan for osteoporosis (brittle bones).  Hepatitis C: Get tested at least once in your life.  STIs (sexually  transmitted infections)  Before age 24: Ask your care team if you should be screened for STIs.  After age 24: Get screened for STIs if you're at risk. You are at risk for STIs (including HIV) if:  You are sexually active with more than one person.  You don't use condoms every time.  You or a partner was diagnosed with a sexually transmitted infection.  If you are at risk for HIV, ask about PrEP medicine to prevent HIV.  Get tested for HIV at least once in your life, whether you are at risk for HIV or not.  Cancer screening tests  Cervical cancer screening: If you have a cervix, begin getting regular cervical cancer screening tests starting at age 21.  Breast cancer scan (mammogram): If you've ever had breasts, begin having regular mammograms starting at age 40. This is a scan to check for breast cancer.  Colon cancer screening: It is important to start screening for colon cancer at age 45.  Have a colonoscopy test every 10 years (or more often if you're at risk) Or, ask your provider about stool tests like a FIT test every year or Cologuard test every 3 years.  To learn more about your testing options, visit:   .  For help making a decision, visit:   https://bit.ly/jw53350.  Prostate cancer screening test: If you have a prostate, ask your care team if a prostate cancer screening test (PSA) at age 55 is right for you.  Lung cancer screening: If you are a current or former smoker ages 50 to 80, ask your care team if ongoing lung cancer screenings are right for you.  For informational purposes only. Not to replace the advice of your health care provider. Copyright   2023 [facility] [employer]. All rights reserved. Clinically reviewed by the [facility] Transitions Program. rankdesk [id number] - REV 01/24.     Patient Education   Preventive Care Advice   This is general advice given by our system to help you stay healthy. However, your care team may have specific advice just for you. Please talk to your care team  about your preventive care needs.  Nutrition  Eat 5 or more servings of fruits and vegetables each day.  Try wheat bread, brown rice and whole grain pasta (instead of white bread, rice, and pasta).  Get enough calcium and vitamin D. Check the label on foods and aim for 100% of the RDA (recommended daily allowance).  Lifestyle  Exercise at least 150 minutes each week  (30 minutes a day, 5 days a week).  Do muscle strengthening activities 2 days a week. These help control your weight and prevent disease.  No smoking.  Wear sunscreen to prevent skin cancer.  Have a dental exam and cleaning every 6 months.  Yearly exams  See your health care team every year to talk about:  Any changes in your health.  Any medicines your care team has prescribed.  Preventive care, family planning, and ways to prevent chronic diseases.  Shots (vaccines)   HPV shots (up to age 26), if you've never had them before.  Hepatitis B shots (up to age 59), if you've never had them before.  COVID-19 shot: Get this shot when it's due.  Flu shot: Get a flu shot every year.  Tetanus shot: Get a tetanus shot every 10 years.  Pneumococcal, hepatitis A, and RSV shots: Ask your care team if you need these based on your risk.  Shingles shot (for age 50 and up)  General health tests  Diabetes screening:  Starting at age 35, Get screened for diabetes at least every 3 years.  If you are younger than age 35, ask your care team if you should be screened for diabetes.  Cholesterol test: At age 39, start having a cholesterol test every 5 years, or more often if advised.  Bone density scan (DEXA): At age 50, ask your care team if you should have this scan for osteoporosis (brittle bones).  Hepatitis C: Get tested at least once in your life.  STIs (sexually transmitted infections)  Before age 24: Ask your care team if you should be screened for STIs.  After age 24: Get screened for STIs if you're at risk. You are at risk for STIs (including HIV) if:  You are  sexually active with more than one person.  You don't use condoms every time.  You or a partner was diagnosed with a sexually transmitted infection.  If you are at risk for HIV, ask about PrEP medicine to prevent HIV.  Get tested for HIV at least once in your life, whether you are at risk for HIV or not.  Cancer screening tests  Cervical cancer screening: If you have a cervix, begin getting regular cervical cancer screening tests starting at age 21.  Breast cancer scan (mammogram): If you've ever had breasts, begin having regular mammograms starting at age 40. This is a scan to check for breast cancer.  Colon cancer screening: It is important to start screening for colon cancer at age 45.  Have a colonoscopy test every 10 years (or more often if you're at risk) Or, ask your provider about stool tests like a FIT test every year or Cologuard test every 3 years.  To learn more about your testing options, visit:   .  For help making a decision, visit:   https://bit.ly/vy03064.  Prostate cancer screening test: If you have a prostate, ask your care team if a prostate cancer screening test (PSA) at age 55 is right for you.  Lung cancer screening: If you are a current or former smoker ages 50 to 80, ask your care team if ongoing lung cancer screenings are right for you.  For informational purposes only. Not to replace the advice of your health care provider. Copyright   2023 [employer]. All rights reserved. Clinically reviewed by the [facility] Transitions Program. Ye[employer] [id number] - REV 01/24.

## 2024-08-15 NOTE — PROGRESS NOTES
Preventive Care Visit  [facility] CYRILMOADITHYA Cunha Ra, CNP, Family Practice  Aug 15, 2024      Assessment & Plan     Encounter for Medicare annual wellness exam              BMI  Estimated body mass index is 26.28 kg/m  as calculated from the following:    Height as of this encounter: 1.829 m (6').    Weight as of this encounter: 87.9 kg (193 lb 12.8 oz).       Counseling  Appropriate preventive services were addressed with this patient via screening, questionnaire, or discussion as appropriate for fall prevention, nutrition, physical activity, Tobacco-use cessation, social engagement, weight loss and cognition.  Checklist reviewing preventive services available has been given to the patient.  Reviewed patient's diet, addressing concerns and/or questions.           Katarina Cedillo is a 33 year old, presenting for the following:  Physical        8/15/2024    12:46 PM   Additional Questions   Roomed by MR   Accompanied by NA         8/15/2024    12:46 PM   Patient Reported Additional Medications   Patient reports taking the following new medications NA         Health Care Directive  Patient has a Health Care Directive on file  Advance care planning document is on file and is current.    HPI      Doing well.  Visits within the last 6 months with endocrinology and survivorship oncology along with MRI.  No changes were made.          8/14/2024   General Health   How would you rate your overall physical health? Good   Feel stress (tense, anxious, or unable to sleep) Not at all            8/14/2024   Nutrition   Diet: Regular (no restrictions)            8/14/2024   Exercise   Days per week of moderate/strenous exercise Patient declined            8/14/2024   Social Factors   Frequency of gathering with friends or relatives Once a week   Worry food won't last until get money to buy more No   Food not last or not have enough money for food? No   Do you have housing? (Housing is defined as  stable permanent housing and does not include staying ouside in a car, in a tent, in an abandoned building, in an overnight shelter, or couch-surfing.) Yes   Are you worried about losing your housing? No   Lack of transportation? No   Unable to get utilities (heat,electricity)? No            8/14/2024   Fall Risk   Fallen 2 or more times in the past year? No   Trouble with walking or balance? No             8/14/2024   Activities of Daily Living- Home Safety   Needs help with the following daily activites None of the above   Safety concerns in the home None of the above            8/14/2024   Dental   Dentist two times every year? Yes            8/14/2024   Hearing Screening   Hearing concerns? None of the above            8/14/2024   Driving Risk Screening   Patient/family members have concerns about driving No            8/14/2024   General Alertness/Fatigue Screening   Have you been more tired than usual lately? No            8/14/2024   Urinary Incontinence Screening   Bothered by leaking urine in past 6 months No            8/14/2024   TB Screening   Were you born outside of the [place]? No            Today's PHQ-2 Score:       8/14/2024    10:33 PM   PHQ-2 ( 1999 [employer])   Q1: Little interest or pleasure in doing things 0   Q2: Feeling down, depressed or hopeless 0   PHQ-2 Score 0   Q1: Little interest or pleasure in doing things Not at all   Q2: Feeling down, depressed or hopeless Not at all   PHQ-2 Score 0           8/14/2024   Substance Use   Alcohol more than 3/day or more than 7/wk Not Applicable   Do you have a current opioid prescription? No   How severe/bad is pain from 1 to 10? 0/10 (No Pain)   Do you use any other substances recreationally? No        Social History     Tobacco Use    Smoking status: Never    Smokeless tobacco: Never   Vaping Use    Vaping status: Never Used   Substance Use Topics    Alcohol use: Yes     Alcohol/week: 0.0 standard drinks of alcohol     Comment: sip at holidays    Drug use:  No             8/14/2024   Contraception/Family Planning   Questions about contraception or family planning No            Reviewed and updated as needed this visit by Provider                    Patient Active Problem List   Diagnosis    ADD (attention deficit disorder)    Craniopharyngioma (H)    CARDIOVASCULAR SCREENING; LDL GOAL LESS THAN 160    Mood change    Legal blindness    Hypothyroidism    Panhypopituitarism (H24)    History of craniopharyngioma    Testosterone deficiency    BCC (basal cell carcinoma), face    Urinary frequency    Adrenal insufficiency (H24)    Diabetes insipidus (H24)    Growth hormone deficiency (H24)     Past Surgical History:   Procedure Laterality Date    CRANIOTOMY, EXCISE TUMOR COMPLEX, COMBINED  age 6    craniopharyngioma    MOHS MICROGRAPHIC PROCEDURE Left 05/31/2018    left cheek nodular BCC       Social History     Tobacco Use    Smoking status: Never    Smokeless tobacco: Never   Substance Use Topics    Alcohol use: Yes     Alcohol/week: 0.0 standard drinks of alcohol     Comment: sip at holidays     Family History   Problem Relation Age of Onset    Diabetes Mother     Cancer Father         Melanoma    Melanoma Father     Basal cell carcinoma Father     Cerebrovascular Disease Maternal Grandmother 91        Age 91    Diabetes Maternal Grandmother     Cerebrovascular Disease Paternal Grandmother         Age 96    Cerebrovascular Disease Paternal Grandfather     Prostate Cancer Paternal Grandfather     Alcoholism Paternal Grandfather     Uterine Cancer Paternal Aunt 60        leiomyosarcoma    Alzheimer Disease Paternal Uncle     Prostate Cancer Maternal Grandfather          Current providers sharing in care for this patient include:  Patient Care Team:  Sabine Pedroza Ra, APRN CNP as PCP - General (Family Practice)  Sumaya Alonso MD as Assigned Endocrinology Provider  Sabine Pedroza Ra, APRN CNP as Assigned PCP  Nader Richey MD as MD (Internal Medicine -  Pediatrics)  Eunice Davies APRN CNP as Assigned Pediatric Specialist Provider  Bianca Menon MD as MD (Endocrinology, Diabetes, and Metabolism)    The following health maintenance items are reviewed in Epic and correct as of today:  Health Maintenance   Topic Date Due    HEPATITIS B IMMUNIZATION (2 of 3 - 3-dose series) 05/04/1995    INFLUENZA VACCINE (1) 09/01/2024    ANNUAL REVIEW OF HM ORDERS  06/24/2025    TSH W/FREE T4 REFLEX  07/08/2025    MEDICARE ANNUAL WELLNESS VISIT  08/15/2025    ADVANCE CARE PLANNING  08/15/2029    DTAP/TDAP/TD IMMUNIZATION (9 - Td or Tdap) 12/10/2029    PHQ-2 (once per calendar year)  Completed    IPV IMMUNIZATION  Completed    COVID-19 Vaccine  Completed    Pneumococcal Vaccine: Pediatrics (0 to 5 Years) and At-Risk Patients (6 to 64 Years)  Aged Out    HPV IMMUNIZATION  Aged Out    MENINGITIS IMMUNIZATION  Aged Out    RSV MONOCLONAL ANTIBODY  Aged Out    HEPATITIS C SCREENING  Discontinued    HIV SCREENING  Discontinued         Review of Systems  Constitutional, HEENT, cardiovascular, pulmonary, gi and gu systems are negative, except as otherwise noted.     Objective    Exam  BP 91/60 (BP Location: Right arm, Patient Position: Sitting, Cuff Size: Adult Regular)   Pulse 66   Temp 97.9  F (36.6  C) (Oral)   Resp 14   Ht 1.829 m (6')   Wt 87.9 kg (193 lb 12.8 oz)   SpO2 98%   BMI 26.28 kg/m     Estimated body mass index is 26.28 kg/m  as calculated from the following:    Height as of this encounter: 1.829 m (6').    Weight as of this encounter: 87.9 kg (193 lb 12.8 oz).    Physical Exam  GENERAL: alert and no distress  EYES: Eyes grossly normal to inspection  HENT: ear canals and TM's normal, nose and mouth without ulcers or lesions  NECK: no adenopathy, no asymmetry, masses, or scars  RESP: lungs clear to auscultation - no rales, rhonchi or wheezes  CV: regular rate and rhythm, normal S1 S2, no S3 or S4, no murmur, click or rub, no peripheral edema  ABDOMEN: soft, nontender, no  hepatosplenomegaly, no masses and bowel sounds normal   (male): normal male genitalia without lesions or urethral discharge, no hernia  MS: no gross musculoskeletal defects noted, no edema  NEURO: Normal strength and tone, mentation intact and speech normal  PSYCH: mentation appears normal, affect normal/bright         No data to display                       Signed Electronically by: ADITHYA Aguirre Ra CNP

## 2024-08-22 ENCOUNTER — ALLIED HEALTH/NURSE VISIT (OUTPATIENT)
Dept: FAMILY MEDICINE | Facility: CLINIC | Age: 34
End: 2024-08-22
Payer: MEDICARE

## 2024-08-22 DIAGNOSIS — E34.9 TESTOSTERONE DEFICIENCY: Primary | ICD-10-CM

## 2024-08-22 PROCEDURE — 99207 PR NO CHARGE NURSE ONLY: CPT

## 2024-08-22 PROCEDURE — 96372 THER/PROPH/DIAG INJ SC/IM: CPT | Performed by: INTERNAL MEDICINE

## 2024-08-22 RX ADMIN — TESTOSTERONE CYPIONATE 100 MG: 200 INJECTION, SOLUTION INTRAMUSCULAR at 15:49

## 2024-08-22 NOTE — PROGRESS NOTES
Administrations This Visit       testosterone cypionate (DEPOTESTOSTERONE) injection 100 mg       Admin Date  08/22/2024 Action  $Given Dose  100 mg Route  Intramuscular Site  Left Ventrogluteal Documented By  Claudia Faustin RN    NDC: 5386-7230-52    Lot#: [id number]    : HOSPIRA    Patient Supplied?: Yes                 Clinic Administered Medication Documentation      Testosterone Documentation     Was this medication supplied by the patient? Yes, medication was received directly from patient in a tamper proof bag (follow site specific policies)   .    Prior to injection, verified patient identity using patient's name and date of birth. Medication was administered. Please see MAR and medication order for additional information. Patient instructed to remain in clinic for 15 minutes and report any adverse reaction to staff immediately but patient declined.  Vial/Syringe: Single dose vial. Was entire vial of medication used? No, The remainder 100 MG of 200 MG was discarded as unavoidable waste.  Verified that the patient has refills remaining in their prescription.    Claudia Faustin RN

## 2024-09-05 ENCOUNTER — TELEPHONE (OUTPATIENT)
Dept: FAMILY MEDICINE | Facility: CLINIC | Age: 34
End: 2024-09-05

## 2024-09-05 ENCOUNTER — ALLIED HEALTH/NURSE VISIT (OUTPATIENT)
Dept: FAMILY MEDICINE | Facility: CLINIC | Age: 34
End: 2024-09-05
Payer: MEDICARE

## 2024-09-05 DIAGNOSIS — E34.9 TESTOSTERONE DEFICIENCY: Primary | ICD-10-CM

## 2024-09-05 PROCEDURE — 99207 PR NO CHARGE NURSE ONLY: CPT

## 2024-09-05 PROCEDURE — 96372 THER/PROPH/DIAG INJ SC/IM: CPT | Performed by: INTERNAL MEDICINE

## 2024-09-05 RX ADMIN — TESTOSTERONE CYPIONATE 100 MG: 200 INJECTION, SOLUTION INTRAMUSCULAR at 15:48

## 2024-09-05 NOTE — PROGRESS NOTES
Clinic Administered Medication Documentation      Testosterone Documentation     Was this medication supplied by the patient? Yes, medication was received directly from patient in a tamper proof bag (follow site specific policies)     Prior to injection, verified patient identity using patient's name and date of birth. Medication was administered. Please see MAR and medication order for additional information. Patient instructed to remain in clinic for 15 minutes and report any adverse reaction to staff immediately .  Vial/Syringe: Single dose vial. Was entire vial of medication used? No, The remainder 100 MG of 200 MG was discarded as unavoidable waste.  Verified that the patient has refills remaining in their prescription.    Administrations This Visit       testosterone cypionate (DEPOTESTOSTERONE) injection 100 mg       Admin Date  09/05/2024 Action  $Given Dose  100 mg Route  Intramuscular Site  Right Ventrogluteal Documented By  Rosette Ford RN    NDC: 0558-5023-26    Lot#: [id number]    : PADAGIS    Patient Supplied?: Yes                  Rosette Ford RN, BSN, PHN  [facility] [place] [facility]

## 2024-09-05 NOTE — TELEPHONE ENCOUNTER
Called pt and spoke with pt's mother (guardian) requesting pt to arrive to appointment a little earlier than scheduled today. She states she is out of town and to attempt to reach pt.     Called pt and left a message to call back to [phone] and to ask to speak to a nurse.     If pt calls back,     Ask pt to come a little earlier to appointment today,  if able. Okay to be seen in unable to reschedule.     Sylvie SMITH, RN   Clinic RN  [facility]

## 2024-09-05 NOTE — TELEPHONE ENCOUNTER
During visit today, 9/5/24 for testosterone injection, RN huddled with group home staff for patient to discuss if able to get patient to visit at 3 pm for next injection due to multiple patients needing 3:30 pm time slot on RN schedule. They and patient agree with earlier time for future visits. They advised clinic team to contact their office to let them know and arrange the earlier transportation.     RN reviewed patient's chart, but there is no contact information on file for the group [facility].     Team to please reach out to patient's legal guardians to see if they can work with [facility] to arrange for 3 pm clinic visit for next testosterone injection scheduled for 9/19/24 at 3 pm.     Rosette Ford, RN, BSN, PHN  [facility], [place] [facility]

## 2024-09-10 NOTE — TELEPHONE ENCOUNTER
Outgoing call to patient's mother (guardian). Attempt # 1. Left message to call back any triage nurse.    Claudia Faustin RN on 9/10/2024 at 9:21 AM

## 2024-09-10 NOTE — TELEPHONE ENCOUNTER
Dana, kaylynnan calling back.  Discussed below.  She will check with group home.  She feels 3 pm is OK but also staff does not come in til later so trying to make it work.  Brittaney Davis RN

## 2024-09-19 ENCOUNTER — ALLIED HEALTH/NURSE VISIT (OUTPATIENT)
Dept: FAMILY MEDICINE | Facility: CLINIC | Age: 34
End: 2024-09-19
Payer: MEDICARE

## 2024-09-19 DIAGNOSIS — E34.9 TESTOSTERONE DEFICIENCY: Primary | ICD-10-CM

## 2024-09-19 PROCEDURE — 96372 THER/PROPH/DIAG INJ SC/IM: CPT | Performed by: INTERNAL MEDICINE

## 2024-09-19 PROCEDURE — 99207 PR NO CHARGE NURSE ONLY: CPT

## 2024-09-19 RX ADMIN — TESTOSTERONE CYPIONATE 100 MG: 200 INJECTION, SOLUTION INTRAMUSCULAR at 15:07

## 2024-09-19 NOTE — PROGRESS NOTES
Clinic Administered Medication Documentation  Administrations This Visit       testosterone cypionate (DEPOTESTOSTERONE) injection 100 mg       Admin Date  09/19/2024 Action  $Given Dose  100 mg Route  Intramuscular Site  Left Ventrogluteal Documented By  ParisaJael RN    NDC: 2735-1139-58    Lot#: [id number]    : HOSPIRA    Patient Supplied?: Yes                Testosterone Documentation     Was this medication supplied by the patient? Yes, medication was received directly from patient in a tamper proof bag (follow site specific policies)     Prior to injection, verified patient identity using patient's name and date of birth. Medication was administered. Please see MAR and medication order for additional information. Patient instructed to remain in clinic for 15 minutes and report any adverse reaction to staff immediately but patient declined.  Vial/Syringe: Single dose vial. Was entire vial of medication used? No, The remainder 100 MG of 200 MG was discarded as unavoidable waste.  Verified that the patient has refills remaining in their prescription.    Jael TRINIDAD RN  [facility]

## 2024-10-03 ENCOUNTER — ALLIED HEALTH/NURSE VISIT (OUTPATIENT)
Dept: FAMILY MEDICINE | Facility: CLINIC | Age: 34
End: 2024-10-03
Payer: MEDICARE

## 2024-10-03 ENCOUNTER — TELEPHONE (OUTPATIENT)
Dept: FAMILY MEDICINE | Facility: CLINIC | Age: 34
End: 2024-10-03

## 2024-10-03 DIAGNOSIS — E34.9 TESTOSTERONE DEFICIENCY: Primary | ICD-10-CM

## 2024-10-03 PROCEDURE — 96372 THER/PROPH/DIAG INJ SC/IM: CPT | Performed by: INTERNAL MEDICINE

## 2024-10-03 PROCEDURE — 99207 PR NO CHARGE NURSE ONLY: CPT

## 2024-10-03 RX ADMIN — TESTOSTERONE CYPIONATE 100 MG: 200 INJECTION, SOLUTION INTRAMUSCULAR at 15:04

## 2024-10-03 NOTE — PROGRESS NOTES
Clinic Administered Medication Documentation      Testosterone Documentation     Was this medication supplied by the patient? Yes, medication was received directly from patient in a tamper proof bag (follow site specific policies)   .    Prior to injection, verified patient identity using patient's name and date of birth. Medication was administered. Please see MAR and medication order for additional information. Patient instructed to remain in clinic for 15 minutes, remain in clinic for 15 minutes and report any adverse reaction to staff immediately but patient declined, and report any adverse reaction to staff immediately .  Vial/Syringe: Single dose vial. Was entire vial of medication used? No, The remainder 100 MG of 200 MG was discarded as unavoidable waste.  Verified that the patient has refills remaining in their prescription.   Given right ventrogluteal.

## 2024-10-03 NOTE — TELEPHONE ENCOUNTER
Dr. Alonso-    Patients CAM order for testosterone will  on 10/16/24. Please review and renew prior to patients next scheduled testosterone on 10/17/24. RN pended, please review.     testosterone cypionate (DEPOTESTOSTERONE) injection 100 mg () -- -- -- 2024 10/16/2024       Patient has one refill remaining of testosterone remaining. Please advise if labs or visit recommended.     testosterone cypionate (DEPOTESTOSTERONE) 200 MG/ML injection Inject 0.5 mLs (100 mg) into the muscle every 14 days     KAMILLE Keys, RN  [facility]

## 2024-10-04 RX ORDER — TESTOSTERONE CYPIONATE 200 MG/ML
100 INJECTION, SOLUTION INTRAMUSCULAR
Status: ACTIVE | OUTPATIENT
Start: 2024-10-17 | End: 2025-01-08

## 2024-10-17 ENCOUNTER — ALLIED HEALTH/NURSE VISIT (OUTPATIENT)
Dept: FAMILY MEDICINE | Facility: CLINIC | Age: 34
End: 2024-10-17
Payer: MEDICARE

## 2024-10-17 DIAGNOSIS — E34.9 TESTOSTERONE DEFICIENCY: ICD-10-CM

## 2024-10-17 DIAGNOSIS — E34.9 TESTOSTERONE DEFICIENCY: Primary | ICD-10-CM

## 2024-10-17 PROCEDURE — 99207 PR NO CHARGE NURSE ONLY: CPT

## 2024-10-17 PROCEDURE — 96372 THER/PROPH/DIAG INJ SC/IM: CPT | Performed by: INTERNAL MEDICINE

## 2024-10-17 RX ORDER — TESTOSTERONE CYPIONATE 200 MG/ML
100 INJECTION, SOLUTION INTRAMUSCULAR
Qty: 1 ML | Refills: 5 | Status: SHIPPED | OUTPATIENT
Start: 2024-10-17

## 2024-10-17 RX ADMIN — TESTOSTERONE CYPIONATE 100 MG: 200 INJECTION, SOLUTION INTRAMUSCULAR at 15:21

## 2024-10-17 NOTE — PROGRESS NOTES
Clinic Administered Medication Documentation      Testosterone Documentation     Was this medication supplied by the patient? Yes, medication was received directly from patient in a tamper proof bag (follow site specific policies)   .    Prior to injection, verified patient identity using patient's name and date of birth. Medication was administered. Please see MAR and medication order for additional information. Patient instructed to remain in clinic for 15 minutes, remain in clinic for 15 minutes and report any adverse reaction to staff immediately but patient declined, and report any adverse reaction to staff immediately .  Vial/Syringe: Single dose vial. Was entire vial of medication used? No, The remainder 100 MG of 200 MG was discarded as unavoidable waste.  Patient has no refills remaining. Refill encounter opened, order pended and Routed to the provider    Given left ventrogluteal.    Madelin GÓMEZ RN

## 2024-10-17 NOTE — TELEPHONE ENCOUNTER
Pt was in clinic for testosterone injection today.    He is out of refills on his ambulatory order for testosterone at the pharmacy.    T'd up med and pharmacy and routing refill request to provider to review and advise.    Madelin GÓMEZ RN

## 2024-10-20 DIAGNOSIS — E23.2 DIABETES INSIPIDUS (H): ICD-10-CM

## 2024-10-21 RX ORDER — DESMOPRESSIN ACETATE 0.1 MG/ML
SOLUTION NASAL
Qty: 10 ML | Refills: 1 | Status: SHIPPED | OUTPATIENT
Start: 2024-10-21

## 2024-10-21 NOTE — TELEPHONE ENCOUNTER
Requested Prescriptions   Pending Prescriptions Disp Refills    desmopressin (DDAVP) 0.01 % SOLN spray [Pharmacy Med Name: DESMOPRESSIN ACE SPRAY RE 0.01 SOLN] 10 mL 1     Sig: USE THREE SPRAYS INTO NOSTRIL ONCE DAILY       There is no refill protocol information for this order

## 2024-10-29 ENCOUNTER — TELEPHONE (OUTPATIENT)
Dept: ENDOCRINOLOGY | Facility: CLINIC | Age: 34
End: 2024-10-29
Payer: MEDICARE

## 2024-10-29 NOTE — TELEPHONE ENCOUNTER
Hello,    [facility] has been unable to reach this patient to refill their medication.  According to our records, the patient is overdue to refill and adherence may be an issue at this point.    Medication and strength: Norditropin [id number] sopn  Last fill date and day supply: 08/29/2024 28 day supply     If the medication is on hold, been changed, discontinued, sent to a different pharmacy, or any other information is available, please reply or contact us at [phone].    Thank you,    [facility]

## 2024-10-31 ENCOUNTER — ALLIED HEALTH/NURSE VISIT (OUTPATIENT)
Dept: FAMILY MEDICINE | Facility: CLINIC | Age: 34
End: 2024-10-31
Payer: MEDICARE

## 2024-10-31 DIAGNOSIS — E34.9 TESTOSTERONE DEFICIENCY: Primary | ICD-10-CM

## 2024-10-31 PROCEDURE — 96372 THER/PROPH/DIAG INJ SC/IM: CPT | Performed by: INTERNAL MEDICINE

## 2024-10-31 PROCEDURE — 99207 PR NO CHARGE NURSE ONLY: CPT

## 2024-10-31 RX ADMIN — TESTOSTERONE CYPIONATE 100 MG: 200 INJECTION, SOLUTION INTRAMUSCULAR at 15:07

## 2024-10-31 NOTE — PROGRESS NOTES
Clinic Administered Medication Documentation      Testosterone Documentation     Was this medication supplied by the patient? Yes, medication was received directly from patient in a tamper proof bag (follow site specific policies)   .    Prior to injection, verified patient identity using patient's name and date of birth. Medication was administered. Please see MAR and medication order for additional information. Patient instructed to remain in clinic for 15 minutes, remain in clinic for 15 minutes and report any adverse reaction to staff immediately but patient declined, and report any adverse reaction to staff immediately .  Vial/Syringe: Single dose vial. Was entire vial of medication used? No, The remainder 100 MG of 200 MG was discarded as unavoidable waste.  Verified that the patient has refills remaining in their prescription.      Given right ventrogluteal.    Madelin GÓMEZ RN

## 2024-11-11 ENCOUNTER — VIRTUAL VISIT (OUTPATIENT)
Dept: PHARMACY | Facility: CLINIC | Age: 34
End: 2024-11-11
Attending: INTERNAL MEDICINE
Payer: MEDICARE

## 2024-11-11 DIAGNOSIS — E27.40 ADRENAL INSUFFICIENCY (H): ICD-10-CM

## 2024-11-11 DIAGNOSIS — E23.2 DIABETES INSIPIDUS (H): ICD-10-CM

## 2024-11-11 DIAGNOSIS — E23.0 GROWTH HORMONE DEFICIENCY (H): Primary | ICD-10-CM

## 2024-11-11 DIAGNOSIS — E03.9 ACQUIRED HYPOTHYROIDISM: ICD-10-CM

## 2024-11-11 DIAGNOSIS — E34.9 TESTOSTERONE DEFICIENCY: ICD-10-CM

## 2024-11-11 NOTE — Clinical Note
FYI- had a visit with patient to assist with PA approval of Norditropin for next year. Patient notes that they are taking on a consistent daily basis now.   Thanks,  Ling Cormier ([facility]), PharmD Endocrine & Diabetes Medication Therapy Management Pharmacist  [address] [place] [phone]

## 2024-11-11 NOTE — PROGRESS NOTES
Medication Therapy Management (MTM) Encounter    ASSESSMENT:                            Medication Adherence/Access: No issues identified.    Hypothyroidism: Based on patient's most recent lab results being within normal limits, recommend patient change to start taking levothyroxine on an empty stomach again since lab values were not normal when patient was taking levohtyoxine after breakfast in the past. Also educated that ideally levothyroxine dose should also be  from calcium supplement.      Growth Hormone: Patient's recent IGF levels have been within normal limits now that they have started to take again on a consistent basis. Will work with Tatiana Batres to work on upcoming PA renewal.     Hypogonadism: Stable, most recent testosterone labs have been within normal limits thus recommend patient continue on current therapy.     Diabetes Insipidus: Did not have time to assess use of Desmopressin therapy with patient today, will follow up with patient regarding adherence and symptoms via my chart. Most recent electrolyte lab results on file have been within normal limits.     Adrenal insufficiency: Stable per patient reports.     PLAN:                            Based on your lab results, it looks like your body best used the levothyroxine when you were taking on an empty stomach thus I would recommend taking consistently in the morning on an empty stomach, at least 30 to 60 minutes before food. Alternatively, may consistently administer at night 3 to 4 hours after the last meal. Do not administer within 4 hours of calcium- or iron-containing products or bile acid sequestrants.     I will confirm with Tatiana when they will start PA for Norditropin     Follow-up: Via My chart pending Norditropin approval.     Endocrine Team & Next Follow-Up:  4/16/2025 with Dr. Menon    SUBJECTIVE/OBJECTIVE:                          Mamadou Reece is a 34 year old male called for an initial visit. He was referred to  me from Dr. Alonso. Patient was accompanied by mother Dana.     Reason for visit: Norditropin follow up.    Allergies/ADRs: Reviewed in chart  Past Medical History: Reviewed in chart  Tobacco: He reports that he has never smoked. He has never used smokeless tobacco.  Alcohol: not currently using    Medication Adherence/Access: no issues reported.    Hypothyroidism   Current Medications:   Levothyroxine 150 mcg daily. Dana reports that in April the patient was recommended to start taking levothyroxine on an empty stomach consistently due to abnormal lab results thus patient started taking that way and as a results, thyroid lab results were normal when re-checked in July. Dana is wondering how important it is for patient to continue to take on an empty stomach since patient has now gone back to taking after breakfast again.   TSH   Date Value Ref Range Status   07/08/2024 0.34 0.30 - 4.20 uIU/mL Final   06/24/2024 3.80 0.30 - 4.20 uIU/mL Final   04/11/2024 8.72 (H) 0.30 - 4.20 uIU/mL Final   02/03/2022 0.10 (L) 0.40 - 4.00 mU/L Final     T4 Free   Date Value Ref Range Status   01/07/2021 1.29 0.76 - 1.46 ng/dL Final   12/12/2019 1.45 0.76 - 1.46 ng/dL Final   01/03/2019 1.17 0.76 - 1.46 ng/dL Final     Free T4   Date Value Ref Range Status   07/08/2024 1.09 0.90 - 1.70 ng/dL Final   04/11/2024 0.16 (L) 0.90 - 1.70 ng/dL Final   04/27/2023 0.89 (L) 0.90 - 1.70 ng/dL Final      Growth Hormone:   Current Medications:   Norditropin 0.7mg daily- Patient notes that their symptoms are controlled on current dose and regimen.   Patient reports that in the past he was not taking Norditropin on a regular basis and had a left over supply that they were using up thus why last fill at pharmacy was at the end of August before this most recent refill. Believe that patient was not taking prior to labs being obtained in April then started again taking on a regular basis after April labs and patient confirms that they have not had  any missed doses since.   Of note, patient did take Nutropin in the past but this formulation was more difficult for patient to see the dose and thus more difficult to administer, prefers to remain on Norditropin.           Hypogonadism:   Current Medications:  Testosterone 100mg IM injections at the clinic every 2 weeks, last injection on 10/31 with next one scheduled for 11/14.      Diabetes Insipidus:   Current Medications:   Desmopressin nasal solution 10mcg- 3 sprays in the evening - was not able to confirm with patient today how they have been using, per pharmacy records, last fill was 10/22/24 for a 17 day supply.    Adrenal insufficiency:   Current Medications:    Prednisone 3mg in AM and 2pm in PM.   Patient declines any side effects or concerns on current regimen.     Today's Vitals: There were no vitals taken for this visit.  ----------------  I spent 29 minutes with this patient today. A copy of the visit note was provided to the patient's provider(s).    A summary of these recommendations was sent via [employer].    Ling Agustin), PharmD  Endocrine & Diabetes Medication Therapy Management Pharmacist   [address] [place], [place] [id number]     Telemedicine Visit Details  The patient's medications can be safely assessed via a telemedicine encounter.  Type of service:  Telephone visit  Originating Location (pt. Location): Home    Distant Location (provider location):  On-site  Start Time:  2:58 PM  End Time: 3:27 PM     Medication Therapy Recommendations  Hypothyroidism   1 Current Medication: levothyroxine (SYNTHROID/LEVOTHROID) 150 MCG tablet   Current Medication Sig: Take 1 tablet (150 mcg) by mouth daily   Rationale: Does not understand instructions - Adherence - Adherence   Recommendation: Provide Education   Status: Patient Agreed - Adherence/Education   Identified Date: 11/11/2024 Completed Date: 11/12/2024

## 2024-11-12 ENCOUNTER — TELEPHONE (OUTPATIENT)
Dept: ENDOCRINOLOGY | Facility: CLINIC | Age: 34
End: 2024-11-12
Payer: MEDICARE

## 2024-11-12 DIAGNOSIS — E23.0 GROWTH HORMONE DEFICIENCY (H): Primary | ICD-10-CM

## 2024-11-12 NOTE — PATIENT INSTRUCTIONS
"Recommendations from today's MTM visit:                                                      Based on your lab results, it looks like your body best used the levothyroxine when you were taking on an empty stomach thus I would recommend taking consistently in the morning on an empty stomach, at least 30 to 60 minutes before food. Alternatively, may consistently administer at night 3 to 4 hours after the last meal. Do not administer within 4 hours of calcium- or iron-containing products or bile acid sequestrants.     I will confirm with Tatiana when they will start PA for Norditropin     Follow-up: Via My chart pending Norditropin approval.     Endocrine Team & Next Follow-Up:  4/16/2025 with Dr. Menon    It was great speaking with you today.  I value your experience and would be very thankful for your time in providing feedback in our clinic survey. In the next few days, you may receive an email or text message from [employer] with a link to a survey related to your  clinical pharmacist.\"     To schedule another MTM appointment, please call the clinic directly or you may call the MTM scheduling line at [phone].    My Clinical Pharmacist's contact information:                                                      Please feel free to contact me with any questions or concerns you have.      Ling Agustin), PharmD  Endocrine & Diabetes Medication Therapy Management Pharmacist   [address] [place] [id number]    "

## 2024-11-14 ENCOUNTER — TELEPHONE (OUTPATIENT)
Dept: FAMILY MEDICINE | Facility: CLINIC | Age: 34
End: 2024-11-14

## 2024-11-14 ENCOUNTER — ALLIED HEALTH/NURSE VISIT (OUTPATIENT)
Dept: FAMILY MEDICINE | Facility: CLINIC | Age: 34
End: 2024-11-14
Payer: MEDICARE

## 2024-11-14 DIAGNOSIS — E34.9 TESTOSTERONE DEFICIENCY: Primary | ICD-10-CM

## 2024-11-14 PROCEDURE — 99207 PR NO CHARGE NURSE ONLY: CPT

## 2024-11-14 RX ADMIN — TESTOSTERONE CYPIONATE 100 MG: 200 INJECTION, SOLUTION INTRAMUSCULAR at 15:04

## 2024-11-14 NOTE — TELEPHONE ENCOUNTER
Routing to Dr. Alonso- please review and approve early testosterone injection      Patient received testosterone injection in-clinic today 11/14/24. His next scheduled injection is 11/27/24. This is 1 day early than what his rx sig is, but pt is unable to come in on 11/28/24 due to it being Thanksgiving.    testosterone cypionate (DEPOTESTOSTERONE) 200 MG/ML injection 1 mL 5 10/17/2024 -- No   Sig - Route: Inject 0.5 mLs (100 mg) into the muscle every 14 days. - Intramuscular     Jael TRINIDAD RN  [facility]

## 2024-11-14 NOTE — PROGRESS NOTES
Clinic Administered Medication Documentation  Administrations This Visit       testosterone cypionate (DEPOTESTOSTERONE) injection 100 mg       Admin Date  11/14/2024 Action  $Given Dose  100 mg Route  Intramuscular Site  Left Ventrogluteal Documented By  ParisaJael RN    NDC: 8512-4397-19    Lot#: [id number]    : HOSPIRA    Patient Supplied?: Yes                Testosterone Documentation     Was this medication supplied by the patient? Yes, medication was received directly from patient in a tamper proof bag (follow site specific policies).    Prior to injection, verified patient identity using patient's name and date of birth. Medication was administered. Please see MAR and medication order for additional information. Patient instructed to remain in clinic for 15 minutes and report any adverse reaction to staff immediately but patient declined.  Vial/Syringe: Single dose vial. Was entire vial of medication used? No, The remainder 100 MG of 200 MG was discarded as unavoidable waste.  Verified that the patient has refills remaining in their prescription.    Jael TRINIDAD RN  [facility]

## 2024-11-27 ENCOUNTER — ALLIED HEALTH/NURSE VISIT (OUTPATIENT)
Dept: FAMILY MEDICINE | Facility: CLINIC | Age: 34
End: 2024-11-27
Payer: MEDICARE

## 2024-11-27 DIAGNOSIS — E34.9 TESTOSTERONE DEFICIENCY: Primary | ICD-10-CM

## 2024-11-27 PROCEDURE — 99207 PR NO CHARGE NURSE ONLY: CPT

## 2024-11-27 RX ADMIN — TESTOSTERONE CYPIONATE 100 MG: 200 INJECTION, SOLUTION INTRAMUSCULAR at 15:12

## 2024-11-27 NOTE — PROGRESS NOTES
Clinic Administered Medication Documentation  Administrations This Visit       testosterone cypionate (DEPOTESTOSTERONE) injection 100 mg       Admin Date  11/27/2024 Action  $Given Dose  100 mg Route  Intramuscular Site  Right Ventrogluteal Documented By  ParisaJael RN    NDC: 6238-3142-07    Lot#: [id number]    : HOSPIRA    Patient Supplied?: Yes                Testosterone Documentation     Was this medication supplied by the patient? Yes, medication was received directly from patient in a tamper proof bag (follow site specific policies).    Prior to injection, verified patient identity using patient's name and date of birth. Medication was administered. Please see MAR and medication order for additional information. Patient instructed to remain in clinic for 15 minutes and report any adverse reaction to staff immediately but patient declined.  Vial/Syringe: Single dose vial. Was entire vial of medication used? No, The remainder 100 MG of 200 MG was discarded as unavoidable waste.  Verified that the patient has refills remaining in their prescription.    Jael TRINIDAD RN  [facility]

## 2024-12-12 ENCOUNTER — ALLIED HEALTH/NURSE VISIT (OUTPATIENT)
Dept: FAMILY MEDICINE | Facility: CLINIC | Age: 34
End: 2024-12-12
Payer: MEDICARE

## 2024-12-12 DIAGNOSIS — E34.9 TESTOSTERONE DEFICIENCY: Primary | ICD-10-CM

## 2024-12-12 RX ADMIN — TESTOSTERONE CYPIONATE 100 MG: 200 INJECTION, SOLUTION INTRAMUSCULAR at 15:04

## 2024-12-12 NOTE — PROGRESS NOTES
Administrations This Visit       testosterone cypionate (DEPOTESTOSTERONE) injection 100 mg       Admin Date  12/12/2024 Action  $Given Dose  100 mg Route  Intramuscular Site  Left Ventrogluteal Documented By  Sue Suarez RN    NDC: 2708-4466-02    Lot#: [id number]    : HOSPIRA    Patient Supplied?: Yes                    Clinic Administered Medication Documentation      Testosterone Documentation     Was this medication supplied by the patient? Yes, medication was received directly from patient in a tamper proof bag (follow site specific policies)   .    Prior to injection, verified patient identity using patient's name and date of birth. Medication was administered. Please see MAR and medication order for additional information. Patient instructed to remain in clinic for 15 minutes and report any adverse reaction to staff immediately but patient declined.  Vial/Syringe: Single dose vial. Was entire vial of medication used? No, The remainder 100 MG of 200 MG was discarded as unavoidable waste.  Verified that the patient has refills remaining in their prescription.    Sue Suarez RN BSN  Clinic Nurse  [facility]

## 2024-12-19 ENCOUNTER — TELEPHONE (OUTPATIENT)
Dept: ENDOCRINOLOGY | Facility: CLINIC | Age: 34
End: 2024-12-19

## 2024-12-19 NOTE — TELEPHONE ENCOUNTER
PA Initiation    Medication: NORDITROPIN FLEXPRO 10 MG/1.5ML SC SOPN  Insurance Company: [employer] Part D - Phone [phone] Fax [phone]  Pharmacy Filling the Rx:    Filling Pharmacy Phone:    Filling Pharmacy Fax:    Start Date: 2024         Liaison will try submitting proactive pa again at or around  date.   Patient last filled medication 12/10/2024

## 2024-12-19 NOTE — TELEPHONE ENCOUNTER
Sent Intrinsic-IDt to determine if insurance plan is changing in , no response.   Continued with pa on current plan. Per telephone encounter 2024, notes that states pa  2024. [employer] part D.

## 2024-12-26 ENCOUNTER — ALLIED HEALTH/NURSE VISIT (OUTPATIENT)
Dept: FAMILY MEDICINE | Facility: CLINIC | Age: 34
End: 2024-12-26
Payer: MEDICARE

## 2024-12-26 DIAGNOSIS — E34.9 TESTOSTERONE DEFICIENCY: Primary | ICD-10-CM

## 2024-12-26 RX ADMIN — TESTOSTERONE CYPIONATE 100 MG: 200 INJECTION, SOLUTION INTRAMUSCULAR at 14:49

## 2024-12-26 NOTE — PROGRESS NOTES
Administrations This Visit       testosterone cypionate (DEPOTESTOSTERONE) injection 100 mg       Admin Date  12/26/2024 Action  $Given Dose  100 mg Route  Intramuscular Site  Right Ventrogluteal Documented By  Sylvie Quinn RN    NDC: 6380-9549-25    Lot#: [id number]    : HOSPIRA    Patient Supplied?: No                  Clinic Administered Medication Documentation      Testosterone Documentation     Was this medication supplied by the patient? Yes, medication was received directly from patient in a tamper proof bag (follow site specific policies)   .    Prior to injection, verified patient identity using patient's name and date of birth. Medication was administered. Please see MAR and medication order for additional information. Patient instructed to remain in clinic for 15 minutes and report any adverse reaction to staff immediately but patient declined.  Vial/Syringe: Single dose vial. Was entire vial of medication used? No, The remainder 100 MG of 200 MG was discarded as unavoidable waste.  Verified that the patient has refills remaining in their prescription.    Sylvie SMITH RN   Clinic RN  [facility]

## 2024-12-27 ENCOUNTER — TELEPHONE (OUTPATIENT)
Dept: FAMILY MEDICINE | Facility: CLINIC | Age: 34
End: 2024-12-27
Payer: MEDICARE

## 2024-12-27 DIAGNOSIS — E34.9 TESTOSTERONE DEFICIENCY: ICD-10-CM

## 2024-12-27 NOTE — TELEPHONE ENCOUNTER
Dr. Alonso- See pended testosterone requests below. Please review and order, if appropriate.     Routed to Dr. Gavin SMITH, MICKIE   Clinic RN  [facility]

## 2024-12-28 RX ORDER — TESTOSTERONE CYPIONATE 200 MG/ML
100 INJECTION, SOLUTION INTRAMUSCULAR
Status: ACTIVE | OUTPATIENT
Start: 2025-01-09 | End: 2025-04-02

## 2024-12-28 RX ORDER — TESTOSTERONE CYPIONATE 200 MG/ML
100 INJECTION, SOLUTION INTRAMUSCULAR
Qty: 1 ML | Refills: 5 | Status: SHIPPED | OUTPATIENT
Start: 2024-12-28

## 2025-01-01 DIAGNOSIS — E23.2 DIABETES INSIPIDUS (H): ICD-10-CM

## 2025-01-02 RX ORDER — DESMOPRESSIN ACETATE 0.1 MG/ML
SOLUTION NASAL
Qty: 10 ML | Refills: 0 | Status: SHIPPED | OUTPATIENT
Start: 2025-01-02

## 2025-01-02 NOTE — TELEPHONE ENCOUNTER
Duy ran test claim to check on PA, for renewal.   Comes back paid claim, paid details pa exp 12/31/2025.   $0 copay.     Prior Authorization Approval    Medication: NORDITROPIN FLEXPRO 10 MG/1.5ML SC SOPN  Authorization Effective Date:    Authorization Expiration Date: 12/31/2025  Approved Dose/Quantity: 3ml per 28 day  Reference #: [id number]   Insurance Company: [employer] Part D - Phone [phone] Fax [phone]  Expected CoPay: $ 0  CoPay Card Available: No    Financial Assistance Needed:   Which Pharmacy is filling the prescription: [facility] - [place], [place] - [address]  Pharmacy Notified: yes via epic  Patient Notified:  yes via Vo[employer]

## 2025-01-02 NOTE — TELEPHONE ENCOUNTER
Last OV 4/15/24  Follow up scheduled with another provider.    Requested Prescriptions   Pending Prescriptions Disp Refills    desmopressin (DDAVP) 0.01 % SOLN spray [Pharmacy Med Name: DESMOPRESSIN ACE SPRAY RE 0.01 SOLN] 10 mL 1     Sig: USE THREE SPRAYS INTO NOSTRIL ONCE DAILY       There is no refill protocol information for this order

## 2025-01-09 ENCOUNTER — ALLIED HEALTH/NURSE VISIT (OUTPATIENT)
Dept: FAMILY MEDICINE | Facility: CLINIC | Age: 35
End: 2025-01-09
Payer: MEDICARE

## 2025-01-09 DIAGNOSIS — E34.9 TESTOSTERONE DEFICIENCY: Primary | ICD-10-CM

## 2025-01-09 RX ADMIN — TESTOSTERONE CYPIONATE 100 MG: 200 INJECTION, SOLUTION INTRAMUSCULAR at 15:17

## 2025-01-09 NOTE — PROGRESS NOTES
Clinic Administered Medication Documentation  Administrations This Visit       testosterone cypionate (DEPOTESTOSTERONE) injection 100 mg       Admin Date  01/09/2025 Action  $Given Dose  100 mg Route  Intramuscular Site  Left Ventrogluteal Documented By  ParisaJael RN    NDC: 1241-3386-63    Lot#: [id number]    : HOSPIRA    Patient Supplied?: Yes                Testosterone Documentation     Was this medication supplied by the patient? Yes, medication was received directly from patient in a tamper proof bag (follow site specific policies).    Prior to injection, verified patient identity using patient's name and date of birth. Medication was administered. Please see MAR and medication order for additional information. Patient instructed to remain in clinic for 15 minutes and report any adverse reaction to staff immediately but patient declined.  Vial/Syringe: Single dose vial. Was entire vial of medication used? No, The remainder 100 MG of 200 MG was discarded as unavoidable waste.  Verified that the patient has refills remaining in their prescription.    Jael TRINIDAD RN  [facility]

## 2025-01-17 NOTE — TELEPHONE ENCOUNTER
Routing refill request to provider for review/approval because:  Drug not on the [facility] refill protocol     María Elena Gutiérrez RN on 2/28/2022 at 10:57 AM        
31
38

## 2025-01-23 ENCOUNTER — ALLIED HEALTH/NURSE VISIT (OUTPATIENT)
Dept: FAMILY MEDICINE | Facility: CLINIC | Age: 35
End: 2025-01-23
Payer: MEDICARE

## 2025-01-23 DIAGNOSIS — E34.9 TESTOSTERONE DEFICIENCY: Primary | ICD-10-CM

## 2025-01-23 RX ADMIN — TESTOSTERONE CYPIONATE 100 MG: 200 INJECTION, SOLUTION INTRAMUSCULAR at 15:07

## 2025-01-23 NOTE — CONFIDENTIAL NOTE
RECORDS RECEIVED FROM: internal    DATE RECEIVED: 4.16.25    NOTES (FOR ALL VISITS) STATUS DETAILS   OFFICE NOTES from referring provider internal    Eunice Davies APRN CNP      OFFICE NOTES from other specialist internal  6.24.24 Tia      MEDICATION LIST internal     IMAGING      MRI (BRAIN) internal  3.6.24   LABS     DIABETES: HBGA1C, CREATININE, FASTING LIPIDS, MICROALBUMIN URINE, POTASSIUM, TSH, T4    THYROID: TSH, T4, CBC, THYRODLONULIN, TOTAL T3, FREE T4, CALCITONIN, CEA internal  Bmp- 7/8/24   Prolactin- 7/8/24   TSH/T4- 7/8/24   POS glucose- 7.3.24  Cbc- 6.24.24  CMP- 6.24.24

## 2025-01-23 NOTE — PROGRESS NOTES
Clinic Administered Medication Documentation    Administrations This Visit       testosterone cypionate (DEPOTESTOSTERONE) injection 100 mg       Admin Date  01/23/2025 Action  $Given Dose  100 mg Route  Intramuscular Site  Right Ventrogluteal Documented By  Sue Suarez RN    NDC: 5551-8349-94    Lot#: [id number]    : HOSPIRA    Patient Supplied?: Yes                    Testosterone Documentation     Was this medication supplied by the patient? Yes, medication was received directly from patient in a tamper proof bag (follow site specific policies)   .    Prior to injection, verified patient identity using patient's name and date of birth. Medication was administered. Please see MAR and medication order for additional information. Patient instructed to remain in clinic for 15 minutes and report any adverse reaction to staff immediately but patient declined.  Vial/Syringe: Single dose vial. Was entire vial of medication used? No, The remainder 100 MG of 200 MG was discarded as unavoidable waste.  Verified that the patient has refills remaining in their prescription.    Sue Suarez RN BSN  Clinic Nurse  [facility]

## 2025-02-06 ENCOUNTER — ALLIED HEALTH/NURSE VISIT (OUTPATIENT)
Dept: FAMILY MEDICINE | Facility: CLINIC | Age: 35
End: 2025-02-06
Payer: MEDICARE

## 2025-02-06 DIAGNOSIS — E34.9 TESTOSTERONE DEFICIENCY: Primary | ICD-10-CM

## 2025-02-06 RX ADMIN — TESTOSTERONE CYPIONATE 100 MG: 200 INJECTION, SOLUTION INTRAMUSCULAR at 15:12

## 2025-02-06 NOTE — PROGRESS NOTES
Clinic Administered Medication Documentation    Administrations This Visit       testosterone cypionate (DEPOTESTOSTERONE) injection 100 mg       Admin Date  02/06/2025 Action  $Given Dose  100 mg Route  Intramuscular Site   Documented By  Brittaney Davis RN    NDC: 5395-7794-40    Lot#: [id number]    : HOSPIRA    Patient Supplied?: No                  Gave left ventrogluteal    Testosterone Documentation     Was this medication supplied by the patient? Yes, medication was received directly from patient in a tamper proof bag (follow site specific policies)   .    Prior to injection, verified patient identity using patient's name and date of birth. Medication was administered. Please see MAR and medication order for additional information. Patient instructed to remain in clinic for 15 minutes and report any adverse reaction to staff immediately .  Vial/Syringe: Single dose vial. Was entire vial of medication used? No, The remainder 100 MG of 200 MG was discarded as unavoidable waste.  Verified that the patient has refills remaining in their prescription.    Brittaney Davis RN

## 2025-02-20 ENCOUNTER — ALLIED HEALTH/NURSE VISIT (OUTPATIENT)
Dept: FAMILY MEDICINE | Facility: CLINIC | Age: 35
End: 2025-02-20
Payer: MEDICARE

## 2025-02-20 DIAGNOSIS — E34.9 TESTOSTERONE DEFICIENCY: Primary | ICD-10-CM

## 2025-02-20 RX ADMIN — TESTOSTERONE CYPIONATE 100 MG: 200 INJECTION, SOLUTION INTRAMUSCULAR at 15:18

## 2025-02-20 NOTE — PROGRESS NOTES
Clinic Administered Medication Documentation    Administrations This Visit       testosterone cypionate (DEPOTESTOSTERONE) injection 100 mg       Admin Date  02/20/2025 Action  $Given Dose  100 mg Route  Intramuscular Site  Right Ventrogluteal Documented By  Rylie Urias RN    NDC: 0647-9501-62    Lot#: [id number]    : HOSPIRA    Patient Supplied?: Yes                    Testosterone Documentation     Was this medication supplied by the patient? Yes, medication was received directly from patient in a tamper proof bag (follow site specific policies)   .    Prior to injection, verified patient identity using patient's name and date of birth. Medication was administered. Please see MAR and medication order for additional information. Patient instructed to remain in clinic for 15 minutes and report any adverse reaction to staff immediately but patient declined.  Vial/Syringe: Single dose vial. Was entire vial of medication used? No, The remainder 100 MG of 200 MG was discarded as unavoidable waste.  Verified that the patient has refills remaining in their prescription.    Rylie ESPAÑA RN  [facility]

## 2025-03-06 ENCOUNTER — ALLIED HEALTH/NURSE VISIT (OUTPATIENT)
Dept: FAMILY MEDICINE | Facility: CLINIC | Age: 35
End: 2025-03-06
Payer: MEDICARE

## 2025-03-06 DIAGNOSIS — E34.9 TESTOSTERONE DEFICIENCY: Primary | ICD-10-CM

## 2025-03-06 RX ADMIN — TESTOSTERONE CYPIONATE 100 MG: 200 INJECTION, SOLUTION INTRAMUSCULAR at 15:08

## 2025-03-06 NOTE — PROGRESS NOTES
Clinic Administered Medication Documentation  Administrations This Visit       testosterone cypionate (DEPOTESTOSTERONE) injection 100 mg       Admin Date  03/06/2025 Action  $Given Dose  100 mg Route  Intramuscular Site  Left Ventrogluteal Documented By  ParisaJael RN    NDC: 5149-6443-07    Lot#: [id number]    : HOSPIRA    Patient Supplied?: Yes                Testosterone Documentation     Was this medication supplied by the patient? Yes, medication was received directly from patient in a tamper proof bag (follow site specific policies).    Prior to injection, verified patient identity using patient's name and date of birth. Medication was administered. Please see MAR and medication order for additional information. Patient instructed to remain in clinic for 15 minutes and report any adverse reaction to staff immediately but patient declined.  Vial/Syringe: Single dose vial. Was entire vial of medication used? No, The remainder 100 MG of 200 MG was discarded as unavoidable waste.  Verified that the patient has 1 refill remaining in their prescription.     Jael TRINIDAD RN  [facility]

## 2025-03-20 ENCOUNTER — ALLIED HEALTH/NURSE VISIT (OUTPATIENT)
Dept: FAMILY MEDICINE | Facility: CLINIC | Age: 35
End: 2025-03-20
Payer: MEDICARE

## 2025-03-20 VITALS — HEART RATE: 61 BPM | OXYGEN SATURATION: 100 % | SYSTOLIC BLOOD PRESSURE: 105 MMHG | DIASTOLIC BLOOD PRESSURE: 63 MMHG

## 2025-03-20 DIAGNOSIS — E34.9 TESTOSTERONE DEFICIENCY: Primary | ICD-10-CM

## 2025-03-20 DIAGNOSIS — E34.9 TESTOSTERONE DEFICIENCY: ICD-10-CM

## 2025-03-20 RX ADMIN — TESTOSTERONE CYPIONATE 100 MG: 200 INJECTION, SOLUTION INTRAMUSCULAR at 15:28

## 2025-03-20 NOTE — PROGRESS NOTES
Nurse Triage SBAR    Is this a 2nd Level Triage? YES, LICENSED PRACTITIONER REVIEW IS REQUIRED    Situation:   Patient reported episode of blurry vision upon standing to get on exam table for testosterone injection     Background:   Patient stated he also had episode of blurry vision while waiting at clinic pharmacy to pick-up his medications prior to RN appt. Patient expressed being very nervous as he has a job interview today at 4pm. He has not eaten or drank much today as a result of his nerves. He also reports not sleeping well last night. Last ate this morning and had bowl of cereal.     Assessment:  Blurry vision with position change   Obtained the following vital signs with the assistance of Sylvie SMITH RN:  Vitals:    03/20/25 1516 03/20/25 1520 03/20/25 1524 03/20/25 1526   BP: (!) 80/52 102/67 106/74 105/63   BP Location: Right arm Right arm Right arm Right arm   Patient Position: Supine Sitting Sitting Standing   Cuff Size: Adult Regular Adult Regular Adult Regular Adult Regular   Pulse: 55 61 60 61   SpO2: 100% 100% 100% 100%      Denies: feeling faint, dizziness, weakness, headache, chest pain    Recommendation:  RN instructed patient to lay down on exam table and rest. Vital signs were monitored with each positional change. Patient given apple juice and rice crispy bar. RN huddled with POD Jennifer Vallejo PA-C who said patient is safe to leave clinic as long as symptoms resolve and BP comes back up.     Blurry vision slowly resolved and patient eventually returned back to baseline. RN advised patient to eat a full meal following his interview today and push oral fluids. Instructed to call back if symptoms were to reoccur.     Patient was given an opportunity to ask questions, verbalized understanding of plan, and is agreeable.     Jael TRINIDAD RN  [facility]

## 2025-03-20 NOTE — PROGRESS NOTES
Clinic Administered Medication Documentation  Administrations This Visit       testosterone cypionate (DEPOTESTOSTERONE) injection 100 mg       Admin Date  03/20/2025 Action  $Given Dose  100 mg Route  Intramuscular Site  Right Ventrogluteal Documented By  ParisaJael RN    NDC: 8367-5876-21    Lot#: [id number]    : [facility]OS[facility]    Patient Supplied?: Yes                Testosterone Documentation     Was this medication supplied by the patient? Yes, medication was received directly from patient in a tamper proof bag (follow site specific policies).    Prior to injection, verified patient identity using patient's name and date of birth. Medication was administered. Please see MAR and medication order for additional information. Patient instructed to remain in clinic for 15 minutes and report any adverse reaction to staff immediately but patient declined.  Vial/Syringe: Single dose vial. Was entire vial of medication used? No, The remainder 100 MG of 200 MG was discarded as unavoidable waste.  Patient has no refills remaining. Refill encounter opened, order pended and Routed to the provider.    Jael TRINIDAD RN  [facility]

## 2025-03-20 NOTE — TELEPHONE ENCOUNTER
Routing to Dr. Alonso-  when would you like patient to have testosterone lab drawn? Orders pended for both refills and lab. Please review and sign.     RN notes patient has no further refills of Testosterone on file, CAM order has also  today.   -Next injection due 4/3/25  -Last testosterone lab drawn 24    Component      Latest Ref Rng 2024  10:45 AM   Testosterone Total      240 - 950 ng/dL 666    Free Testosterone Calculated      ng/dL 12.08      Jael TRINIDDA RN  [facility]

## 2025-03-24 RX ORDER — TESTOSTERONE CYPIONATE 200 MG/ML
100 INJECTION, SOLUTION INTRAMUSCULAR
Status: ACTIVE | OUTPATIENT
Start: 2025-04-03 | End: 2025-06-25

## 2025-03-24 RX ORDER — TESTOSTERONE CYPIONATE 200 MG/ML
100 INJECTION, SOLUTION INTRAMUSCULAR
Qty: 1 ML | Refills: 5 | Status: SHIPPED | OUTPATIENT
Start: 2025-03-24

## 2025-04-03 ENCOUNTER — ALLIED HEALTH/NURSE VISIT (OUTPATIENT)
Dept: FAMILY MEDICINE | Facility: CLINIC | Age: 35
End: 2025-04-03
Payer: MEDICARE

## 2025-04-03 DIAGNOSIS — E34.9 TESTOSTERONE DEFICIENCY: Primary | ICD-10-CM

## 2025-04-03 RX ADMIN — TESTOSTERONE CYPIONATE 100 MG: 200 INJECTION, SOLUTION INTRAMUSCULAR at 15:20

## 2025-04-03 NOTE — PROGRESS NOTES
Administrations This Visit       testosterone cypionate (DEPOTESTOSTERONE) injection 100 mg       Admin Date  04/03/2025 Action  $Given Dose  100 mg Route  Intramuscular Site  Left Ventrogluteal Documented By  Sylvie Quinn RN    NDC: 1625-8271-10    Lot#: [id number]    : [facility]OS[facility]IRA    Patient Supplied?: No                  Clinic Administered Medication Documentation      Testosterone Documentation     Was this medication supplied by the patient? Yes, medication was received directly from patient in a tamper proof bag (follow site specific policies)   .    Prior to injection, verified patient identity using patient's name and date of birth. Medication was administered. Please see MAR and medication order for additional information. Patient instructed to remain in clinic for 15 minutes and report any adverse reaction to staff immediately but patient declined and report any adverse reaction to staff immediately .  Vial/Syringe: Single dose vial. Was entire vial of medication used? No, The remainder 100 MG of 200 MG was discarded as unavoidable waste.  Verified that the patient has refills remaining in their prescription.      Sylvie SMITH RN   Clinic RN  [facility]

## 2025-04-16 ENCOUNTER — PRE VISIT (OUTPATIENT)
Dept: ENDOCRINOLOGY | Facility: CLINIC | Age: 35
End: 2025-04-16

## 2025-04-16 ENCOUNTER — VIRTUAL VISIT (OUTPATIENT)
Dept: ENDOCRINOLOGY | Facility: CLINIC | Age: 35
End: 2025-04-16
Payer: MEDICARE

## 2025-04-16 DIAGNOSIS — D44.4 CRANIOPHARYNGIOMA (H): ICD-10-CM

## 2025-04-16 DIAGNOSIS — E23.0 PANHYPOPITUITARISM: ICD-10-CM

## 2025-04-16 DIAGNOSIS — Z92.3 HISTORY OF EXTERNAL BEAM RADIATION THERAPY: ICD-10-CM

## 2025-04-16 RX ORDER — OXYTOCIN ACETATE 100 %
6 POWDER (GRAM) MISCELLANEOUS 2 TIMES DAILY
Qty: 1 G | Refills: 5 | Status: SHIPPED | OUTPATIENT
Start: 2025-04-16

## 2025-04-16 ASSESSMENT — PAIN SCALES - GENERAL: PAINLEVEL_OUTOF10: NO PAIN (0)

## 2025-04-16 NOTE — LETTER
4/16/2025       RE: Mamadou Reece  [address]  [address]  [place] [address]     Dear Colleague,    Thank you for referring your patient, Mamadou Reece, to the [facility] [place] at [facility]. Please see a copy of my visit note below.    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     A 35 yo male, at age, he was reading books very closely, then found out sellar lesion, he had a surgery 10/1996 at [facility]. Since then he has been followed and at age 11, tumor regrowth and underwent steotactic RT for 26 sessions in 2001. He has been stable since then with every 2 years MRI has been stable. Last MRI 3/2024, with minimum possible residual lesion which has been stable.     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 Currently using DDAVP 3 sprays every night, managing well throughout the night. OK during the day.     GH deficiency: he started GH age 9 or 10, he was on higher dose but now lower dose. Currently dose 0.7 mg daily injection. He does self injection.     Thyroid: He has been taking levothyroxine 150 mcg daily with excellent compliance.     Adrenal insufficiency: 3 tabs in the morning and 2 tabs at night (total daily dose 5 mg).     Testosterone: testosterone injection 100 mg every other week visiting [facility]. He shaves every 3 days.     BW: 190 lb, Ht: 6 feet    Sleep: good,     Social: lives in a group home.  Used to work at [employer]. Volunteering at library twice a week, He  "likes bowling.       Past Medical/Surgical History:  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, [facility]     Legal blindness     Craniopharyngioma     Mood disorder     Craniopharyngioma     Radiation age 11    Craniopharyngioma     Past Surgical History:   Procedure Laterality Date     CRANIOTOMY, EXCISE TUMOR COMPLEX, COMBINED  age 6    craniopharyngioma     MOHS MICROGRAPHIC PROCEDURE Left 05/31/2018    left cheek nodular BCC       Allergies:  No Known Allergies    Current Medications   Current Outpatient Medications   Medication Sig Dispense Refill     Calcium Citrate-Vitamin D (CALCIUM + D) 315-200 MG-UNIT TABS Take by mouth 2 times daily        Cholecalciferol (VITAMIN D) 1000 UNIT capsule Take 1 capsule by mouth daily        Cyanocobalamin (B-12) 1000 MCG TBCR Take 1,000 mcg by mouth daily  100 tablet 1     desmopressin (DDAVP) 0.01 % SOLN spray USE THREE SPRAYS INTO NOSTRIL ONCE DAILY 10 mL 0     Insulin Pen Needle (PEN NEEDLES 5/16\") 30G X 8 MM MISC USE dailyDIRECTED WITH NORDITROPIN 90 each 3     levothyroxine (SYNTHROID/LEVOTHROID) 150 MCG tablet Take 1 tablet (150 mcg) by mouth daily 90 tablet 3     predniSONE (DELTASONE) 1 MG tablet TAKE THREE TABLETS (3 MG) BY MOUTH IN THE MORNING AND TWO TABLETS (2 MG) AT NIGHT 200 tablet 11     somatropin (NORDITROPIN FLEXPRO) 10 MG/1.5ML SOPN PEN injection Inject 0.7 mg subcutaneously daily. 9 mL 1     testosterone cypionate (DEPOTESTOSTERONE) 200 MG/ML injection Inject 0.5 mLs (100 mg) into the muscle every 14 days. 1 mL 5     Current Facility-Administered Medications   Medication Dose Route Frequency Provider Last Rate Last Admin     testosterone cypionate (DEPOTESTOSTERONE) injection 100 mg  100 mg Intramuscular Q14 Days    100 mg at 04/03/25 1520       Family History:  Family History   Problem Relation Age of Onset     Diabetes Mother      Cancer Father         Melanoma     Melanoma Father      Basal cell carcinoma " Father      Cerebrovascular Disease Maternal Grandmother 91        Age 91     Diabetes Maternal Grandmother      Cerebrovascular Disease Paternal Grandmother         Age 96     Cerebrovascular Disease Paternal Grandfather      Prostate Cancer Paternal Grandfather      Alcoholism Paternal Grandfather      Uterine Cancer Paternal Aunt 60        leiomyosarcoma     Alzheimer Disease Paternal Uncle      Prostate Cancer Maternal Grandfather        Social History:  Social History     Tobacco Use     Smoking status: Never     Smokeless tobacco: Never   Substance Use Topics     Alcohol use: Not Currently     Comment: sip at holidays       ROS:  Full review of systems taken with the help of the intake sheet. Otherwise a complete 14 point review of systems was taken and is negative unless stated in the history above.      Physical Exam:   Vitals: There were no vitals taken for this visit.  BMI= There is no height or weight on file to calculate BMI.   General: well appearing, no acute distress, pleasant and conversant,   Mental Status/neuro: alert and oriented  Face: symmetrical, normal facial color  Eyes: anicteric, no proptosis or lid lag  Resp: normally breathing      Labs : I reviewed data from epic and extract and summarize the pertinent data here.      Latest Reference Range & Units 01/05/23 08:48 04/27/23 09:30 04/11/24 10:45 06/24/24 15:00 07/08/24 08:50   Prolactin 4 - 15 ng/mL 4  2 (L)  1 (L)   T4 Free 0.90 - 1.70 ng/dL 0.28 (L) 0.89 (L) 0.16 (L)  1.09   Testosterone Total 240 - 950 ng/dL 1,394 (H) 936 666     TSH 0.30 - 4.20 uIU/mL 6.42 (H) 0.87 8.72 (H) 3.80 0.34   Ins Growth Factor 1 82 - 243 ng/mL 63 (L)  13 (L)     (L): Data is abnormally low  (H): Data is abnormally high  Lab Results   Component Value Date     07/08/2024     01/07/2021      Lab Results   Component Value Date    POTASSIUM 4.2 07/08/2024    POTASSIUM 4.1 06/24/2022    POTASSIUM 3.3 01/07/2021     Lab Results   Component Value Date     "CHLORIDE 103 07/08/2024    CHLORIDE 105 06/24/2022    CHLORIDE 105 01/07/2021     Lab Results   Component Value Date    IVAN 9.3 07/08/2024    IVAN 8.9 01/07/2021     Lab Results   Component Value Date    CO2 30 07/08/2024    CO2 28 06/24/2022    CO2 32 01/07/2021     Lab Results   Component Value Date    BUN 18.6 07/08/2024    BUN 12 06/24/2022    BUN 12 01/07/2021     Lab Results   Component Value Date    CR 0.97 07/08/2024    CR 1.01 01/07/2021     Lab Results   Component Value Date    GLC 87 07/08/2024    GLC 78 06/24/2022    GLC 68 01/07/2021     Lab Results   Component Value Date    TSH 0.34 07/08/2024    TSH 0.10 02/03/2022     Lab Results   Component Value Date    T4 1.09 07/08/2024    T4 1.29 01/07/2021     No results found for: \"A1C\"    No results found for: \"IGF1\"  Lab Results   Component Value Date    LH <0.2 01/07/2021     Lab Results   Component Value Date    FSH <0.2 01/07/2021     No results found for: \"ESTROGEN\"  Lab Results   Component Value Date    PROLACTIN 2 02/03/2022    PROLACTIN <1 01/07/2021           MRI Brain: I personally reviewed the original images and agree with the below reports.   Results for orders placed during the hospital encounter of 03/06/24    MRI Brain w & w/o contrast    Narrative  MRI OF THE BRAIN/PITUITARY WITHOUT AND WITH CONTRAST  3/6/2024 11:12  AM    COMPARISON: Brain MR 9/22/2020    HISTORY: History of craniopharyngioma diagnosed in '96 and treated  with near total resection initially. In '02 craniopharyngioma grew  back and treated here by Kapil with XRT. No chemotherapy.  [facility] has been doing brain MRIs every 2-3 years - MRI last in  2020; History of craniopharyngioma; Legal blindness; History of  radiation therapy.    TECHNIQUE: Sagittal T1-weighted, coronal T2-weighted, coronal  T1-weighted images with focus on the sella were obtained without  intravenous contrast. Dynamic thin-section coronal T1-weighted images  were acquired during intravenous " administration of gadolinium (9 mL  Gadavist). Delayed post-gadolinium coronal, sagittal and axial  T1-weighted images were also obtained .    FINDINGS: The ventricles and basal cisterns are within normal limits  in configuration.  There is no midline shift.  There are no  extra-axial fluid collections. Gray-white differentiation is well  maintained.  There is no evidence for stroke or acute intracranial  hemorrhage.  There is no abnormal contrast enhancement in the brain or  its coverings. Small presumed cavernoma in the inferior aspect of the  basal ganglia on the left again noted.    Postoperative change consisting of a left pterional craniotomy  presumably for resection of the pituitary lesion again noted. There is  minimal residual mixed-signal intensity, heterogeneously enhancing  tissue at the left lateral aspect of the sella that is unchanged in  size, shape, signal intensity pattern or enhancement pattern since the  comparison study. No evidence for progression of pituitary expansion  of the sella again noted consistent with prior tumoral expansion.    Impression  IMPRESSION:  1. Postoperative change consisting of a left pterional craniotomy  presumably for resection of the pituitary lesion again noted without  change.  2. Minimal mixed signal intensity, heterogeneously enhancing tissue at  the lateral aspect of the sella again noted without change. No  evidence for tumor progression or recurrence.  3. Small cavernoma in the inferior aspect of the basal ganglia on the  left again noted.  4. Otherwise, normal brain MRI.    ANCA CONNOLLY MD      SYSTEM ID:  [id number]          Assessment and Plan  34 year old male with history of craniopharyngioma, panhypopituitarism, and chronic DI    - continue current DDAVP 3 sprays at night    - continue prednisone 5 mg daily (3 mg am, 2 mg pm)    - continue current GH injection 0.7 mg daily     - continue levothyroxine 150 mcg daily    - we will try oxytocin powder 6  international unit(s) BID.           Return to clinic with me in 1 year     60 minutes spent on the date of the encounter doing chart review, history and exam, personal review of imaging, outside record, documentation and further activities as noted above.    Note: Chart documentation done in part with Dragon Voice Recognition software. Although reviewed after completion, some word and grammatical errors may remain.  Please consider this when interpreting information in this chart     Bianca Menon MD  Staff Physician  Endocrinology and Metabolism  License: [id number]       Again, thank you for allowing me to participate in the care of your patient.      Sincerely,    Bianca Menon MD

## 2025-04-16 NOTE — PROGRESS NOTES
Video visit  Start 1:05 pm  End 1:55 pm  [facility]                                                                               - Endocrinology Initial Consultation -    Reason for visit/consult:     Craniopharyngioma (H)  History of external beam radiation therapy  Panhypopituitarism  [unfilled]     Primary care provider: Sabine Pedroza    HPI: 34 year old male is here for endocrinology clinic visit for    Craniopharyngioma (H)  History of external beam radiation therapy  Panhypopituitarism.     A 33 yo male, at age, he was reading books very closely, then found out sellar lesion, he had a surgery 10/1996 at [facility]. Since then he has been followed and at age 11, tumor regrowth and underwent steotactic RT for 26 sessions in 2001. He has been stable since then with every 2 years MRI has been stable. Last MRI 3/2024, with minimum possible residual lesion which has been stable.     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 Currently using DDAVP 3 sprays every night, managing well throughout the night. OK during the day.     GH deficiency: he started GH age 9 or 10, he was on higher dose but now lower dose. Currently dose 0.7 mg daily injection. He does self injection.     Thyroid: He has been taking levothyroxine 150 mcg daily with excellent compliance.     Adrenal insufficiency: 3 tabs in the morning and 2 tabs at night (total daily dose 5 mg).     Testosterone: testosterone injection 100 mg every other week visiting [facility]. He shaves every 3 days.     BW: 190 lb, Ht: 6 feet    Sleep: good,     Social: lives in a group home.  Used to work at [employer]. Volunteering at library twice a week, He likes bowling.       Past Medical/Surgical History:  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, [facility]    Legal blindness     Craniopharyngioma    Mood disorder     Craniopharyngioma    Radiation age 11    Craniopharyngioma     Past Surgical History:   Procedure Laterality Date     "CRANIOTOMY, EXCISE TUMOR COMPLEX, COMBINED  age 6    craniopharyngioma    MOHS MICROGRAPHIC PROCEDURE Left 05/31/2018    left cheek nodular BCC       Allergies:  No Known Allergies    Current Medications   Current Outpatient Medications   Medication Sig Dispense Refill    Calcium Citrate-Vitamin D (CALCIUM + D) 315-200 MG-UNIT TABS Take by mouth 2 times daily       Cholecalciferol (VITAMIN D) 1000 UNIT capsule Take 1 capsule by mouth daily       Cyanocobalamin (B-12) 1000 MCG TBCR Take 1,000 mcg by mouth daily  100 tablet 1    desmopressin (DDAVP) 0.01 % SOLN spray USE THREE SPRAYS INTO NOSTRIL ONCE DAILY 10 mL 0    Insulin Pen Needle (PEN NEEDLES 5/16\") 30G X 8 MM MISC USE dailyDIRECTED WITH NORDITROPIN 90 each 3    levothyroxine (SYNTHROID/LEVOTHROID) 150 MCG tablet Take 1 tablet (150 mcg) by mouth daily 90 tablet 3    predniSONE (DELTASONE) 1 MG tablet TAKE THREE TABLETS (3 MG) BY MOUTH IN THE MORNING AND TWO TABLETS (2 MG) AT NIGHT 200 tablet 11    somatropin (NORDITROPIN FLEXPRO) 10 MG/1.5ML SOPN PEN injection Inject 0.7 mg subcutaneously daily. 9 mL 1    testosterone cypionate (DEPOTESTOSTERONE) 200 MG/ML injection Inject 0.5 mLs (100 mg) into the muscle every 14 days. 1 mL 5     Current Facility-Administered Medications   Medication Dose Route Frequency Provider Last Rate Last Admin    testosterone cypionate (DEPOTESTOSTERONE) injection 100 mg  100 mg Intramuscular Q14 Days    100 mg at 04/03/25 1520       Family History:  Family History   Problem Relation Age of Onset    Diabetes Mother     Cancer Father         Melanoma    Melanoma Father     Basal cell carcinoma Father     Cerebrovascular Disease Maternal Grandmother 91        Age 91    Diabetes Maternal Grandmother     Cerebrovascular Disease Paternal Grandmother         Age 96    Cerebrovascular Disease Paternal Grandfather     Prostate Cancer Paternal Grandfather     Alcoholism Paternal Grandfather     Uterine Cancer Paternal Aunt 60        " leiomyosarcoma    Alzheimer Disease Paternal Uncle     Prostate Cancer Maternal Grandfather        Social History:  Social History     Tobacco Use    Smoking status: Never    Smokeless tobacco: Never   Substance Use Topics    Alcohol use: Not Currently     Comment: sip at holidays       ROS:  Full review of systems taken with the help of the intake sheet. Otherwise a complete 14 point review of systems was taken and is negative unless stated in the history above.      Physical Exam:   Vitals: There were no vitals taken for this visit.  BMI= There is no height or weight on file to calculate BMI.   General: well appearing, no acute distress, pleasant and conversant,   Mental Status/neuro: alert and oriented  Face: symmetrical, normal facial color  Eyes: anicteric, no proptosis or lid lag  Resp: normally breathing      Labs : I reviewed data from epic and extract and summarize the pertinent data here.      Latest Reference Range & Units 01/05/23 08:48 04/27/23 09:30 04/11/24 10:45 06/24/24 15:00 07/08/24 08:50   Prolactin 4 - 15 ng/mL 4  2 (L)  1 (L)   T4 Free 0.90 - 1.70 ng/dL 0.28 (L) 0.89 (L) 0.16 (L)  1.09   Testosterone Total 240 - 950 ng/dL 1,394 (H) 936 666     TSH 0.30 - 4.20 uIU/mL 6.42 (H) 0.87 8.72 (H) 3.80 0.34   Ins Growth Factor 1 82 - 243 ng/mL 63 (L)  13 (L)     (L): Data is abnormally low  (H): Data is abnormally high  Lab Results   Component Value Date     07/08/2024     01/07/2021      Lab Results   Component Value Date    POTASSIUM 4.2 07/08/2024    POTASSIUM 4.1 06/24/2022    POTASSIUM 3.3 01/07/2021     Lab Results   Component Value Date    CHLORIDE 103 07/08/2024    CHLORIDE 105 06/24/2022    CHLORIDE 105 01/07/2021     Lab Results   Component Value Date    IVAN 9.3 07/08/2024    IVAN 8.9 01/07/2021     Lab Results   Component Value Date    CO2 30 07/08/2024    CO2 28 06/24/2022    CO2 32 01/07/2021     Lab Results   Component Value Date    BUN 18.6 07/08/2024    BUN 12 06/24/2022     "BUN 12 01/07/2021     Lab Results   Component Value Date    CR 0.97 07/08/2024    CR 1.01 01/07/2021     Lab Results   Component Value Date    GLC 87 07/08/2024    GLC 78 06/24/2022    GLC 68 01/07/2021     Lab Results   Component Value Date    TSH 0.34 07/08/2024    TSH 0.10 02/03/2022     Lab Results   Component Value Date    T4 1.09 07/08/2024    T4 1.29 01/07/2021     No results found for: \"A1C\"    No results found for: \"IGF1\"  Lab Results   Component Value Date    LH <0.2 01/07/2021     Lab Results   Component Value Date    FSH <0.2 01/07/2021     No results found for: \"ESTROGEN\"  Lab Results   Component Value Date    PROLACTIN 2 02/03/2022    PROLACTIN <1 01/07/2021           MRI Brain: I personally reviewed the original images and agree with the below reports.   Results for orders placed during the hospital encounter of 03/06/24    MRI Brain w & w/o contrast    Narrative  MRI OF THE BRAIN/PITUITARY WITHOUT AND WITH CONTRAST  3/6/2024 11:12  AM    COMPARISON: Brain MR 9/22/2020    HISTORY: History of craniopharyngioma diagnosed in '96 and treated  with near total resection initially. In '02 craniopharyngioma grew  back and treated here by Kapil with XRT. No chemotherapy.  [facility] has been doing brain MRIs every 2-3 years - MRI last in  2020; History of craniopharyngioma; Legal blindness; History of  radiation therapy.    TECHNIQUE: Sagittal T1-weighted, coronal T2-weighted, coronal  T1-weighted images with focus on the sella were obtained without  intravenous contrast. Dynamic thin-section coronal T1-weighted images  were acquired during intravenous administration of gadolinium (9 mL  Gadavist). Delayed post-gadolinium coronal, sagittal and axial  T1-weighted images were also obtained .    FINDINGS: The ventricles and basal cisterns are within normal limits  in configuration.  There is no midline shift.  There are no  extra-axial fluid collections. Gray-white differentiation is well  maintained.  " There is no evidence for stroke or acute intracranial  hemorrhage.  There is no abnormal contrast enhancement in the brain or  its coverings. Small presumed cavernoma in the inferior aspect of the  basal ganglia on the left again noted.    Postoperative change consisting of a left pterional craniotomy  presumably for resection of the pituitary lesion again noted. There is  minimal residual mixed-signal intensity, heterogeneously enhancing  tissue at the left lateral aspect of the sella that is unchanged in  size, shape, signal intensity pattern or enhancement pattern since the  comparison study. No evidence for progression of pituitary expansion  of the sella again noted consistent with prior tumoral expansion.    Impression  IMPRESSION:  1. Postoperative change consisting of a left pterional craniotomy  presumably for resection of the pituitary lesion again noted without  change.  2. Minimal mixed signal intensity, heterogeneously enhancing tissue at  the lateral aspect of the sella again noted without change. No  evidence for tumor progression or recurrence.  3. Small cavernoma in the inferior aspect of the basal ganglia on the  left again noted.  4. Otherwise, normal brain MRI.    ANCA CONNOLLY MD      SYSTEM ID:  [id number]          Assessment and Plan  34 year old male with history of craniopharyngioma, panhypopituitarism, and chronic DI    - continue current DDAVP 3 sprays at night    - continue prednisone 5 mg daily (3 mg am, 2 mg pm)    - continue current GH injection 0.7 mg daily     - continue levothyroxine 150 mcg daily    - we will try oxytocin powder 6 international unit(s) BID.           Return to clinic with me in 1 year     60 minutes spent on the date of the encounter doing chart review, history and exam, personal review of imaging, outside record, documentation and further activities as noted above.    Note: Chart documentation done in part with Dragon Voice Recognition software. Although reviewed  after completion, some word and grammatical errors may remain.  Please consider this when interpreting information in this chart     Bianca Menon MD  Staff Physician  Endocrinology and Metabolism  License: [id number]

## 2025-04-16 NOTE — NURSING NOTE
Current patient location: [address] [place] [address]  [facility] [address]    Is the patient currently in the state of [place]? YES    Visit mode: VIDEO    If the visit is dropped, the patient can be reconnected by:VIDEO VISIT: Send to e-mail at: [email or web address]    Will anyone else be joining the visit? NO  (If patient encounters technical issues they should call [phone]    Are changes needed to the allergy or medication list? No    Are refills needed on medications prescribed by this physician? NO    Rooming Documentation:  Questionnaire(s) completed    Reason for visit: Consult    Kailee SCHROEDER

## 2025-04-17 ENCOUNTER — TELEPHONE (OUTPATIENT)
Dept: ENDOCRINOLOGY | Facility: CLINIC | Age: 35
End: 2025-04-17

## 2025-04-17 ENCOUNTER — ALLIED HEALTH/NURSE VISIT (OUTPATIENT)
Dept: FAMILY MEDICINE | Facility: CLINIC | Age: 35
End: 2025-04-17
Payer: MEDICARE

## 2025-04-17 DIAGNOSIS — E34.9 TESTOSTERONE DEFICIENCY: Primary | ICD-10-CM

## 2025-04-17 DIAGNOSIS — E23.0 PANHYPOPITUITARISM: ICD-10-CM

## 2025-04-17 RX ADMIN — TESTOSTERONE CYPIONATE 100 MG: 200 INJECTION, SOLUTION INTRAMUSCULAR at 15:16

## 2025-04-17 NOTE — PROGRESS NOTES
Clinic Administered Medication Documentation      Testosterone Documentation     Was this medication supplied by the patient? Yes, medication was received directly from patient in a tamper proof bag (follow site specific policies).    Prior to injection, verified patient identity using patient's name and date of birth. Medication was administered. Please see MAR and medication order for additional information. Patient instructed to remain in clinic for 15 minutes, remain in clinic for 15 minutes and report any adverse reaction to staff immediately but patient declined, and report any adverse reaction to staff immediately .  Vial/Syringe: Single dose vial. Was entire vial of medication used? No, The remainder 100 MG of 200 MG was discarded as unavoidable waste.  Verified that the patient has refills remaining in their prescription.    Given right ventrogluteal    Madelin GÓMEZ RN

## 2025-04-17 NOTE — TELEPHONE ENCOUNTER
Oxytocin Acetate Powder is something that [facility] can not order.  We not sure if this would be a compound and would be best to send up to them??    Kaylan Doran, [place]  [facility]  [address] [place] [address] [phone]

## 2025-04-18 ENCOUNTER — ONCOLOGY VISIT (OUTPATIENT)
Dept: ONCOLOGY | Facility: CLINIC | Age: 35
End: 2025-04-18
Attending: NURSE PRACTITIONER
Payer: MEDICARE

## 2025-04-18 VITALS
RESPIRATION RATE: 12 BRPM | HEART RATE: 69 BPM | SYSTOLIC BLOOD PRESSURE: 90 MMHG | DIASTOLIC BLOOD PRESSURE: 62 MMHG | WEIGHT: 212.4 LBS | BODY MASS INDEX: 28.81 KG/M2 | OXYGEN SATURATION: 99 % | TEMPERATURE: 96.6 F

## 2025-04-18 DIAGNOSIS — Q28.3 CEREBRAL CAVERNOMA: ICD-10-CM

## 2025-04-18 DIAGNOSIS — D64.9 MILD ANEMIA: ICD-10-CM

## 2025-04-18 DIAGNOSIS — D44.4 CRANIOPHARYNGIOMA (H): ICD-10-CM

## 2025-04-18 DIAGNOSIS — Z92.3 HISTORY OF EXTERNAL BEAM RADIATION THERAPY: Primary | ICD-10-CM

## 2025-04-18 PROCEDURE — G0463 HOSPITAL OUTPT CLINIC VISIT: HCPCS | Performed by: NURSE PRACTITIONER

## 2025-04-18 RX ORDER — ACETAMINOPHEN 325 MG/1
650 TABLET ORAL
COMMUNITY
Start: 2024-07-03

## 2025-04-18 RX ORDER — IBUPROFEN 600 MG/1
600 TABLET, FILM COATED ORAL
COMMUNITY
Start: 2024-07-03

## 2025-04-18 ASSESSMENT — PAIN SCALES - GENERAL: PAINLEVEL_OUTOF10: NO PAIN (0)

## 2025-04-18 NOTE — LETTER
4/18/2025      Mamadou Reece  [address]  [address]  [facility] [place] [facility] [address]      Dear Colleague,    Thank you for referring your patient, Mamadou Reece, to the [facility]. Please see a copy of my visit note below.    Childhood Cancer/BMT Survivor Program (cCSP) Progress Note     I had the pleasure of seeing Mamadou Reece for routine follow up in the [facility].  He is here with his parents (his legal guardians) to transition his survivorship care for the [facility].   He was referred by Mariluz Burt CNP.    HISTORY OF PRESENT ILLNESS   Mamadou is here with his guardians (his parents).  He has been doing well.  He lives in an assisted living apartment complex.  He has assistants that check in on his 1-2 times per day.  Mamadou is legally blind.  He uses a cane for assistance.  He also has magnifiers for reading larger print.  He has 20/200 vision in the right eye and only a sliver of light by his nose of the left eye (significant visual field cuts).  He takes DDAVP for his DI.  No issues with excessive thirst or urination.  He drinks mostly water but does have 1-2 Pepsi drinks per day.  No longer taking meds for ADHD because he didn't feel like they were helpful.  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  Hasn't been working since for 2021.  Has vocational rehab services.  Involved with state services for the blind.  Mood has been good.  No issues with sleep.  Sees endocrine yearly and now seeing Dr. Menon who is a pituitary specialist.  Disclosed that they don't have an emergency action plan for his adrenal insufficiency. He did endo up having wisdom teeth surgery last year in a controlled OR with anesthesia present.    They would like a new ophthalmologist that works for more with optic neuropathy and low vision.  Last had neuropsych testing in 2009.  Occasional dizziness with  position changes.  Had an MRI at [facility] in March 2024 that was stable.  Had a basal cell carcinoma removed on his left cheek 6 years ago.  Saw derm last a few years ago and needs another visit.  Has a PCP.      TREATMENT HISTORY   Diagnosis: Craniopharyngioma  Date of Diagnosis: 10/30/1996 (primary) and 2002 (relapse)  Age at Diagnosis: 6 years  Date Treatment Completed: 1996 surgery only; 7/25/2002 with relapse  Chemotherapy History: None  Radiation Therapy History: Tumor bed; brain with relapse that started on 6/13/2002 and completed on 7/25/2002 in 28 fractions at 180 cGy per fraction for a total dose of 5040 cGy (fractionated steriotactic radiation)  Bone Marrow Transplant/Cellular Treatment History: none    LATE EFFECTS   Late Effects to Date:   ADHD  Panhypopituitarism (DI, hypothyroid, GH deficiency, central adrenal insufficiency, hypogonadism)  Vision loss, legally blind  Cognitive impairment post XRT  Depression  Cerebral cavernoma  Vitamin D deficiency  Left cheek basal cell carcinoma 5/31/2018  Dizziness    New Late Effects:  none    PAST MEDICAL HISTORY   PMH: PAST MEDICAL HISTORY:   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, [facility]     Legal blindness     Craniopharyngioma     Mood disorder     Craniopharyngioma     Radiation age 11    Craniopharyngioma     PFMH:FAMILY HISTORY:   Family History   Problem Relation Age of Onset     Diabetes Mother      Cancer Father         Melanoma     Melanoma Father      Basal cell carcinoma Father      Cerebrovascular Disease Maternal Grandmother 91        Age 91     Diabetes Maternal Grandmother      Cerebrovascular Disease Paternal Grandmother         Age 96     Cerebrovascular Disease Paternal Grandfather      Prostate Cancer Paternal Grandfather      Alcoholism Paternal Grandfather      Uterine Cancer Paternal Aunt 60        leiomyosarcoma     Alzheimer Disease Paternal Uncle      Prostate Cancer Maternal Grandfather        Social Hx: Graduated from high school in 2010 from [employer]+ transitions program.  Lives in assisted living complex. Looking for a new job and working with vocational rehab.  Interested in meeting with a therapist to discuss his mood.  Surgical Hx: PAST SURGICAL HISTORY:   Past Surgical History:   Procedure Laterality Date     CRANIOTOMY, EXCISE TUMOR COMPLEX, COMBINED  age 6    craniopharyngioma     MOHS MICROGRAPHIC PROCEDURE Left 05/31/2018    left cheek nodular BCC     Allergies:  No Known Allergies   Immunizations: up to date and documented    REVIEW OF SYSTEMS   A comprehensive review of systems was performed and was negative unless noted in the HPI.     PHYSICAL EXAM   VITALS: All vitals have been reviewed  BP 90/62 (BP Location: Right arm, Patient Position: Sitting, Cuff Size: Adult Large)   Pulse 69   Temp (!) 96.6  F (35.9  C) (Oral)   Resp 12   Wt 96.3 kg (212 lb 6.4 oz)   SpO2 99%   BMI 28.81 kg/m     Wt Readings from Last 4 Encounters:   04/18/25 96.3 kg (212 lb 6.4 oz)   08/15/24 87.9 kg (193 lb 12.8 oz)   06/24/24 89.3 kg (196 lb 12.8 oz)   05/20/24 86.8 kg (191 lb 6.4 oz)      Ht Readings from Last 2 Encounters:   08/15/24 1.829 m (6')   06/24/24 1.829 m (6')        Exam:  Constitutional: healthy, alert, and no distress  Head: Normocephalic. No masses, lesions, tenderness or abnormalities  Neck: Neck supple. No adenopathy. Thyroid symmetric, normal size  Eyes:  difficulty with upward gaze.  PERRL. No icterus or injection  ENT: ENT exam normal, no neck nodes or sinus tenderness  Cardiovascular: negative. No lifts, heaves, or thrills. RRR. No murmurs, clicks gallops or rub  Respiratory: Lungs CTAB. No increased WOB.  Gastrointestinal: Abdomen soft, non-tender. BS normal. No masses, organomegaly  : Deferred  Musculoskeletal: extremities normal- no gross deformities noted, gait normal, and normal muscle tone  Skin: no suspicious lesions or rashes  Neurologic: Gait wide based. Reflexes normal and  symmetric. Sensation grossly WNL.  Psychiatric: mentation appears normal and affect normal/bright  Hematologic/Lymphatic/Immunologic: Normal cervical lymph nodes     LABS/IMAGING   MRI OF THE BRAIN/PITUITARY WITHOUT AND WITH CONTRAST  3/6/2024 11:12  AM      COMPARISON: Brain MR 9/22/2020      HISTORY: History of craniopharyngioma diagnosed in '96 and treated  with near total resection initially. In '02 craniopharyngioma grew  back and treated here by Kapil with XRT. No chemotherapy.  [facility] has been doing brain MRIs every 2-3 years - MRI last in  2020; History of craniopharyngioma; Legal blindness; History of  radiation therapy.       TECHNIQUE: Sagittal T1-weighted, coronal T2-weighted, coronal  T1-weighted images with focus on the sella were obtained without  intravenous contrast. Dynamic thin-section coronal T1-weighted images  were acquired during intravenous administration of gadolinium (9 mL  Gadavist). Delayed post-gadolinium coronal, sagittal and axial  T1-weighted images were also obtained .      FINDINGS: The ventricles and basal cisterns are within normal limits  in configuration.  There is no midline shift.  There are no  extra-axial fluid collections. Gray-white differentiation is well  maintained.  There is no evidence for stroke or acute intracranial  hemorrhage.  There is no abnormal contrast enhancement in the brain or  its coverings. Small presumed cavernoma in the inferior aspect of the  basal ganglia on the left again noted.     Postoperative change consisting of a left pterional craniotomy  presumably for resection of the pituitary lesion again noted. There is  minimal residual mixed-signal intensity, heterogeneously enhancing  tissue at the left lateral aspect of the sella that is unchanged in  size, shape, signal intensity pattern or enhancement pattern since the  comparison study. No evidence for progression of pituitary expansion  of the sella again noted consistent with prior  "tumoral expansion.                                                                      IMPRESSION:  1. Postoperative change consisting of a left pterional craniotomy  presumably for resection of the pituitary lesion again noted without  change.  2. Minimal mixed signal intensity, heterogeneously enhancing tissue at  the lateral aspect of the sella again noted without change. No  evidence for tumor progression or recurrence.  3. Small cavernoma in the inferior aspect of the basal ganglia on the  left again noted.  4. Otherwise, normal brain MRI.     ANCA CONNOLLY MD     No results found for any visits on 04/18/25.      MEDICATIONS     Current Outpatient Medications   Medication Sig Dispense Refill     acetaminophen (TYLENOL) 325 MG tablet Take 650 mg by mouth.       Calcium Citrate-Vitamin D (CALCIUM + D) 315-200 MG-UNIT TABS Take by mouth 2 times daily        Cholecalciferol (VITAMIN D) 1000 UNIT capsule Take 1 capsule by mouth daily        Cyanocobalamin (B-12) 1000 MCG TBCR Take 1,000 mcg by mouth daily  100 tablet 1     desmopressin (DDAVP) 0.01 % SOLN spray USE THREE SPRAYS INTO NOSTRIL ONCE DAILY 10 mL 0     ibuprofen (ADVIL/MOTRIN) 600 MG tablet Take 600 mg by mouth.       Insulin Pen Needle (PEN NEEDLES 5/16\") 30G X 8 MM MISC USE dailyDIRECTED WITH NORDITROPIN 90 each 3     levothyroxine (SYNTHROID/LEVOTHROID) 150 MCG tablet Take 1 tablet (150 mcg) by mouth daily 90 tablet 3     predniSONE (DELTASONE) 1 MG tablet TAKE THREE TABLETS (3 MG) BY MOUTH IN THE MORNING AND TWO TABLETS (2 MG) AT NIGHT 200 tablet 11     somatropin (NORDITROPIN FLEXPRO) 10 MG/1.5ML SOPN PEN injection Inject 0.7 mg subcutaneously daily. 9 mL 1     testosterone cypionate (DEPOTESTOSTERONE) 200 MG/ML injection Inject 0.5 mLs (100 mg) into the muscle every 14 days. 1 mL 5     Oxytocin Acetate POWD 6 Int'l Units/day 2 times daily. (Patient not taking: Reported on 4/18/2025) 1 g 5     Current Facility-Administered Medications   Medication " Dose Route Frequency Provider Last Rate Last Admin     testosterone cypionate (DEPOTESTOSTERONE) injection 100 mg  100 mg Intramuscular Q14 Days    100 mg at 04/17/25 1516       SURVIVOR CARE PLAN   Surveillance Plan:    RISK OF RECURRENCE: Mamadou Reece is 23 years out from the completion of therapy for relapsed craniopharyngioma. Given the time from the diagnosis of his primary condition, the likelihood of recurrence is low.      PSYCHOSOCIAL EFFECTS/MENTAL HEALTH:  Educational and emotional issues are common for childhood cancer/BMT survivors. Emotional distress from cancer/BMT treatment and recovery affects the patient, their caregivers and family members. Depending on diagnosis, age at treatment, and type of treatments, difficulties with memory, learning, behavior, or emotional health may occur. In addition to [place] public laws that protect the rights of students with educational problems related to cancer/BMT treatment, childhood cancer/BMT survivors can also seek additional support through our Social Work team, community mental health professionals, and school support services    ACCESS TO HEALTH CARE AND INSURANCE: This can be an issue for survivors over time. Our team has personnel with expertise in this area (e.g. social workers) who can assist with related barriers.    DIET AND PHYSICAL ACTIVITY: The American Cancer Society recommends at least 150 minutes of moderate physical activity or 75 minutes of vigorous physical activity, or a combination of these, each week for adults. Children and adolescents should engage in at least 60 minutes each day of moderate to vigorous physical activity with vigorous activity at least 3 days each week. Survivors who have special needs can take part in most activities, but the help of a PT or OT may be needed to adapt the activity for success. *Always consult with a Healthcare Professional before beginning any new exercise routines to ensure this is safe for  you.    NEUROCOGNITIVE EFFECTS:  Many childhood cancer survivors are at risk for neurocognitive deficits as a result of the disruption to their early schooling for their necessary oncology treatments.  If any changes are noted regarding neurocognitive function (memory issues, information processing, comprehension, etc) or psychological health please let us and your primary care provider know immediately.    EYE HEALTH:  Exposures to certain therapies (steroids, methotrexate, radiation including the head, etc.) can increase risk for cataracts. Routine eye exams should be performed as part of long-term follow-up care, along with annual optometry or ophthalmology exams.    METABOLIC HEALTH: Survivors are at increased risk for obesity, high cholesterol, and high blood pressure. A lipid panel for cholesterol and triglyceride screening should take place regularly so that interventions can be initiated promptly.     HORMONAL HEALTH: Survivors are at an increased risk for hormone changes including infertility.  Blood or bodily fluids may be tested if concerns are present.     SKIN HEALTH:  After radiation exposure, or for those who have experienced skin problems (like GVHD) after BMT, there is a risk of non-melanoma skin cancers.  Close attention to sun protection and annual follow-up with Dermatology is strongly recommended.    RISK OF SECONDARY CANCERS: Survivors of childhood cancer/BMT and genetic disorders are at increased risk for developing subsequent cancers compared to the general population. This is primarily due to exposure to radiation and certain chemotherapies, and for some individuals is secondary to an underlying genetic susceptibility. Regular physical examination is important and changes or concerns (new lumps, bumps, changes in medical status) should be reported to a primary care provider or to the Childhood Cancer/BMT Survivor Program.  In addition, screening or monitoring for certain cancers is  recommended for the following.    Skin Cancer: There is a risk of second cancers to the skin, soft tissues and bones within the field of prior radiation. Close attention to sun protection and annual follow-up with Dermatology is strongly recommended. , Brain Tumor: Neurologic changes including sudden or dramatic change in vision, hearing, taste or smell, or new onset persistent headaches, should be reported immediately to a health care provider who may evaluate for a second cancer of the brain.     ASSESSMENT AND PLAN   Mamadou Reece is a now 33 year old survivor of relapsed craniopharyngioma who is now transitioning care from [facility].  He is here with his parents to establish care.  He has ongoing care with ophthalmology, endocrine, dermatology and primary care.  He had an MRI brain in March that was stable with radiation induced cavernomas seen.  He is having his wisdom teeth removed in May and would like help with finding out the regimen for steroids with surgery.  I have reached out to his endocrinologist.  I asked that the family discuss his extensive medical history with the oral surgery office to ensure they are comfortable sedating him in the office with that history.  We will see him back for follow up next year.    -MRI brain last year and repeat in 2 years due to cerebral cavernoma and history of radiation- will do in 2026  -Follow up with dermatology yearly due to BCC; wear sunscreen regularly with SPF 30 or higher- parents will make appt locally  -Neuropsych testing done in 2009; repeat as needed based on cognitive concerns  -Will need life long endocrine follow up due to panhypopituitarism.  Reached out to endocrine to discuss need for action plan for emergencies and surgeries with his adrenal insufficiency.    -Dental follow up every 6 months  -Referral for neuro-ophthalmology for follow up  -Mamadou talked with SALENA last year about a therapist.      Eunice Davies MSN, APRN, CPNP-AC, CPON  Department of  Pediatrics  Division of Hematology/Oncology          Total time spent on the following services on the date of the encounter: Preparing to see patient, chart review, review of outside records, Ordering medications, test, procedures, chemotherapy, Referring or communicating with other healthcare professionals, Interpretation of labs, imaging and other tests, Performing a medically appropriate examination , Counseling and educating the patient/family/caregiver , Documenting clinical information in the electronic or other health record , Communicating results to the patient/family/caregiver, Care coordination.  Total amount of time performed on this outpatient visit: 50 minutes.           Again, thank you for allowing me to participate in the care of your patient.        Sincerely,        ADITHYA Jesus CNP    Electronically signed

## 2025-04-18 NOTE — NURSING NOTE
Oncology Rooming Note    April 18, 2025 9:18 AM   Mamadou Reece is a 34 year old male who presents for:    Chief Complaint   Patient presents with    Oncology Clinic Visit     LT Follow up Craniopharyngioma      Initial Vitals: BP 90/62 (BP Location: Right arm, Patient Position: Sitting, Cuff Size: Adult Large)   Pulse 69   Temp (!) 96.6  F (35.9  C) (Oral)   Resp 12   Wt 96.3 kg (212 lb 6.4 oz)   SpO2 99%   BMI 28.81 kg/m   Estimated body mass index is 28.81 kg/m  as calculated from the following:    Height as of 8/15/24: 1.829 m (6').    Weight as of this encounter: 96.3 kg (212 lb 6.4 oz). Body surface area is 2.21 meters squared.  No Pain (0) Comment: Data Unavailable   No LMP for male patient.  Allergies reviewed: Yes  Medications reviewed: Yes    Medications: Medication refills not needed today.  Pharmacy name entered into [facility]:    [facility] [place], [place] - [address] [address] [facility] - [place], [place] - [address] [place] [address]  [facility] [place], [place] - [address] [place] [address] - [address] [place], [place] - [address] [facility]    Frailty Screening:   Is the patient here for a new oncology consult visit in cancer care? 2. No    PHQ9:  Did this patient require a PHQ9?: No      Clinical concerns: Discuss Oxytocin and eye appt.'s Has not started taking Oxytocin as was recently prescribed.       Richie Ramírez

## 2025-04-21 NOTE — PROGRESS NOTES
Childhood Cancer/BMT Survivor Program (cCSP) Progress Note     I had the pleasure of seeing Mamadou Reece for routine follow up in the [facility].  He is here with his parents (his legal guardians) to transition his survivorship care for the [facility].   He was referred by Mariluz Burt CNP.    HISTORY OF PRESENT ILLNESS   Mamadou is here with his guardians (his parents).  He has been doing well.  He lives in an assisted living apartment complex.  He has assistants that check in on his 1-2 times per day.  Mamadou is legally blind.  He uses a cane for assistance.  He also has magnifiers for reading larger print.  He has 20/200 vision in the right eye and only a sliver of light by his nose of the left eye (significant visual field cuts).  He takes DDAVP for his DI.  No issues with excessive thirst or urination.  He drinks mostly water but does have 1-2 Pepsi drinks per day.  No longer taking meds for ADHD because he didn't feel like they were helpful.  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  Hasn't been working since for 2021.  Has vocational rehab services.  Involved with state services for the blind.  Mood has been good.  No issues with sleep.  Sees endocrine yearly and now seeing Dr. Menon who is a pituitary specialist.  Disclosed that they don't have an emergency action plan for his adrenal insufficiency. He did endo up having wisdom teeth surgery last year in a controlled OR with anesthesia present.    They would like a new ophthalmologist that works for more with optic neuropathy and low vision.  Last had neuropsych testing in 2009.  Occasional dizziness with position changes.  Had an MRI at [facility] in March 2024 that was stable.  Had a basal cell carcinoma removed on his left cheek 6 years ago.  Saw derm last a few years ago and needs another visit.  Has a PCP.      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 (primary) and 2002 (relapse)  Age at Diagnosis: 6 years  Date Treatment Completed: 1996 surgery only; 7/25/2002 with relapse  Chemotherapy History: None  Radiation Therapy History: Tumor bed; brain with relapse that started on 6/13/2002 and completed on 7/25/2002 in 28 fractions at 180 cGy per fraction for a total dose of 5040 cGy (fractionated steriotactic radiation)  Bone Marrow Transplant/Cellular Treatment History: none    LATE EFFECTS   Late Effects to Date:   ADHD  Panhypopituitarism (DI, hypothyroid, GH deficiency, central adrenal insufficiency, hypogonadism)  Vision loss, legally blind  Cognitive impairment post XRT  Depression  Cerebral cavernoma  Vitamin D deficiency  Left cheek basal cell carcinoma 5/31/2018  Dizziness    New Late Effects:  none    PAST MEDICAL HISTORY   PMH: PAST MEDICAL HISTORY:   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, [facility]    Legal blindness     Craniopharyngioma    Mood disorder     Craniopharyngioma    Radiation age 11    Craniopharyngioma     [facility]:FAMILY HISTORY:   Family History   Problem Relation Age of Onset    Diabetes Mother     Cancer Father         Melanoma    Melanoma Father     Basal cell carcinoma Father     Cerebrovascular Disease Maternal Grandmother 91        Age 91    Diabetes Maternal Grandmother     Cerebrovascular Disease Paternal Grandmother         Age 96    Cerebrovascular Disease Paternal Grandfather     Prostate Cancer Paternal Grandfather     Alcoholism Paternal Grandfather     Uterine Cancer Paternal Aunt 60        leiomyosarcoma    Alzheimer Disease Paternal Uncle     Prostate Cancer Maternal Grandfather       Social Hx: Graduated from high school in 2010 from [employer]+ transitions program.  Lives in assisted living complex. Looking for a new job and working with vocational rehab.  Interested in meeting with a therapist to discuss his mood.  Surgical Hx: PAST  SURGICAL HISTORY:   Past Surgical History:   Procedure Laterality Date    CRANIOTOMY, EXCISE TUMOR COMPLEX, COMBINED  age 6    craniopharyngioma    MOHS MICROGRAPHIC PROCEDURE Left 05/31/2018    left cheek nodular BCC     Allergies:  No Known Allergies   Immunizations: up to date and documented    REVIEW OF SYSTEMS   A comprehensive review of systems was performed and was negative unless noted in the HPI.     PHYSICAL EXAM   VITALS: All vitals have been reviewed  BP 90/62 (BP Location: Right arm, Patient Position: Sitting, Cuff Size: Adult Large)   Pulse 69   Temp (!) 96.6  F (35.9  C) (Oral)   Resp 12   Wt 96.3 kg (212 lb 6.4 oz)   SpO2 99%   BMI 28.81 kg/m     Wt Readings from Last 4 Encounters:   04/18/25 96.3 kg (212 lb 6.4 oz)   08/15/24 87.9 kg (193 lb 12.8 oz)   06/24/24 89.3 kg (196 lb 12.8 oz)   05/20/24 86.8 kg (191 lb 6.4 oz)      Ht Readings from Last 2 Encounters:   08/15/24 1.829 m (6')   06/24/24 1.829 m (6')        Exam:  Constitutional: healthy, alert, and no distress  Head: Normocephalic. No masses, lesions, tenderness or abnormalities  Neck: Neck supple. No adenopathy. Thyroid symmetric, normal size  Eyes:  difficulty with upward gaze.  PERRL. No icterus or injection  ENT: ENT exam normal, no neck nodes or sinus tenderness  Cardiovascular: negative. No lifts, heaves, or thrills. RRR. No murmurs, clicks gallops or rub  Respiratory: Lungs CTAB. No increased WOB.  Gastrointestinal: Abdomen soft, non-tender. BS normal. No masses, organomegaly  : Deferred  Musculoskeletal: extremities normal- no gross deformities noted, gait normal, and normal muscle tone  Skin: no suspicious lesions or rashes  Neurologic: Gait wide based. Reflexes normal and symmetric. Sensation grossly WNL.  Psychiatric: mentation appears normal and affect normal/bright  Hematologic/Lymphatic/Immunologic: Normal cervical lymph nodes     LABS/IMAGING   MRI OF THE BRAIN/PITUITARY WITHOUT AND WITH CONTRAST  3/6/2024 11:12  AM       COMPARISON: Brain MR 9/22/2020      HISTORY: History of craniopharyngioma diagnosed in '96 and treated  with near total resection initially. In '02 craniopharyngioma grew  back and treated here by [facility] with XRT. No chemotherapy.  [facility] [place] has been doing brain MRIs every 2-3 years - MRI last in  2020; History of craniopharyngioma; Legal blindness; History of  radiation therapy.       TECHNIQUE: Sagittal T1-weighted, coronal T2-weighted, coronal  T1-weighted images with focus on the sella were obtained without  intravenous contrast. Dynamic thin-section coronal T1-weighted images  were acquired during intravenous administration of gadolinium (9 mL  Gadavist). Delayed post-gadolinium coronal, sagittal and axial  T1-weighted images were also obtained .      FINDINGS: The ventricles and basal cisterns are within normal limits  in configuration.  There is no midline shift.  There are no  extra-axial fluid collections. Gray-white differentiation is well  maintained.  There is no evidence for stroke or acute intracranial  hemorrhage.  There is no abnormal contrast enhancement in the brain or  its coverings. Small presumed cavernoma in the inferior aspect of the  basal ganglia on the left again noted.     Postoperative change consisting of a left pterional craniotomy  presumably for resection of the pituitary lesion again noted. There is  minimal residual mixed-signal intensity, heterogeneously enhancing  tissue at the left lateral aspect of the sella that is unchanged in  size, shape, signal intensity pattern or enhancement pattern since the  comparison study. No evidence for progression of pituitary expansion  of the sella again noted consistent with prior tumoral expansion.                                                                      IMPRESSION:  1. Postoperative change consisting of a left pterional craniotomy  presumably for resection of the pituitary lesion again noted without  change.  2. Minimal  "mixed signal intensity, heterogeneously enhancing tissue at  the lateral aspect of the sella again noted without change. No  evidence for tumor progression or recurrence.  3. Small cavernoma in the inferior aspect of the basal ganglia on the  left again noted.  4. Otherwise, normal brain MRI.     ANCA CONNOLLY MD     No results found for any visits on 04/18/25.      MEDICATIONS     Current Outpatient Medications   Medication Sig Dispense Refill    acetaminophen (TYLENOL) 325 MG tablet Take 650 mg by mouth.      Calcium Citrate-Vitamin D (CALCIUM + D) 315-200 MG-UNIT TABS Take by mouth 2 times daily       Cholecalciferol (VITAMIN D) 1000 UNIT capsule Take 1 capsule by mouth daily       Cyanocobalamin (B-12) 1000 MCG TBCR Take 1,000 mcg by mouth daily  100 tablet 1    desmopressin (DDAVP) 0.01 % SOLN spray USE THREE SPRAYS INTO NOSTRIL ONCE DAILY 10 mL 0    ibuprofen (ADVIL/MOTRIN) 600 MG tablet Take 600 mg by mouth.      Insulin Pen Needle (PEN NEEDLES 5/16\") 30G X 8 MM MISC USE dailyDIRECTED WITH NORDITROPIN 90 each 3    levothyroxine (SYNTHROID/LEVOTHROID) 150 MCG tablet Take 1 tablet (150 mcg) by mouth daily 90 tablet 3    predniSONE (DELTASONE) 1 MG tablet TAKE THREE TABLETS (3 MG) BY MOUTH IN THE MORNING AND TWO TABLETS (2 MG) AT NIGHT 200 tablet 11    somatropin (NORDITROPIN FLEXPRO) 10 MG/1.5ML SOPN PEN injection Inject 0.7 mg subcutaneously daily. 9 mL 1    testosterone cypionate (DEPOTESTOSTERONE) 200 MG/ML injection Inject 0.5 mLs (100 mg) into the muscle every 14 days. 1 mL 5    Oxytocin Acetate POWD 6 Int'l Units/day 2 times daily. (Patient not taking: Reported on 4/18/2025) 1 g 5     Current Facility-Administered Medications   Medication Dose Route Frequency Provider Last Rate Last Admin    testosterone cypionate (DEPOTESTOSTERONE) injection 100 mg  100 mg Intramuscular Q14 Days    100 mg at 04/17/25 1516       SURVIVOR CARE PLAN   Surveillance Plan:    RISK OF RECURRENCE: Mamadou AME Chevy is 23 years out " from the completion of therapy for relapsed craniopharyngioma. Given the time from the diagnosis of his primary condition, the likelihood of recurrence is low.      PSYCHOSOCIAL EFFECTS/MENTAL HEALTH:  Educational and emotional issues are common for childhood cancer/BMT survivors. Emotional distress from cancer/BMT treatment and recovery affects the patient, their caregivers and family members. Depending on diagnosis, age at treatment, and type of treatments, difficulties with memory, learning, behavior, or emotional health may occur. In addition to [place] public laws that protect the rights of students with educational problems related to cancer/BMT treatment, childhood cancer/BMT survivors can also seek additional support through our Social Work team, community mental health professionals, and school support services    ACCESS TO HEALTH CARE AND INSURANCE: This can be an issue for survivors over time. Our team has personnel with expertise in this area (e.g. social workers) who can assist with related barriers.    DIET AND PHYSICAL ACTIVITY: The American Cancer Society recommends at least 150 minutes of moderate physical activity or 75 minutes of vigorous physical activity, or a combination of these, each week for adults. Children and adolescents should engage in at least 60 minutes each day of moderate to vigorous physical activity with vigorous activity at least 3 days each week. Survivors who have special needs can take part in most activities, but the help of a PT or OT may be needed to adapt the activity for success. *Always consult with a Healthcare Professional before beginning any new exercise routines to ensure this is safe for you.    NEUROCOGNITIVE EFFECTS:  Many childhood cancer survivors are at risk for neurocognitive deficits as a result of the disruption to their early schooling for their necessary oncology treatments.  If any changes are noted regarding neurocognitive function (memory issues,  information processing, comprehension, etc) or psychological health please let us and your primary care provider know immediately.    EYE HEALTH:  Exposures to certain therapies (steroids, methotrexate, radiation including the head, etc.) can increase risk for cataracts. Routine eye exams should be performed as part of long-term follow-up care, along with annual optometry or ophthalmology exams.    METABOLIC HEALTH: Survivors are at increased risk for obesity, high cholesterol, and high blood pressure. A lipid panel for cholesterol and triglyceride screening should take place regularly so that interventions can be initiated promptly.     HORMONAL HEALTH: Survivors are at an increased risk for hormone changes including infertility.  Blood or bodily fluids may be tested if concerns are present.     SKIN HEALTH:  After radiation exposure, or for those who have experienced skin problems (like GVHD) after BMT, there is a risk of non-melanoma skin cancers.  Close attention to sun protection and annual follow-up with Dermatology is strongly recommended.    RISK OF SECONDARY CANCERS: Survivors of childhood cancer/BMT and genetic disorders are at increased risk for developing subsequent cancers compared to the general population. This is primarily due to exposure to radiation and certain chemotherapies, and for some individuals is secondary to an underlying genetic susceptibility. Regular physical examination is important and changes or concerns (new lumps, bumps, changes in medical status) should be reported to a primary care provider or to the Childhood Cancer/BMT Survivor Program.  In addition, screening or monitoring for certain cancers is recommended for the following.    Skin Cancer: There is a risk of second cancers to the skin, soft tissues and bones within the field of prior radiation. Close attention to sun protection and annual follow-up with Dermatology is strongly recommended. , Brain Tumor: Neurologic changes  including sudden or dramatic change in vision, hearing, taste or smell, or new onset persistent headaches, should be reported immediately to a health care provider who may evaluate for a second cancer of the brain.     ASSESSMENT AND PLAN   Mamadou Reece is a now 33 year old survivor of relapsed craniopharyngioma who is now transitioning care from [facility].  He is here with his parents to establish care.  He has ongoing care with ophthalmology, endocrine, dermatology and primary care.  He had an MRI brain in March that was stable with radiation induced cavernomas seen.  He is having his wisdom teeth removed in May and would like help with finding out the regimen for steroids with surgery.  I have reached out to his endocrinologist.  I asked that the family discuss his extensive medical history with the oral surgery office to ensure they are comfortable sedating him in the office with that history.  We will see him back for follow up next year.    -MRI brain last year and repeat in 2 years due to cerebral cavernoma and history of radiation- will do in 2026  -Follow up with dermatology yearly due to BCC; wear sunscreen regularly with SPF 30 or higher- parents will make appt locally  -Neuropsych testing done in 2009; repeat as needed based on cognitive concerns  -Will need life long endocrine follow up due to panhypopituitarism.  Reached out to endocrine to discuss need for action plan for emergencies and surgeries with his adrenal insufficiency.    -Dental follow up every 6 months  -Referral for neuro-ophthalmology for follow up  -Mamadou talked with SW last year about a therapist.      Eunice Davies MSN, APRN, CPNP-AC, CPON  Department of Pediatrics  Division of Hematology/Oncology          Total time spent on the following services on the date of the encounter: Preparing to see patient, chart review, review of outside records, Ordering medications, test, procedures, chemotherapy, Referring or communicating with other  healthcare professionals, Interpretation of labs, imaging and other tests, Performing a medically appropriate examination , Counseling and educating the patient/family/caregiver , Documenting clinical information in the electronic or other health record , Communicating results to the patient/family/caregiver, Care coordination.  Total amount of time performed on this outpatient visit: 50 minutes.

## 2025-04-22 ENCOUNTER — TELEPHONE (OUTPATIENT)
Dept: OPHTHALMOLOGY | Facility: CLINIC | Age: 35
End: 2025-04-22
Payer: MEDICARE

## 2025-04-22 RX ORDER — HYDROCORTISONE SODIUM SUCCINATE 100 MG/2ML
100 INJECTION INTRAMUSCULAR; INTRAVENOUS
Qty: 2 ML | Refills: 3 | Status: SHIPPED | OUTPATIENT
Start: 2025-04-22

## 2025-04-22 NOTE — TELEPHONE ENCOUNTER
[facility] Call Center    Phone Message    May a detailed message be left on voicemail: yes     Reason for Call: Appointment Intake    Referring Provider Name: Eunice Davies APRN CNP in [facility] ONC LONG TERM F/U     Diagnosis and/or Symptoms:   history of craniopharyngioma and low vision/ please refer to neuro-ophthalmology with Dr. Vasu Davies    Craniopharyngioma (H) [D44.4]  History of external beam radiation therapy [Z92.3]  Panhypopituitarism [E23.0]  Legal blindness [H54.8]    Please review and follow up with mother.       Action Taken: Other: eye    Travel Screening: Not Applicable

## 2025-04-23 NOTE — TELEPHONE ENCOUNTER
Called and LVM     Ok to schedule with Dr. Etienne in a new neuro for next available     Peggy Lobato Communication Facilitator on 4/23/2025 at 11:49 AM

## 2025-04-24 ENCOUNTER — LAB (OUTPATIENT)
Dept: LAB | Facility: CLINIC | Age: 35
End: 2025-04-24
Payer: MEDICARE

## 2025-04-24 DIAGNOSIS — E23.0 PANHYPOPITUITARISM: ICD-10-CM

## 2025-04-24 DIAGNOSIS — D64.9 MILD ANEMIA: ICD-10-CM

## 2025-04-24 DIAGNOSIS — Z92.3 HISTORY OF EXTERNAL BEAM RADIATION THERAPY: ICD-10-CM

## 2025-04-24 DIAGNOSIS — D44.4 CRANIOPHARYNGIOMA (H): ICD-10-CM

## 2025-04-24 LAB
ALBUMIN SERPL BCG-MCNC: 4.2 G/DL (ref 3.5–5.2)
ALP SERPL-CCNC: 45 U/L (ref 40–150)
ALT SERPL W P-5'-P-CCNC: 21 U/L (ref 0–70)
ANION GAP SERPL CALCULATED.3IONS-SCNC: 11 MMOL/L (ref 7–15)
AST SERPL W P-5'-P-CCNC: 33 U/L (ref 0–45)
BASOPHILS # BLD AUTO: 0 10E3/UL (ref 0–0.2)
BASOPHILS NFR BLD AUTO: 0 %
BILIRUB SERPL-MCNC: 0.8 MG/DL
BUN SERPL-MCNC: 10.2 MG/DL (ref 6–20)
CALCIUM SERPL-MCNC: 9.1 MG/DL (ref 8.8–10.4)
CHLORIDE SERPL-SCNC: 104 MMOL/L (ref 98–107)
CHOLEST SERPL-MCNC: 232 MG/DL
CREAT SERPL-MCNC: 1.37 MG/DL (ref 0.67–1.17)
EGFRCR SERPLBLD CKD-EPI 2021: 70 ML/MIN/1.73M2
EOSINOPHIL # BLD AUTO: 0.1 10E3/UL (ref 0–0.7)
EOSINOPHIL NFR BLD AUTO: 1 %
ERYTHROCYTE [DISTWIDTH] IN BLOOD BY AUTOMATED COUNT: 13.6 % (ref 10–15)
FASTING STATUS PATIENT QL REPORTED: YES
FASTING STATUS PATIENT QL REPORTED: YES
GLUCOSE SERPL-MCNC: 92 MG/DL (ref 70–99)
HCO3 SERPL-SCNC: 26 MMOL/L (ref 22–29)
HCT VFR BLD AUTO: 36.4 % (ref 40–53)
HDLC SERPL-MCNC: 39 MG/DL
HGB BLD-MCNC: 11.9 G/DL (ref 13.3–17.7)
IMM GRANULOCYTES # BLD: 0 10E3/UL
IMM GRANULOCYTES NFR BLD: 0 %
LDLC SERPL CALC-MCNC: 172 MG/DL
LYMPHOCYTES # BLD AUTO: 1.9 10E3/UL (ref 0.8–5.3)
LYMPHOCYTES NFR BLD AUTO: 31 %
MCH RBC QN AUTO: 29.5 PG (ref 26.5–33)
MCHC RBC AUTO-ENTMCNC: 32.7 G/DL (ref 31.5–36.5)
MCV RBC AUTO: 90 FL (ref 78–100)
MONOCYTES # BLD AUTO: 0.4 10E3/UL (ref 0–1.3)
MONOCYTES NFR BLD AUTO: 6 %
NEUTROPHILS # BLD AUTO: 3.9 10E3/UL (ref 1.6–8.3)
NEUTROPHILS NFR BLD AUTO: 62 %
NONHDLC SERPL-MCNC: 193 MG/DL
PLATELET # BLD AUTO: 163 10E3/UL (ref 150–450)
POTASSIUM SERPL-SCNC: 3.8 MMOL/L (ref 3.4–5.3)
PROT SERPL-MCNC: 6.8 G/DL (ref 6.4–8.3)
RBC # BLD AUTO: 4.03 10E6/UL (ref 4.4–5.9)
RETICS # AUTO: 0.09 10E6/UL (ref 0.03–0.1)
RETICS/RBC NFR AUTO: 2.4 % (ref 0.5–2)
SODIUM SERPL-SCNC: 141 MMOL/L (ref 135–145)
T4 FREE SERPL-MCNC: 0.6 NG/DL (ref 0.9–1.7)
TRIGL SERPL-MCNC: 106 MG/DL
TSH SERPL DL<=0.005 MIU/L-ACNC: 2.46 UIU/ML (ref 0.3–4.2)
WBC # BLD AUTO: 6.2 10E3/UL (ref 4–11)

## 2025-04-24 PROCEDURE — 99207 BLOOD MORPHOLOGY PATHOLOGIST REVIEW: CPT | Performed by: PATHOLOGY

## 2025-04-27 LAB — TESTOST SERPL-MCNC: 733 NG/DL (ref 240–950)

## 2025-05-01 ENCOUNTER — ALLIED HEALTH/NURSE VISIT (OUTPATIENT)
Dept: FAMILY MEDICINE | Facility: CLINIC | Age: 35
End: 2025-05-01
Payer: MEDICARE

## 2025-05-01 DIAGNOSIS — E34.9 TESTOSTERONE DEFICIENCY: Primary | ICD-10-CM

## 2025-05-01 RX ADMIN — TESTOSTERONE CYPIONATE 100 MG: 200 INJECTION, SOLUTION INTRAMUSCULAR at 15:08

## 2025-05-01 NOTE — PROGRESS NOTES
Clinic Administered Medication Documentation    Administrations This Visit       testosterone cypionate (DEPOTESTOSTERONE) injection 100 mg       Admin Date  05/01/2025 Action  $Given Dose  100 mg Route  Intramuscular Site  Left Ventrogluteal Documented By  Sobia Roman RN    NDC: 4922-0521-23    Lot#: [id number]    : HOSPIRA    Patient Supplied?: No                  Testosterone Documentation     Was this medication supplied by the patient? Yes, medication was received directly from patient in a tamper proof bag (follow site specific policies)     Prior to injection, verified patient identity using patient's name and date of birth. Medication was administered. Please see MAR and medication order for additional information. Patient instructed to remain in clinic for 15 minutes and report any adverse reaction to staff immediately but patient declined.  Vial/Syringe: Single dose vial. Was entire vial of medication used? No, The remainder 0.5 ML of 1.0 ML was discarded as unavoidable waste.  Verified that the patient has refills remaining in their prescription.      KAMILLE Aguilera RN  [facility]

## 2025-05-08 ENCOUNTER — RESULTS FOLLOW-UP (OUTPATIENT)
Dept: PEDIATRIC HEMATOLOGY/ONCOLOGY | Facility: CLINIC | Age: 35
End: 2025-05-08

## 2025-05-12 ENCOUNTER — OFFICE VISIT (OUTPATIENT)
Dept: FAMILY MEDICINE | Facility: CLINIC | Age: 35
End: 2025-05-12
Payer: MEDICARE

## 2025-05-12 VITALS
TEMPERATURE: 97.1 F | OXYGEN SATURATION: 100 % | DIASTOLIC BLOOD PRESSURE: 70 MMHG | HEART RATE: 64 BPM | RESPIRATION RATE: 14 BRPM | BODY MASS INDEX: 28.44 KG/M2 | WEIGHT: 210 LBS | SYSTOLIC BLOOD PRESSURE: 102 MMHG | HEIGHT: 72 IN

## 2025-05-12 DIAGNOSIS — R79.89 ELEVATED SERUM CREATININE: ICD-10-CM

## 2025-05-12 DIAGNOSIS — Z86.03 HISTORY OF CRANIOPHARYNGIOMA: ICD-10-CM

## 2025-05-12 DIAGNOSIS — E78.2 MIXED HYPERLIPIDEMIA: ICD-10-CM

## 2025-05-12 DIAGNOSIS — E23.0 PANHYPOPITUITARISM: ICD-10-CM

## 2025-05-12 DIAGNOSIS — D64.9 ANEMIA, UNSPECIFIED TYPE: Primary | ICD-10-CM

## 2025-05-12 LAB
ANION GAP SERPL CALCULATED.3IONS-SCNC: 14 MMOL/L (ref 7–15)
BASOPHILS # BLD AUTO: 0.1 10E3/UL (ref 0–0.2)
BASOPHILS NFR BLD AUTO: 1 %
BUN SERPL-MCNC: 18.1 MG/DL (ref 6–20)
CALCIUM SERPL-MCNC: 9.2 MG/DL (ref 8.8–10.4)
CHLORIDE SERPL-SCNC: 101 MMOL/L (ref 98–107)
CREAT SERPL-MCNC: 1.34 MG/DL (ref 0.67–1.17)
EGFRCR SERPLBLD CKD-EPI 2021: 72 ML/MIN/1.73M2
EOSINOPHIL # BLD AUTO: 0.2 10E3/UL (ref 0–0.7)
EOSINOPHIL NFR BLD AUTO: 2 %
ERYTHROCYTE [DISTWIDTH] IN BLOOD BY AUTOMATED COUNT: 13.4 % (ref 10–15)
FERRITIN SERPL-MCNC: 258 NG/ML (ref 31–409)
GLUCOSE SERPL-MCNC: 60 MG/DL (ref 70–99)
HCO3 SERPL-SCNC: 24 MMOL/L (ref 22–29)
HCT VFR BLD AUTO: 40.7 % (ref 40–53)
HGB BLD-MCNC: 15 G/DL (ref 13.3–17.7)
IMM GRANULOCYTES # BLD: 0 10E3/UL
IMM GRANULOCYTES NFR BLD: 0 %
IRON BINDING CAPACITY (ROCHE): ABNORMAL
IRON SATN MFR SERPL: ABNORMAL %
IRON SERPL-MCNC: 53 UG/DL (ref 61–157)
LYMPHOCYTES # BLD AUTO: 3 10E3/UL (ref 0.8–5.3)
LYMPHOCYTES NFR BLD AUTO: 45 %
MCH RBC QN AUTO: 33.3 PG (ref 26.5–33)
MCHC RBC AUTO-ENTMCNC: 36.9 G/DL (ref 31.5–36.5)
MCV RBC AUTO: 90 FL (ref 78–100)
MONOCYTES # BLD AUTO: 0.6 10E3/UL (ref 0–1.3)
MONOCYTES NFR BLD AUTO: 9 %
NEUTROPHILS # BLD AUTO: 2.9 10E3/UL (ref 1.6–8.3)
NEUTROPHILS NFR BLD AUTO: 43 %
PLATELET # BLD AUTO: 204 10E3/UL (ref 150–450)
POTASSIUM SERPL-SCNC: 4.3 MMOL/L (ref 3.4–5.3)
RBC # BLD AUTO: 4.5 10E6/UL (ref 4.4–5.9)
SODIUM SERPL-SCNC: 139 MMOL/L (ref 135–145)
VIT B12 SERPL-MCNC: 396 PG/ML (ref 232–1245)
WBC # BLD AUTO: 6.7 10E3/UL (ref 4–11)

## 2025-05-12 PROCEDURE — 99214 OFFICE O/P EST MOD 30 MIN: CPT | Performed by: NURSE PRACTITIONER

## 2025-05-12 PROCEDURE — 3078F DIAST BP <80 MM HG: CPT | Performed by: NURSE PRACTITIONER

## 2025-05-12 PROCEDURE — 1126F AMNT PAIN NOTED NONE PRSNT: CPT | Performed by: NURSE PRACTITIONER

## 2025-05-12 PROCEDURE — 80048 BASIC METABOLIC PNL TOTAL CA: CPT | Performed by: NURSE PRACTITIONER

## 2025-05-12 PROCEDURE — 85025 COMPLETE CBC W/AUTO DIFF WBC: CPT | Performed by: NURSE PRACTITIONER

## 2025-05-12 PROCEDURE — 82728 ASSAY OF FERRITIN: CPT | Performed by: NURSE PRACTITIONER

## 2025-05-12 PROCEDURE — 36415 COLL VENOUS BLD VENIPUNCTURE: CPT | Performed by: NURSE PRACTITIONER

## 2025-05-12 PROCEDURE — 82607 VITAMIN B-12: CPT | Performed by: NURSE PRACTITIONER

## 2025-05-12 PROCEDURE — 3074F SYST BP LT 130 MM HG: CPT | Performed by: NURSE PRACTITIONER

## 2025-05-12 PROCEDURE — 83550 IRON BINDING TEST: CPT | Performed by: NURSE PRACTITIONER

## 2025-05-12 PROCEDURE — 83540 ASSAY OF IRON: CPT | Performed by: NURSE PRACTITIONER

## 2025-05-12 PROCEDURE — G2211 COMPLEX E/M VISIT ADD ON: HCPCS | Performed by: NURSE PRACTITIONER

## 2025-05-12 ASSESSMENT — PAIN SCALES - GENERAL: PAINLEVEL_OUTOF10: NO PAIN (0)

## 2025-05-12 NOTE — PROGRESS NOTES
Assessment & Plan     Anemia, unspecified type  Recheck today with additional labs.  May need hematology e consult.    - Ferritin; Future  - Iron and iron binding capacity; Future  - CBC with platelets and differential; Future  - Ferritin  - Iron and iron binding capacity  - CBC with platelets and differential  - Vitamin B12; Future  - Vitamin B12    Elevated serum creatinine  Recheck today.    - Basic metabolic panel  (Ca, Cl, CO2, Creat, Gluc, K, Na, BUN); Future  - Basic metabolic panel  (Ca, Cl, CO2, Creat, Gluc, K, Na, BUN)    Panhypopituitarism  Followed by endocrinology.    History of craniopharyngioma  Due for MRI in 2026.    Mixed hyperlipidemia  Will work on increasing activity.  Plan on rechecking levels in 3 months.      The longitudinal plan of care for the diagnosis(es)/condition(s) as documented were addressed during this visit. Due to the added complexity in care, I will continue to support Mamadou in the subsequent management and with ongoing continuity of care.      BMI  Estimated body mass index is 28.48 kg/m  as calculated from the following:    Height as of this encounter: 1.829 m (6').    Weight as of this encounter: 95.3 kg (210 lb).         Katarina Cedillo is a 34 year old, presenting for the following health issues:  Follow Up        5/12/2025     1:06 PM   Additional Questions   Roomed by sixto     History of Present Illness       Reason for visit:  Anemia    He eats 2-3 servings of fruits and vegetables daily.He consumes 1 sweetened beverage(s) daily.He exercises with enough effort to increase his heart rate 9 or less minutes per day.  He exercises with enough effort to increase his heart rate 3 or less days per week.   He is taking medications regularly.          Doing well.  Recent visit with oncology survivorship clinic.  Hgb low at that time.  Normal peripheral smear.  Eats a full diet.  He takes a B12 supplement daily.  Has for years.  Was recommended by neurology years ago for help  with focus.  He does not take any NSAIDs regularly.  No PPIs regularly.  No black or bloody stools.          Review of Systems  Constitutional, HEENT, cardiovascular, pulmonary, gi and gu systems are negative, except as otherwise noted.      Objective    /70   Pulse 64   Temp 97.1  F (36.2  C)   Resp 14   Ht 1.829 m (6')   Wt 95.3 kg (210 lb)   SpO2 100%   BMI 28.48 kg/m    Body mass index is 28.48 kg/m .  Physical Exam   GENERAL: alert and no distress  PSYCH: mentation appears normal, affect normal/bright            Signed Electronically by: ADITHYA Posadas CNP

## 2025-05-13 ENCOUNTER — OFFICE VISIT (OUTPATIENT)
Dept: OPHTHALMOLOGY | Facility: CLINIC | Age: 35
End: 2025-05-13
Payer: MEDICARE

## 2025-05-13 DIAGNOSIS — H54.8 LEGAL BLINDNESS: Primary | ICD-10-CM

## 2025-05-13 DIAGNOSIS — H52.223 REGULAR ASTIGMATISM OF BOTH EYES: ICD-10-CM

## 2025-05-13 DIAGNOSIS — D44.4 CRANIOPHARYNGIOMA (H): ICD-10-CM

## 2025-05-13 PROCEDURE — 92015 DETERMINE REFRACTIVE STATE: CPT | Mod: GY

## 2025-05-13 PROCEDURE — G0463 HOSPITAL OUTPT CLINIC VISIT: HCPCS

## 2025-05-13 PROCEDURE — 92081 LIMITED VISUAL FIELD XM: CPT

## 2025-05-13 PROCEDURE — 92133 CPTRZD OPH DX IMG PST SGM ON: CPT

## 2025-05-13 ASSESSMENT — CONF VISUAL FIELD
OD_INFERIOR_TEMPORAL_RESTRICTION: 3
OS_INFERIOR_TEMPORAL_RESTRICTION: 1
OS_INFERIOR_NASAL_RESTRICTION: 1
OD_SUPERIOR_TEMPORAL_RESTRICTION: 3
OS_SUPERIOR_TEMPORAL_RESTRICTION: 1
METHOD: COUNTING FINGERS
OS_SUPERIOR_NASAL_RESTRICTION: 1

## 2025-05-13 ASSESSMENT — REFRACTION_MANIFEST
OD_AXIS: 180
OS_CYLINDER: +0.50
OD_CYLINDER: +1.25
OS_AXIS: 180
OD_SPHERE: +0.00
OS_SPHERE: +0.50

## 2025-05-13 ASSESSMENT — VISUAL ACUITY
OD_SC: 20/250
OS_SC: CF 1'
METHOD: SNELLEN - LINEAR

## 2025-05-13 ASSESSMENT — EXTERNAL EXAM - LEFT EYE: OS_EXAM: NORMAL

## 2025-05-13 ASSESSMENT — REFRACTION_WEARINGRX
SPECS_TYPE: READING GLASSES
OS_SPHERE: +8.00
OD_CYLINDER: SPHERE
OD_SPHERE: +8.00
OS_CYLINDER: SPHERE

## 2025-05-13 ASSESSMENT — SLIT LAMP EXAM - LIDS
COMMENTS: NORMAL
COMMENTS: NORMAL

## 2025-05-13 ASSESSMENT — CUP TO DISC RATIO
OS_RATIO: 0.7
OD_RATIO: 0.5

## 2025-05-13 ASSESSMENT — TONOMETRY
OS_IOP_MMHG: 09
IOP_METHOD: ICARE
OD_IOP_MMHG: 09

## 2025-05-13 ASSESSMENT — EXTERNAL EXAM - RIGHT EYE: OD_EXAM: NORMAL

## 2025-05-13 NOTE — PROGRESS NOTES
Mamadou Reece is a 34 year old male with the following diagnoses:   1. Legal blindness    2. Regular astigmatism of both eyes    3. Craniopharyngioma (H)       Patient was sent for consultation by Eunice Davies, MSN, APRN.    HPI:    Mamadou has a PMHx remarkable for relapsed craniopharyngioma and low vision.    Patient is here with his parents, Peg + Pat.  His sense is that vision has been stable for many years.  Historically, Mamadou has worn +8.00 to read.    He feels his right eye has some peripheral vision, but left eye he is only able to see light.    He has a dome magnifier and uses his iPhone to magnify as well.  He is established with [facility]; he recently trialed a patriot.  White cane user.    Patient is transitioning from pediatrics to long term eye care.      Independent historians:  Patient    Review of outside testing:  MRI Brain O 3/6/24:  IMPRESSION:  1. Postoperative change consisting of a left pterional craniotomy  presumably for resection of the pituitary lesion again noted without  change.  2. Minimal mixed signal intensity, heterogeneously enhancing tissue at  the lateral aspect of the sella again noted without change. No  evidence for tumor progression or recurrence.  3. Small cavernoma in the inferior aspect of the basal ganglia on the  left again noted.  4. Otherwise, normal brain MRI.    Review of outside clinical notes:  Visit with Eunice Davies, MSN, APRN 4/18/25:  ...He also has magnifiers for reading larger print.  He has 20/200 vision in the right eye and only a sliver of light by his nose of the left eye (significant visual field cuts).     Mamadou Reece is a now 33 year old survivor of relapsed craniopharyngioma who is now transitioning care from [facility].  He is here with his parents to establish care.  He has ongoing care with ophthalmology, endocrine, dermatology and primary care.  He had an MRI brain in March that was stable with radiation induced cavernomas seen.  He is having his wisdom teeth  "removed in May and would like help with finding out the regimen for steroids with surgery.  I have reached out to his endocrinologist.  I asked that the family discuss his extensive medical history with the oral surgery office to ensure they are comfortable sedating him in the office with that history.  We will see him back for follow up next year.     -MRI brain last year and repeat in 2 years due to cerebral cavernoma and history of radiation- will do in 2026  -Follow up with dermatology yearly due to BCC; wear sunscreen regularly with SPF 30 or higher- parents will make appt locally  -Neuropsych testing done in 2009; repeat as needed based on cognitive concerns  -Will need life long endocrine follow up due to panhypopituitarism.  Reached out to endocrine to discuss need for action plan for emergencies and surgeries with his adrenal insufficiency.    -Dental follow up every 6 months  -Referral for neuro-ophthalmology for follow up  -Mamadou talked with SW last year about a therapist.    Past medical history:    Patient Active Problem List   Diagnosis    ADD (attention deficit disorder)    Craniopharyngioma (H)    CARDIOVASCULAR SCREENING; LDL GOAL LESS THAN 160    Mood change    Legal blindness    Hypothyroidism    Panhypopituitarism    History of craniopharyngioma    Testosterone deficiency    BCC (basal cell carcinoma), face    Urinary frequency    Adrenal insufficiency    Diabetes insipidus    Growth hormone deficiency         Medications:   acetaminophen  B-12 Tbcr  Calcium + D Tabs  desmopressin Soln  hydrocortisone sodium succinate PF  ibuprofen  levothyroxine  Norditropin FlexPro Sopn  Oxytocin Acetate Powd  Pen Needles 5/16\" Misc  predniSONE  testosterone cypionate  vitamin D      Family history / social history:  Patient's family history includes Alcoholism in his paternal grandfather; Alzheimer Disease in his paternal uncle; Basal cell carcinoma in his father; Cancer in his father; Cerebrovascular Disease " in his paternal grandfather and paternal grandmother; Cerebrovascular Disease (age of onset: 91) in his maternal grandmother; Diabetes in his maternal grandmother and mother; Melanoma in his father; Prostate Cancer in his maternal grandfather and paternal grandfather; Uterine Cancer (age of onset: 60) in his paternal aunt.     Patient  reports that he has never smoked. He has never used smokeless tobacco. He reports that he does not currently use alcohol. He reports that he does not use drugs.       Exam:  Uncorrected distance visual acuity was 20/250 in the right eye and CF 1' in the left eye. Intraocular pressure was 09 in the right eye and 09 in the left eye using ICare.  Color vision 1/11 right eye and 0/11 left eye.  Pupils with left APD.    See complete chart note for anterior segment, fundus, and strabismus exam.    Tests ordered and interpreted today:  OCT RNFL:  Right eye: Average RNFL 31 microns.  Diffuse thinning.  Left eye: Average RNFL 29 microns.  Diffuse thinning.    Kinetic III4e:  Right eye: Central/nasal island.  Left eye: UTT    Assessment/Plan:   Mamadou presents today to establish care in the setting of h/o craniopharyngioma and legal blindness.  Best-corrected visual acuity 20/200 right eye and CF left eye, per patient and parents, this is Mamadou's baseline vision.  Provided polycarbonate distance prescription as well as high plus reading prescription with relieving prism.  Stressed importance of full-time glasses wear for monocular precaution.  Counseled on option of bifocal pending Mamadou's response to full-time glasses wear.    OCT retinal nerve fiber layers significantly thinned bilaterally; patient with known bilateral optic atrophy.  VF with central/nasal island right eye.    Mamadou is content with his habitual low vision devices and aids; counseled on OrCam and OCR.    Follow-up with me every 1-2 years for comprehensive eye exams.    Complete documentation of historical and exam elements from  today's encounter can be found in the full encounter summary report (not reduplicated in this progress note). I personally obtained the chief complaint(s) and history of present illness. I confirmed and edited as necessary the review of systems, past medical/surgical history, family history, social history, and examination findings as documented by others; and I examined the patient myself. I personally reviewed the relevant tests, images, and reports as documented above. I formulated and edited as necessary the assessment and plan and discussed the findings and management plan with the patient and family.    Jazmine Etienne, OD

## 2025-05-13 NOTE — NURSING NOTE
Chief Complaint(s) and History of Present Illness(es)       Vision Changes Ou    In both eyes.  Associated symptoms include Negative for eye pain, headache, flashes and floaters.  Pain was noted as 0/10.             Comments    Mamadou Reece is a 34 year old male sent for consultation by ADITHYA Jesus CNP for craniopharyngioma and low vision.  Mamadou reports minimal light perception vision n the left eye - vision has been this way since he was 6 years old.  He reports poor peripheral vision in the right eye, consistent visual acuity of 20/200 right eye.  No eye pain or headaches.  Mom would like an updated Rx today.  He was seeing a ped ophth (Dr. Brennan) until he retired, has been seeing an optometrist since.  Parents are wondering if he can continue care with an optometrist or if he needs to be seen by an ophthalmologist.     MAURICIO Acevedo 5/13/2025 12:38 PM

## 2025-05-14 ENCOUNTER — DOCUMENTATION ONLY (OUTPATIENT)
Dept: FAMILY MEDICINE | Facility: CLINIC | Age: 35
End: 2025-05-14

## 2025-05-14 DIAGNOSIS — R79.89 ELEVATED SERUM CREATININE: ICD-10-CM

## 2025-05-14 DIAGNOSIS — D64.9 ANEMIA, UNSPECIFIED TYPE: Primary | ICD-10-CM

## 2025-05-14 NOTE — PROGRESS NOTES
Mamadou Kwonner has an upcoming lab appointment:    Future Appointments   Date Time Provider Department Center   5/15/2025  3:00 PM CR RN CRFP CR   5/16/2025  3:00 PM CR LAB CRLABR CR   8/28/2025  1:00 PM Sabine Pedroza, ADITHYA CNP [facility]   4/15/2026  9:30 AM Bianca Menon MD [facility]   4/17/2026  8:35 AM [id number] [facility]   4/17/2026  9:30 AM Eunice Davies APRN CNP [facility]     Patient is scheduled for the following lab(s): per ROYCE    There is no order available. Patient recently had labs done on 5/12/2025.     Please review and place either future orders or HMPO (Review of Health Maintenance Protocol Orders), as appropriate.    If no labs are needed at this time please have the Care Team contact patient regarding this information and cancel lab appointment. Thank you.     There are no preventive care reminders to display for this patient.  Adalgisa Nj

## 2025-05-15 ENCOUNTER — ALLIED HEALTH/NURSE VISIT (OUTPATIENT)
Dept: FAMILY MEDICINE | Facility: CLINIC | Age: 35
End: 2025-05-15
Payer: MEDICARE

## 2025-05-15 DIAGNOSIS — E34.9 TESTOSTERONE DEFICIENCY: Primary | ICD-10-CM

## 2025-05-15 RX ADMIN — TESTOSTERONE CYPIONATE 100 MG: 200 INJECTION, SOLUTION INTRAMUSCULAR at 15:10

## 2025-05-15 NOTE — PROGRESS NOTES
Clinic Administered Medication Documentation  Administrations This Visit       testosterone cypionate (DEPOTESTOSTERONE) injection 100 mg       Admin Date  05/15/2025 Action  $Given Dose  100 mg Route  Intramuscular Site  Right Ventrogluteal Documented By  ParisaJael RN    NDC: 1853-4114-95    Lot#: [id number]    : HOSPIRA    Patient Supplied?: Yes                   Testosterone Documentation     Was this medication supplied by the patient? Yes, medication was received directly from patient in a tamper proof bag (follow site specific policies)     Prior to injection, verified patient identity using patient's name and date of birth. Medication was administered. Please see MAR and medication order for additional information. Patient instructed to remain in clinic for 15 minutes and report any adverse reaction to staff immediately but patient declined.  Vial/Syringe: Single dose vial. Was entire vial of medication used? No, The remainder 100MG of 200MG was discarded as unavoidable waste.  Verified that the patient has 2 refills remaining in their prescription.     Jael TRINIDAD RN  [facility]

## 2025-05-21 ENCOUNTER — RESULTS FOLLOW-UP (OUTPATIENT)
Dept: FAMILY MEDICINE | Facility: CLINIC | Age: 35
End: 2025-05-21

## 2025-05-21 DIAGNOSIS — D64.9 ANEMIA, UNSPECIFIED TYPE: ICD-10-CM

## 2025-05-21 DIAGNOSIS — R79.89 ELEVATED SERUM CREATININE: Primary | ICD-10-CM

## 2025-05-29 ENCOUNTER — ALLIED HEALTH/NURSE VISIT (OUTPATIENT)
Dept: FAMILY MEDICINE | Facility: CLINIC | Age: 35
End: 2025-05-29
Payer: MEDICARE

## 2025-05-29 DIAGNOSIS — E34.9 TESTOSTERONE DEFICIENCY: Primary | ICD-10-CM

## 2025-05-29 RX ADMIN — TESTOSTERONE CYPIONATE 100 MG: 200 INJECTION, SOLUTION INTRAMUSCULAR at 15:21

## 2025-05-29 NOTE — PROGRESS NOTES
Administrations This Visit       testosterone cypionate (DEPOTESTOSTERONE) injection 100 mg       Admin Date  05/29/2025 Action  $Given Dose  100 mg Route  Intramuscular Site  Left Ventrogluteal Documented By  Sobia Roman RN    NDC: 0566-6092-28    Lot#: [id number]    : HOSPIRA    Patient Supplied?: No                    Clinic Administered Medication Documentation      Testosterone Documentation     Was this medication supplied by the patient? Yes, medication was received directly from patient in a tamper proof bag (follow site specific policies)   .    Prior to injection, verified patient identity using patient's name and date of birth. Medication was administered. Please see MAR and medication order for additional information. Patient instructed to remain in clinic for 15 minutes and report any adverse reaction to staff immediately but patient declined.  Vial/Syringe: Single dose vial. Was entire vial of medication used? No, The remainder 0.5ML of 1.0ML was discarded as unavoidable waste.  Verified that the patient has refills remaining in their prescription.    KAMILLE Aguilera RN  [facility]

## 2025-06-12 ENCOUNTER — ALLIED HEALTH/NURSE VISIT (OUTPATIENT)
Dept: FAMILY MEDICINE | Facility: CLINIC | Age: 35
End: 2025-06-12
Payer: MEDICARE

## 2025-06-12 DIAGNOSIS — E34.9 TESTOSTERONE DEFICIENCY: ICD-10-CM

## 2025-06-12 DIAGNOSIS — E34.9 TESTOSTERONE DEFICIENCY: Primary | ICD-10-CM

## 2025-06-12 RX ORDER — TESTOSTERONE CYPIONATE 200 MG/ML
100 INJECTION, SOLUTION INTRAMUSCULAR
Status: ACTIVE | OUTPATIENT
Start: 2025-06-26 | End: 2025-09-17

## 2025-06-12 RX ORDER — TESTOSTERONE CYPIONATE 200 MG/ML
100 INJECTION, SOLUTION INTRAMUSCULAR
Qty: 1 ML | Refills: 5 | Status: SHIPPED | OUTPATIENT
Start: 2025-06-12

## 2025-06-12 RX ADMIN — TESTOSTERONE CYPIONATE 100 MG: 200 INJECTION, SOLUTION INTRAMUSCULAR at 15:03

## 2025-06-12 NOTE — PROGRESS NOTES
Clinic Administered Medication Documentation  Administrations This Visit       testosterone cypionate (DEPOTESTOSTERONE) injection 100 mg       Admin Date  06/12/2025 Action  $Given Dose  100 mg Route  Intramuscular Site  Right Ventrogluteal Documented By  ParisaJael RN    NDC: 1170-1574-20    Lot#: [id number]    : HOSPIRA    Patient Supplied?: Yes                Testosterone Documentation     Was this medication supplied by the patient? Yes, medication was received directly from patient in a tamper proof bag (follow site specific policies).    Prior to injection, verified patient identity using patient's name and date of birth. Medication was administered. Please see MAR and medication order for additional information. Patient instructed to remain in clinic for 15 minutes and report any adverse reaction to staff immediately but patient declined.  Vial/Syringe: Single dose vial. Was entire vial of medication used? No, The remainder 100MG of 200MG was discarded as unavoidable waste.  Patient has no refills remaining. Refill encounter opened, order pended and Routed to the provider.    Jael TRINIDAD RN  [facility]

## 2025-06-12 NOTE — TELEPHONE ENCOUNTER
Routing to Bianca Menon MD-  orders pended. Please review and sign. Thanks!    RN notes patient has no further refills of Testosterone on file, CAM order has also  today.   -Next injection due 25  -Last testosterone lab drawn 25    Component      Latest Ref Rng 2025  10:04 AM   Testosterone Total      240 - 950 ng/dL 733      Jael TRINIDAD RN  [facility]

## 2025-06-13 ENCOUNTER — TELEPHONE (OUTPATIENT)
Dept: FAMILY MEDICINE | Facility: CLINIC | Age: 35
End: 2025-06-13
Payer: MEDICARE

## 2025-06-13 NOTE — TELEPHONE ENCOUNTER
Forms/Letter Request    Type of form/letter: OTHER:   [employer]   Standing Medication Orders     Do we have the form/letter: Yes:  in basket     Who is the form from? [employer]      Where did/will the form come from? form was faxed in    When is form/letter needed by: ASAP     How would you like the form/letter returned: Fax : [phone]    Patient Notified form requests are processed in 5-7 business days:No    Could we send this information to you in [place] or would you prefer to receive a phone call?:   No preference   Okay to leave a detailed message?: No at Other phone number:   Fax : [phone]    Brandt Robin  Patient Representative  [facility]

## 2025-06-15 ENCOUNTER — MEDICAL CORRESPONDENCE (OUTPATIENT)
Dept: HEALTH INFORMATION MANAGEMENT | Facility: CLINIC | Age: 35
End: 2025-06-15
Payer: MEDICARE

## 2025-06-16 NOTE — TELEPHONE ENCOUNTER
Placed form in provider's basket for review and signature.     Nubia Nj  Lead   [facility]

## 2025-06-23 ENCOUNTER — RESULTS FOLLOW-UP (OUTPATIENT)
Dept: FAMILY MEDICINE | Facility: CLINIC | Age: 35
End: 2025-06-23

## 2025-06-24 DIAGNOSIS — E23.0 GROWTH HORMONE DEFICIENCY: ICD-10-CM

## 2025-06-24 RX ORDER — SOMATROPIN 10 MG/1.5ML
0.7 INJECTION, SOLUTION SUBCUTANEOUS DAILY
Qty: 9 ML | Refills: 5 | Status: SHIPPED | OUTPATIENT
Start: 2025-06-24

## 2025-06-24 RX ORDER — SOMATROPIN 10 MG/1.5ML
INJECTION, SOLUTION SUBCUTANEOUS
Qty: 9 ML | Refills: 3 | Status: SHIPPED | OUTPATIENT
Start: 2025-06-24

## 2025-06-26 ENCOUNTER — ALLIED HEALTH/NURSE VISIT (OUTPATIENT)
Dept: FAMILY MEDICINE | Facility: CLINIC | Age: 35
End: 2025-06-26
Payer: MEDICARE

## 2025-06-26 DIAGNOSIS — E34.9 TESTOSTERONE DEFICIENCY: Primary | ICD-10-CM

## 2025-06-26 RX ADMIN — TESTOSTERONE CYPIONATE 100 MG: 200 INJECTION, SOLUTION INTRAMUSCULAR at 15:07

## 2025-06-26 NOTE — PROGRESS NOTES
Clinic Administered Medication Documentation  Administrations This Visit       testosterone cypionate (DEPOTESTOSTERONE) injection 100 mg       Admin Date  06/26/2025 Action  $Given Dose  100 mg Route  Intramuscular Site  Left Ventrogluteal Documented By  ParisaJael RN    NDC: 7438-3221-31    Lot#: [id number]    : HOSPIRA    Patient Supplied?: Yes                Testosterone Documentation     Was this medication supplied by the patient? Yes, medication was received directly from patient in a tamper proof bag (follow site specific policies).    Prior to injection, verified patient identity using patient's name and date of birth. Medication was administered. Please see MAR and medication order for additional information. Patient instructed to remain in clinic for 15 minutes and report any adverse reaction to staff immediately but patient declined.  Vial/Syringe: Single dose vial. Was entire vial of medication used? No, The remainder 100MG of 200MG was discarded as unavoidable waste.  Verified that the patient has 5 refills remaining in their prescription.     Jael TRINIDAD RN  [facility]

## 2025-07-07 ENCOUNTER — TRANSFERRED RECORDS (OUTPATIENT)
Dept: HEALTH INFORMATION MANAGEMENT | Facility: CLINIC | Age: 35
End: 2025-07-07
Payer: MEDICARE

## 2025-07-10 ENCOUNTER — ALLIED HEALTH/NURSE VISIT (OUTPATIENT)
Dept: FAMILY MEDICINE | Facility: CLINIC | Age: 35
End: 2025-07-10
Payer: MEDICARE

## 2025-07-10 DIAGNOSIS — E34.9 TESTOSTERONE DEFICIENCY: Primary | ICD-10-CM

## 2025-07-10 RX ADMIN — TESTOSTERONE CYPIONATE 100 MG: 200 INJECTION, SOLUTION INTRAMUSCULAR at 15:07

## 2025-07-10 NOTE — PROGRESS NOTES
Clinic Administered Medication Documentation    Administrations This Visit       testosterone cypionate (DEPOTESTOSTERONE) injection 100 mg       Admin Date  07/10/2025 Action  $Given Dose  100 mg Route  Intramuscular Site  Right Ventrogluteal Documented By  ParisaJael RN    NDC: 2817-0830-01    Lot#: [id number]    : HOSPIRA    Patient Supplied?: Yes                Testosterone Documentation     Was this medication supplied by the patient? Yes, medication was received directly from patient in a tamper proof bag (follow site specific policies).    Prior to injection, verified patient identity using patient's name and date of birth. Medication was administered. Please see MAR and medication order for additional information. Patient instructed to remain in clinic for 15 minutes and report any adverse reaction to staff immediately but patient declined.  Vial/Syringe: Single dose vial. Was entire vial of medication used? No, The remainder 100MG of 200MG was discarded as unavoidable waste.  Verified that the patient has 4 refills remaining in their prescription.     Jael TRINIDAD RN  [facility]

## 2025-07-11 DIAGNOSIS — E23.2 DIABETES INSIPIDUS: ICD-10-CM

## 2025-07-11 RX ORDER — DESMOPRESSIN ACETATE 0.1 MG/ML
SOLUTION NASAL
Qty: 10 ML | Refills: 0 | OUTPATIENT
Start: 2025-07-11

## 2025-07-11 NOTE — TELEPHONE ENCOUNTER
Called patient, left voicemail for call back to any triage nurse.     [facility] sent advising pt to contact prescribing provider for refill request.    Barby Simeon RN on 7/11/2025 at 2:24 PM

## 2025-07-14 ENCOUNTER — MYC MEDICAL ADVICE (OUTPATIENT)
Dept: ENDOCRINOLOGY | Facility: CLINIC | Age: 35
End: 2025-07-14
Payer: MEDICARE

## 2025-07-14 DIAGNOSIS — E23.2 DIABETES INSIPIDUS: ICD-10-CM

## 2025-07-14 RX ORDER — DESMOPRESSIN ACETATE 0.1 MG/ML
SOLUTION NASAL
Qty: 10 ML | Refills: 0 | OUTPATIENT
Start: 2025-07-14

## 2025-07-14 RX ORDER — DESMOPRESSIN ACETATE 0.1 MG/ML
SOLUTION NASAL
Qty: 10 ML | Refills: 1 | Status: SHIPPED | OUTPATIENT
Start: 2025-07-14

## 2025-07-14 NOTE — TELEPHONE ENCOUNTER
Desmopressin was filled today - 7/14/25 by Dr. Sinan Valenzuela.     Writer called [facility] to confirm to discontinue Losartan-hctz off patient's med list.  Patient will be taking Losartan 50 mg daily only.

## 2025-07-22 ENCOUNTER — TELEPHONE (OUTPATIENT)
Dept: ENDOCRINOLOGY | Facility: CLINIC | Age: 35
End: 2025-07-22
Payer: MEDICARE

## 2025-07-22 NOTE — TELEPHONE ENCOUNTER
Patient's mom called the pharmacy to get a refill on his Oxytocin but [facility] is stating they don't cover the medication and have never covered it before, even though it was covered in June and early July. They informed her that since it's a non formulary medication, they need a PA before it can be filled. She's trying to get the medication before they leave on vacation on August 1st and is hoping this can be taken care of ASAP. She would like a call back at [phone] with an update.     Julito Loving MTWINSTON

## 2025-07-22 NOTE — TELEPHONE ENCOUNTER
Called and spoke to Dana to deliver message. Request Dana call insurance to request a vacation override or could pay out of pocket.     Called and spoke to [facility] pharmacy at ([phone]), they note that medication does not required a PA but instead is a refill too soon until 8/1, notes that earliest that they could get the medication to the patient would be the following Monday. They note that they did try calling the insurance for a vacation override and this was denied.     Ling Agustin), PharmD  Endocrine & Diabetes Medication Therapy Management Pharmacist   [address] [place] [id number]     Contact information:   To schedule a MTM appointment: [phone]  For questions or concerns, please send a [employer] message or call the clinic at [phone].    For more urgent concerns that do not require 911, please call [phone] after hours/weekends and ask to speak with the Endocrinologist on call.

## 2025-07-22 NOTE — TELEPHONE ENCOUNTER
Called back and spoke with Dana, they were able to talk with the insurance and the insurance told them that they were able to request/process a vacation override Sunday 7/27 thus recommended patient call compounding pharmacy to confirm that this would give them enough time to process and deliver the order.     Ling [facility]), PharmD  Endocrine & Diabetes Medication Therapy Management Pharmacist   [address] [place] [id number]     Contact information:   To schedule a MTM appointment: [phone]  For questions or concerns, please send a [employer] message or call the clinic at [phone].    For more urgent concerns that do not require 470, please call [phone] after hours/weekends and ask to speak with the Endocrinologist on call.

## 2025-07-24 ENCOUNTER — ALLIED HEALTH/NURSE VISIT (OUTPATIENT)
Dept: FAMILY MEDICINE | Facility: CLINIC | Age: 35
End: 2025-07-24
Payer: MEDICARE

## 2025-07-24 DIAGNOSIS — E34.9 TESTOSTERONE DEFICIENCY: Primary | ICD-10-CM

## 2025-07-24 RX ADMIN — TESTOSTERONE CYPIONATE 100 MG: 200 INJECTION, SOLUTION INTRAMUSCULAR at 15:24

## 2025-07-24 NOTE — PROGRESS NOTES
Clinic Administered Medication Documentation  Administrations This Visit       testosterone cypionate (DEPOTESTOSTERONE) injection 100 mg       Admin Date  07/24/2025 Action  $Given Dose  100 mg Route  Intramuscular Site  Left Ventrogluteal Documented By  Rylie Urias RN    NDC: 6915-0800-49    Lot#: [id number]    : HOSPIRA    Patient Supplied?: Yes                      Testosterone Documentation     Was this medication supplied by the patient? Yes, medication was received directly from patient in a tamper proof bag (follow site specific policies)   .    Prior to injection, verified patient identity using patient's name and date of birth. Medication was administered. Please see MAR and medication order for additional information. Patient instructed to remain in clinic for 15 minutes, remain in clinic for 15 minutes and report any adverse reaction to staff immediately but patient declined, and report any adverse reaction to staff immediately .  Vial/Syringe: Single dose vial. Was entire vial of medication used? No, The remainder 100MG of 200MG was discarded as unavoidable waste.  Verified that the patient has 3 refills remaining in their prescription.    Rylie ESPAÑA RN  [facility]      
0 = swallows foods/liquids without difficulty

## 2025-07-28 ENCOUNTER — TELEPHONE (OUTPATIENT)
Dept: ENDOCRINOLOGY | Facility: CLINIC | Age: 35
End: 2025-07-28
Payer: MEDICARE

## 2025-07-28 NOTE — TELEPHONE ENCOUNTER
[facility] Call Center    Phone Message    May a detailed message be left on voicemail: yes     Reason for Call: Medication Question or concern regarding medication   Prescription Clarification  Name of Medication:   Oxytocin    Prescribing Provider: Dr. Menon   Pharmacy: [facility]   What on the order needs clarification? Pt's mother Dana requesting the clinic call gillian meyers at [phone] to give them verbal coverage determination to fill this script in all forms of it early for the pt due the pt going out of town. Pt's ID number is [id number]. Pt's mother can be called with any questions

## 2025-07-29 ENCOUNTER — VIRTUAL VISIT (OUTPATIENT)
Dept: PHARMACY | Facility: CLINIC | Age: 35
End: 2025-07-29
Attending: INTERNAL MEDICINE
Payer: MEDICARE

## 2025-07-29 DIAGNOSIS — E34.9 TESTOSTERONE DEFICIENCY: ICD-10-CM

## 2025-07-29 DIAGNOSIS — E23.0 PANHYPOPITUITARISM: Primary | ICD-10-CM

## 2025-07-29 DIAGNOSIS — E27.40 ADRENAL INSUFFICIENCY: ICD-10-CM

## 2025-07-29 NOTE — PROGRESS NOTES
Medication Therapy Management (MTM) Encounter    ASSESSMENT:                            Medication Adherence/Access: Will follow up with pharmacy regarding oxytocin and if this can be filled sooner before patient goes on trip, seems that this would be okay if not able to get before trip but will verify with Dr. Menon. PA has been started for medication powder coverage through insurance and is pending at this time.    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 Menon with patient's experiences while taking oxytocin      Testosterone Deficiency: Will connect with [facility] to see if they would be able to order testosterone for patient instead of pharmacy to help with convenience.     Adrenal insufficiency: Educated on hydrocortisone IM use and sick day dosing, patient notes that current prednisone dose is effective     PLAN:                            I will follow up with [facility] regarding testosterone ordering     I will look into oxytocin coverage before the trip, will verify with Dr. Menon if this is okay to go without during trip if not able to get    3. SICK DAY RULES FOR PATIENTS ON STEROID REPLACEMENT:      Mild illness (cold without fever; routine physical training; psychological stress like a job interview or exam)   No adjustment required.  You can take your regular dose of prednisone     Moderate illness: cold/cough or other infection with fever over 100.4 Fahrenheit; 1 episode of vomiting; 1-2 episodes of diarrhea)   Double the dose of steroid during the period of illness     Major illness: (cough or infection with significant fever, diarrhea/vomiting and able to keep down tablets)   Triple your dose of steroid during the period of illness.     Vomiting, severe diarrhea:   Repeat the dose if you vomit within 1 hour of taking medication.  If you vomit again, have another episode of diarrhea, or are unable to keep fluids down, give the intramuscular injection of hydrocortisone. Call 911 after  giving the injection or go to the nearest emergency room.     Severe physical trauma (major injury with substantial blood loss, fracture, or shock), signs or symptoms of adrenal crisis or unconsciousness   Give the intramuscular injection of hydrocortisone.  Call 911 after giving the injection or go to the nearest emergency room.      Follow-up: Via My Chart after able to discuss with [facility], Oxytocin coverage, and consulting with Dr. Menon    SUBJECTIVE/OBJECTIVE:                          Mamadou Reece is a 34 year old male called for a follow-up visit. Patient was accompanied by beth Cortez.    Reason for visit: Medication follow up.    Allergies/ADRs: Reviewed in chart  Past Medical History: Reviewed in chart  Tobacco: He reports that he has never smoked. He has never used smokeless tobacco.  Alcohol: Reviewed in chart     Medication Adherence/Access: Most recently patient has been struggling to get oxytocin approved before vacation, has been told multiple things by insurance and pharmacy. When beth Cortez was talking to insurance most recently they were told that they approved sterile water but not the medication powder. Declines any issues with access to any other medications.     Panhypopituitarism:  Patient has been on oxytocin since June, they have not noticed any noticeable change to mood per patient reports, beth Cortez thinks that patient may be more happier since starting but nothing significant.     Testosterone Deficiency:   Patient has been picking testosterone up at the pharmacy then getting administered in the clinic every 2 weeks, patient notes that it would be more convenient if they could have the clinic order instead.     Adrenal insufficiency:   Patient continues to take prednisone 3mg in AM and 2mg in PM - patient notes that this dose has been effective, beth Cortez wondering what hydrocortisone injection should be used for.    Today's Vitals: There were no vitals taken  for this visit.  ----------------    I spent 23 minutes with this patient today. All changes were made via collaborative practice agreement with Bianca Menon.     A summary of these recommendations was sent via [employer].    Ling Agustin), PharmD  Endocrine & Diabetes Medication Therapy Management Pharmacist   [address] [place] [id number]     Contact information:   To schedule a MTM appointment: [phone]  For questions or concerns, please send a [employer] message or call the clinic at [phone].    For more urgent concerns that do not require 708, please call [phone] after hours/weekends and ask to speak with the Endocrinologist on call.      Telemedicine Visit Details  The patient's medications can be safely assessed via a telemedicine encounter.  Type of service:  Telephone visit  Originating Location (pt. Location): Home    Distant Location (provider location):  Off-site  Start Time: 1:01 PM  End Time: 1:24 PM     Medication Therapy Recommendations  No medication therapy recommendations to display

## 2025-08-21 ENCOUNTER — ALLIED HEALTH/NURSE VISIT (OUTPATIENT)
Dept: FAMILY MEDICINE | Facility: CLINIC | Age: 35
End: 2025-08-21
Payer: MEDICARE

## 2025-08-21 DIAGNOSIS — E34.9 TESTOSTERONE DEFICIENCY: Primary | ICD-10-CM

## 2025-08-21 RX ADMIN — TESTOSTERONE CYPIONATE 100 MG: 200 INJECTION, SOLUTION INTRAMUSCULAR at 15:10

## 2025-08-28 ENCOUNTER — OFFICE VISIT (OUTPATIENT)
Dept: FAMILY MEDICINE | Facility: CLINIC | Age: 35
End: 2025-08-28
Attending: NURSE PRACTITIONER
Payer: MEDICARE

## 2025-08-28 VITALS
RESPIRATION RATE: 16 BRPM | WEIGHT: 217.9 LBS | SYSTOLIC BLOOD PRESSURE: 102 MMHG | BODY MASS INDEX: 29.51 KG/M2 | DIASTOLIC BLOOD PRESSURE: 68 MMHG | HEART RATE: 74 BPM | HEIGHT: 72 IN | OXYGEN SATURATION: 99 %

## 2025-08-28 DIAGNOSIS — Z00.00 ENCOUNTER FOR MEDICARE ANNUAL WELLNESS EXAM: ICD-10-CM

## 2025-08-28 DIAGNOSIS — R79.89 ELEVATED SERUM CREATININE: Primary | ICD-10-CM

## 2025-08-28 SDOH — HEALTH STABILITY: PHYSICAL HEALTH: ON AVERAGE, HOW MANY MINUTES DO YOU ENGAGE IN EXERCISE AT THIS LEVEL?: 20 MIN

## 2025-08-28 SDOH — HEALTH STABILITY: PHYSICAL HEALTH: ON AVERAGE, HOW MANY DAYS PER WEEK DO YOU ENGAGE IN MODERATE TO STRENUOUS EXERCISE (LIKE A BRISK WALK)?: 2 DAYS

## 2025-08-28 ASSESSMENT — PAIN SCALES - GENERAL: PAINLEVEL_OUTOF10: NO PAIN (0)

## 2025-09-04 ENCOUNTER — ALLIED HEALTH/NURSE VISIT (OUTPATIENT)
Dept: FAMILY MEDICINE | Facility: CLINIC | Age: 35
End: 2025-09-04
Payer: MEDICARE

## 2025-09-04 DIAGNOSIS — E34.9 TESTOSTERONE DEFICIENCY: Primary | ICD-10-CM

## 2025-09-04 RX ADMIN — TESTOSTERONE CYPIONATE 100 MG: 200 INJECTION, SOLUTION INTRAMUSCULAR at 15:19

## 2025-12-17 ENCOUNTER — TELEPHONE (OUTPATIENT)
Dept: ENDOCRINOLOGY | Facility: CLINIC | Age: 35
End: 2025-12-17
Payer: MEDICARE